# Patient Record
Sex: MALE | Race: WHITE | NOT HISPANIC OR LATINO | Employment: OTHER | URBAN - METROPOLITAN AREA
[De-identification: names, ages, dates, MRNs, and addresses within clinical notes are randomized per-mention and may not be internally consistent; named-entity substitution may affect disease eponyms.]

---

## 2019-10-22 ENCOUNTER — HISTORICAL (OUTPATIENT)
Dept: RADIOLOGY | Facility: HOSPITAL | Age: 74
End: 2019-10-22

## 2020-04-22 ENCOUNTER — HOSPITAL ENCOUNTER (OUTPATIENT)
Dept: MEDSURG UNIT | Facility: HOSPITAL | Age: 75
End: 2020-04-23
Attending: INTERNAL MEDICINE | Admitting: INTERNAL MEDICINE

## 2020-04-22 LAB
ABS NEUT (OLG): 9.8 X10(3)/MCL (ref 2.1–9.2)
ALBUMIN SERPL-MCNC: 3.8 GM/DL (ref 3.4–4.8)
ALBUMIN/GLOB SERPL: 1 RATIO (ref 1.1–2)
ALP SERPL-CCNC: 79 UNIT/L (ref 40–150)
ALT SERPL-CCNC: 18 UNIT/L (ref 0–55)
APPEARANCE, UA: ABNORMAL
AST SERPL-CCNC: 16 UNIT/L (ref 5–34)
BACTERIA SPEC CULT: ABNORMAL /HPF
BASOPHILS # BLD AUTO: 0 X10(3)/MCL (ref 0–0.2)
BASOPHILS NFR BLD AUTO: 0 %
BILIRUB SERPL-MCNC: 3.7 MG/DL
BILIRUB UR QL STRIP: NEGATIVE
BILIRUBIN DIRECT+TOT PNL SERPL-MCNC: 0.4 MG/DL (ref 0–0.5)
BILIRUBIN DIRECT+TOT PNL SERPL-MCNC: 3.3 MG/DL (ref 0–0.8)
BUN SERPL-MCNC: 17 MG/DL (ref 8.4–25.7)
CALCIUM SERPL-MCNC: 9.1 MG/DL (ref 8.8–10)
CHLORIDE SERPL-SCNC: 104 MMOL/L (ref 98–107)
CO2 SERPL-SCNC: 25 MMOL/L (ref 23–31)
COLOR UR: ABNORMAL
CREAT SERPL-MCNC: 1.03 MG/DL (ref 0.73–1.18)
EOSINOPHIL # BLD AUTO: 0 X10(3)/MCL (ref 0–0.9)
EOSINOPHIL NFR BLD AUTO: 0 %
ERYTHROCYTE [DISTWIDTH] IN BLOOD BY AUTOMATED COUNT: 13.2 % (ref 11.5–17)
GLOBULIN SER-MCNC: 3.8 GM/DL (ref 2.4–3.5)
GLUCOSE (UA): NEGATIVE
GLUCOSE SERPL-MCNC: 107 MG/DL (ref 82–115)
HCT VFR BLD AUTO: 45.9 % (ref 42–52)
HGB BLD-MCNC: 14.5 GM/DL (ref 14–18)
HGB UR QL STRIP: ABNORMAL
KETONES UR QL STRIP: NEGATIVE
LEUKOCYTE ESTERASE UR QL STRIP: ABNORMAL
LYMPHOCYTES # BLD AUTO: 1.4 X10(3)/MCL (ref 0.6–4.6)
LYMPHOCYTES NFR BLD AUTO: 11 %
MAGNESIUM SERPL-MCNC: 2.25 MG/DL (ref 1.6–2.6)
MCH RBC QN AUTO: 29.8 PG (ref 27–31)
MCHC RBC AUTO-ENTMCNC: 31.6 GM/DL (ref 33–36)
MCV RBC AUTO: 94.3 FL (ref 80–94)
MONOCYTES # BLD AUTO: 1.3 X10(3)/MCL (ref 0.1–1.3)
MONOCYTES NFR BLD AUTO: 10 %
NEUTROPHILS # BLD AUTO: 9.8 X10(3)/MCL (ref 2.1–9.2)
NEUTROPHILS NFR BLD AUTO: 78 %
NITRITE UR QL STRIP: NEGATIVE
PH UR STRIP: 6.5 [PH] (ref 5–9)
PLATELET # BLD AUTO: 157 X10(3)/MCL (ref 130–400)
PMV BLD AUTO: 9.7 FL (ref 9.4–12.4)
POC TROPONIN: 0.03 NG/ML (ref 0–0.08)
POTASSIUM SERPL-SCNC: 4.1 MMOL/L (ref 3.5–5.1)
PROT SERPL-MCNC: 7.6 GM/DL (ref 5.8–7.6)
PROT UR QL STRIP: ABNORMAL
RBC # BLD AUTO: 4.87 X10(6)/MCL (ref 4.7–6.1)
RBC #/AREA URNS HPF: ABNORMAL /HPF
SARS-COV-2 RNA RESP QL NAA+PROBE: NOT DETECTED
SODIUM SERPL-SCNC: 137 MMOL/L (ref 136–145)
SP GR UR STRIP: 1.02 (ref 1–1.03)
SQUAMOUS EPITHELIAL, UA: ABNORMAL
TROPONIN I SERPL-MCNC: 0.02 NG/ML (ref 0–0.04)
TROPONIN I SERPL-MCNC: 0.02 NG/ML (ref 0–0.04)
UA WBC MAN: >200 /HPF
UROBILINOGEN UR STRIP-ACNC: 2
WBC # SPEC AUTO: 12.6 X10(3)/MCL (ref 4.5–11.5)

## 2020-04-23 LAB — TROPONIN I SERPL-MCNC: 0.02 NG/ML (ref 0–0.04)

## 2022-04-30 NOTE — ED PROVIDER NOTES
Patient:   Madan Elder             MRN: [id number]            FIN: [id number]               Age:   74 years     Sex:  Male     :  1945   Associated Diagnoses:   Chest pain   Author:   Bienvenido MANZANARES, Ania Washburn      Basic Information   Time seen: Date & time 2020 10:57:00.   History source: Patient, EMS.   Arrival mode: Ambulance.   History limitation: hard of hearing.   Additional information: Chief Complaint from Nursing Triage Note : Chief Complaint   2020 10:56 CDT      Chief Complaint           pt to ER via [facility] for CP and SOB.  started yesterday.  possible covid exposure and elderly apartments he resides at per EMS.  hx pacer  .   Provider/Visit info:    No qualifying data available.   .   History of Present Illness   The patient presents with chest pain.  The onset was 1  days ago.  The course/duration of symptoms is fluctuating in intensity.  Location: Substernal chest. Radiating pain: none. The character of symptoms is pressure.  The degree at onset was moderate.  The degree at maximum was moderate.  The degree at present is none.  The relieving factor is none.  Associated symptoms: shortness of breath and cough attributed to allergies, improved with antihistamine.  75 yo M history of HTN, HLD, PM presents for chest pressure, intermittent since last night with SOB. Currently CP free. No fever. Lives in an apartment complex with neighbors who have COVID-19, however patient has not been out of his apartment and has not had visitors. Limited history as he is hard of hearing. History obtained by writing  questions on paper and from daughter who spoke to patient on the phone..        Review of Systems   Constitutional symptoms:  No fever,    Skin symptoms:  No rash,    Eye symptoms:  No recent vision problems,    ENMT symptoms:  No sore throat,    Respiratory symptoms:  Shortness of breath, cough.    Cardiovascular symptoms:  Chest pain, central, moderate pain, intermittent, pressure,  No syncope,    Gastrointestinal symptoms:  No abdominal pain, no nausea, no vomiting, no diarrhea, no rectal bleeding.    Genitourinary symptoms   Neurologic symptoms:  No headache, no altered level of consciousness.       Health Status   Allergies:    Allergic Reactions (Selected)  No Known Allergies,    Allergies (1) Active Reaction  No Known Allergies None Documented  .   Medications:  (Selected)   Prescriptions  Prescribed  DuoNeb 0.5 mg-2.5 mg/3 mL inhalation solution: 3 mL, NEB, q4hr Resp, PRN PRN shortness of breath or wheezing, # 540 mL, 0 Refill(s)  Lasix 20 mg oral tablet: 20 mg = 1 tab(s), Oral, Daily, # 30 tab(s), 0 Refill(s)  folic acid 1 mg oral tablet: 1 mg = 1 tab(s), Oral, Daily, # 30 tab(s), 0 Refill(s)  Documented Medications  Documented  atorvastatin 10 mg oral tablet: 10 mg = 1 tab(s), Oral, Daily  carvedilol 6.25 mg oral tablet: 6.25 mg = 1 tab(s), Oral, BID  lisinopril 5 mg oral tablet: 5 mg = 1 tab(s), Oral, Daily, 0 Refill(s)  thiamine 100 mg oral tablet: 1 tab, Oral, Daily, 0 Refill(s).      Past Medical/ Family/ Social History   Medical history:    Resolved  Essential hypertension (66112833):  Resolved.  Cardiomyopathy ([id number]):  Resolved.  Valvular heart disease ([id number]):  Resolved..   Surgical history:    Shunt Ventriculo Peritoneal (.) on 9/24/2019 at 73 Years.  Comments:  9/24/2019 15:24 Charmaine Manley RN  auto-populated from documented surgical case  Laparoscopic Shunt Ventriculo Peritoneal (.) on 9/24/2019 at 73 Years.  Comments:  9/24/2019 15:24 Charmaine Manley RN  auto-populated from documented surgical case  Appendectomy; ([id number]).  Cholecystectomy ([id number]).  Exploratory laparotomy ([id number])..   Family history:    Cardiac arrhythmia.  Mother  Father  .   Social history:    Social & Psychosocial Habits    Alcohol  10/10/2017  Use: Past    Type: Liquor    Frequency: Daily    Has alcohol use interfered with work or home life? Yes    Do you ever  drink more than intended? Yes    Has anyone been hurt or at risk by your drinking? No    Ready to change: No    Concerns about alcohol use in household: No    Comment: according to patients daughter, who is 50 years of age, patient has been drinking a fifth to a 1/2 galloon of vodka her whole life. - 05/17/2017 00:16 - Isak COHEN, Abdirashid WALLS    09/18/2019  Use: Past    Comment: pt denies. pt daughter states she believes he was drinking last night. - 09/18/2019 10:27 - Maricarmen Yeung RN    09/18/2019  Use: Current    Type: Liquor    Frequency: Daily    Has alcohol use interfered with work or home life? Yes    Has anyone been hurt or at risk by your drinking? No    Concerns about alcohol use in household: Yes    Comment: drinks vodka but hides it from his daughter - 09/18/2019 14:38 - Karen COHEN, Darby GRAHAM    Substance Use  05/13/2015  Use: Never    Tobacco  10/10/2017  Use: Former smoker    Type: Cigarettes    Started at age: 15.0 Years    Comment: quit in 1966 - 10/10/2017 13:57 - Yoselin Ho RN    09/18/2019  Use: Former smoker, quit more    Patient Wants Consult For Cessation Counseling No    09/24/2019  Use: Former smoker, quit more    Type: Cigarettes    Patient Wants Consult For Cessation Counseling No    Concerns about tobacco use in household: No    Abuse/Neglect  09/24/2019  SHX Any signs of abuse or neglect No    Feels unsafe at home: No    Safe place to go: Yes  .      Physical Examination               Vital Signs   Vital Signs   4/22/2020 11:00 CDT      Peripheral Pulse Rate     79 bpm                             Respiratory Rate          18 br/min                             SpO2                      98 %                             Oxygen Therapy            Room air                             Systolic Blood Pressure   146 mmHg  HI                             Diastolic Blood Pressure  74 mmHg                             Mean Arterial Pressure, Cuff              98 mmHg    4/22/2020 10:56 CDT       Temperature Oral          36.6 DegC                             Temperature Oral (calculated)             97.88 DegF                             Respiratory Rate          20 br/min                             Oxygen Therapy            Room air  .      No qualifying data available.   General:  Alert, no acute distress.    Skin:  Warm, dry, intact, no pallor, normal for ethnicity.    Head:  Normocephalic, atraumatic.    Neck:  Supple, trachea midline.    Eye:  Extraocular movements are intact, normal conjunctiva.    Ears, nose, mouth and throat:  Oral mucosa moist.   Cardiovascular:  Regular rate and rhythm, Normal peripheral perfusion, No edema.    Respiratory:  Lungs are clear to auscultation, respirations are non-labored, breath sounds are equal, Symmetrical chest wall expansion.    Gastrointestinal:  Soft, Nontender, Non distended.    Back:  Normal range of motion.   Musculoskeletal:  Normal ROM.   Neurological:  Alert and oriented to person, place, time, and situation, No focal neurological deficit observed, normal sensory observed, normal motor observed.    Psychiatric:  Cooperative.      Medical Decision Making   Differential Diagnosis:  Unstable angina, angina, anxiety, pneumonia, gastroesophageal reflux disease, congestive heart failure, bronchitis.    Rationale:  73 yo M with VHD, CHF EF 35%, HTN, HLD, here for chest pain concerning for ACS. History limited secondary to [place]. Currently CP free. Pacemaker not firing currently, EKG with lateral TWI, no recent available for comparison. Will interrogate PM, Labs and CXR negative for acute findings. Hospital medicine consulted for admission for serial troponins. Patient remains CP free. COVID testing sent considering possible exposure at home with SOB. It is negative. .   Documents reviewed:  Emergency department nurses' notes.   Orders  Launch Orders   Laboratory:  Magnesium Level (Order): Stat collect, 4/22/2020 10:58 CDT, Blood, Lab Collect, Print Label By  Order Location, 4/22/2020 10:58 CDT  Troponin-I (Order): Stat collect, 4/22/2020 10:58 CDT, Blood, Lab Collect, Print Label By Order Location, 4/22/2020 10:58 CDT  CMP (Order): Stat collect, 4/22/2020 10:58 CDT, Blood, Lab Collect, Print Label By Order Location, 4/22/2020 10:58 CDT  CBC w/ Auto Diff (Order): Now collect, 4/22/2020 10:58 CDT, Blood, Lab Collect, Print Label By Order Location, 4/22/2020 10:58 CDT  POC iSTAT ER Troponin request (Order): Blood, Stat collect, 4/22/2020 10:58 CDT by Carolina GALLARDO, Leilani MAIER, Lab Collect, Print Label By Order Location  Radiology:  XR Chest 1 View (Order): Stat, 4/22/2020 10:58 CDT, Chest Pain, None, Patient Bed, Patient Has IV?, Rad Type, Not Scheduled  Cardiology:  EKG Adult 12 Lead (Order): 4/22/2020 10:58 CDT, Stat, Once, 4/22/2020 10:58 CDT, Patient Bed, Patient Has IV, Standard Precautions, -1, -1, 4/22/2020 10:58 CDT.   Electrocardiogram:  Time 4/21/2020 10:56:00, rate 77, normal sinus rhythm, P wave and MS interval 1st degree atrioventricular block, TWI in lateral leads, not currently being paced; no ST elevation.    Results review:  Lab results : Lab View   4/22/2020 12:30 CDT      SARS-CoV-2 PCR            Not Detected    4/22/2020 12:07 CDT      POC Troponin              0.03 ng/mL    4/22/2020 11:44 CDT      Sodium Lvl                137 mmol/L                             Potassium Lvl             4.1 mmol/L                             Chloride                  104 mmol/L                             CO2                       25 mmol/L                             Calcium Lvl               9.1 mg/dL                             Magnesium Lvl             2.25 mg/dL                             Glucose Lvl               107 mg/dL                             BUN                       17.0 mg/dL                             Creatinine                1.03 mg/dL                             eGFR-AA                   91  NA                             eGFR-BAILEE                   75  NA                             Bili Total                3.7 mg/dL  HI                             Bili Direct               0.4 mg/dL                             Bili Indirect             3.30 mg/dL  HI                             AST                       16 unit/L                             ALT                       18 unit/L                             Alk Phos                  79 unit/L                             Total Protein             7.6 gm/dL                             Albumin Lvl               3.8 gm/dL                             Globulin                  3.8 gm/dL  HI                             A/G Ratio                 1.0 ratio  LOW                             Troponin-I                0.019 ng/mL                             WBC                       12.6 x10(3)/mcL  HI                             RBC                       4.87 x10(6)/mcL                             Hgb                       14.5 gm/dL                             Hct                       45.9 %                             Platelet                  157 x10(3)/mcL                             MCV                       94.3 fL  HI                             MCH                       29.8 pg                             MCHC                      31.6 gm/dL  LOW                             RDW                       13.2 %                             MPV                       9.7 fL                             Abs Neut                  9.80 x10(3)/mcL  HI                             Neutro Auto               78 %  NA                             Lymph Auto                11 %  NA                             Mono Auto                 10 %  NA                             Eos Auto                  0 %  NA                             Abs Eos                   0.0 x10(3)/mcL                             Basophil Auto             0 %  NA                             Abs Neutro                9.80 x10(3)/mcL  HI                              Abs Lymph                 1.4 x10(3)/mcL                             Abs Mono                  1.3 x10(3)/mcL                             Abs Baso                  0.0 x10(3)/mcL  .   Chest X-Ray:  No acute disease process, interpretation by Emergency Physician.    Radiology results:  Rad Results (ST)  < 12 hrs   Accession: [id number]  Order: XR Chest 1 View  Report Dt/Tm: 04/22/2020 11:40  Report:   PORTABLE CHEST X-RAY, ONE FRONTAL VIEW     HISTORY: Chest Pain     COMPARISON: 9/18/2019     FINDINGS:  Dual-lead left chest cardiac device with unchanged lead projection.  Shunt catheter traverses the right hemithorax medially.     No focal consolidations, pleural effusions or pneumothoraces.  Cardiomediastinal silhouette within normal limits.  No acute bony pathology.  Soft tissues within normal limits.     IMPRESSION:     No acute thoracic abnormality.  No significant interval change.    .      Reexamination/ Reevaluation   Time: 4/22/2020 12:50:00 .   Vital signs   results included from flowsheet : Vital Signs   4/22/2020 12:00 CDT      Peripheral Pulse Rate     73 bpm                             Respiratory Rate          20 br/min  (Modified)                            SpO2                      97 %                             Oxygen Therapy            Room air                             Systolic Blood Pressure   121 mmHg                             Diastolic Blood Pressure  70 mmHg                             Mean Arterial Pressure, Cuff              87 mmHg     Course: progressing as expected.   Assessment: exam unchanged.      Impression and Plan   Diagnosis   Chest pain ([id number] R07.9)      Calls-Consults   -  4/22/2020 13:10:00 , hospital medicine.    Plan   Condition: Improved, Stable.    Disposition: Admit time  4/22/2020 13:11:00, Place in Observation Telemetry Unit, Jan MANZANARES, Jose Elias VILLANUEVA    Counseled: Patient, Family, Regarding diagnosis, Regarding diagnostic results, Regarding treatment plan, Patient  indicated understanding of instructions.

## 2022-05-03 NOTE — HISTORICAL [facility] CERNER
This is a historical note converted from Samy. Formatting and pictures may have been removed.  Please reference Samy for original formatting and attached multimedia. Admit and Discharge Dates  Admit Date: 04/22/2020  Discharge Date: 04/23/2020  Physicians  Attending Physician - Jan MANZANARES, Jose Elias HANCOCK  Admitting Physician - Jan MANZANARES, Jose Elias HANCOCK  Primary Care Physician - Shonda MANZANARES, Drew PATRICIO  Discharge Diagnosis  1.?Chest pain?R07.9,?Chest pain?R07.9  2.?Coronary artery disease?I25.10  3.?Nonischemic cardiomyopathy?I42.8  4.?Chronic systolic heart failure?I50.22  5.?Essential hypertension?I10  6.?Valvular heart disease?I38  Surgical Procedures  No procedures recorded for this visit.  Immunizations  No immunizations recorded for this visit.  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 He presented to [facility] ED via EMS with c/o CP and SOB x1 day. Describes CP as intermittent, substernal, and nonradiating. EMS reported pt lives in an apartment complex where other residents are positive for COVID-19 and the patient may have been exposed.  ?  ?   Initial ED VS include?/74, HR 79, RR 18, SPO2 98% on room air, temperature 36.6??C. ?Labs notable for total bili 3.7, indirect bili 3.3, WBC 12.6. ?Initial troponin normal. ?EKG 4/22/2020 at 10:56 AM: Rate 77, NSR with first-degree AV block and occasional PVCs. ?CXR negative for acute abnormality. ?He received Ofirmev 1 g IVPB while in the ED. COVID-19 PCR negative. He was subsequently admitted to hospital medicine services for further work-up and management. Pt is very [place]. At the time of exam he denied CP, SOB, or HA. Stated he had pain relief with IV Ofirmev. ?Patients only complaint this morning some constipation. ?He states that he normally uses enemas at home to help relieve this. ?He does have some suprapubic pressure but unsure if  this is constipation?or from bladder infection. ?UA showed too many to count?bacteria. ?He remained afebrile and no white count.? He does report some?intermittent difficulty urinating. ?States that he has a urologist that he follows up with as an outpatient but cannot recall the name. ?Encouraged him to make an appointment with him for the next available. ?We will ahead and discharge him today on?5 days of Bactrim for the UTI. ?To follow-up cultures as an outpatient. ?No further chest pain. ?Troponins remain negative.  Time Spent on discharge  Time to discharge 31 minutes  Objective  Vitals & Measurements  T:?36.9? ?C (Oral)? TMIN:?36.5? ?C (Oral)? TMAX:?36.9? ?C (Oral)? HR:?75(Peripheral)? RR:?12? BP:?136/78? SpO2:?95%? WT:?70.4?kg? BMI:?23.52?  Physical Exam  General:?Cooperative; elderly male?in no acute distress  Eye: PERRLA, EOMI, clear conjunctiva, eyelids normal  HENT:?normocephalic; atraumatic; [place]  Neck: full range of motion, No JVD  Respiratory:?clear to auscultation bilaterally; non-labored respirations; symmetrical chest expansion  Cardiovascular:?regular rate and rhythm; no edema noted  Gastrointestinal:?soft, non-tender, non-distended with normal bowel sounds  Musculoskeletal:?moves all extremities  Integumentary: warm and dry  Neurologic: Alert and oriented; motor/sensory function intact  Patient Discharge Condition  stable  Discharge Disposition  home with home health   Discharge Medication Reconciliation  Prescribed  sulfamethoxazole-trimethoprim (Bactrim  mg-160 mg oral tablet)?1 tab(s), Oral, BID  Continue  albuterol-ipratropium (DuoNeb 0.5 mg-2.5 mg/3 mL inhalation solution)?3 mL, NEB, q4hr Resp, PRN shortness of breath or wheezing  aspirin (aspirin 81 mg oral tablet)?81 mg, Oral, At Bedtime  atorvastatin (atorvastatin 10 mg oral tablet)?10 mg, Oral, Daily  carvedilol (carvedilol 6.25 mg oral tablet)?6.25 mg, Oral, BID  folic acid (folic acid 1 mg oral tablet)?1 mg, Oral, Daily  furosemide  (Lasix 20 mg oral tablet)?20 mg, Oral, Daily  lisinopril (lisinopril 5 mg oral tablet)?5 mg, Oral, Daily  thiamine (thiamine 100 mg oral tablet)?1 tab, Oral, Daily  Education and Orders Provided  Discharge - 04/23/20 8:03:00 CDT, Dis/Trn Home Health, After BM?  Discharge Activity - Activity as Tolerated?  Discharge Diet - Cardiac?  Follow up  Outpatient urology with next available appointment.  Cardiology as scheduled  PCP in 1 to 2 weeks

## 2022-05-03 NOTE — HISTORICAL [facility]ERNER
This is a historical note converted from Samy. Formatting and pictures may have been removed.  Please reference Samy for original formatting and attached multimedia. Chief Complaint  pt to ER via [facility] for CP and SOB. ?started yesterday. ?possible covid exposure and elderly apartments he resides at per EMS. ?hx pacer  History of Present Illness  Mr. Elder is a 74-year-old  male with past medical history of HTN, HLD, VHD, NPH s/p  shunt, dilated nonischemic?CMO -?EF 35% (9/2018) s/p AICD, and non-obstructive CAD. He presented to [facility] ED via EMS with c/o CP and SOB x1 day. Describes CP as intermittent, substernal, and nonradiating. EMS reported pt lives in an apartment complex where other residents are positive for COVID-19 and the patient may have been exposed.  ?  Initial ED VS include?/74, HR 79, RR 18, SPO2 98% on room air, temperature 36.6??C. ?Labs notable for total bili 3.7, indirect bili 3.3, WBC 12.6. ?Initial troponin normal. ?EKG 4/22/2020 at 10:56 AM: Rate 77, NSR with first-degree AV block and occasional PVCs. ?CXR negative for acute abnormality. ?He received Ofirmev 1 g IVPB while in the ED. COVID-19 PCR negative. He was subsequently admitted to hospital medicine services for further work-up and management. Pt is very [place]. At the time of exam he denied CP, SOB, or HA. Stated he had pain relief with IV Ofirmev.  Review of Systems  Except as documented, all other systems reviewed and negative.?  Physical Exam  Vitals & Measurements  T:?36.6? ?C (Oral)? HR:?73(Peripheral)? RR:?20? BP:?121/70? SpO2:?97%?  General:?Cooperative; elderly male?in no acute distress  Eye: PERRLA, EOMI, clear conjunctiva, eyelids normal  HENT:?normocephalic; atraumatic; [place]  Neck: full range of motion, No JVD  Respiratory:?clear to auscultation bilaterally; non-labored respirations; symmetrical chest expansion  Cardiovascular:?regular rate and rhythm; no edema noted  Gastrointestinal:?soft, non-tender,  non-distended with normal bowel sounds  Musculoskeletal:?moves all extremities  Integumentary: warm and dry  Neurologic: Alert and oriented; motor/sensory function intact  Assessment/Plan  Atypical Chest Pain  Nonobstructive CAD  Dilated nonischemic cardiomyopathy s/p AICD (10.10.17) - ECHO (9.17.18) - EF 35%  Essential HTN  VHD  Leukocytosis  Indirect Hyperbilirubinemia  Hx of NPH s/p  shunt, EtOH abuse  ?  Plan:  Trend Troponin levels x3  Telemetry Monitoring  Resume appropriate home medications  Labs in AM  If he remains?symptom free, can likely be discharged home in the AM.  ?  Source of history: Self. Medical record.?  Present at bedside: None.?  History limitation: None.  Provider information: PCP:?NA  ?   Code status: Full  DVT prophylaxis: SCDs  ?  I, Marquita Barron NP have reviewed and discussed this case with Dr. DEVONTE Montoya.  ?  Patient seen/examined personally by me. ?Case discussed with NP.? 74-year-old male admitted for complaints of?chest pain that began last evening.  Work-up in the ER?was negative. ?EKG shows no acute changes.? Patient was seen by [facility] last September?and noted to have nonobstructive CAD.  He has an ICD in place but denies having any?type of shock.? Patient is?asymptomatic at present.? He tells me that it could have been a panic attack causing his CP  ?  General:?elderly male, in no acute distress, decreased hearing  Respiratory:?clear to auscultation bilaterally  Cardiovascular:?regular rate and rhythm? no edema  Gastrointestinal:?soft, non-tender, non-distended with normal bowel sounds  Neurologic: cranial nerves intact, no focal deficits  ?   Personally reviewed labs/radiographs/EKG  ?   -Monitor on telemetry, check serial cardiac enzymes  -Continue aspirin, beta-blocker, statin  -DC in a.m.?symptoms resolve and troponins negative  -Agree with exam/plan as documented by?NP   Problem List/Past Medical History  HTN, HLD, VHD, NPH s/p  shunt, dilated non ischemic?CMO -?EF 35%  (9/2018) s/p AICD, non-obstructive CAD  Procedure/Surgical History  Bypass Cerebral Ventricle to Peritoneal Cavity with Synthetic Substitute, Open Approach (09/24/2019)  Inspection of Peritoneal Cavity, Percutaneous Endoscopic Approach (09/24/2019)  Laparoscopic Shunt Ventriculo Peritoneal (.) (09/24/2019)  Shunt Ventriculo Peritoneal (.) (09/24/2019)  Drainage of Spinal Canal, Percutaneous Approach, Diagnostic (09/20/2019)  Insertion of Defibrillator Generator into Chest Subcutaneous Tissue and Fascia, Open Approach (10/10/2017)  Insertion of Defibrillator Lead into Right Atrium, Percutaneous Approach (10/10/2017)  Insertion of Defibrillator Lead into Right Ventricle, Percutaneous Approach (10/10/2017)  Appendectomy;  Cholecystectomy  Exploratory laparotomy   Medications  Inpatient  acetaminophen, 1000 mg= 2 tab(s), Oral, q6hr, PRN  Zofran, 4 mg= 2 mL, IV Push, q4hr, PRN  Home  aspirin 81 mg oral tablet, 81 mg= 1 tab(s), Oral, Daily  atorvastatin 10 mg oral tablet, 10 mg= 1 tab(s), Oral, Daily  carvedilol 6.25 mg oral tablet, 6.25 mg= 1 tab(s), Oral, BID  DuoNeb 0.5 mg-2.5 mg/3 mL inhalation solution, 3 mL, NEB, q4hr Resp, PRN,? ?Investigating  folic acid 1 mg oral tablet, 1 mg= 1 tab(s), Oral, Daily  Lasix 20 mg oral tablet, 20 mg= 1 tab(s), Oral, Daily  lisinopril 5 mg oral tablet, 5 mg= 1 tab(s), Oral, Daily  thiamine 100 mg oral tablet, 1 tab, Oral, Daily  Allergies  No Known Allergies  Social History  Abuse/Neglect  No, No, Yes, 09/24/2019  Alcohol  Current, Liquor, Daily, Alcohol use interferes with work or home: Yes. Others hurt by drinking: No. Household alcohol concerns: Yes., 09/18/2019  Substance Use  Never, 05/13/2015  Tobacco  Former smoker, quit more than 30 days ago, Cigarettes, No, Household tobacco concerns: No., 09/24/2019  Family History  Cardiac arrhythmia.: Mother and Father.  Lab Results  Labs Last 24 Hours?  ?Chemistry? Hematology/Coagulation?   Sodium Lvl: 137 mmol/L (04/22/20 11:44:00)  WBC:?12.6 x10(3)/mcL?High (04/22/20 11:44:00)   Potassium Lvl: 4.1 mmol/L (04/22/20 11:44:00) RBC: 4.87 x10(6)/mcL (04/22/20 11:44:00)   Chloride: 104 mmol/L (04/22/20 11:44:00) Hgb: 14.5 gm/dL (04/22/20 11:44:00)   CO2: 25 mmol/L (04/22/20 11:44:00) Hct: 45.9 % (04/22/20 11:44:00)   Calcium Lvl: 9.1 mg/dL (04/22/20 11:44:00) Platelet: 157 x10(3)/mcL (04/22/20 11:44:00)   Magnesium Lvl: 2.25 mg/dL (04/22/20 11:44:00) MCV:?94.3 fL?High (04/22/20 11:44:00)   Glucose Lvl: 107 mg/dL (04/22/20 11:44:00) MCH: 29.8 pg (04/22/20 11:44:00)   BUN: 17 mg/dL (04/22/20 11:44:00) MCHC:?31.6 gm/dL?Low (04/22/20 11:44:00)   Creatinine: 1.03 mg/dL (04/22/20 11:44:00) RDW: 13.2 % (04/22/20 11:44:00)   eGFR-AA: 91 (04/22/20 11:44:00) MPV: 9.7 fL (04/22/20 11:44:00)   eGFR-BAILEE: 75 (04/22/20 11:44:00) Abs Neut:?9.8 x10(3)/mcL?High (04/22/20 11:44:00)   Bili Total:?3.7 mg/dL?High (04/22/20 11:44:00) Neutro Auto: 78 % (04/22/20 11:44:00)   Bili Direct: 0.4 mg/dL (04/22/20 11:44:00) Lymph Auto: 11 % (04/22/20 11:44:00)   Bili Indirect:?3.3 mg/dL?High (04/22/20 11:44:00) Mono Auto: 10 % (04/22/20 11:44:00)   AST: 16 unit/L (04/22/20 11:44:00) Eos Auto: 0 % (04/22/20 11:44:00)   ALT: 18 unit/L (04/22/20 11:44:00) Abs Eos: 0 x10(3)/mcL (04/22/20 11:44:00)   Alk Phos: 79 unit/L (04/22/20 11:44:00) Basophil Auto: 0 % (04/22/20 11:44:00)   Total Protein: 7.6 gm/dL (04/22/20 11:44:00) Abs Neutro:?9.8 x10(3)/mcL?High (04/22/20 11:44:00)   Albumin Lvl: 3.8 gm/dL (04/22/20 11:44:00) Abs Lymph: 1.4 x10(3)/mcL (04/22/20 11:44:00)   Globulin:?3.8 gm/dL?High (04/22/20 11:44:00) Abs Mono: 1.3 x10(3)/mcL (04/22/20 11:44:00)   A/G Ratio:?1 ratio?Low (04/22/20 11:44:00) Abs Baso: 0 x10(3)/mcL (04/22/20 11:44:00)   Troponin-I: 0.019 ng/mL (04/22/20 11:44:00) ?   POC Troponin: 0.03 ng/mL (04/22/20 12:07:00) ?   ?  ?  Diagnostic Results  Accession:?[id number]  Order:?XR Chest 1 View  Report Dt/Tm:?04/22/2020 11:40  Report:?  PORTABLE CHEST X-RAY, ONE FRONTAL  VIEW  ?  HISTORY: Chest Pain  ?  COMPARISON: 9/18/2019  ?  FINDINGS:  Dual-lead left chest cardiac device with unchanged lead projection.  Shunt catheter traverses the right hemithorax medially.  ?  No focal consolidations, pleural effusions or pneumothoraces.  Cardiomediastinal silhouette within normal limits.  No acute bony pathology.  Soft tissues within normal limits.  ?  IMPRESSION:  ?  No acute thoracic abnormality.  No significant interval change.  ?

## 2023-04-04 PROCEDURE — 99285 EMERGENCY DEPT VISIT HI MDM: CPT | Mod: 25

## 2023-04-05 ENCOUNTER — HOSPITAL ENCOUNTER (INPATIENT)
Facility: HOSPITAL | Age: 78
LOS: 5 days | Discharge: HOME HEALTH | DRG: 065 | End: 2023-04-10
Attending: EMERGENCY MEDICINE | Admitting: INTERNAL MEDICINE
Payer: MEDICARE

## 2023-04-05 DIAGNOSIS — R20.0 NUMBNESS: Primary | ICD-10-CM

## 2023-04-05 DIAGNOSIS — R53.1 LEFT-SIDED WEAKNESS: ICD-10-CM

## 2023-04-05 DIAGNOSIS — R29.90 STROKE-LIKE SYMPTOMS: ICD-10-CM

## 2023-04-05 DIAGNOSIS — I63.9 STROKE: ICD-10-CM

## 2023-04-05 DIAGNOSIS — R29.898 LEFT ARM WEAKNESS: ICD-10-CM

## 2023-04-05 LAB
ALBUMIN SERPL-MCNC: 3.7 G/DL (ref 3.4–4.8)
ALBUMIN/GLOB SERPL: 1.3 RATIO (ref 1.1–2)
ALP SERPL-CCNC: 61 UNIT/L (ref 40–150)
ALT SERPL-CCNC: 18 UNIT/L (ref 0–55)
AMPHET UR QL SCN: NEGATIVE
APPEARANCE UR: ABNORMAL
AST SERPL-CCNC: 16 UNIT/L (ref 5–34)
AV INDEX (PROSTH): 0.47
AV MEAN GRADIENT: 5 MMHG
AV PEAK GRADIENT: 9 MMHG
AV REGURGITATION PRESSURE HALF TIME: 678 MS
AV VALVE AREA: 1.48 CM2
AV VELOCITY RATIO: 0.51
BACTERIA #/AREA URNS AUTO: ABNORMAL /HPF
BARBITURATE SCN PRESENT UR: NEGATIVE
BASOPHILS # BLD AUTO: 0.03 X10(3)/MCL (ref 0–0.2)
BASOPHILS NFR BLD AUTO: 0.5 %
BENZODIAZ UR QL SCN: NEGATIVE
BILIRUB UR QL STRIP.AUTO: NEGATIVE MG/DL
BILIRUBIN DIRECT+TOT PNL SERPL-MCNC: 3.5 MG/DL
BUN SERPL-MCNC: 16.6 MG/DL (ref 8.4–25.7)
CALCIUM SERPL-MCNC: 8.8 MG/DL (ref 8.8–10)
CANNABINOIDS UR QL SCN: NEGATIVE
CHLORIDE SERPL-SCNC: 110 MMOL/L (ref 98–107)
CHOLEST SERPL-MCNC: 122 MG/DL
CHOLEST/HDLC SERPL: 3 {RATIO} (ref 0–5)
CO2 SERPL-SCNC: 25 MMOL/L (ref 23–31)
COCAINE UR QL SCN: NEGATIVE
COLOR UR AUTO: ABNORMAL
CREAT SERPL-MCNC: 1.08 MG/DL (ref 0.73–1.18)
CRP SERPL HS-MCNC: 1.32 MG/L
CV ECHO LV RWT: 0.32 CM
DOP CALC AO PEAK VEL: 1.46 M/S
DOP CALC AO VTI: 33.8 CM
DOP CALC LVOT AREA: 3.1 CM2
DOP CALC LVOT DIAMETER: 2 CM
DOP CALC LVOT PEAK VEL: 0.74 M/S
DOP CALC LVOT STROKE VOLUME: 49.93 CM3
DOP CALC MV VTI: 21.9 CM
DOP CALCLVOT PEAK VEL VTI: 15.9 CM
ECHO LV POSTERIOR WALL: 1.01 CM (ref 0.6–1.1)
EJECTION FRACTION: 35 %
EOSINOPHIL # BLD AUTO: 0.15 X10(3)/MCL (ref 0–0.9)
EOSINOPHIL NFR BLD AUTO: 2.5 %
ERYTHROCYTE [DISTWIDTH] IN BLOOD BY AUTOMATED COUNT: 12.8 % (ref 11.5–17)
ERYTHROCYTE [SEDIMENTATION RATE] IN BLOOD: 16 MM/HR (ref 0–15)
EST. AVERAGE GLUCOSE BLD GHB EST-MCNC: 128.4 MG/DL
FRACTIONAL SHORTENING: 16 % (ref 28–44)
GFR SERPLBLD CREATININE-BSD FMLA CKD-EPI: >60 MLS/MIN/1.73/M2
GLOBULIN SER-MCNC: 2.8 GM/DL (ref 2.4–3.5)
GLUCOSE SERPL-MCNC: 97 MG/DL (ref 82–115)
GLUCOSE UR QL STRIP.AUTO: NEGATIVE MG/DL
HBA1C MFR BLD: 6.1 %
HCT VFR BLD AUTO: 41.6 % (ref 42–52)
HDLC SERPL-MCNC: 37 MG/DL (ref 35–60)
HGB BLD-MCNC: 13.6 G/DL (ref 14–18)
IMM GRANULOCYTES # BLD AUTO: 0.03 X10(3)/MCL (ref 0–0.04)
IMM GRANULOCYTES NFR BLD AUTO: 0.5 %
INR BLD: 1.14 (ref 0–1.3)
INTERVENTRICULAR SEPTUM: 1.09 CM (ref 0.6–1.1)
KETONES UR QL STRIP.AUTO: ABNORMAL MG/DL
LDLC SERPL CALC-MCNC: 59 MG/DL (ref 50–140)
LEFT ATRIUM SIZE: 3.8 CM
LEFT ATRIUM VOLUME MOD: 69.7 CM3
LEFT INTERNAL DIMENSION IN SYSTOLE: 5.38 CM (ref 2.1–4)
LEFT VENTRICLE DIASTOLIC VOLUME: 206 ML
LEFT VENTRICLE SYSTOLIC VOLUME: 140 ML
LEFT VENTRICULAR INTERNAL DIMENSION IN DIASTOLE: 6.37 CM (ref 3.5–6)
LEFT VENTRICULAR MASS: 291.11 G
LEUKOCYTE ESTERASE UR QL STRIP.AUTO: ABNORMAL UNIT/L
LVOT MG: 1 MMHG
LVOT MV: 0.5 CM/S
LYMPHOCYTES # BLD AUTO: 1.88 X10(3)/MCL (ref 0.6–4.6)
LYMPHOCYTES NFR BLD AUTO: 31.6 %
MCH RBC QN AUTO: 29.6 PG (ref 27–31)
MCHC RBC AUTO-ENTMCNC: 32.7 G/DL (ref 33–36)
MCV RBC AUTO: 90.4 FL (ref 80–94)
MONOCYTES # BLD AUTO: 0.54 X10(3)/MCL (ref 0.1–1.3)
MONOCYTES NFR BLD AUTO: 9.1 %
MV MEAN GRADIENT: 2 MMHG
MV PEAK GRADIENT: 5 MMHG
MV VALVE AREA BY CONTINUITY EQUATION: 2.28 CM2
NEUTROPHILS # BLD AUTO: 3.32 X10(3)/MCL (ref 2.1–9.2)
NEUTROPHILS NFR BLD AUTO: 55.8 %
NITRITE UR QL STRIP.AUTO: POSITIVE
NRBC BLD AUTO-RTO: 0 %
OPIATES UR QL SCN: NEGATIVE
PCP UR QL: NEGATIVE
PH UR STRIP.AUTO: 7 [PH]
PH UR: 7 [PH] (ref 3–11)
PISA AR MAX VEL: 3.46 M/S
PISA RADIUS: 0.6 CM
PISA TR MAX VEL: 2.57 M/S
PISA VN NYQUIST MS: 0.29 M/S
PISA VN NYQUIST: 0.29 M/S
PLATELET # BLD AUTO: 209 X10(3)/MCL (ref 130–400)
PMV BLD AUTO: 10 FL (ref 7.4–10.4)
POTASSIUM SERPL-SCNC: 4.2 MMOL/L (ref 3.5–5.1)
PROT SERPL-MCNC: 6.5 GM/DL (ref 5.8–7.6)
PROT UR QL STRIP.AUTO: ABNORMAL MG/DL
PROTHROMBIN TIME: 14.5 SECONDS (ref 12.5–14.5)
RA PRESSURE: 3 MMHG
RBC # BLD AUTO: 4.6 X10(6)/MCL (ref 4.7–6.1)
RBC #/AREA URNS AUTO: 5 /HPF
RBC UR QL AUTO: ABNORMAL UNIT/L
SINUS: 3.8 CM
SODIUM SERPL-SCNC: 139 MMOL/L (ref 136–145)
SP GR UR STRIP.AUTO: 1.04 (ref 1–1.03)
SPECIFIC GRAVITY, URINE AUTO (.000) (OHS): 1.04 (ref 1–1.03)
SQUAMOUS #/AREA URNS AUTO: <5 /HPF
TDI LATERAL: 0.1 M/S
TDI SEPTAL: 0.06 M/S
TDI: 0.08 M/S
TR MAX PG: 26 MMHG
TRICUSPID ANNULAR PLANE SYSTOLIC EXCURSION: 1.58 CM
TRIGL SERPL-MCNC: 130 MG/DL (ref 34–140)
TSH SERPL-ACNC: 1.53 UIU/ML (ref 0.35–4.94)
TV REST PULMONARY ARTERY PRESSURE: 29 MMHG
UROBILINOGEN UR STRIP-ACNC: 1 MG/DL
VLDLC SERPL CALC-MCNC: 26 MG/DL
WBC # SPEC AUTO: 6 X10(3)/MCL (ref 4.5–11.5)
WBC #/AREA URNS AUTO: 754 /HPF

## 2023-04-05 PROCEDURE — 25500020 PHARM REV CODE 255: Performed by: STUDENT IN AN ORGANIZED HEALTH CARE EDUCATION/TRAINING PROGRAM

## 2023-04-05 PROCEDURE — 80061 LIPID PANEL: CPT | Performed by: NURSE PRACTITIONER

## 2023-04-05 PROCEDURE — 93005 ELECTROCARDIOGRAM TRACING: CPT

## 2023-04-05 PROCEDURE — 63600175 PHARM REV CODE 636 W HCPCS: Performed by: EMERGENCY MEDICINE

## 2023-04-05 PROCEDURE — 25000003 PHARM REV CODE 250: Performed by: NURSE PRACTITIONER

## 2023-04-05 PROCEDURE — 93010 ELECTROCARDIOGRAM REPORT: CPT | Mod: ,,, | Performed by: INTERNAL MEDICINE

## 2023-04-05 PROCEDURE — 83036 HEMOGLOBIN GLYCOSYLATED A1C: CPT | Performed by: NURSE PRACTITIONER

## 2023-04-05 PROCEDURE — 80307 DRUG TEST PRSMV CHEM ANLYZR: CPT | Performed by: PHYSICIAN ASSISTANT

## 2023-04-05 PROCEDURE — 80053 COMPREHEN METABOLIC PANEL: CPT | Performed by: PHYSICIAN ASSISTANT

## 2023-04-05 PROCEDURE — 85651 RBC SED RATE NONAUTOMATED: CPT | Performed by: NURSE PRACTITIONER

## 2023-04-05 PROCEDURE — 93010 EKG 12-LEAD: ICD-10-PCS | Mod: ,,, | Performed by: INTERNAL MEDICINE

## 2023-04-05 PROCEDURE — 87088 URINE BACTERIA CULTURE: CPT | Performed by: NURSE PRACTITIONER

## 2023-04-05 PROCEDURE — 86141 C-REACTIVE PROTEIN HS: CPT | Performed by: NURSE PRACTITIONER

## 2023-04-05 PROCEDURE — 84443 ASSAY THYROID STIM HORMONE: CPT | Performed by: NURSE PRACTITIONER

## 2023-04-05 PROCEDURE — 21400001 HC TELEMETRY ROOM

## 2023-04-05 PROCEDURE — 11000001 HC ACUTE MED/SURG PRIVATE ROOM

## 2023-04-05 PROCEDURE — 97166 OT EVAL MOD COMPLEX 45 MIN: CPT

## 2023-04-05 PROCEDURE — 85610 PROTHROMBIN TIME: CPT | Performed by: NURSE PRACTITIONER

## 2023-04-05 PROCEDURE — 81001 URINALYSIS AUTO W/SCOPE: CPT | Performed by: NURSE PRACTITIONER

## 2023-04-05 PROCEDURE — 25000003 PHARM REV CODE 250: Performed by: EMERGENCY MEDICINE

## 2023-04-05 PROCEDURE — 85025 COMPLETE CBC W/AUTO DIFF WBC: CPT | Performed by: PHYSICIAN ASSISTANT

## 2023-04-05 RX ORDER — PROCHLORPERAZINE EDISYLATE 5 MG/ML
5 INJECTION INTRAMUSCULAR; INTRAVENOUS
Status: COMPLETED | OUTPATIENT
Start: 2023-04-05 | End: 2023-04-05

## 2023-04-05 RX ORDER — ASPIRIN 81 MG/1
1 TABLET ORAL EVERY MORNING
Status: ON HOLD | COMMUNITY
End: 2023-04-10 | Stop reason: HOSPADM

## 2023-04-05 RX ORDER — ATORVASTATIN CALCIUM 10 MG/1
20 TABLET, FILM COATED ORAL DAILY
Status: DISCONTINUED | OUTPATIENT
Start: 2023-04-05 | End: 2023-04-10 | Stop reason: HOSPADM

## 2023-04-05 RX ORDER — FOLIC ACID 1 MG/1
1000 TABLET ORAL DAILY
COMMUNITY
Start: 2023-03-10

## 2023-04-05 RX ORDER — ASPIRIN 325 MG
325 TABLET, DELAYED RELEASE (ENTERIC COATED) ORAL
Status: COMPLETED | OUTPATIENT
Start: 2023-04-05 | End: 2023-04-05

## 2023-04-05 RX ORDER — ASPIRIN 81 MG/1
81 TABLET ORAL DAILY
Status: DISCONTINUED | OUTPATIENT
Start: 2023-04-05 | End: 2023-04-06

## 2023-04-05 RX ORDER — ISOSORBIDE DINITRATE AND HYDRALAZINE HYDROCHLORIDE 37.5; 2 MG/1; MG/1
1 TABLET ORAL
Status: ON HOLD | COMMUNITY
End: 2023-04-10 | Stop reason: HOSPADM

## 2023-04-05 RX ORDER — LISINOPRIL 5 MG/1
5 TABLET ORAL DAILY
COMMUNITY
Start: 2023-03-10

## 2023-04-05 RX ORDER — THIAMINE HCL 100 MG
100 TABLET ORAL DAILY
COMMUNITY
Start: 2023-03-10

## 2023-04-05 RX ORDER — CARVEDILOL 12.5 MG/1
12.5 TABLET ORAL 2 TIMES DAILY
Status: ON HOLD | COMMUNITY
Start: 2023-03-10 | End: 2023-04-10 | Stop reason: HOSPADM

## 2023-04-05 RX ORDER — ATORVASTATIN CALCIUM 10 MG/1
10 TABLET, FILM COATED ORAL
Status: ON HOLD | COMMUNITY
Start: 2023-03-10 | End: 2023-04-10 | Stop reason: HOSPADM

## 2023-04-05 RX ORDER — SODIUM CHLORIDE 0.9 % (FLUSH) 0.9 %
10 SYRINGE (ML) INJECTION
Status: DISCONTINUED | OUTPATIENT
Start: 2023-04-05 | End: 2023-04-10 | Stop reason: HOSPADM

## 2023-04-05 RX ORDER — FUROSEMIDE 20 MG/1
20 TABLET ORAL DAILY
COMMUNITY
Start: 2023-03-10

## 2023-04-05 RX ORDER — HYDRALAZINE HYDROCHLORIDE 20 MG/ML
10 INJECTION INTRAMUSCULAR; INTRAVENOUS EVERY 6 HOURS PRN
Status: DISCONTINUED | OUTPATIENT
Start: 2023-04-05 | End: 2023-04-08

## 2023-04-05 RX ADMIN — PROCHLORPERAZINE EDISYLATE 5 MG: 5 INJECTION INTRAMUSCULAR; INTRAVENOUS at 02:04

## 2023-04-05 RX ADMIN — ASPIRIN 325 MG: 325 TABLET, COATED ORAL at 02:04

## 2023-04-05 RX ADMIN — ASPIRIN 81 MG: 81 TABLET, COATED ORAL at 09:04

## 2023-04-05 RX ADMIN — IOPAMIDOL 100 ML: 755 INJECTION, SOLUTION INTRAVENOUS at 02:04

## 2023-04-05 RX ADMIN — ATORVASTATIN CALCIUM 20 MG: 10 TABLET, FILM COATED ORAL at 03:04

## 2023-04-05 NOTE — PLAN OF CARE
04/05/23 0809   Discharge Assessment   Assessment Type Discharge Planning Assessment   Confirmed/corrected address, phone number and insurance Yes   Confirmed Demographics Correct on Facesheet   Source of Information patient   When was your last doctors appointment?   (no PCP)   Communicated DIAN with patient/caregiver Date not available/Unable to determine   Reason For Admission stroke-like symptoms   People in Home alone   Do you expect to return to your current living situation? Yes   Do you have help at home or someone to help you manage your care at home? Yes   Who are your caregiver(s) and their phone number(s)? daughter Charmaine Vargas [phone] and neighbors help   Prior to hospitilization cognitive status: Alert/Oriented   Current cognitive status: Alert/Oriented   Walking or Climbing Stairs ambulation difficulty, requires equipment   Mobility Management wheelchair or walker   Home Accessibility wheelchair accessible  (lives in 5th floor apt. but has elevator)   Home Layout Able to live on 1st floor   Equipment Currently Used at Home wheelchair;walker, rolling   Readmission within 30 days? No   Patient currently being followed by outpatient case management? No   Do you currently have service(s) that help you manage your care at home? Yes   How Many hours does patient receive services 20   Name and Contact number of agency has sitters (One Day At A Time) 5 days/wk   Do you take prescription medications? Yes  (fills rx at [facility])   Who is going to help you get home at discharge? daughter   How do you get to doctors appointments? family or friend will provide   Are you on dialysis? No   Do you take coumadin? No   Discharge Plan A Home with family   Discharge Plan B Home Health   DME Needed Upon Discharge  none   Discharge Plan discussed with: Patient   Discharge Barriers Identified None

## 2023-04-05 NOTE — PT/OT/SLP EVAL
Occupational Therapy   Evaluation    Name: Madan Elder  MRN: [id number]  Admitting Diagnosis: <principal problem not specified>  Recent Surgery: * No surgery found *      Recommendations:     Discharge Recommendations: home with home health  Discharge Equipment Recommendations:     Barriers to discharge:       Assessment:     Madan Elder is a 77 y.o. male with a medical diagnosis of CVA w/u with onset x2 days ago.  He presents with the following performance deficits affecting function: weakness, impaired endurance, impaired self care skills, impaired functional mobility, gait instability, decreased upper extremity function, decreased coordination, impaired cognition.    Pt lives at [facility], has a caregiver 9-2 M-S, but is alone at night. Caregivers assist with cooking, bathing, and IADLs. Pt uses RW/cane but mostly gets around with w/c. Today, pt was able to ambulate around ED room with RW and appears to be at baseline per daughter and caregiver in terms of mobility. LUE slightly weak with impaired coordination. Recommend home with HH, pending progress. Educated pt on exercises for LUE neuromuscular re-ed.     Rehab Prognosis: Good; patient would benefit from acute skilled OT services to address these deficits and reach maximum level of function.       Plan:     Patient to be seen 3 x/week, 5 x/week to address the above listed problems via self-care/home management, therapeutic activities, therapeutic exercises  Plan of Care Expires:    Plan of Care Reviewed with: patient, daughter, caregiver    Subjective         Occupational Profile:  Living Environment: elevator at apartment, handicap bathroom   Previous level of function: independent for basic ADLs   Roles and Routines: none   Equipment Used at Home: walker, rolling, cane, straight  Assistance upon Discharge: caregivers 9-2 Monday-Saturday     Pain/Comfort:  Pain Rating 1: 0/10    Patients cultural, spiritual, Muslim conflicts given the  current situation:      Objective:     Communicated with: nrsg prior to session.  Patient found HOB elevated with   upon OT entry to room.    General Precautions: Standard,    Orthopedic Precautions:    Braces:    Respiratory Status: Room air  /74     Occupational Performance:    Bed Mobility:    Patient completed Supine to Sit with contact guard assistance  Patient completed Sit to Supine with contact guard assistance    Functional Mobility/Transfers:  Patient completed Sit <> Stand Transfer with contact guard assistance  with  rolling walker and straight cane   Functional Mobility: initially started with RW, then used pts cane with SBA for balance     Activities of Daily Living:  Upper Body Dressing: contact guard assistance    Lower Body Dressing: contact guard assistance    Toileting: contact guard assistance      Cognitive/Visual Perceptual:  Cognitive/Psychosocial Skills:     -       Oriented to: Person, Place, and not oriented to time    Visual/Perceptual:      -Intact      Physical Exam:  Sensation:    -       Intact  Upper Extremity Strength:    -       Right Upper Extremity: WNL  -       Left Upper Extremity: 3/5   Fine Motor Coordination:    -       Impaired  Left hand, finger to nose   and Left hand thumb/finger opposition skills -slightly impaired coordination     AMPAC 6 Click ADL:  AMPAC Total Score:      Treatment & Education:  Educated pt on neuromuscular activities for strength and coordination on LUE.     Patient left HOB elevated with all lines intact, call button in reach, nrsg notified, and family present    GOALS:   Multidisciplinary Problems       Occupational Therapy Goals          Problem: Occupational Therapy    Goal Priority Disciplines Outcome Interventions   Occupational Therapy Goal     OT, PT/OT Ongoing, Progressing    Description: Goals to be met by: 4/19/2023     Patient will increase functional independence with ADLs by performing:    LE Dressing with Modified  [place].  Grooming while standing with Modified [place].  Toileting from toilet with Modified [place] for hygiene and clothing management.   Bathing from  shower chair/bench with Contact Guard Assistance.  Toilet transfer to toilet with Modified [place].                         History:     Past Medical History:   Diagnosis Date    Hypertension          Past Surgical History:   Procedure Laterality Date    INSERTION OF PACEMAKER         Time Tracking:     OT Date of Treatment:    OT Start Time: 1002  OT Stop Time: 1023  OT Total Time (min): 21 min    Billable Minutes:Evaluation Moderate Complexity     4/5/2023

## 2023-04-05 NOTE — SUBJECTIVE & OBJECTIVE
Past Medical History:   Diagnosis Date    Hypertension        Past Surgical History:   Procedure Laterality Date    INSERTION OF PACEMAKER         Review of patient's allergies indicates:  No Known Allergies    Current Neurological Medications:     No current facility-administered medications on file prior to encounter.     Current Outpatient Medications on File Prior to Encounter   Medication Sig    aspirin (ECOTRIN) 81 MG EC tablet Take 1 tablet by mouth every morning.    atorvastatin (LIPITOR) 10 MG tablet Take 10 mg by mouth.    carvediloL (COREG) 12.5 MG tablet Take 12.5 mg by mouth 2 (two) times daily.    folic acid (FOLVITE) 1 MG tablet Take 1,000 mcg by mouth once daily.    furosemide (LASIX) 20 MG tablet Take 20 mg by mouth once daily.    isosorbide-hydrALAZINE 20-37.5 mg (BIDIL) 20-37.5 mg Tab Take 1 tablet by mouth.    lisinopriL (PRINIVIL,ZESTRIL) 5 MG tablet Take 5 mg by mouth once daily.    VITAMIN B-1 100 MG tablet Take 100 mg by mouth once daily.     Family History    None       Tobacco Use    Smoking status: Never    Smokeless tobacco: Never   Substance and Sexual Activity    Alcohol use: Yes    Drug use: Never    Sexual activity: Not on file     Review of Systems   All other systems reviewed and are negative.  Objective:     Vital Signs (Most Recent):  Temp: 98.4 °F (36.9 °C) (04/05/23 0727)  Pulse: 80 (04/05/23 1202)  Resp: (!) 25 (04/05/23 1006)  BP: (!) 146/74 (04/05/23 1006)  SpO2: 96 % (MRI complete, No change in patient status.) (04/05/23 1202)   Vital Signs (24h Range):  Temp:  [98.4 °F (36.9 °C)] 98.4 °F (36.9 °C)  Pulse:  [62-95] 80  Resp:  [18-25] 25  SpO2:  [94 %-97 %] 96 %  BP: (138-162)/(71-90) 146/74     Weight: 77.5 kg (170 lb 13.7 oz)  There is no height or weight on file to calculate BMI.    Physical Exam  Vitals and nursing note reviewed.   Constitutional:       General: He is awake. He is not in acute distress.     Appearance: He is not ill-appearing or toxic-appearing.   HENT:       Head: Normocephalic and atraumatic.   Eyes:      General: No visual field deficit.     Pupils: Pupils are equal, round, and reactive to light.   Pulmonary:      Effort: Pulmonary effort is normal.   Musculoskeletal:         General: Normal range of motion.      Cervical back: Normal range of motion.   Skin:     General: Skin is warm and dry.      Capillary Refill: Capillary refill takes less than 2 seconds.   Neurological:      Mental Status: He is alert and oriented to person, place, and time.      Cranial Nerves: No dysarthria or facial asymmetry.      Sensory: No sensory deficit.      Motor: Weakness present. No tremor, atrophy, abnormal muscle tone, seizure activity or pronator drift.      Coordination: Coordination normal. Finger-Nose-Finger Test normal.   Psychiatric:         Behavior: Behavior is cooperative.       NEUROLOGICAL EXAMINATION:     MENTAL STATUS   Oriented to person, place, and time.     CRANIAL NERVES     CN III, IV, VI   Pupils are equal, round, and reactive to light.    MOTOR EXAM     Strength   Right deltoid: 5/5  Left deltoid: 5/5  Right biceps: 5/5  Left biceps: 5/5  Right triceps: 5/5  Left triceps: 5/5  Right wrist flexion: 5/5  Left wrist flexion: 5/5  Right wrist extension: 5/5  Left wrist extension: 5/5  Right quadriceps: 4/5  Left quadriceps: 4/5  Right hamstrin/5  Left hamstrin/5  Right anterior tibial: 4/5  Left anterior tibial: 4/5  Right posterior tibial: 4/5  Left posterior tibial: 4/5       4/5 left hand grasp  5/5 right hand grasp      GAIT AND COORDINATION      Coordination   Finger to nose coordination: normal    Significant Labs:   Recent Lab Results         23  0411        Albumin/Globulin Ratio 1.3       Albumin 3.7       Alkaline Phosphatase 61       ALT 18       AST 16       Baso # 0.03       Basophil % 0.5       BILIRUBIN TOTAL 3.5       BUN 16.6       Calcium 8.8       Chloride 110       Cholesterol 122       CO2 25       Creatinine 1.08       CRP,  High Sensitivity 1.32       eGFR >60       Eos # 0.15       Eosinophil % 2.5       Estimated Avg Glucose 128.4       Globulin, Total 2.8       Glucose 97       HDL 37       Hematocrit 41.6       Hemoglobin 13.6       Hemoglobin A1C External 6.1       Immature Grans (Abs) 0.03       Immature Granulocytes 0.5       INR 1.14       LDL Cholesterol External 59.00       Lymph # 1.88       LYMPH % 31.6       MCH 29.6       MCHC 32.7       MCV 90.4       Mono # 0.54       Mono % 9.1       MPV 10.0       Neut # 3.32       Neut % 55.8       nRBC 0.0       Platelets 209       Potassium 4.2       PROTEIN TOTAL 6.5       Protime 14.5       RBC 4.60       RDW 12.8       Sed Rate 16       Sodium 139       Thyroid Stimulating Hormone 1.532       Total Cholesterol/HDL Ratio 3       Triglycerides 130       Very Low Density Lipoprotein 26       WBC 6.0               Significant Imaging: I have reviewed all pertinent imaging results/findings within the past 24 hours.

## 2023-04-05 NOTE — ASSESSMENT & PLAN NOTE
LUE weakness- r/o CVA    Continue stroke workup        Antithrombotics for secondary stroke prevention: Antiplatelets: Aspirin: 81 mg daily    Statins for secondary stroke prevention and hyperlipidemia, if present:   Statins: Atorvastatin- 20 mg daily    Aggressive risk factor modification: HTN, HLD, CAD     Rehab efforts: The patient has been evaluated by a stroke team provider and the therapy needs have been fully considered based off the presenting complaints and exam findings. The following therapy evaluations are needed: PT evaluate and treat, OT evaluate and treat, SLP evaluate and treat    Diagnostics ordered/pending: CTA Head to assess vasculature , CTA Neck/Arch to assess vasculature, TTE to assess cardiac function/status     Stroke workup  -CT head: negative  -MRI brain:   1. Small areas of acute ischemia in the right posterior frontal lobe and left anterior temporal.  2. Moderate chronic microvascular ischemic changes.  3. Right posterior ventricular shunt catheter in place with decompressed ventricles.  -LDL: 59  -A1c: 6.1  -TSH: 1.532  -CRP: 1.32  -CUS: negative     VTE prophylaxis: Mechanical prophylaxis: Place SCDs    BP parameters: Infarct: No intervention, SBP <220

## 2023-04-05 NOTE — HPI
77-year-old male with past medical history of HTN, HLD, and MI presented to ED  on 4/5 for LUE weakness x 2 days. Per EMR, he presented to [facility] ED in [place], he was going to be admitted but they left and presented here. CT head was negative at [facility] ED. Caregiver reports she saw him Monday morning, he usually fixes his scrambled eggs in the morning but he did not Monday morning. He reported to her that his LUE felt weak. She walked with him downstairs to get a cup of coffee without difficulties. She also reported he had noticeable decreased hand strength, it looked like his hand was curled up. He came to [facility] ED for further evaluation. CT head was negative for acute intracranial abnormalities. Neurology was consulted for stroke workup.

## 2023-04-05 NOTE — H&P
[facility] Hospital Medicine History & Physical Examination       Patient Name: Madan Elder  MRN: [id number]  Patient Class: IP- Inpatient   Admission Date: 4/5/2023   Admitting Physician: Salvatore Urbina MD   Length of Stay: 0  Attending Physician: Salvatore Urbina MD   Primary Care Provider: Ricky Newsome MD  Face-to-Face encounter date: 04/05/2023  Code Status:Full code   Chief Complaint: Numbness (Pt arrives to ED c/o L arm numbness and weakness that he woke up with 2 days ago. Per daughter she received notice of this 1000 today and symptoms have improved throughout the day. No numbness, weakness, facial droop noted in triage at time. + Headache, nausea, SOB. Denies falls, trauma. Went to [facility] in [place] where pt had labs and CT, neuro consult, admitted to [facility] however daughter took pt out AMA due to lack of satisfaction of their care. Cardiologist Dr. Newsome. )        Patient information was obtained from patient, patient's family, past medical records and ER records.     HISTORY OF PRESENT ILLNESS:   Madan Elder is a 77 y.o. male with a past medical history of essential hypertension, hyperlipidemia, and  presented to [facility] on 4/5/2023 for left upper extremity weakness.  Patient reported left upper extremity weakness and numbness began approximately 2 days ago. Patient reports he noticed symptoms while cooking breakfast. Patient denies speech changes, vision changes, dizziness, headache, syncope, chest pain, shortness of breath, abdominal pain, nausea, vomiting, diarrhea, fever, and chills.  Family reports patient told them of his symptoms yesterday, prompting them to ED.  Prior to arrival patient was seen at [facility] with CT head without abnormality.  Patient was admitted for stroke workup at outside facility.  Family was not satisfied with care and signed patient out AMA.  Initial vital signs in ED were /90, pulse 85, respirations 18, temperature 36.9°  C, and SpO2 97% on room air.  Labs revealed WBC 6 and chloride 110. Neurology was consulted. Patient was admitted to hospital medicine service for further medical management.      PAST MEDICAL HISTORY:   Essential hypertension  Hyperlipidemia  Cardiomyopathy    PAST SURGICAL HISTORY:   Pacemaker insertion   Colostomy   Exploratory laparotomy  Tonsillectomy   Colonoscopy    ALLERGIES:   Patient has no known allergies.    FAMILY HISTORY:   MI: Father    SOCIAL HISTORY:   Previous alcohol and tobacco use  Denies illicit drug use    HOME MEDICATIONS:     Prior to Admission medications    Not on File       REVIEW OF SYSTEMS:   Except as documented, all other systems reviewed and negative     PHYSICAL EXAM:     VITAL SIGNS: 24 HRS MIN & MAX LAST   Temp  Min: 98.4 °F (36.9 °C)  Max: 98.4 °F (36.9 °C) 98.4 °F (36.9 °C)   BP  Min: 147/76  Max: 162/90 (!) 147/76     Pulse  Min: 71  Max: 85  71   Resp  Min: 18  Max: 18 18   SpO2  Min: 97 %  Max: 97 % 97 %       General appearance: Male in no apparent distress.  HEENNT: Atraumatic head.   Lungs: Clear to auscultation bilaterally.   Heart: Regular rate and rhythm.    Abdomen: Soft, non-distended, non-tender. Bowel sounds are normal.   Extremities: No cyanosis, clubbing, or edema. No deformities.   Skin: No Rash. Warm and dry.   Neuro: Awake, alert, and oriented. Motor and sensory exams grossly intact.  No slurred speech.  No facial droop.  Left upper extremity strength 4/5.  5/5 muscle strength in right upper extremity and bilateral lower extremities  Psych/mental status: Appropriate mood and affect. Responds appropriately to questions.     LABS AND IMAGING:   No results for input(s): WBC, RBC, HGB, HCT, MCV, MCH, MCHC, RDW, PLT, MPV, GRAN, LYMPH, MONO, BASO, NRBC in the last 168 hours.    No results for input(s): NA, K, CL, CO2, ANIONGAP, BUN, CREATININE, GLU, CALCIUM, PH, MG, ALBUMIN, PROT, ALKPHOS, ALT, AST, BILITOT in the last 168 hours.    Microbiology Results (last 7 days)        ** No results found for the last 168 hours. **             CT Abdomen Pelvis With Contrast     History.  Abdominal pain.     Reference.  16 May 2017     Technique.  Helical acquisition through the abdomen and pelvis with IV contrast.  Three plane reconstructions were provided for review.  mGycm.  Automatic exposure control, adjustment of mA/kV or iterative  reconstruction technique was used to reduce radiation.     Findings.  Mild chronic changes at the lung bases. The heart is enlarged.     No significant abnormality liver, spleen, pancreas or adrenals. The  gallbladder is surgically absent. There is no hydronephrosis. There  are isoattenuating lesions of the right kidney favored to represent  proteinaceous or hemorrhagic cysts. These are seen on images 26 and 29  series 2.     There is no bowel obstruction. Peritoneal shunt tubing is noted. There  is rectal suture line. There are no significant inflammatory changes  of the bowel. There is colonic diverticulosis. No free air.     There is Lindsey catheter within the bladder. There is irregular bladder  wall thickening with diverticula. There is extensive fat stranding  around the bladder. Prostate is moderately enlarged. No free fluid.  Abdominal aorta normal in caliber. Mild atherosclerotic disease.     Moderate degenerative change of the spine.     Impression.  Irregular bladder wall thickening with adjacent fat stranding  suspicious for cystitis. Other findings above.                 Electronically Signed By: Rigo Umanzor MD  Date/Time Signed: 10/20/2021 10:44        ASSESSMENT & PLAN:   Assessment:  CVA rule out- left arm weakness/paresthesia   Essential Hypertension   Hyperlipidemia  Cardiomyopathy    Plan:  Neurology consulted, appreciate recommendations   MRI brain   Echo   Bilateral carotid artery ultrasound   Lipid panel   Hemoglobin A1c   ESR   CRP   Urine drug screen   Physical, occupational, and speech therapy consulted   PRN  hydralazine  Neuro checks q.4 hours   Fall precautions   Continue appropriate home medications once med rec updated   Labs in a.m.    VTE Prophylaxis: SCDs    __________________________________________________________________________  INPATIENT LIST OF MEDICATIONS     Scheduled Meds:   aspirin  81 mg Oral Daily     Continuous Infusions:  PRN Meds:.hydrALAZINE, sodium chloride 0.9%      Roman LOCO PA-C, have reviewed and discussed the case with Dr. Salvatore Urbina MD   Please see the following addendum for further assessment and plan from there attending MD.    04/05/2023    ________________________________________________________________________________    MD Addendum:  I,  assumed care of this patient today at ---am/pm  For the patient encounter, I performed the substantive portion of the visit, I reviewed the PA documentation, treatment plan, and medical decision making.  I had face to face time with this patient     A. History:    B. Physical exam:    C. Medical decision making:    Discharge Planning and Disposition: No mobility needs. Ambulating well. Good social support system.   Anticipated discharge    All diagnosis and differential diagnosis have been reviewed; assessment and plan has been documented; I have personally reviewed the labs and test results that are presently available; I have reviewed the patients medication list; I have reviewed the consulting providers response and recommendations. I have reviewed or attempted to review medical records based upon their availability.    All of the patient and family questions have been addressed and answered. Patient's is agreeable to the above stated plan. I will continue to monitor closely and make adjustments to medical management as needed.      04/05/2023

## 2023-04-05 NOTE — ED PROVIDER NOTES
Encounter Date: 4/4/2023    SCRIBE #1 NOTE: I, Sanam Siu, am scribing for, and in the presence of,  Kevon Hermosillo MD. I have scribed the following portions of the note - Other sections scribed: HPI,RSO,PE.     History     Chief Complaint   Patient presents with    Numbness     Pt arrives to ED c/o L arm numbness and weakness that he woke up with 2 days ago. Per daughter she received notice of this 1000 today and symptoms have improved throughout the day. No numbness, weakness, facial droop noted in triage at time. + Headache, nausea, SOB. Denies falls, trauma. Went to [facility] in [place] where pt had labs and CT, neuro consult, admitted to [facility] hospital however daughter took pt out AMA due to lack of satisfaction of their care. Cardiologist Dr. Newsome.      77-year-old male with past medical history of HTN, HLD, and MI presents to ED with daughter c/o left arm weakness onset 2x days, daughter states she noticed symptom yesterday 4/4. Pt reports decreased  strength. Pt was seen at [facility] in [place] with plans for admission, however, daughter states unsatisfactory with service therefore signed out and presented here. At [place], CT head negative, C-spine showed effacement of multilevel neural foramina , EKG there sinus rhythm. Pt received Levaquin and aspirin.     Currently, pt reports weakness improved some, c/o HA in room. Pt denies leg weakness, speech change, ankle pain. Daughter reports of SOB on exertion yesterday. Pt follows with Dr. Newsome.     The history is provided by the patient and a relative. No  was used.   Neurologic Problem  The primary symptoms include headaches. Primary symptoms do not include fever, nausea or vomiting. The symptoms began two days ago. The symptoms are improving. The neurological symptoms are focal.   The headache began today. Headache is a new problem. The headache is associated with weakness. The headache is not  associated with eye pain.   Additional symptoms include weakness. Medical issues also include hypertension. Workup history includes CT scan and cardiac workup.   Review of patient's allergies indicates:  No Known Allergies  Past Medical History:   Diagnosis Date    Hypertension      Past Surgical History:   Procedure Laterality Date    INSERTION OF PACEMAKER       No family history on file.  Social History     Tobacco Use    Smoking status: Never    Smokeless tobacco: Never   Substance Use Topics    Alcohol use: Yes    Drug use: Never     Review of Systems   Constitutional:  Negative for chills, diaphoresis, fatigue and fever.   HENT:  Negative for congestion and trouble swallowing.    Eyes:  Negative for pain and discharge.   Respiratory:  Negative for cough and wheezing.    Cardiovascular:  Negative for chest pain and leg swelling.   Gastrointestinal:  Negative for abdominal pain, diarrhea, nausea and vomiting.   Genitourinary:  Negative for dysuria and flank pain.   Musculoskeletal:  Negative for arthralgias.   Skin:  Negative for color change.   Neurological:  Positive for weakness and headaches. Negative for syncope and speech difficulty.   Psychiatric/Behavioral:  Negative for agitation and confusion.      Physical Exam     Initial Vitals [04/05/23 0006]   BP Pulse Resp Temp SpO2   (!) 162/90 85 18 98.4 °F (36.9 °C) 97 %      MAP       --         Physical Exam    Nursing note and vitals reviewed.  Constitutional: He appears well-developed. No distress.   HENT:   Head: Normocephalic and atraumatic.   Mouth/Throat: Oropharynx is clear and moist.   Eyes: Conjunctivae and EOM are normal. Pupils are equal, round, and reactive to light.   Neck: Neck supple.   Cardiovascular:  Normal rate and regular rhythm.           No murmur heard.  Pulmonary/Chest: Breath sounds normal. No respiratory distress. He exhibits no tenderness.   Abdominal: Abdomen is soft. Bowel sounds are normal. He exhibits no distension. There is no  abdominal tenderness.   Musculoskeletal:         General: No edema. Normal range of motion.      Cervical back: Neck supple.      Lumbar back: Normal. No tenderness. Normal range of motion.     Neurological: He is alert and oriented to person, place, and time.   Patient with weak  strength, left greater than right as well as weak bicep strength, left greater than right.    Psychiatric: He has a normal mood and affect. Judgment normal.       ED Course   Procedures  Labs Reviewed - No data to display  EKG Readings: (Independently Interpreted)   Conduction: Normal. Axis: Normal.   EKG done at 0017: Paced rhythm at 70bpm with t-wave inversion in lateral leads. Occasional PVCs.      Imaging Results    None          Medications   aspirin EC tablet 325 mg (has no administration in time range)   prochlorperazine injection Soln 5 mg (has no administration in time range)      Medical Decision Making  The differential diagnosis includes, but is not limited to: CVA, partial seizure, and Radiculopathy    Amount and/or Complexity of Data Reviewed  Independent Historian: guardian     Details: Pt was seen at [facility] in [place] with plans for admission, however, daughter states unsatisfactory with service therefore signed out and presented here. At [place], CT head negative, MRI C-spine showed multilevel neural foramina , EKG there sinus rhythm. Pt received Levaquin and aspirin.  External Data Reviewed: labs, radiology and notes.  Labs: ordered. Decision-making details documented in ED Course.  Radiology: ordered. Decision-making details documented in ED Course.             Scribe Attestation:   Scribe #1: I performed the above scribed service and the documentation accurately describes the services I performed. I attest to the accuracy of the note.    Attending Attestation:           Physician Attestation for Scribe:  Physician Attestation Statement for Scribe #1: I, Kevon Hermosillo MD, reviewed  documentation, as scribed by Sanam Siu in my presence, and it is both accurate and complete.           ED Course as of 04/05/23 0227 Wed Apr 05, 2023   0157 Paged Hospitalist  [DP]   0224 Call and Consult Hospitalist  [DP]      ED Course User Index  [DP] Nikki Siu                   Clinical Impression:   Final diagnoses:  [R29.90] Stroke-like symptoms  [R29.898] Left arm weakness        ED Disposition Condition    Admit Stable                Kevon Hermosillo MD  04/05/23 0228

## 2023-04-05 NOTE — PLAN OF CARE
Problem: Occupational Therapy  Goal: Occupational Therapy Goal  Description: Goals to be met by: 4/19/2023     Patient will increase functional independence with ADLs by performing:    LE Dressing with Modified Ray.  Grooming while standing with Modified Ray.  Toileting from toilet with Modified Ray for hygiene and clothing management.   Bathing from  shower chair/bench with Contact Guard Assistance.  Toilet transfer to toilet with Modified Ray.    Outcome: Ongoing, Progressing

## 2023-04-05 NOTE — CONSULTS
[facility] - Emergency Dept  Neurology  Consult Note    Patient Name: Madan Elder  MRN: [id number]  Admission Date: 4/5/2023  Hospital Length of Stay: 0 days  Code Status: Full Code   Attending Provider: Salvatore Urbina MD   Consulting Provider: Linda Shah NP  Primary Care Physician: Ricky Newsome MD  Principal Problem:Left-sided weakness    Inpatient consult to Neurology  Consult performed by: Linda Shah NP  Consult ordered by: Roman Linares PA-C         Subjective:     Chief Complaint:  Left sided weakness      HPI:   77-year-old male with past medical history of HTN, HLD, and MI presented to ED  on 4/5 for LUE weakness x 2 days. Per EMR, he presented to [facility] ED in [place], he was going to be admitted but they left and presented here. CT head was negative at [facility] ED. Caregiver reports she saw him Monday morning, he usually fixes his scrambled eggs in the morning but he did not Monday morning. He reported to her that his LUE felt weak. She walked with him downstairs to get a cup of coffee without difficulties. She also reported he had noticeable decreased hand strength, it looked like his hand was curled up. He came to [facility] ED for further evaluation. CT head was negative for acute intracranial abnormalities. Neurology was consulted for stroke workup.          Past Medical History:   Diagnosis Date    Hypertension        Past Surgical History:   Procedure Laterality Date    INSERTION OF PACEMAKER         Review of patient's allergies indicates:  No Known Allergies    Current Neurological Medications:     No current facility-administered medications on file prior to encounter.     Current Outpatient Medications on File Prior to Encounter   Medication Sig    aspirin (ECOTRIN) 81 MG EC tablet Take 1 tablet by mouth every morning.    atorvastatin (LIPITOR) 10 MG tablet Take 10 mg by mouth.    carvediloL (COREG) 12.5 MG tablet Take 12.5 mg by mouth 2 (two) times daily.    folic  acid (FOLVITE) 1 MG tablet Take 1,000 mcg by mouth once daily.    furosemide (LASIX) 20 MG tablet Take 20 mg by mouth once daily.    isosorbide-hydrALAZINE 20-37.5 mg (BIDIL) 20-37.5 mg Tab Take 1 tablet by mouth.    lisinopriL (PRINIVIL,ZESTRIL) 5 MG tablet Take 5 mg by mouth once daily.    VITAMIN B-1 100 MG tablet Take 100 mg by mouth once daily.     Family History    None       Tobacco Use    Smoking status: Never    Smokeless tobacco: Never   Substance and Sexual Activity    Alcohol use: Yes    Drug use: Never    Sexual activity: Not on file     Review of Systems   All other systems reviewed and are negative.  Objective:     Vital Signs (Most Recent):  Temp: 98.4 °F (36.9 °C) (04/05/23 0727)  Pulse: 80 (04/05/23 1202)  Resp: (!) 25 (04/05/23 1006)  BP: (!) 146/74 (04/05/23 1006)  SpO2: 96 % (MRI complete, No change in patient status.) (04/05/23 1202)   Vital Signs (24h Range):  Temp:  [98.4 °F (36.9 °C)] 98.4 °F (36.9 °C)  Pulse:  [62-95] 80  Resp:  [18-25] 25  SpO2:  [94 %-97 %] 96 %  BP: (138-162)/(71-90) 146/74     Weight: 77.5 kg (170 lb 13.7 oz)  There is no height or weight on file to calculate BMI.    Physical Exam  Vitals and nursing note reviewed.   Constitutional:       General: He is awake. He is not in acute distress.     Appearance: He is not ill-appearing or toxic-appearing.   HENT:      Head: Normocephalic and atraumatic.   Eyes:      General: No visual field deficit.     Pupils: Pupils are equal, round, and reactive to light.   Pulmonary:      Effort: Pulmonary effort is normal.   Musculoskeletal:         General: Normal range of motion.      Cervical back: Normal range of motion.   Skin:     General: Skin is warm and dry.      Capillary Refill: Capillary refill takes less than 2 seconds.   Neurological:      Mental Status: He is alert and oriented to person, place, and time.      Cranial Nerves: No dysarthria or facial asymmetry.      Sensory: No sensory deficit.      Motor: Weakness  present. No tremor, atrophy, abnormal muscle tone, seizure activity or pronator drift.      Coordination: Coordination normal. Finger-Nose-Finger Test normal.   Psychiatric:         Behavior: Behavior is cooperative.       NEUROLOGICAL EXAMINATION:     MENTAL STATUS   Oriented to person, place, and time.     CRANIAL NERVES     CN III, IV, VI   Pupils are equal, round, and reactive to light.    MOTOR EXAM     Strength   Right deltoid: 5/5  Left deltoid: 5/5  Right biceps: 5/5  Left biceps: 5/5  Right triceps: 5/5  Left triceps: 5/5  Right wrist flexion: 5/5  Left wrist flexion: 5/5  Right wrist extension: 5/5  Left wrist extension: 5/5  Right quadriceps: 4/5  Left quadriceps: 4/5  Right hamstrin/5  Left hamstrin/5  Right anterior tibial: 4/5  Left anterior tibial: 4/5  Right posterior tibial: 4/5  Left posterior tibial: 4/5       4/5 left hand grasp  5/5 right hand grasp      GAIT AND COORDINATION      Coordination   Finger to nose coordination: normal    Significant Labs:   Recent Lab Results         23  0411        Albumin/Globulin Ratio 1.3       Albumin 3.7       Alkaline Phosphatase 61       ALT 18       AST 16       Baso # 0.03       Basophil % 0.5       BILIRUBIN TOTAL 3.5       BUN 16.6       Calcium 8.8       Chloride 110       Cholesterol 122       CO2 25       Creatinine 1.08       CRP, High Sensitivity 1.32       eGFR >60       Eos # 0.15       Eosinophil % 2.5       Estimated Avg Glucose 128.4       Globulin, Total 2.8       Glucose 97       HDL 37       Hematocrit 41.6       Hemoglobin 13.6       Hemoglobin A1C External 6.1       Immature Grans (Abs) 0.03       Immature Granulocytes 0.5       INR 1.14       LDL Cholesterol External 59.00       Lymph # 1.88       LYMPH % 31.6       MCH 29.6       MCHC 32.7       MCV 90.4       Mono # 0.54       Mono % 9.1       MPV 10.0       Neut # 3.32       Neut % 55.8       nRBC 0.0       Platelets 209       Potassium 4.2       PROTEIN TOTAL 6.5        Protime 14.5       RBC 4.60       RDW 12.8       Sed Rate 16       Sodium 139       Thyroid Stimulating Hormone 1.532       Total Cholesterol/HDL Ratio 3       Triglycerides 130       Very Low Density Lipoprotein 26       WBC 6.0               Significant Imaging: I have reviewed all pertinent imaging results/findings within the past 24 hours.    Assessment and Plan:     * Left-sided weakness  LUE weakness- r/o CVA    Continue stroke workup  -Symptom onset greater than 24 hours, recommend SBP less than 140 (goal BP)  -ok to work with PT/OT, ok for therapy to use clinic judgement about BP parameters       Antithrombotics for secondary stroke prevention: Antiplatelets: Aspirin: 81 mg daily    Statins for secondary stroke prevention and hyperlipidemia, if present:   Statins: Atorvastatin- 20 mg daily    Aggressive risk factor modification: HTN, HLD, CAD     Rehab efforts: The patient has been evaluated by a stroke team provider and the therapy needs have been fully considered based off the presenting complaints and exam findings. The following therapy evaluations are needed: PT evaluate and treat, OT evaluate and treat, SLP evaluate and treat    Diagnostics ordered/pending: CTA Head to assess vasculature , CTA Neck/Arch to assess vasculature, TTE to assess cardiac function/status     Stroke workup  -CT head: negative  -MRI brain:   1. Small areas of acute ischemia in the right posterior frontal lobe and left anterior temporal.  2. Moderate chronic microvascular ischemic changes.  3. Right posterior ventricular shunt catheter in place with decompressed ventricles.  -LDL: 59  -A1c: 6.1  -TSH: 1.532  -CRP: 1.32  -CUS: negative     VTE prophylaxis: Mechanical prophylaxis: Place SCDs    BP parameters: Infarct: No intervention, SBP <140            VTE Risk Mitigation (From admission, onward)         Ordered     IP VTE HIGH RISK PATIENT  Once         04/05/23 0327     Place sequential compression device  Until discontinued          04/05/23 3115                Thank you for your consult. Further recommendations to follow from MD Linda Shah, NP  Neurology  [facility] - Emergency Dept

## 2023-04-05 NOTE — ED NOTES
Pt. Awake and alert at this time denies any needs.. MRI contacted pt. Has pacemaker card.. reports will come to speak with pt.. family in room will continue to monitor

## 2023-04-06 PROBLEM — I63.9 STROKE: Status: ACTIVE | Noted: 2023-04-06

## 2023-04-06 LAB
ALBUMIN SERPL-MCNC: 4.1 G/DL (ref 3.4–4.8)
ALBUMIN/GLOB SERPL: 1.2 RATIO (ref 1.1–2)
ALP SERPL-CCNC: 69 UNIT/L (ref 40–150)
ALT SERPL-CCNC: 19 UNIT/L (ref 0–55)
AST SERPL-CCNC: 20 UNIT/L (ref 5–34)
BILIRUBIN DIRECT+TOT PNL SERPL-MCNC: 5.3 MG/DL
BUN SERPL-MCNC: 15.3 MG/DL (ref 8.4–25.7)
CALCIUM SERPL-MCNC: 9.7 MG/DL (ref 8.8–10)
CHLORIDE SERPL-SCNC: 108 MMOL/L (ref 98–107)
CO2 SERPL-SCNC: 25 MMOL/L (ref 23–31)
CREAT SERPL-MCNC: 1.14 MG/DL (ref 0.73–1.18)
GFR SERPLBLD CREATININE-BSD FMLA CKD-EPI: >60 MLS/MIN/1.73/M2
GLOBULIN SER-MCNC: 3.3 GM/DL (ref 2.4–3.5)
GLUCOSE SERPL-MCNC: 91 MG/DL (ref 82–115)
MAGNESIUM SERPL-MCNC: 2.2 MG/DL (ref 1.6–2.6)
PHOSPHATE SERPL-MCNC: 2.7 MG/DL (ref 2.3–4.7)
POTASSIUM SERPL-SCNC: 4.1 MMOL/L (ref 3.5–5.1)
PROT SERPL-MCNC: 7.4 GM/DL (ref 5.8–7.6)
SODIUM SERPL-SCNC: 142 MMOL/L (ref 136–145)

## 2023-04-06 PROCEDURE — 21400001 HC TELEMETRY ROOM

## 2023-04-06 PROCEDURE — 63600175 PHARM REV CODE 636 W HCPCS: Performed by: INTERNAL MEDICINE

## 2023-04-06 PROCEDURE — 25000003 PHARM REV CODE 250: Performed by: STUDENT IN AN ORGANIZED HEALTH CARE EDUCATION/TRAINING PROGRAM

## 2023-04-06 PROCEDURE — 97530 THERAPEUTIC ACTIVITIES: CPT | Mod: CO

## 2023-04-06 PROCEDURE — 80053 COMPREHEN METABOLIC PANEL: CPT | Performed by: NURSE PRACTITIONER

## 2023-04-06 PROCEDURE — 97162 PT EVAL MOD COMPLEX 30 MIN: CPT

## 2023-04-06 PROCEDURE — 25000003 PHARM REV CODE 250: Performed by: NURSE PRACTITIONER

## 2023-04-06 PROCEDURE — 83735 ASSAY OF MAGNESIUM: CPT | Performed by: NURSE PRACTITIONER

## 2023-04-06 PROCEDURE — 25000003 PHARM REV CODE 250: Performed by: INTERNAL MEDICINE

## 2023-04-06 PROCEDURE — 84100 ASSAY OF PHOSPHORUS: CPT | Performed by: NURSE PRACTITIONER

## 2023-04-06 PROCEDURE — 92523 SPEECH SOUND LANG COMPREHEN: CPT

## 2023-04-06 PROCEDURE — 11000001 HC ACUTE MED/SURG PRIVATE ROOM

## 2023-04-06 RX ORDER — LISINOPRIL 5 MG/1
5 TABLET ORAL DAILY
Status: DISCONTINUED | OUTPATIENT
Start: 2023-04-06 | End: 2023-04-07

## 2023-04-06 RX ORDER — FUROSEMIDE 20 MG/1
20 TABLET ORAL DAILY
Status: DISCONTINUED | OUTPATIENT
Start: 2023-04-06 | End: 2023-04-07

## 2023-04-06 RX ORDER — FOLIC ACID 1 MG/1
1000 TABLET ORAL DAILY
Status: DISCONTINUED | OUTPATIENT
Start: 2023-04-06 | End: 2023-04-10 | Stop reason: HOSPADM

## 2023-04-06 RX ORDER — ASPIRIN 325 MG
325 TABLET, DELAYED RELEASE (ENTERIC COATED) ORAL DAILY
Status: DISCONTINUED | OUTPATIENT
Start: 2023-04-07 | End: 2023-04-10 | Stop reason: HOSPADM

## 2023-04-06 RX ORDER — CARVEDILOL 12.5 MG/1
12.5 TABLET ORAL 2 TIMES DAILY
Status: DISCONTINUED | OUTPATIENT
Start: 2023-04-06 | End: 2023-04-07

## 2023-04-06 RX ORDER — ENOXAPARIN SODIUM 100 MG/ML
40 INJECTION SUBCUTANEOUS EVERY 24 HOURS
Status: DISCONTINUED | OUTPATIENT
Start: 2023-04-06 | End: 2023-04-10 | Stop reason: HOSPADM

## 2023-04-06 RX ADMIN — ASPIRIN 81 MG: 81 TABLET, COATED ORAL at 09:04

## 2023-04-06 RX ADMIN — FOLIC ACID 1000 MCG: 1 TABLET ORAL at 09:04

## 2023-04-06 RX ADMIN — ATORVASTATIN CALCIUM 20 MG: 10 TABLET, FILM COATED ORAL at 09:04

## 2023-04-06 RX ADMIN — FUROSEMIDE 20 MG: 20 TABLET ORAL at 09:04

## 2023-04-06 RX ADMIN — CARVEDILOL 12.5 MG: 12.5 TABLET, FILM COATED ORAL at 09:04

## 2023-04-06 RX ADMIN — LISINOPRIL 5 MG: 5 TABLET ORAL at 09:04

## 2023-04-06 RX ADMIN — CEFTRIAXONE 1 G: 1 INJECTION, POWDER, FOR SOLUTION INTRAMUSCULAR; INTRAVENOUS at 09:04

## 2023-04-06 RX ADMIN — ENOXAPARIN SODIUM 40 MG: 40 INJECTION SUBCUTANEOUS at 04:04

## 2023-04-06 NOTE — NURSING
Interrogated  pacemaker, [facility] stated [facility]emaker was interrogated yesterday before MRI. Report was not in pt's chart. Report was faxed from today's interrogation and placed in pt's red chart.

## 2023-04-06 NOTE — NURSING
Nurses Note -- 4 Eyes      4/5/2023   10:59 PM      Skin assessed during: Admit      [x] No Pressure Injuries Present    []Prevention Measures Documented      [] Yes- Altered Skin Integrity Present or Discovered   [] LDA Added if Not in Epic (Describe Wound)   [] New Altered Skin Integrity was Present on Admit and Documented in LDA   [] Wound Image Taken    Wound Care Consulted? No    Attending Nurse:  Mayco Craven RN     Second RN/Staff Member:  Jovanna Spencer

## 2023-04-06 NOTE — PLAN OF CARE
Pt choice of hh reviewed with pt and dgt (Charmaine). Pt had used [facility] in past and stated he would like to use them again. Verbal Pt choice obtained and placed in pt chart. Referral sent via [facility] to [facility]. I spoke to Mansi with [facility] to update pt does not have a PCP. She stated they have a MD who will see pt and sign order for 1st 60 days and will work on getting a PCP locally in mean time. Pt and dgt in agreement with this plan.

## 2023-04-06 NOTE — PT/OT/SLP EVAL
Speech Language Pathology Department  Cognitive-Communication Evaluation    Patient Name:  Madan Elder   MRN:  [id number]    Recommendations:     General recommendations:  SLP intervention not indicated  Communication strategies:  go to room if call light pushed    Discharge recommendations:  Discharge Facility/Level of Care Needs: home with home health       History:     Madan Elder is a/n 77 y.o. male admitted for left sided weakness.    Past Medical History:   Diagnosis Date    Hypertension      Past Surgical History:   Procedure Laterality Date    INSERTION OF PACEMAKER         Previous level of Function  Lives: alone, with care during day  Glasses: yes  Hearing Aids: yes  Home Responsibilities: independent with help from family    Subjective     Patient awake and alert.    Patient goals: to go home     Spiritual/Cultural/Bahai Beliefs/Practices that affect care: no  Pain/Comfort: Pain Rating 1: 0/10  Respiratory Status: room air    Objective:     SPEECH PRODUCTION  Phoneme Production: WFL  Voice Quality: WFL  Voice Production: WFL  Speech Rate: WFL  Loudness: WFL  Respiration: WFL  Resonance: WFL  Prosody: WFL  Speech Intelligibility  Known Context: Greater that 90%  Unknown Context: Greater that 90%    AUDITORY COMPREHENSION  Identification:  Objects: 100%  Following Directions:  1-Step: 100%  2-Step: 100%  Yes/No Questions:  Biographical: 100%  Environmental: 100%  Simple: 100%  Complex: 100%    VERBAL EXPRESSION  Automatic Speech:  Days of the week: 100%  Countin%  Wh- Questions:  Object name: 100%  Object function: 100%    COGNITION  Orientation:  x4  Attention:  Focused: WFL  Sustained: WFL  Memory:  Immediate: WFL  Short Term: WFL, 10/12 on four word memory recall  Problem Solving  Functional simple: 100%  Executive Function:  Attention to detail: WFL  Cognitive endurance: WFL  Information processing: WFL    Assessment:     Pt presents with speech/language and cognitive linguistic abilities  WFL. Patients daughter at bedside, reports pt is at baseline. Skilled SLP services not warranted at this time, please reconsult if appropriate.    Goals:     Multidisciplinary Problems       SLP Goals       Not on file                  Patient Education:     Patient and daughter/s provided with verbal education regarding recommendations.  Understanding was verbalized.    Plan:     SLP Follow-Up:  No   Patient to be seen:      Plan of Care expires:     Plan of Care reviewed with:  patient      Time Tracking:     SLP Treatment Date:   04/06/23  Speech Start Time:  1100  Speech Stop Time:  1120     Speech Total Time (min):  20 min    Billable minutes:  Evaluation of Speech Sound Production with Comprehension and Expression, 20 min      04/06/2023

## 2023-04-06 NOTE — PLAN OF CARE
Problem: Physical Therapy  Goal: Physical Therapy Goal  Description: Goals to be met by: 23     Patient will increase functional independence with mobility by performin. Sit to stand transfer with Modified [place]  2. Gait  x 300 feet with Modified [place] using Rolling Walker.     Outcome: Ongoing, Progressing

## 2023-04-06 NOTE — PROGRESS NOTES
[facility]  Hospital Medicine Progress Note        Chief Complaint: Numbness (Pt arrives to ED c/o L arm numbness and weakness that he woke up with 2 days ago. Per daughter she received notice of this 1000 today and symptoms have improved throughout the day. No numbness, weakness, facial droop noted in triage at time. + Headache, nausea, SOB. Denies falls, trauma. Went to [facility] in [place] where pt had labs and CT, neuro consult, admitted to [facility] hospital however daughter took pt out AMA due to lack of satisfaction of their care. Cardiologist Dr. Newsome. )         Patient information was obtained from patient, patient's family, past medical records and ER records.      HISTORY OF PRESENT ILLNESS:   Madan Elder is a 77 y.o. male with a past medical history of essential hypertension, hyperlipidemia, and  presented to [facility] on 4/5/2023 for left upper extremity weakness.  Patient reported left upper extremity weakness and numbness began approximately 2 days ago. Patient reports he noticed symptoms while cooking breakfast. Patient denies speech changes, vision changes, dizziness, headache, syncope, chest pain, shortness of breath, abdominal pain, nausea, vomiting, diarrhea, fever, and chills.  Family reports patient told them of his symptoms yesterday, prompting them to ED.  Prior to arrival patient was seen at [facility] with CT head without abnormality.  Patient was admitted for stroke workup at outside facility.  Family was not satisfied with care and signed patient out AMA.  Initial vital signs in ED were /90, pulse 85, respirations 18, temperature 36.9° C, and SpO2 97% on room air.  Labs revealed WBC 6 and chloride 110. Neurology was consulted. Patient was admitted to hospital medicine service for further medical management.      Patient currently being managed for acute CVA in the right posterior frontal and left anterior temporal regions.    Today's information   Patient  seen and examined at bedside, daughter at bedside   Daughter reports patient sometimes forgets to take his aspirin  Will defer anticoagulation recommendations to neurology if to add on Plavix or continue only aspirin at this time   Carotid ultrasound was negative   Echo shows EF of 35%, which is known to patient   UA is significant for an infection will begin on IV ceftriaxone and follow up with urine cultures   Continue PT OT       Exam  General appearance: Male in no apparent distress.  HEENNT: Atraumatic head.   Lungs: Clear to auscultation bilaterally.   Heart: Regular rate and rhythm.    Abdomen: Soft, non-distended, non-tender. Bowel sounds are normal.   Extremities: No cyanosis, clubbing, or edema. No deformities.   Skin: No Rash. Warm and dry.   Neuro: Awake, alert, and oriented. Motor and sensory exams grossly intact.  No slurred speech.  No facial droop.  Left upper extremity strength 4/5.  5/5 muscle strength in right upper extremity and bilateral lower extremities  Psych/mental status: Appropriate mood and affect. Responds appropriately to questions.        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  left arm weakness/paresthesia   Essential Hypertension   Hyperlipidemia  Cardiomyopathy EF 35%  Acute UTI  Left oropharynx nodule 12 mm     Plan:  Daughter reports patient sometimes forgets to take his aspirin  Will defer anticoagulation recommendations to neurology if to add on Plavix or continue only aspirin at this time   Carotid ultrasound was negative   Echo shows EF of 35%, which is known to patient   UA is significant for an infection will begin on IV ceftriaxone and follow up with urine cultures   Continue PT OT    PRN hydralazine  Neuro checks q.4 hours   Fall precautions   Continue appropriate home medications once med rec updated        VTE Prophylaxis: SC lovenox     Dispo- to [facility] PT, when medically cleared          VITAL SIGNS:  24 HRS MIN & MAX LAST   Temp  Min: 97.5 °F (36.4 °C)  Max: 98 °F (36.7 °C) 97.5 °F (36.4 °C)   BP  Min: 118/69  Max: 168/79 118/69   Pulse  Min: 52  Max: 97  70   Resp  Min: 14  Max: 25 18   SpO2  Min: 94 %  Max: 98 % (!) 94 %         Recent Labs   Lab 04/05/23  0411   WBC 6.0   RBC 4.60*   HGB 13.6*   HCT 41.6*   MCV 90.4   MCH 29.6   MCHC 32.7*   RDW 12.8      MPV 10.0       Recent Labs   Lab 04/05/23 0411 04/06/23  0936    142   K 4.2 4.1   CO2 25 25   BUN 16.6 15.3   CREATININE 1.08 1.14   CALCIUM 8.8 9.7   MG  --  2.20   ALBUMIN 3.7 4.1   ALKPHOS 61 69   ALT 18 19   AST 16 20   BILITOT 3.5* 5.3*          Microbiology Results (last 7 days)       Procedure Component Value Units Date/Time    Urine culture [[id number]] Collected: 04/05/23 2202    Order Status: Sent Specimen: Urine Updated: 04/05/23 2227             See below for Radiology    Scheduled Med:   aspirin  81 mg Oral Daily    atorvastatin  20 mg Oral Daily    carvediloL  12.5 mg Oral BID    cefTRIAXone (ROCEPHIN) IVPB  1 g Intravenous Q24H    folic acid  1,000 mcg Oral Daily    furosemide  20 mg Oral Daily    lisinopriL  5 mg Oral Daily        Continuous Infusions:       PRN Meds:  hydrALAZINE, sodium chloride 0.9%       VTE prophylaxis:     Patient condition:  Stable/Fair/Guarded/ Serious/ Critical    Anticipated discharge and Disposition:         All diagnosis and differential diagnosis have been reviewed; assessment and plan has been documented; I have personally reviewed the labs and test results that are presently available; I have reviewed the patients medication list; I have reviewed the consulting providers response and recommendations. I have reviewed or attempted to review medical records based upon their availability    All of the patient's questions have been  addressed and answered. Patient's is agreeable to the above stated plan. I will continue to monitor closely and make adjustments to medical management as  needed.  _____________________________________________________________________    Nutrition Status:    Radiology:  Echo Saline Bubble? Yes  · There is no evidence of intracardiac shunting.  · The left ventricle is mildly enlarged with moderately decreased systolic   function.  · The estimated ejection fraction is 35%.  · Left ventricular diastolic dysfunction.  · Mild aortic regurgitation.  · Mild tricuspid regurgitation.  · Normal central venous pressure (3 mmHg).  · The estimated PA systolic pressure is 29 mmHg.  · Normal right ventricular size with mildly reduced right ventricular   systolic function.  · Mild mitral regurgitation.  · Mild right atrial enlargement.     CTA Head and Neck (xpd)  Narrative: EXAMINATION:  CTA HEAD AND NECK (XPD)    CLINICAL HISTORY:  Stroke/TIA, determine embolic source;    TECHNIQUE:  Axial images obtained through the cervical region and [place] before and after the administration of intravenous contrast.    Coronal, sagittal, MIP and 3D reconstructions were obtained from the axial data.    Automatic exposure control was utilized to limit radiation dose.    Radiation Dose:    Total DLP: 2441 mGy*cm    COMPARISON:  MRI brain dated 04/05/2023    FINDINGS:  Head CT with contrast:    No interval changes.    No enhancing abnormalities.    If present, stenosis of the carotid bulbs is measured based on NASCET criteria,    i.e. area of maximal stenosis compared to the cervical ICA distal to the bulb.    Cervical CTA:    The origins of the great vessels are patent.    The common carotid arteries are patent.  There are calcifications at the carotid bulbs with less than 20% stenosis bilaterally.  The internal carotid arteries are otherwise normal in caliber.    The vertebral arteries are patent.    Intracranial CTA:    There are calcifications along the course of the carotid siphons with mild narrowing of the supraclinoid segment on the left.  The middle cerebral arteries and anterior  cerebral arteries are patent.    There is mild narrowing of the vertebral arteries with scattered calcifications.  The basilar artery and posterior cerebral arteries are patent with mild narrowing of the left posterior cerebral artery.    The dural venous sinuses are patent.    Additional findings:    There is a suspected 12 mm enhancing mucosal nodule in the left oropharynx (series 22, image 59).    No cervical lymphadenopathy.  Impression: 1. No large vessel occlusion or flow-limiting stenosis.  2. Atherosclerotic changes of the carotid arteries with less than 20% stenosis at the carotid bulbs and mild narrowing of the left supraclinoid ICA.  3. Suspected 12 mm enhancing mucosal nodule in the left oropharynx.  Suggest correlation with visual inspection.    Electronically signed by: Carisa Moon  Date:    04/05/2023  Time:    14:56  FL Shunt Setting  Narrative: EXAMINATION:  FL SHUNT SETTING    CLINICAL HISTORY:  Stroke, follow up;stroke;    TECHNIQUE:  Fluoroscopic spot images of the skull were obtained in lateral projection prior to and following MRI, to evaluate shunt setting.    COMPARISON:  None available.  Impression: Shunt type:  Codman Certas    Shunt performance settings    Pre-MRI:  2    Post-MRI: 2    The partially visualized shunt tubing and regional osseous structures demonstrate no evidence of acute disruption.    Exposure information:    Fluoro time: 30 seconds    Dose: 13.85 mGy    Electronically signed by: Carisa Moon  Date:    04/05/2023  Time:    12:51  MRI Brain Without Contrast  Narrative: EXAMINATION:  MRI BRAIN WITHOUT CONTRAST    CLINICAL HISTORY:  Transient ischemic attack (TIA);Stroke, follow up;    TECHNIQUE:  Multiplanar, multisequence MR images of the brain were obtained without the administration of intravenous contrast.    COMPARISON:  CT head dated 10/22/2019    FINDINGS:  There is a small area of cortical restricted diffusion in the right posterior frontal lobe, involving  the precentral gyrus.  There is also a suspected small focus of restricted diffusion in the left anterior temporal lobe (series 4, image 18).    Moderate patchy and confluent T2/FLAIR hyperintensities in the subcortical and periventricular white matter, basal ganglia, thalami and liu are nonspecific and may represent chronic microvascular ischemic changes.    There is a right posterior approach ventricular shunt catheter with tip over the left lateral ventricle.  The ventricles have decreased in size and are now decompressed.  There is moderate diffuse parenchymal volume loss.  There is no abnormal extra-axial fluid collection.  The major intracranial flow voids are patent.  The paranasal sinuses are clear.  There are bilateral mastoid effusions.  Impression: 1. Small areas of acute ischemia in the right posterior frontal lobe and left anterior temporal.  2. Moderate chronic microvascular ischemic changes.  3. Right posterior ventricular shunt catheter in place with decompressed ventricles.    Electronically signed by: Carisa Moon  Date:    04/05/2023  Time:    12:12      Oumar Muse MD   04/06/2023

## 2023-04-06 NOTE — PROGRESS NOTES
[facility] - 9th Floor Med Surg  Neurology  Progress Note    Patient Name: Madan Elder  MRN: [id number]  Admission Date: 4/5/2023  Hospital Length of Stay: 1 days  Code Status: Full Code   Attending Provider: Roshan Waller MD  Primary Care Physician: Ricky Newsome MD   Principal Problem:Stroke    HPI:   77-year-old male with past medical history of HTN, HLD, and MI presented to ED  on 4/5 for LUE weakness x 2 days. Per EMR, he presented to [facility] in [place], he was going to be admitted but they left and presented here. CT head was negative at [facility]. Caregiver reports she saw him Monday morning, he usually fixes his scrambled eggs in the morning but he did not Monday morning. He reported to her that his LUE felt weak. She walked with him downstairs to get a cup of coffee without difficulties. She also reported he had noticeable decreased hand strength, it looked like his hand was curled up. He came to [facility] ED for further evaluation. CT head was negative for acute intracranial abnormalities. Neurology was consulted for stroke workup.         Overview/Hospital Course:  No notes on file        Subjective:     Interval History:   He is walking around in his room, reports no new issues, no family at bedside.     Current Neurological Medications:     Current Facility-Administered Medications   Medication Dose Route Frequency Provider Last Rate Last Admin    aspirin EC tablet 81 mg  81 mg Oral Daily PETER Amato   81 mg at 04/06/23 0935    atorvastatin tablet 20 mg  20 mg Oral Daily Linda Shah, NP   20 mg at 04/06/23 0935    carvediloL tablet 12.5 mg  12.5 mg Oral BID Salvatore Urbina MD   12.5 mg at 04/06/23 0935    cefTRIAXone (ROCEPHIN) 1 g in dextrose 5 % in water (D5W) 5 % 50 mL IVPB (MB+)  1 g Intravenous Q24H Oumar Muse MD   Stopped at 04/06/23 1006    folic acid tablet 1,000 mcg  1,000 mcg Oral Daily Salvatore Urbina MD   1,000 mcg at 04/06/23 0935    furosemide  tablet 20 mg  20 mg Oral Daily Salvatore Urbina MD   20 mg at 04/06/23 0935    hydrALAZINE injection 10 mg  10 mg Intravenous Q6H PRN PETER Amato        lisinopriL tablet 5 mg  5 mg Oral Daily Salvatore Urbina MD   5 mg at 04/06/23 0935    sodium chloride 0.9% flush 10 mL  10 mL Intravenous PRN PETER Amato           Review of Systems  Objective:     Vital Signs (Most Recent):  Temp: 97.8 °F (36.6 °C) (04/06/23 0741)  Pulse: (!) 57 (04/06/23 0741)  Resp: 18 (04/06/23 0401)  BP: 138/76 (04/06/23 0935)  SpO2: (!) 94 % (04/06/23 0741)   Vital Signs (24h Range):  Temp:  [97.5 °F (36.4 °C)-98 °F (36.7 °C)] 97.8 °F (36.6 °C)  Pulse:  [52-97] 57  Resp:  [14-25] 18  SpO2:  [94 %-98 %] 94 %  BP: (128-168)/(56-94) 138/76     Weight: 74.1 kg (163 lb 4.8 oz)  Body mass index is 23.43 kg/m².    Physical Exam  GENERAL: NAD, calm, cooperative, appropriate  RESP: CTAB, symmetric chest expansion  HEART: RRR, S1, S2  no LE edema  MENTAL STATUS: Oriented x4, follows commands reliably  SPEECH/LANGUAGE: Clear, fluent, coherent  CN:  perr, EOMI, VFF, gaze conjugate  No tactile or motor facial asymmetry  Motor: Muscle strength 5/5 BUE, BLE,   Mild left hand weakness 4/5 hand grasp, 5/5 right hand grasp   Cerebellar: No tremor or dysmetria  Sensory: Normal to tactile stim.  Gait: not observed  Memory: normal and thought process intact  SKIN: warm, dry, intact        Significant Labs:   Recent Lab Results         04/06/23 0936 04/05/23 2202        Phencyclidine   Negative       Albumin/Globulin Ratio 1.2         Albumin 4.1         Alkaline Phosphatase 69         ALT 19         Amphetamine Screen, Ur   Negative       Appearance, UA   Cloudy       AST 20         Bacteria, UA   None Seen       Barbiturate Screen, Ur   Negative       Benzodiazepine Screen, Urine   Negative       BILIRUBIN TOTAL 5.3         Bilirubin, UA   Negative       BUN 15.3         Calcium 9.7         Cannabinoids, Urine   Negative       Chloride 108          CO2 25         Cocaine (Metab.)   Negative       Color, UA   Dark Yellow       Creatinine 1.14         eGFR >60         Globulin, Total 3.3         Glucose 91         Glucose, UA   Negative       Ketones, UA   Trace       Leukocytes, UA   3+       Magnesium 2.20         NITRITE UA   Positive       Occult Blood UA   1+       Opiate Scrn, Ur   Negative       pH, UA   7.0       pH, Urine   7.0       Phosphorus 2.7         Potassium 4.1         PROTEIN TOTAL 7.4         Protein, UA   1+       RBC, UA   5       Sodium 142         Specific Gravity,UA   1.039       Specific Gravity, Urine Auto   1.039       Squam Epithel, UA   <5       Urobilinogen, UA   1.0       WBC, UA   754               Significant Imaging: I have reviewed all pertinent imaging results/findings within the past 24 hours.    Assessment and Plan:     * Stroke  Stroke     -Continue stroke workup  -Pacemaker needs to be interrogated   -Will defer to primary team about 12 mm enhancing mucosal nodule in the left oropharynx  -From a secondary stroke prevention standpoint only needs to be on aspirin, although the etiology of his strokes are suspicious for cardio embolic source (if that is the case, he will need to be on OAC)  -Goal BP less than 140      Antithrombotics for secondary stroke prevention: Antiplatelets: Aspirin: 81 mg daily    Statins for secondary stroke prevention and hyperlipidemia, if present:   Statins: Atorvastatin- 20 mg daily    Aggressive risk factor modification: HTN, HLD, CAD     Rehab efforts: The patient has been evaluated by a stroke team provider and the therapy needs have been fully considered based off the presenting complaints and exam findings. The following therapy evaluations are needed: PT evaluate and treat, OT evaluate and treat, SLP evaluate and treat    Diagnostics ordered/pending:     Stroke workup  -CT head: negative  -MRI brain:   1. Small areas of acute ischemia in the right posterior frontal lobe and left anterior  temporal.  2. Moderate chronic microvascular ischemic changes.  3. Right posterior ventricular shunt catheter in place with decompressed ventricles.  -LDL: 59  -A1c: 6.1  -TSH: 1.532  -CRP: 1.32  -CUS: negative   -CTA head and neck:   1. No large vessel occlusion or flow-limiting stenosis.  2. Atherosclerotic changes of the carotid arteries with less than 20% stenosis at the carotid bulbs and mild narrowing of the left supraclinoid ICA.  3. Suspected 12 mm enhancing mucosal nodule in the left oropharynx.  Suggest correlation with visual inspection.  -ECHO:    There is no evidence of intracardiac shunting.   The left ventricle is mildly enlarged with moderately decreased systolic function.   The estimated ejection fraction is 35%.   Left ventricular diastolic dysfunction.   Mild aortic regurgitation.   Mild tricuspid regurgitation.   Normal central venous pressure (3 mmHg).   The estimated PA systolic pressure is 29 mmHg.   Normal right ventricular size with mildly reduced right ventricular systolic function.   Mild mitral regurgitation.   Mild right atrial enlargement.        VTE prophylaxis: Mechanical prophylaxis: Place SCDs    BP parameters: Infarct: No intervention, SBP <140          Further recommendations to follow from MD      VTE Risk Mitigation (From admission, onward)         Ordered     enoxaparin injection 40 mg  Daily         04/06/23 1208     IP VTE HIGH RISK PATIENT  Once         04/05/23 0327     Place sequential compression device  Until discontinued         04/05/23 0327                Linda Shah NP  Neurology  [facility] - 9th Floor Med Surg

## 2023-04-06 NOTE — PT/OT/SLP EVAL
Physical Therapy Evaluation/Re-Evaluation    Patient Name:  Madan Elder   MRN:  [id number]    Recommendations:     Discharge Recommendations: home with home health   Discharge Equipment Recommendations: walker, rolling   Barriers to discharge:  impaired mobility    Assessment:     Madan Elder is a 77 y.o. male admitted with a medical diagnosis of Stroke workup: R posterior frontal, L anterior temporal infarcts. He presents with the following impairments/functional limitations: weakness, impaired endurance, impaired functional mobility, gait instability, impaired balance. Patient tolerated PT evaluation well mobilizing with CGA-SBA and RW. Patient with decreased gait speed, slight lateral sway- education provided for safety with mobility. Pt is appropriate for continued acute PT services to improve activity tolerance and reduce fall risk.     Rehab Prognosis: Good; patient would benefit from acute skilled PT services to address these deficits and reach maximum level of function.    Recent Surgery: * No surgery found *      Plan:     During this hospitalization, patient to be seen 5 x/week to address the identified rehab impairments via gait training, therapeutic activities, therapeutic exercises, neuromuscular re-education and progress toward the following goals:    Plan of Care Expires:  05/05/23    Subjective     Chief Complaint: none stated  Patient/Family Comments/goals: to go home  Pain/Comfort:  Pain Rating 1: 0/10    Patients cultural, spiritual, Episcopal conflicts given the current situation: no    Living Environment:  Pt lives alone with caregiver assist from 9-2 M-F, 2nd floor apartment with elevator.  Prior to admission, patients level of function was independent with wheelchair vs household ambulation.  Equipment used at home: wheelchair, cane.  DME owned (not currently used): rolling walker.  Upon discharge, patient will have assistance from caregiver.    Objective:     Communicated with RN prior  to session.  Patient found HOB elevated with peripheral IV, telemetry  upon PT entry to room.    General Precautions: Standard, fall  Orthopedic Precautions:N/A   Braces: N/A  Respiratory Status: Room air  Blood Pressure: 176/97, HR: 76      Exams:  Fine Motor Coordination:    -       Impaired  Left hand, finger to nose   and Left hand thumb/finger opposition skills    Sensation: -       Intact  light/touch BLE  RLE ROM: WNL  RLE Strength: WNL  LLE ROM: WNL  LLE Strength: grossly 4/5  Skin integrity: Visible skin intact      Functional Mobility:  Bed Mobility:  Supine to Sit: minimum assistance  Transfers:  Sit to Stand:  minimum assistance with rolling walker  Gait: patient ambulated 200ft with CGA and RW- progressing to SBA      AM-PAC 6 CLICK MOBILITY  Total Score:20       Treatment & Education:    Patient provided with verbal education regarding PT POC, safety with mobility.  Understanding was verbalized.     Patient left HOB elevated with all lines intact, call button in reach, and family present.    GOALS:   Multidisciplinary Problems       Physical Therapy Goals          Problem: Physical Therapy    Goal Priority Disciplines Outcome Goal Variances Interventions   Physical Therapy Goal     PT, PT/OT Ongoing, Progressing     Description: Goals to be met by: 23     Patient will increase functional independence with mobility by performin. Sit to stand transfer with Modified [place]  2. Gait  x 300 feet with Modified [place] using Rolling Walker.                          History:     Past Medical History:   Diagnosis Date    Hypertension        Past Surgical History:   Procedure Laterality Date    INSERTION OF PACEMAKER         Time Tracking:     PT Received On: 23  PT Start Time: 851     PT Stop Time: 921  PT Total Time (min): 30 min     Billable Minutes: Evaluation Mod complexity      2023

## 2023-04-06 NOTE — ASSESSMENT & PLAN NOTE
Stroke     -Continue stroke workup  -Pacemaker needs to be interrogated   -Will defer to primary team about 12 mm enhancing mucosal nodule in the left oropharynx  -From a secondary stroke prevention standpoint only needs to be on aspirin, although the etiology of his strokes are suspicious for cardio embolic source (if that is the case, he will need to be on OAC)      Antithrombotics for secondary stroke prevention: Antiplatelets: Aspirin: 81 mg daily    Statins for secondary stroke prevention and hyperlipidemia, if present:   Statins: Atorvastatin- 20 mg daily    Aggressive risk factor modification: HTN, HLD, CAD     Rehab efforts: The patient has been evaluated by a stroke team provider and the therapy needs have been fully considered based off the presenting complaints and exam findings. The following therapy evaluations are needed: PT evaluate and treat, OT evaluate and treat, SLP evaluate and treat    Diagnostics ordered/pending:     Stroke workup  -CT head: negative  -MRI brain:   1. Small areas of acute ischemia in the right posterior frontal lobe and left anterior temporal.  2. Moderate chronic microvascular ischemic changes.  3. Right posterior ventricular shunt catheter in place with decompressed ventricles.  -LDL: 59  -A1c: 6.1  -TSH: 1.532  -CRP: 1.32  -CUS: negative   -CTA head and neck:   1. No large vessel occlusion or flow-limiting stenosis.  2. Atherosclerotic changes of the carotid arteries with less than 20% stenosis at the carotid bulbs and mild narrowing of the left supraclinoid ICA.  3. Suspected 12 mm enhancing mucosal nodule in the left oropharynx.  Suggest correlation with visual inspection.  -ECHO:    There is no evidence of intracardiac shunting.   The left ventricle is mildly enlarged with moderately decreased systolic function.   The estimated ejection fraction is 35%.   Left ventricular diastolic dysfunction.   Mild aortic regurgitation.   Mild tricuspid regurgitation.   Normal  central venous pressure (3 mmHg).   The estimated PA systolic pressure is 29 mmHg.   Normal right ventricular size with mildly reduced right ventricular systolic function.   Mild mitral regurgitation.   Mild right atrial enlargement.        VTE prophylaxis: Mechanical prophylaxis: Place SCDs    BP parameters: Infarct: No intervention, SBP <220

## 2023-04-06 NOTE — SUBJECTIVE & OBJECTIVE
Subjective:     Interval History:   He is walking around in his room, reports no new issues, no family at bedside.     Current Neurological Medications:     Current Facility-Administered Medications   Medication Dose Route Frequency Provider Last Rate Last Admin    aspirin EC tablet 81 mg  81 mg Oral Daily PETER Amato   81 mg at 04/06/23 0935    atorvastatin tablet 20 mg  20 mg Oral Daily Linda Shah, NP   20 mg at 04/06/23 0935    carvediloL tablet 12.5 mg  12.5 mg Oral BID Salvatore Urbina MD   12.5 mg at 04/06/23 0935    cefTRIAXone (ROCEPHIN) 1 g in dextrose 5 % in water (D5W) 5 % 50 mL IVPB (MB+)  1 g Intravenous Q24H Oumar Muse MD   Stopped at 04/06/23 1006    folic acid tablet 1,000 mcg  1,000 mcg Oral Daily Salvatore Urbina MD   1,000 mcg at 04/06/23 0935    furosemide tablet 20 mg  20 mg Oral Daily Salvatore Urbina MD   20 mg at 04/06/23 0935    hydrALAZINE injection 10 mg  10 mg Intravenous Q6H PRN PETER Amato        lisinopriL tablet 5 mg  5 mg Oral Daily Salvatore Urbina MD   5 mg at 04/06/23 0935    sodium chloride 0.9% flush 10 mL  10 mL Intravenous PRN PETER Amato           Review of Systems  Objective:     Vital Signs (Most Recent):  Temp: 97.8 °F (36.6 °C) (04/06/23 0741)  Pulse: (!) 57 (04/06/23 0741)  Resp: 18 (04/06/23 0401)  BP: 138/76 (04/06/23 0935)  SpO2: (!) 94 % (04/06/23 0741)   Vital Signs (24h Range):  Temp:  [97.5 °F (36.4 °C)-98 °F (36.7 °C)] 97.8 °F (36.6 °C)  Pulse:  [52-97] 57  Resp:  [14-25] 18  SpO2:  [94 %-98 %] 94 %  BP: (128-168)/(56-94) 138/76     Weight: 74.1 kg (163 lb 4.8 oz)  Body mass index is 23.43 kg/m².    Physical Exam  GENERAL: NAD, calm, cooperative, appropriate  RESP: CTAB, symmetric chest expansion  HEART: RRR, S1, S2  no LE edema  MENTAL STATUS: Oriented x4, follows commands reliably  SPEECH/LANGUAGE: Clear, fluent, coherent  CN:  perr, EOMI, VFF, gaze conjugate  No tactile or motor facial asymmetry  Motor: Muscle strength 5/5 BUE,  BLE,   Mild left hand weakness 4/5 hand grasp, 5/5 right hand grasp   Cerebellar: No tremor or dysmetria  Sensory: Normal to tactile stim.  Gait: not observed  Memory: normal and thought process intact  SKIN: warm, dry, intact        Significant Labs:   Recent Lab Results         04/06/23  0936   04/05/23  2202        Phencyclidine   Negative       Albumin/Globulin Ratio 1.2         Albumin 4.1         Alkaline Phosphatase 69         ALT 19         Amphetamine Screen, Ur   Negative       Appearance, UA   Cloudy       AST 20         Bacteria, UA   None Seen       Barbiturate Screen, Ur   Negative       Benzodiazepine Screen, Urine   Negative       BILIRUBIN TOTAL 5.3         Bilirubin, UA   Negative       BUN 15.3         Calcium 9.7         Cannabinoids, Urine   Negative       Chloride 108         CO2 25         Cocaine (Metab.)   Negative       Color, UA   Dark Yellow       Creatinine 1.14         eGFR >60         Globulin, Total 3.3         Glucose 91         Glucose, UA   Negative       Ketones, UA   Trace       Leukocytes, UA   3+       Magnesium 2.20         NITRITE UA   Positive       Occult Blood UA   1+       Opiate Scrn, Ur   Negative       pH, UA   7.0       pH, Urine   7.0       Phosphorus 2.7         Potassium 4.1         PROTEIN TOTAL 7.4         Protein, UA   1+       RBC, UA   5       Sodium 142         Specific Gravity,UA   1.039       Specific Gravity, Urine Auto   1.039       Squam Epithel, UA   <5       Urobilinogen, UA   1.0       WBC, UA   754               Significant Imaging: I have reviewed all pertinent imaging results/findings within the past 24 hours.

## 2023-04-06 NOTE — PT/OT/SLP PROGRESS
Occupational Therapy  Treatment    Madan Elder   MRN: [id number]   Admitting Diagnosis: Stroke    OT Date of Treatment: 04/06/23   OT Start Time: 1400  OT Stop Time: 1415  OT Total Time (min): 15 min     Billable Minutes:  Therapeutic Activity 15  Total Minutes: 15     OT/TASH: TASH     Number of TASH visits since last OT visit: 1    General Precautions: Standard, fall  Orthopedic Precautions: N/A  Braces: N/A    Spiritual, Cultural Beliefs, Restorationist Practices, Values that Affect Care: no    Subjective:  Communicated with RN prior to session.     Objective:  Patient found with: telemetry, peripheral IV    Functional Mobility:  Bed Mobility:   Supine to sit: Standby Assistance   Sit to supine: Standby Assistance   Rolling: Activity did not occur   Scooting: Standby Assistance    Transfer Training:  Pt BP 99/67 semi supine, 100/65 seated EOB and dropped to 65/30 in standing. Returned pt to supine and notified RN. Family member present in room requested to check again with different machine. BP checked again, semi supine 105/66, dropped to 76/53 in standing. Returned to supine. Pt asymptomatic. Sit>stand x 2 trials CGA no LOB.     Balance:   Static Sit: NORMAL: No deviations seen in posture held statically  Dynamic Sit:  GOOD+: Maintains balance through MAXIMAL excursions of active trunk motion  Static Stand: GOOD: Takes MODERATE challenges from all directions  Dynamic stand: N/A    Additional Treatment:      Patient left supine with all lines intact, call button in reach, bed alarm on, and family present    ASSESSMENT:  Madan Elder is a 77 y.o. male with a medical diagnosis of Stroke. Session limited 2/2 decreased BP with mobility. No c/o dizziness. RN notified.    Rehab potential is good    Activity tolerance: Good    Discharge recommendations: home with home health     Equipment recommendations:       GOALS:   Multidisciplinary Problems       Occupational Therapy Goals          Problem: Occupational Therapy     Goal Priority Disciplines Outcome Interventions   Occupational Therapy Goal     OT, PT/OT Ongoing, Progressing    Description: Goals to be met by: 4/19/2023     Patient will increase functional independence with ADLs by performing:    LE Dressing with Modified [place].  Grooming while standing with Modified [place].  Toileting from toilet with Modified [place] for hygiene and clothing management.   Bathing from  shower chair/bench with Contact Guard Assistance.  Toilet transfer to toilet with Modified [place].                         Plan:  Patient to be seen 3 x/week, 5 x/week to address the above listed problems via self-care/home management, therapeutic activities, therapeutic exercises  Plan of Care expires:    Plan of Care reviewed with: patient, family         04/06/2023

## 2023-04-07 LAB
ALBUMIN SERPL-MCNC: 3.6 G/DL (ref 3.4–4.8)
ALBUMIN/GLOB SERPL: 1.2 RATIO (ref 1.1–2)
ALP SERPL-CCNC: 57 UNIT/L (ref 40–150)
ALT SERPL-CCNC: 16 UNIT/L (ref 0–55)
AST SERPL-CCNC: 19 UNIT/L (ref 5–34)
BACTERIA UR CULT: ABNORMAL
BILIRUBIN DIRECT+TOT PNL SERPL-MCNC: 3.2 MG/DL
BUN SERPL-MCNC: 21.7 MG/DL (ref 8.4–25.7)
CALCIUM SERPL-MCNC: 9.2 MG/DL (ref 8.8–10)
CHLORIDE SERPL-SCNC: 108 MMOL/L (ref 98–107)
CO2 SERPL-SCNC: 21 MMOL/L (ref 23–31)
CREAT SERPL-MCNC: 1.09 MG/DL (ref 0.73–1.18)
GFR SERPLBLD CREATININE-BSD FMLA CKD-EPI: >60 MLS/MIN/1.73/M2
GLOBULIN SER-MCNC: 3.1 GM/DL (ref 2.4–3.5)
GLUCOSE SERPL-MCNC: 88 MG/DL (ref 82–115)
POTASSIUM SERPL-SCNC: 4.2 MMOL/L (ref 3.5–5.1)
PROT SERPL-MCNC: 6.7 GM/DL (ref 5.8–7.6)
SODIUM SERPL-SCNC: 141 MMOL/L (ref 136–145)

## 2023-04-07 PROCEDURE — 97530 THERAPEUTIC ACTIVITIES: CPT | Mod: CQ

## 2023-04-07 PROCEDURE — 80053 COMPREHEN METABOLIC PANEL: CPT | Performed by: NURSE PRACTITIONER

## 2023-04-07 PROCEDURE — 21400001 HC TELEMETRY ROOM

## 2023-04-07 PROCEDURE — 25000003 PHARM REV CODE 250: Performed by: NURSE PRACTITIONER

## 2023-04-07 PROCEDURE — 25000003 PHARM REV CODE 250: Performed by: STUDENT IN AN ORGANIZED HEALTH CARE EDUCATION/TRAINING PROGRAM

## 2023-04-07 PROCEDURE — 97116 GAIT TRAINING THERAPY: CPT | Mod: CQ

## 2023-04-07 PROCEDURE — 97110 THERAPEUTIC EXERCISES: CPT | Mod: CQ

## 2023-04-07 PROCEDURE — 63600175 PHARM REV CODE 636 W HCPCS: Performed by: INTERNAL MEDICINE

## 2023-04-07 PROCEDURE — 25000003 PHARM REV CODE 250: Performed by: PSYCHIATRY & NEUROLOGY

## 2023-04-07 PROCEDURE — 63600175 PHARM REV CODE 636 W HCPCS: Performed by: NURSE PRACTITIONER

## 2023-04-07 PROCEDURE — 25000003 PHARM REV CODE 250: Performed by: INTERNAL MEDICINE

## 2023-04-07 RX ADMIN — CEFTRIAXONE 1 G: 1 INJECTION, POWDER, FOR SOLUTION INTRAMUSCULAR; INTRAVENOUS at 11:04

## 2023-04-07 RX ADMIN — ASPIRIN 325 MG: 325 TABLET, COATED ORAL at 09:04

## 2023-04-07 RX ADMIN — ENOXAPARIN SODIUM 40 MG: 40 INJECTION SUBCUTANEOUS at 04:04

## 2023-04-07 RX ADMIN — FOLIC ACID 1000 MCG: 1 TABLET ORAL at 09:04

## 2023-04-07 RX ADMIN — HYDRALAZINE HYDROCHLORIDE 10 MG: 20 INJECTION INTRAMUSCULAR; INTRAVENOUS at 04:04

## 2023-04-07 RX ADMIN — ATORVASTATIN CALCIUM 20 MG: 10 TABLET, FILM COATED ORAL at 09:04

## 2023-04-07 NOTE — PT/OT/SLP PROGRESS
Physical Therapy         Treatment        Madan Elder   MRN: [id number]     PT Received On: 04/07/23  PT Start Time: 1338     PT Stop Time: 1418    PT Total Time (min): 40 min       Billable Minutes:  Gait [id number], Therapeutic Activity 15, and Therapeutic Exercise 13  Total Minutes: 40    Treatment Type: Treatment  PT/PTA: PTA     Number of PTA visits since last PT visit: 1       General Precautions: Standard, fall  Orthopedic Precautions: Orthopedic Precautions : N/A   Braces:      Spiritual, Cultural Beliefs, Alevism Practices, Values that Affect Care: no    Subjective:  Communicated with NSG prior to session.  Nurse reports pt has not resumed BP medicine, requesting pt BP taken in supine, sitting and standing.          Objective:  Patient found in bed awake, with daughter present.  Patient found with: telemetry, peripheral IV  Pt BP taken in supine 112/ 60 and 101/52 seated.  CNA was in room and stated that she took pt BP ~ 15 minutes ago and it was much different.  Second BP machine was used, recording  supine 152/ 80 seated 155/88  and standing 159/ 91 which was similar to CNA's earlier findings.  Informed pts nurse and nurse stated pt was okay for therapy.     Functional Mobility:  Bed Mobility:   Supine to sit: Modified Independent   Sit to supine: Activity did not occur   Rolling: Activity did not occur   Scooting: Standby Assistance    Balance:   Static Sit: independent   Dynamic Sit:  GOOD: Maintains balance through MODERATE excursions of active trunk movement  Static Stand: SPV w/ walker for support.   Dynamic stand: [facility]    Pt demonstrates good trunk control in standing w/ walker for support, no LOB with standing tasks.     Transfer Training:Mod I  Pt performed sit to stand x 5 reps from EOB and chair working on obtaining balance prior to reaching for support.    Gait Training: CGA w/ RW    Pt ambulated 200 ft x 2 trials working on lupe, increase foot clearance and continuity of steps.       Additional Treatment:  Pt performed seated LE ROM and strengthening exercises, all planes x 15 reps,    Activity Tolerance:  Patient tolerated treatment well    Patient left up in chair with call button in reach and daughter present.    Assessment:  Madan Elder is a 77 y.o. male with a medical diagnosis of Stroke.     Rehab potential is excellent.    Activity tolerance: Excellent    Discharge recommendations: Discharge Facility/Level of Care Needs: home with home health     Equipment recommendations:       GOALS:   Multidisciplinary Problems       Physical Therapy Goals          Problem: Physical Therapy    Goal Priority Disciplines Outcome Goal Variances Interventions   Physical Therapy Goal     PT, PT/OT Ongoing, Progressing     Description: Goals to be met by: 23     Patient will increase functional independence with mobility by performin. Sit to stand transfer with Modified [place]  2. Gait  x 300 feet with Modified [place] using Rolling Walker.                          PLAN:    Patient to be seen 5 x/week  to address the above listed problems via gait training, therapeutic activities, therapeutic exercises  Plan of Care expires: 23  Plan of Care reviewed with: patient, daughter         2023

## 2023-04-07 NOTE — PROGRESS NOTES
[facility]  Hospital Medicine Progress Note        Chief Complaint: Numbness (Pt arrives to ED c/o L arm numbness and weakness that he woke up with 2 days ago. Per daughter she received notice of this 1000 today and symptoms have improved throughout the day. No numbness, weakness, facial droop noted in triage at time. + Headache, nausea, SOB. Denies falls, trauma. Went to [facility] in [place] where pt had labs and CT, neuro consult, admitted to [facility] hospital however daughter took pt out AMA due to lack of satisfaction of their care. Cardiologist Dr. Newsome. )         Patient information was obtained from patient, patient's family, past medical records and ER records.      HISTORY OF PRESENT ILLNESS:   Madan Elder is a 77 y.o. male with a past medical history of essential hypertension, hyperlipidemia, and  presented to [facility] on 4/5/2023 for left upper extremity weakness.  Patient reported left upper extremity weakness and numbness began approximately 2 days ago. Patient reports he noticed symptoms while cooking breakfast. Patient denies speech changes, vision changes, dizziness, headache, syncope, chest pain, shortness of breath, abdominal pain, nausea, vomiting, diarrhea, fever, and chills.  Family reports patient told them of his symptoms yesterday, prompting them to ED.  Prior to arrival patient was seen at [facility] with CT head without abnormality.  Patient was admitted for stroke workup at outside facility.  Family was not satisfied with care and signed patient out AMA.  Initial vital signs in ED were /90, pulse 85, respirations 18, temperature 36.9° C, and SpO2 97% on room air.  Labs revealed WBC 6 and chloride 110. Neurology was consulted. Patient was admitted to hospital medicine service for further medical management.        Patient currently being managed for acute CVA in the right posterior frontal and left anterior temporal regions.     Today's information    Patient seen and examined at bedside, daughter is not at bedside   Patient experienced orthostatic hypotension yesterday.  He was advised to increase p.o. intake  Appreciate neuro input to interrogate pacemaker- this has been done will consult [facility] to help interpret the results   Repeat orthostatic vitals today   Suspend BP meds his blood pressure is borderline low        Exam  General appearance: Male in no apparent distress.  HEENNT: Atraumatic head.   Lungs: Clear to auscultation bilaterally.   Heart: Regular rate and rhythm.    Abdomen: Soft, non-distended, non-tender. Bowel sounds are normal.   Extremities: No cyanosis, clubbing, or edema. No deformities.   Skin: No Rash. Warm and dry.   Neuro: Awake, alert, and oriented. Motor and sensory exams grossly intact.  No slurred speech.  No facial droop.  Left upper extremity strength 4/5.  5/5 muscle strength in right upper extremity and bilateral lower extremities  Psych/mental status: Appropriate mood and affect. Responds appropriately to questions.         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  left arm weakness/paresthesia   Orthostatic hypotension  Essential Hypertension   Hyperlipidemia  Cardiomyopathy EF 35%  Acute UTI  Left oropharynx nodule 12 mm     Plan:   Patient experienced orthostatic hypotension yesterday.  He was advised to increase p.o. intake  Appreciate neuro input to interrogate pacemaker- this has been done will consult [facility] to help interpret the results   Currently on p.o. aspirin q.day  Repeat orthostatic vitals today   Suspend BP meds his blood pressure is borderline low   Carotid ultrasound was negative   Echo shows EF of 35%, which is known to patient   UA is significant for an infection   Continue  IV ceftriaxone, day 2/3,  and follow up with urine cultures   Continue PT OT    PRN hydralazine  Neuro checks q.4 hours   Fall precautions   Continue appropriate home  medications once med rec updated         VTE Prophylaxis: SC lovenox      Dispo- to [facility] PT, when medically cleared          VITAL SIGNS: 24 HRS MIN & MAX LAST   Temp  Min: 97.6 °F (36.4 °C)  Max: 98.1 °F (36.7 °C) 97.9 °F (36.6 °C)   BP  Min: 82/51  Max: 158/82 (!) 158/82     Pulse  Min: 61  Max: 80  72   Resp  Min: 17  Max: 18 18   SpO2  Min: 93 %  Max: 97 % 97 %       Recent Labs   Lab 04/05/23  0411   WBC 6.0   RBC 4.60*   HGB 13.6*   HCT 41.6*   MCV 90.4   MCH 29.6   MCHC 32.7*   RDW 12.8      MPV 10.0       Recent Labs   Lab 04/05/23 0411 04/06/23  0936 04/07/23 0537    142 141   K 4.2 4.1 4.2   CO2 25 25 21*   BUN 16.6 15.3 21.7   CREATININE 1.08 1.14 1.09   CALCIUM 8.8 9.7 9.2   MG  --  2.20  --    ALBUMIN 3.7 4.1 3.6   ALKPHOS 61 69 57   ALT 18 19 16   AST 16 20 19   BILITOT 3.5* 5.3* 3.2*          Microbiology Results (last 7 days)       Procedure Component Value Units Date/Time    Urine culture [[id number]]  (Abnormal) Collected: 04/05/23 2202    Order Status: Completed Specimen: Urine Updated: 04/07/23 0926     Urine Culture Multiple organisms present indicate probable contamination. Recommend recollection.      >/= 100,000 colonies/ml - Three different Gram-positive Cocci      10,000 - 25,000 colonies/ml GRAM POSITIVE GWENDOLYN RESEMBLING DIPHTHEROIDS      Less than 10,000 colonies/ml GAMMA STREPTOCOCCUS     Comment: [place] counts <10,000/ml are of questionable significance and may or may not indicate contamination.  Therefore organisms are identified only.  If further workup is desired please notify Microbiology   We have not further evaluated these organisms because three or more species compatible with normal genital jeannie usually represents specimen contamination from the time of collection, rather than infection. If clinical circumstances warrant further workup of these organisms, please contact the Microbiology dept at [phone]; otherwise please have the patient submit another specimen.                 See below for Radiology    Scheduled Med:   aspirin  325 mg Oral Daily    atorvastatin  20 mg Oral Daily    cefTRIAXone (ROCEPHIN) IVPB  1 g Intravenous Q24H    enoxaparin  40 mg Subcutaneous Daily    folic acid  1,000 mcg Oral Daily        Continuous Infusions:       PRN Meds:  hydrALAZINE, sodium chloride 0.9%         VTE prophylaxis:     Patient condition:  Stable/Fair/Guarded/ Serious/ Critical    Anticipated discharge and Disposition:         All diagnosis and differential diagnosis have been reviewed; assessment and plan has been documented; I have personally reviewed the labs and test results that are presently available; I have reviewed the patients medication list; I have reviewed the consulting providers response and recommendations. I have reviewed or attempted to review medical records based upon their availability    All of the patient's questions have been  addressed and answered. Patient's is agreeable to the above stated plan. I will continue to monitor closely and make adjustments to medical management as needed.  _____________________________________________________________________    Nutrition Status:    Radiology:  Echo Saline Bubble? Yes  · There is no evidence of intracardiac shunting.  · The left ventricle is mildly enlarged with moderately decreased systolic   function.  · The estimated ejection fraction is 35%.  · Left ventricular diastolic dysfunction.  · Mild aortic regurgitation.  · Mild tricuspid regurgitation.  · Normal central venous pressure (3 mmHg).  · The estimated PA systolic pressure is 29 mmHg.  · Normal right ventricular size with mildly reduced right ventricular   systolic function.  · Mild mitral regurgitation.  · Mild right atrial enlargement.     CTA Head and Neck (xpd)  Narrative: EXAMINATION:  CTA HEAD AND NECK (XPD)    CLINICAL HISTORY:  Stroke/TIA, determine embolic source;    TECHNIQUE:  Axial images obtained through the cervical region and [place]  before and after the administration of intravenous contrast.    Coronal, sagittal, MIP and 3D reconstructions were obtained from the axial data.    Automatic exposure control was utilized to limit radiation dose.    Radiation Dose:    Total DLP: 2441 mGy*cm    COMPARISON:  MRI brain dated 04/05/2023    FINDINGS:  Head CT with contrast:    No interval changes.    No enhancing abnormalities.    If present, stenosis of the carotid bulbs is measured based on NASCET criteria,    i.e. area of maximal stenosis compared to the cervical ICA distal to the bulb.    Cervical CTA:    The origins of the great vessels are patent.    The common carotid arteries are patent.  There are calcifications at the carotid bulbs with less than 20% stenosis bilaterally.  The internal carotid arteries are otherwise normal in caliber.    The vertebral arteries are patent.    Intracranial CTA:    There are calcifications along the course of the carotid siphons with mild narrowing of the supraclinoid segment on the left.  The middle cerebral arteries and anterior cerebral arteries are patent.    There is mild narrowing of the vertebral arteries with scattered calcifications.  The basilar artery and posterior cerebral arteries are patent with mild narrowing of the left posterior cerebral artery.    The dural venous sinuses are patent.    Additional findings:    There is a suspected 12 mm enhancing mucosal nodule in the left oropharynx (series 22, image 59).    No cervical lymphadenopathy.  Impression: 1. No large vessel occlusion or flow-limiting stenosis.  2. Atherosclerotic changes of the carotid arteries with less than 20% stenosis at the carotid bulbs and mild narrowing of the left supraclinoid ICA.  3. Suspected 12 mm enhancing mucosal nodule in the left oropharynx.  Suggest correlation with visual inspection.    Electronically signed by: Carisa Moon  Date:    04/05/2023  Time:    14:56  FL Shunt Setting  Narrative: EXAMINATION:  FL  SHUNT SETTING    CLINICAL HISTORY:  Stroke, follow up;stroke;    TECHNIQUE:  Fluoroscopic spot images of the skull were obtained in lateral projection prior to and following MRI, to evaluate shunt setting.    COMPARISON:  None available.  Impression: Shunt type:  Codman Certas    Shunt performance settings    Pre-MRI:  2    Post-MRI: 2    The partially visualized shunt tubing and regional osseous structures demonstrate no evidence of acute disruption.    Exposure information:    Fluoro time: 30 seconds    Dose: 13.85 mGy    Electronically signed by: Carisa Moon  Date:    04/05/2023  Time:    12:51  MRI Brain Without Contrast  Narrative: EXAMINATION:  MRI BRAIN WITHOUT CONTRAST    CLINICAL HISTORY:  Transient ischemic attack (TIA);Stroke, follow up;    TECHNIQUE:  Multiplanar, multisequence MR images of the brain were obtained without the administration of intravenous contrast.    COMPARISON:  CT head dated 10/22/2019    FINDINGS:  There is a small area of cortical restricted diffusion in the right posterior frontal lobe, involving the precentral gyrus.  There is also a suspected small focus of restricted diffusion in the left anterior temporal lobe (series 4, image 18).    Moderate patchy and confluent T2/FLAIR hyperintensities in the subcortical and periventricular white matter, basal ganglia, thalami and liu are nonspecific and may represent chronic microvascular ischemic changes.    There is a right posterior approach ventricular shunt catheter with tip over the left lateral ventricle.  The ventricles have decreased in size and are now decompressed.  There is moderate diffuse parenchymal volume loss.  There is no abnormal extra-axial fluid collection.  The major intracranial flow voids are patent.  The paranasal sinuses are clear.  There are bilateral mastoid effusions.  Impression: 1. Small areas of acute ischemia in the right posterior frontal lobe and left anterior temporal.  2. Moderate chronic  microvascular ischemic changes.  3. Right posterior ventricular shunt catheter in place with decompressed ventricles.    Electronically signed by: Carisa Moon  Date:    04/05/2023  Time:    12:12      Oumar Muse MD   04/07/2023

## 2023-04-08 PROCEDURE — 25000003 PHARM REV CODE 250: Performed by: INTERNAL MEDICINE

## 2023-04-08 PROCEDURE — 25000003 PHARM REV CODE 250: Performed by: PSYCHIATRY & NEUROLOGY

## 2023-04-08 PROCEDURE — 97530 THERAPEUTIC ACTIVITIES: CPT | Mod: CQ

## 2023-04-08 PROCEDURE — 25000003 PHARM REV CODE 250: Performed by: STUDENT IN AN ORGANIZED HEALTH CARE EDUCATION/TRAINING PROGRAM

## 2023-04-08 PROCEDURE — 25000003 PHARM REV CODE 250: Performed by: NURSE PRACTITIONER

## 2023-04-08 PROCEDURE — 97116 GAIT TRAINING THERAPY: CPT | Mod: CQ

## 2023-04-08 PROCEDURE — 21400001 HC TELEMETRY ROOM

## 2023-04-08 PROCEDURE — 63600175 PHARM REV CODE 636 W HCPCS: Performed by: INTERNAL MEDICINE

## 2023-04-08 RX ORDER — ACETAMINOPHEN 325 MG/1
650 TABLET ORAL EVERY 6 HOURS PRN
Status: DISCONTINUED | OUTPATIENT
Start: 2023-04-08 | End: 2023-04-10 | Stop reason: HOSPADM

## 2023-04-08 RX ORDER — LISINOPRIL 5 MG/1
5 TABLET ORAL DAILY
Status: DISCONTINUED | OUTPATIENT
Start: 2023-04-08 | End: 2023-04-10 | Stop reason: HOSPADM

## 2023-04-08 RX ORDER — FUROSEMIDE 20 MG/1
20 TABLET ORAL DAILY
Status: DISCONTINUED | OUTPATIENT
Start: 2023-04-08 | End: 2023-04-10 | Stop reason: HOSPADM

## 2023-04-08 RX ADMIN — LISINOPRIL 5 MG: 5 TABLET ORAL at 09:04

## 2023-04-08 RX ADMIN — ATORVASTATIN CALCIUM 20 MG: 10 TABLET, FILM COATED ORAL at 09:04

## 2023-04-08 RX ADMIN — FOLIC ACID 1000 MCG: 1 TABLET ORAL at 09:04

## 2023-04-08 RX ADMIN — ACETAMINOPHEN 325MG 650 MG: 325 TABLET ORAL at 02:04

## 2023-04-08 RX ADMIN — ENOXAPARIN SODIUM 40 MG: 40 INJECTION SUBCUTANEOUS at 05:04

## 2023-04-08 RX ADMIN — ASPIRIN 325 MG: 325 TABLET, COATED ORAL at 09:04

## 2023-04-08 RX ADMIN — FUROSEMIDE 20 MG: 20 TABLET ORAL at 09:04

## 2023-04-08 RX ADMIN — CEFTRIAXONE 1 G: 1 INJECTION, POWDER, FOR SOLUTION INTRAMUSCULAR; INTRAVENOUS at 11:04

## 2023-04-08 NOTE — PT/OT/SLP PROGRESS
Physical Therapy         Treatment        Madan Elder   MRN: [id number]     PT Received On: 04/08/23  PT Start Time: 1359     PT Stop Time: 1432    PT Total Time (min): 33 min       Billable Minutes:  Gait [id number] and Therapeutic Activity 19  Total Minutes: 33    Treatment Type: Treatment  PT/PTA: PTA     Number of PTA visits since last PT visit: 2       General Precautions: Standard, fall  Orthopedic Precautions: Orthopedic Precautions : N/A   Braces:      Spiritual, Cultural Beliefs, Evangelical Practices, Values that Affect Care: no    Subjective:  Communicated with NSG prior to session, stating pt has resumed BP meds and to monitor PB during activity.          Objective:  Patient found sitting up EOB, with daughter present.   Patient found with: peripheral IV, telemetry    Blood pressure taken supine, 152/76 seated 152/80 and standing first time at 161/103, 5 min sit rest and repeated 163/92.  Nurse suggested doing light activity and monitor BP.  Following ~ 5 minutes of activity, BP measured at 139/68.  Nurse given all measurements.     Functional Mobility:  Bed Mobility:   Supine to sit: Supervision or Set-up Assistance   Sit to supine: Supervision or Set-up Assistance   Rolling: Activity did not occur   Scooting: Supervision or Set-up Assistance    Balance:   Static Stand: SBA   Dynamic stand: CGA    Pt performed functional tasks in standing in bathroom, reaching various levels/directions with steps to side and back.    Transfer Training:SPV    Pt performed sit to stand from toilet bed and chair, obtaining balance prior to reaching for walker.    Gait Training:CGA    Pt ambulated in room and bathroom, to improve safety and motor planning for return home.  Pt ambulated 200 ft in hallway, w/ RW, demonstrating good safety and quality of gait.     Activity Tolerance:  Patient tolerated treatment well    Patient left up in chair with all lines intact, call button in reach, and daughter  present.    Assessment:  Madan Elder is a 77 y.o. male with a medical diagnosis of Stroke.     Rehab potential is excellent.    Activity tolerance: Excellent    Discharge recommendations: Discharge Facility/Level of Care Needs: home with home health     Equipment recommendations:       GOALS:   Multidisciplinary Problems       Physical Therapy Goals          Problem: Physical Therapy    Goal Priority Disciplines Outcome Goal Variances Interventions   Physical Therapy Goal     PT, PT/OT Ongoing, Progressing     Description: Goals to be met by: 23     Patient will increase functional independence with mobility by performin. Sit to stand transfer with Modified [place]  2. Gait  x 300 feet with Modified [place] using Rolling Walker.                          PLAN:    Patient to be seen 5 x/week  to address the above listed problems via gait training, therapeutic activities, therapeutic exercises  Plan of Care expires: 23  Plan of Care reviewed with: patient, daughter         2023

## 2023-04-08 NOTE — PLAN OF CARE
Problem: Adult Inpatient Plan of Care  Goal: Plan of Care Review  Outcome: Ongoing, Progressing  Goal: Patient-Specific Goal (Individualized)  Outcome: Ongoing, Progressing  Goal: Absence of Hospital-Acquired Illness or Injury  Outcome: Ongoing, Progressing  Goal: Optimal Comfort and Wellbeing  Outcome: Ongoing, Progressing  Goal: Readiness for Transition of Care  Outcome: Ongoing, Progressing     Problem: Adjustment to Illness (Stroke, Ischemic/Transient Ischemic Attack)  Goal: Optimal Coping  Outcome: Ongoing, Progressing     Problem: Bowel Elimination Impaired (Stroke, Ischemic/Transient Ischemic Attack)  Goal: Effective Bowel Elimination  Outcome: Ongoing, Progressing     Problem: Cognitive Impairment (Stroke, Ischemic/Transient Ischemic Attack)  Goal: Optimal Cognitive Function  Outcome: Ongoing, Progressing     Problem: Cerebral Tissue Perfusion (Stroke, Ischemic/Transient Ischemic Attack)  Goal: Optimal Cerebral Tissue Perfusion  Outcome: Ongoing, Progressing     Problem: Communication Impairment (Stroke, Ischemic/Transient Ischemic Attack)  Goal: Improved Communication Skills  Outcome: Ongoing, Progressing     Problem: Respiratory Compromise (Stroke, Ischemic/Transient Ischemic Attack)  Goal: Effective Oxygenation and Ventilation  Outcome: Ongoing, Progressing     Problem: Functional Ability Impaired (Stroke, Ischemic/Transient Ischemic Attack)  Goal: Optimal Functional Ability  Outcome: Ongoing, Progressing     Problem: Sensorimotor Impairment (Stroke, Ischemic/Transient Ischemic Attack)  Goal: Improved Sensorimotor Function  Outcome: Ongoing, Progressing     Problem: Urinary Elimination Impaired (Stroke, Ischemic/Transient Ischemic Attack)  Goal: Effective Urinary Elimination  Outcome: Ongoing, Progressing

## 2023-04-08 NOTE — PROGRESS NOTES
[facility]  Hospital Medicine Progress Note        Chief Complaint: Numbness (Pt arrives to ED c/o L arm numbness and weakness that he woke up with 2 days ago. Per daughter she received notice of this 1000 today and symptoms have improved throughout the day. No numbness, weakness, facial droop noted in triage at time. + Headache, nausea, SOB. Denies falls, trauma. Went to [facility] in [place] where pt had labs and CT, neuro consult, admitted to [facility] hospital however daughter took pt out AMA due to lack of satisfaction of their care. Cardiologist Dr. Newsome. )         Patient information was obtained from patient, patient's family, past medical records and ER records.      HISTORY OF PRESENT ILLNESS:   Madan Elder is a 77 y.o. male with a past medical history of essential hypertension, hyperlipidemia, and  presented to [facility] on 4/5/2023 for left upper extremity weakness.  Patient reported left upper extremity weakness and numbness began approximately 2 days ago. Patient reports he noticed symptoms while cooking breakfast. Patient denies speech changes, vision changes, dizziness, headache, syncope, chest pain, shortness of breath, abdominal pain, nausea, vomiting, diarrhea, fever, and chills.  Family reports patient told them of his symptoms yesterday, prompting them to ED.  Prior to arrival patient was seen at [facility] with CT head without abnormality.  Patient was admitted for stroke workup at outside facility.  Family was not satisfied with care and signed patient out AMA.  Initial vital signs in ED were /90, pulse 85, respirations 18, temperature 36.9° C, and SpO2 97% on room air.  Labs revealed WBC 6 and chloride 110. Neurology was consulted. Patient was admitted to hospital medicine service for further medical management.        Patient currently being managed for acute CVA in the right posterior frontal and left anterior temporal regions.     Today's information    Patient seen and examined at bedside, daughter is not at bedside   Vitals currently reviewed and stable   Will resume home dose lisinopril 5 mg and Lasix 20 mg   Continue IV ceftriaxone day 3 of 3   Patient currently on aspirin and atorvastatin per neuro recs   Patient complained that he wet the bed he did not know when he urinated on himself.  He feels ashamed      Exam  General appearance: Male in no apparent distress.  HEENNT: Atraumatic head.   Lungs: Clear to auscultation bilaterally.   Heart: Regular rate and rhythm.    Abdomen: Soft, non-distended, non-tender. Bowel sounds are normal.   Extremities: No cyanosis, clubbing, or edema. No deformities.   Skin: No Rash. Warm and dry.   Neuro: Awake, alert, and oriented. Motor and sensory exams grossly intact.  No slurred speech.  No facial droop.  Left upper extremity strength 4/5.  5/5 muscle strength in right upper extremity and bilateral lower extremities  Psych/mental status: Appropriate mood and affect. Responds appropriately to questions.         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  left arm weakness/paresthesia   Orthostatic hypotension  Essential Hypertension , uncontrolled   Hyperlipidemia  Cardiomyopathy EF 35%  Acute UTI  Left oropharynx nodule 12 mm     Plan:   Will resume home dose lisinopril 5 mg and Lasix 20 mg   Continue IV ceftriaxone day 3 of 3   Patient currently on aspirin and atorvastatin per neuro recs   Appreciate neuro input to interrogate pacemaker- this has been done    Carotid ultrasound was negative   Echo shows EF of 35%, which is known to patient   Continue PT OT    Neuro checks q.4 hours   Fall precautions   Continue appropriate home medications once med rec updated         VTE Prophylaxis: SC lovenox      Dispo- to [facility] PT, when medically cleared in next 24 hrs       VITAL SIGNS: 24 HRS MIN & MAX LAST   Temp  Min: 97.5 °F (36.4 °C)  Max: 98.7 °F (37.1 °C)  97.6 °F (36.4 °C)   BP  Min: 101/64  Max: 156/97 (!) 150/67     Pulse  Min: 62  Max: 90  66   Resp  Min: 16  Max: 16 16   SpO2  Min: 94 %  Max: 97 % 97 %       Recent Labs   Lab 04/05/23 0411   WBC 6.0   RBC 4.60*   HGB 13.6*   HCT 41.6*   MCV 90.4   MCH 29.6   MCHC 32.7*   RDW 12.8      MPV 10.0       Recent Labs   Lab 04/05/23 0411 04/06/23  0936 04/07/23  0537    142 141   K 4.2 4.1 4.2   CO2 25 25 21*   BUN 16.6 15.3 21.7   CREATININE 1.08 1.14 1.09   CALCIUM 8.8 9.7 9.2   MG  --  2.20  --    ALBUMIN 3.7 4.1 3.6   ALKPHOS 61 69 57   ALT 18 19 16   AST 16 20 19   BILITOT 3.5* 5.3* 3.2*          Microbiology Results (last 7 days)       Procedure Component Value Units Date/Time    Urine culture [[id number]]  (Abnormal) Collected: 04/05/23 2202    Order Status: Completed Specimen: Urine Updated: 04/07/23 0926     Urine Culture Multiple organisms present indicate probable contamination. Recommend recollection.      >/= 100,000 colonies/ml - Three different Gram-positive Cocci      10,000 - 25,000 colonies/ml GRAM POSITIVE GWENDOLYN RESEMBLING DIPHTHEROIDS      Less than 10,000 colonies/ml GAMMA STREPTOCOCCUS     Comment: [place] counts <10,000/ml are of questionable significance and may or may not indicate contamination.  Therefore organisms are identified only.  If further workup is desired please notify Microbiology   We have not further evaluated these organisms because three or more species compatible with normal genital jeannie usually represents specimen contamination from the time of collection, rather than infection. If clinical circumstances warrant further workup of these organisms, please contact the Microbiology dept at [phone]; otherwise please have the patient submit another specimen.                See below for Radiology    Scheduled Med:   aspirin  325 mg Oral Daily    atorvastatin  20 mg Oral Daily    enoxaparin  40 mg Subcutaneous Daily    folic acid  1,000 mcg Oral Daily    furosemide  20 mg  Oral Daily    lisinopriL  5 mg Oral Daily        Continuous Infusions:       PRN Meds:  acetaminophen, hydrALAZINE, sodium chloride 0.9%     VTE prophylaxis:     Patient condition:  Stable/Fair/Guarded/ Serious/ Critical    Anticipated discharge and Disposition:         All diagnosis and differential diagnosis have been reviewed; assessment and plan has been documented; I have personally reviewed the labs and test results that are presently available; I have reviewed the patients medication list; I have reviewed the consulting providers response and recommendations. I have reviewed or attempted to review medical records based upon their availability    All of the patient's questions have been  addressed and answered. Patient's is agreeable to the above stated plan. I will continue to monitor closely and make adjustments to medical management as needed.  _____________________________________________________________________    Nutrition Status:    Radiology:  Echo Saline Bubble? Yes  · There is no evidence of intracardiac shunting.  · The left ventricle is mildly enlarged with moderately decreased systolic   function.  · The estimated ejection fraction is 35%.  · Left ventricular diastolic dysfunction.  · Mild aortic regurgitation.  · Mild tricuspid regurgitation.  · Normal central venous pressure (3 mmHg).  · The estimated PA systolic pressure is 29 mmHg.  · Normal right ventricular size with mildly reduced right ventricular   systolic function.  · Mild mitral regurgitation.  · Mild right atrial enlargement.     CTA Head and Neck (xpd)  Narrative: EXAMINATION:  CTA HEAD AND NECK (XPD)    CLINICAL HISTORY:  Stroke/TIA, determine embolic source;    TECHNIQUE:  Axial images obtained through the cervical region and [place] before and after the administration of intravenous contrast.    Coronal, sagittal, MIP and 3D reconstructions were obtained from the axial data.    Automatic exposure control was utilized to  limit radiation dose.    Radiation Dose:    Total DLP: 2441 mGy*cm    COMPARISON:  MRI brain dated 04/05/2023    FINDINGS:  Head CT with contrast:    No interval changes.    No enhancing abnormalities.    If present, stenosis of the carotid bulbs is measured based on NASCET criteria,    i.e. area of maximal stenosis compared to the cervical ICA distal to the bulb.    Cervical CTA:    The origins of the great vessels are patent.    The common carotid arteries are patent.  There are calcifications at the carotid bulbs with less than 20% stenosis bilaterally.  The internal carotid arteries are otherwise normal in caliber.    The vertebral arteries are patent.    Intracranial CTA:    There are calcifications along the course of the carotid siphons with mild narrowing of the supraclinoid segment on the left.  The middle cerebral arteries and anterior cerebral arteries are patent.    There is mild narrowing of the vertebral arteries with scattered calcifications.  The basilar artery and posterior cerebral arteries are patent with mild narrowing of the left posterior cerebral artery.    The dural venous sinuses are patent.    Additional findings:    There is a suspected 12 mm enhancing mucosal nodule in the left oropharynx (series 22, image 59).    No cervical lymphadenopathy.  Impression: 1. No large vessel occlusion or flow-limiting stenosis.  2. Atherosclerotic changes of the carotid arteries with less than 20% stenosis at the carotid bulbs and mild narrowing of the left supraclinoid ICA.  3. Suspected 12 mm enhancing mucosal nodule in the left oropharynx.  Suggest correlation with visual inspection.    Electronically signed by: Carisa Moon  Date:    04/05/2023  Time:    14:56  FL Shunt Setting  Narrative: EXAMINATION:  FL SHUNT SETTING    CLINICAL HISTORY:  Stroke, follow up;stroke;    TECHNIQUE:  Fluoroscopic spot images of the skull were obtained in lateral projection prior to and following MRI, to evaluate  shunt setting.    COMPARISON:  None available.  Impression: Shunt type:  [place] Certas    Shunt performance settings    Pre-MRI:  2    Post-MRI: 2    The partially visualized shunt tubing and regional osseous structures demonstrate no evidence of acute disruption.    Exposure information:    Fluoro time: 30 seconds    Dose: 13.85 mGy    Electronically signed by: Carisa Moon  Date:    04/05/2023  Time:    12:51  MRI Brain Without Contrast  Narrative: EXAMINATION:  MRI BRAIN WITHOUT CONTRAST    CLINICAL HISTORY:  Transient ischemic attack (TIA);Stroke, follow up;    TECHNIQUE:  Multiplanar, multisequence MR images of the brain were obtained without the administration of intravenous contrast.    COMPARISON:  CT head dated 10/22/2019    FINDINGS:  There is a small area of cortical restricted diffusion in the right posterior frontal lobe, involving the precentral gyrus.  There is also a suspected small focus of restricted diffusion in the left anterior temporal lobe (series 4, image 18).    Moderate patchy and confluent T2/FLAIR hyperintensities in the subcortical and periventricular white matter, basal ganglia, thalami and liu are nonspecific and may represent chronic microvascular ischemic changes.    There is a right posterior approach ventricular shunt catheter with tip over the left lateral ventricle.  The ventricles have decreased in size and are now decompressed.  There is moderate diffuse parenchymal volume loss.  There is no abnormal extra-axial fluid collection.  The major intracranial flow voids are patent.  The paranasal sinuses are clear.  There are bilateral mastoid effusions.  Impression: 1. Small areas of acute ischemia in the right posterior frontal lobe and left anterior temporal.  2. Moderate chronic microvascular ischemic changes.  3. Right posterior ventricular shunt catheter in place with decompressed ventricles.    Electronically signed by: Carisa  Red  Date:    04/05/2023  Time:    12:12      Ouamr Muse MD   04/08/2023

## 2023-04-09 PROCEDURE — 63600175 PHARM REV CODE 636 W HCPCS: Performed by: INTERNAL MEDICINE

## 2023-04-09 PROCEDURE — 25000003 PHARM REV CODE 250: Performed by: PSYCHIATRY & NEUROLOGY

## 2023-04-09 PROCEDURE — 25000003 PHARM REV CODE 250: Performed by: INTERNAL MEDICINE

## 2023-04-09 PROCEDURE — 21400001 HC TELEMETRY ROOM

## 2023-04-09 PROCEDURE — 25000003 PHARM REV CODE 250: Performed by: NURSE PRACTITIONER

## 2023-04-09 PROCEDURE — 25000003 PHARM REV CODE 250: Performed by: STUDENT IN AN ORGANIZED HEALTH CARE EDUCATION/TRAINING PROGRAM

## 2023-04-09 RX ORDER — CARVEDILOL 3.12 MG/1
3.12 TABLET ORAL 2 TIMES DAILY
Status: DISCONTINUED | OUTPATIENT
Start: 2023-04-09 | End: 2023-04-10 | Stop reason: HOSPADM

## 2023-04-09 RX ADMIN — FUROSEMIDE 20 MG: 20 TABLET ORAL at 08:04

## 2023-04-09 RX ADMIN — ASPIRIN 325 MG: 325 TABLET, COATED ORAL at 08:04

## 2023-04-09 RX ADMIN — CARVEDILOL 3.12 MG: 3.12 TABLET, FILM COATED ORAL at 08:04

## 2023-04-09 RX ADMIN — LISINOPRIL 5 MG: 5 TABLET ORAL at 08:04

## 2023-04-09 RX ADMIN — ENOXAPARIN SODIUM 40 MG: 40 INJECTION SUBCUTANEOUS at 04:04

## 2023-04-09 RX ADMIN — ATORVASTATIN CALCIUM 20 MG: 10 TABLET, FILM COATED ORAL at 08:04

## 2023-04-09 RX ADMIN — FOLIC ACID 1000 MCG: 1 TABLET ORAL at 08:04

## 2023-04-09 NOTE — PLAN OF CARE
Problem: Adult Inpatient Plan of Care  Goal: Plan of Care Review  Outcome: Ongoing, Progressing  Goal: Patient-Specific Goal (Individualized)  Outcome: Ongoing, Progressing  Goal: Absence of Hospital-Acquired Illness or Injury  Outcome: Ongoing, Progressing  Goal: Optimal Comfort and Wellbeing  Outcome: Ongoing, Progressing  Goal: Readiness for Transition of Care  Outcome: Ongoing, Progressing     Problem: Adjustment to Illness (Stroke, Ischemic/Transient Ischemic Attack)  Goal: Optimal Coping  Outcome: Ongoing, Progressing     Problem: Bowel Elimination Impaired (Stroke, Ischemic/Transient Ischemic Attack)  Goal: Effective Bowel Elimination  Outcome: Ongoing, Progressing     Problem: Cerebral Tissue Perfusion (Stroke, Ischemic/Transient Ischemic Attack)  Goal: Optimal Cerebral Tissue Perfusion  Outcome: Ongoing, Progressing     Problem: Cognitive Impairment (Stroke, Ischemic/Transient Ischemic Attack)  Goal: Optimal Cognitive Function  Outcome: Ongoing, Progressing     Problem: Communication Impairment (Stroke, Ischemic/Transient Ischemic Attack)  Goal: Improved Communication Skills  Outcome: Ongoing, Progressing     Problem: Respiratory Compromise (Stroke, Ischemic/Transient Ischemic Attack)  Goal: Effective Oxygenation and Ventilation  Outcome: Ongoing, Progressing     Problem: Sensorimotor Impairment (Stroke, Ischemic/Transient Ischemic Attack)  Goal: Improved Sensorimotor Function  Outcome: Ongoing, Progressing     Problem: Swallowing Impairment (Stroke, Ischemic/Transient Ischemic Attack)  Goal: Optimal Eating and Swallowing without Aspiration  Outcome: Ongoing, Progressing     Problem: Urinary Elimination Impaired (Stroke, Ischemic/Transient Ischemic Attack)  Goal: Effective Urinary Elimination  Outcome: Ongoing, Progressing

## 2023-04-09 NOTE — PROGRESS NOTES
[facility]  Hospital Medicine Progress Note        Chief Complaint: Numbness (Pt arrives to ED c/o L arm numbness and weakness that he woke up with 2 days ago. Per daughter she received notice of this 1000 today and symptoms have improved throughout the day. No numbness, weakness, facial droop noted in triage at time. + Headache, nausea, SOB. Denies falls, trauma. Went to [facility] in [place] where pt had labs and CT, neuro consult, admitted to [facility] hospital however daughter took pt out AMA due to lack of satisfaction of their care. Cardiologist Dr. Newsome. )         Patient information was obtained from patient, patient's family, past medical records and ER records.      HISTORY OF PRESENT ILLNESS:   Madan Elder is a 77 y.o. male with a past medical history of essential hypertension, hyperlipidemia, and  presented to [facility] on 4/5/2023 for left upper extremity weakness.  Patient reported left upper extremity weakness and numbness began approximately 2 days ago. Patient reports he noticed symptoms while cooking breakfast. Patient denies speech changes, vision changes, dizziness, headache, syncope, chest pain, shortness of breath, abdominal pain, nausea, vomiting, diarrhea, fever, and chills.  Family reports patient told them of his symptoms yesterday, prompting them to ED.  Prior to arrival patient was seen at [facility] with CT head without abnormality.  Patient was admitted for stroke workup at outside facility.  Family was not satisfied with care and signed patient out AMA.  Initial vital signs in ED were /90, pulse 85, respirations 18, temperature 36.9° C, and SpO2 97% on room air.  Labs revealed WBC 6 and chloride 110. Neurology was consulted. Patient was admitted to hospital medicine service for further medical management.        Patient currently being managed for acute CVA in the right posterior frontal and left anterior temporal regions.     Today's information    Patient seen and examined at bedside, daughter is not at bedside   Overnight no events reported   Blood pressure trending upwards will resume Coreg at a low dose 3.125 mg b.i.d.  Recheck orthostatic today  Will likely discharge patient in next 24 hours        Exam  General appearance: Male in no apparent distress.  HEENNT: Atraumatic head.   Lungs: Clear to auscultation bilaterally.   Heart: Regular rate and rhythm.    Abdomen: Soft, non-distended, non-tender. Bowel sounds are normal.   Extremities: No cyanosis, clubbing, or edema. No deformities.   Skin: No Rash. Warm and dry.   Neuro: Awake, alert, and oriented. Motor and sensory exams grossly intact.  No slurred speech.  No facial droop.  Left upper extremity strength 4/5.  5/5 muscle strength in right upper extremity and bilateral lower extremities  Psych/mental status: Appropriate mood and affect. Responds appropriately to questions.         ASSESSMENT & PLAN:   acute CVA in the right posterior frontal and left anterior temporal regions.  left arm weakness/paresthesia   Orthostatic hypotension  Essential Hypertension , uncontrolled   Hyperlipidemia  Cardiomyopathy EF 35%  Acute UTI  Left oropharynx nodule 12 mm     Plan:    resume Coreg at a low dose 3.125 mg b.i.d.  Recheck orthostatic today  Will resume home dose lisinopril 5 mg and Lasix 20 mg   S/p  IV ceftriaxone 3 days   Patient currently on aspirin and atorvastatin per neuro recs   Appreciate neuro input to interrogate pacemaker- this has been done    Carotid ultrasound was negative   Echo shows EF of 35%, which is known to patient   Continue PT OT    Neuro checks q.4 hours   Fall precautions   Continue appropriate home medications once med rec updated         VTE Prophylaxis: SC lovenox      Dispo- to [facility] PT,Will likely discharge patient in next 24 hours            VITAL SIGNS: 24 HRS MIN & MAX LAST   Temp  Min: 97.3 °F (36.3 °C)  Max: 97.6 °F (36.4 °C) 97.3 °F (36.3 °C)   BP  Min: 132/68  Max: 184/93  139/80   Pulse  Min: 61  Max: 79  65   Resp  Min: 16  Max: 16 16   SpO2  Min: 95 %  Max: 97 % 96 %       Recent Labs   Lab 04/05/23  0411   WBC 6.0   RBC 4.60*   HGB 13.6*   HCT 41.6*   MCV 90.4   MCH 29.6   MCHC 32.7*   RDW 12.8      MPV 10.0       Recent Labs   Lab 04/05/23  0411 04/06/23  0936 04/07/23  0537    142 141   K 4.2 4.1 4.2   CO2 25 25 21*   BUN 16.6 15.3 21.7   CREATININE 1.08 1.14 1.09   CALCIUM 8.8 9.7 9.2   MG  --  2.20  --    ALBUMIN 3.7 4.1 3.6   ALKPHOS 61 69 57   ALT 18 19 16   AST 16 20 19   BILITOT 3.5* 5.3* 3.2*          Microbiology Results (last 7 days)       Procedure Component Value Units Date/Time    Urine culture [[id number]]  (Abnormal) Collected: 04/05/23 2202    Order Status: Completed Specimen: Urine Updated: 04/07/23 0926     Urine Culture Multiple organisms present indicate probable contamination. Recommend recollection.      >/= 100,000 colonies/ml - Three different Gram-positive Cocci      10,000 - 25,000 colonies/ml GRAM POSITIVE GWENDOLYN RESEMBLING DIPHTHEROIDS      Less than 10,000 colonies/ml GAMMA STREPTOCOCCUS     Comment: [place] counts <10,000/ml are of questionable significance and may or may not indicate contamination.  Therefore organisms are identified only.  If further workup is desired please notify Microbiology   We have not further evaluated these organisms because three or more species compatible with normal genital jeannie usually represents specimen contamination from the time of collection, rather than infection. If clinical circumstances warrant further workup of these organisms, please contact the Microbiology dept at [phone]; otherwise please have the patient submit another specimen.                See below for Radiology    Scheduled Med:   aspirin  325 mg Oral Daily    atorvastatin  20 mg Oral Daily    carvediloL  3.125 mg Oral BID    enoxaparin  40 mg Subcutaneous Daily    folic acid  1,000 mcg Oral Daily    furosemide  20 mg Oral Daily     lisinopriL  5 mg Oral Daily        Continuous Infusions:       PRN Meds:  acetaminophen, sodium chloride 0.9%       VTE prophylaxis:     Patient condition:  Stable/Fair/Guarded/ Serious/ Critical    Anticipated discharge and Disposition:         All diagnosis and differential diagnosis have been reviewed; assessment and plan has been documented; I have personally reviewed the labs and test results that are presently available; I have reviewed the patients medication list; I have reviewed the consulting providers response and recommendations. I have reviewed or attempted to review medical records based upon their availability    All of the patient's questions have been  addressed and answered. Patient's is agreeable to the above stated plan. I will continue to monitor closely and make adjustments to medical management as needed.  _____________________________________________________________________    Nutrition Status:    Radiology:  Echo Saline Bubble? Yes  · There is no evidence of intracardiac shunting.  · The left ventricle is mildly enlarged with moderately decreased systolic   function.  · The estimated ejection fraction is 35%.  · Left ventricular diastolic dysfunction.  · Mild aortic regurgitation.  · Mild tricuspid regurgitation.  · Normal central venous pressure (3 mmHg).  · The estimated PA systolic pressure is 29 mmHg.  · Normal right ventricular size with mildly reduced right ventricular   systolic function.  · Mild mitral regurgitation.  · Mild right atrial enlargement.     CTA Head and Neck (xpd)  Narrative: EXAMINATION:  CTA HEAD AND NECK (XPD)    CLINICAL HISTORY:  Stroke/TIA, determine embolic source;    TECHNIQUE:  Axial images obtained through the cervical region and [place] before and after the administration of intravenous contrast.    Coronal, sagittal, MIP and 3D reconstructions were obtained from the axial data.    Automatic exposure control was utilized to limit radiation  dose.    Radiation Dose:    Total DLP: 2441 mGy*cm    COMPARISON:  MRI brain dated 04/05/2023    FINDINGS:  Head CT with contrast:    No interval changes.    No enhancing abnormalities.    If present, stenosis of the carotid bulbs is measured based on NASCET criteria,    i.e. area of maximal stenosis compared to the cervical ICA distal to the bulb.    Cervical CTA:    The origins of the great vessels are patent.    The common carotid arteries are patent.  There are calcifications at the carotid bulbs with less than 20% stenosis bilaterally.  The internal carotid arteries are otherwise normal in caliber.    The vertebral arteries are patent.    Intracranial CTA:    There are calcifications along the course of the carotid siphons with mild narrowing of the supraclinoid segment on the left.  The middle cerebral arteries and anterior cerebral arteries are patent.    There is mild narrowing of the vertebral arteries with scattered calcifications.  The basilar artery and posterior cerebral arteries are patent with mild narrowing of the left posterior cerebral artery.    The dural venous sinuses are patent.    Additional findings:    There is a suspected 12 mm enhancing mucosal nodule in the left oropharynx (series 22, image 59).    No cervical lymphadenopathy.  Impression: 1. No large vessel occlusion or flow-limiting stenosis.  2. Atherosclerotic changes of the carotid arteries with less than 20% stenosis at the carotid bulbs and mild narrowing of the left supraclinoid ICA.  3. Suspected 12 mm enhancing mucosal nodule in the left oropharynx.  Suggest correlation with visual inspection.    Electronically signed by: Carisa Moon  Date:    04/05/2023  Time:    14:56  FL Shunt Setting  Narrative: EXAMINATION:  FL SHUNT SETTING    CLINICAL HISTORY:  Stroke, follow up;stroke;    TECHNIQUE:  Fluoroscopic spot images of the skull were obtained in lateral projection prior to and following MRI, to evaluate shunt  setting.    COMPARISON:  None available.  Impression: Shunt type:  Codman Certas    Shunt performance settings    Pre-MRI:  2    Post-MRI: 2    The partially visualized shunt tubing and regional osseous structures demonstrate no evidence of acute disruption.    Exposure information:    Fluoro time: 30 seconds    Dose: 13.85 mGy    Electronically signed by: Carisa Moon  Date:    04/05/2023  Time:    12:51  MRI Brain Without Contrast  Narrative: EXAMINATION:  MRI BRAIN WITHOUT CONTRAST    CLINICAL HISTORY:  Transient ischemic attack (TIA);Stroke, follow up;    TECHNIQUE:  Multiplanar, multisequence MR images of the brain were obtained without the administration of intravenous contrast.    COMPARISON:  CT head dated 10/22/2019    FINDINGS:  There is a small area of cortical restricted diffusion in the right posterior frontal lobe, involving the precentral gyrus.  There is also a suspected small focus of restricted diffusion in the left anterior temporal lobe (series 4, image 18).    Moderate patchy and confluent T2/FLAIR hyperintensities in the subcortical and periventricular white matter, basal ganglia, thalami and liu are nonspecific and may represent chronic microvascular ischemic changes.    There is a right posterior approach ventricular shunt catheter with tip over the left lateral ventricle.  The ventricles have decreased in size and are now decompressed.  There is moderate diffuse parenchymal volume loss.  There is no abnormal extra-axial fluid collection.  The major intracranial flow voids are patent.  The paranasal sinuses are clear.  There are bilateral mastoid effusions.  Impression: 1. Small areas of acute ischemia in the right posterior frontal lobe and left anterior temporal.  2. Moderate chronic microvascular ischemic changes.  3. Right posterior ventricular shunt catheter in place with decompressed ventricles.    Electronically signed by: Carisa  Red  Date:    04/05/2023  Time:    12:12      Oumar Muse MD   04/09/2023

## 2023-04-10 VITALS
SYSTOLIC BLOOD PRESSURE: 138 MMHG | TEMPERATURE: 98 F | RESPIRATION RATE: 18 BRPM | WEIGHT: 164.56 LBS | HEART RATE: 60 BPM | BODY MASS INDEX: 23.56 KG/M2 | OXYGEN SATURATION: 95 % | HEIGHT: 70 IN | DIASTOLIC BLOOD PRESSURE: 84 MMHG

## 2023-04-10 PROCEDURE — 25000003 PHARM REV CODE 250: Performed by: PSYCHIATRY & NEUROLOGY

## 2023-04-10 PROCEDURE — 97116 GAIT TRAINING THERAPY: CPT | Mod: CQ

## 2023-04-10 PROCEDURE — 25000003 PHARM REV CODE 250: Performed by: NURSE PRACTITIONER

## 2023-04-10 PROCEDURE — 99223 PR INITIAL HOSPITAL CARE,LEVL III: ICD-10-PCS | Mod: ,,, | Performed by: PSYCHIATRY & NEUROLOGY

## 2023-04-10 PROCEDURE — 25000003 PHARM REV CODE 250: Performed by: STUDENT IN AN ORGANIZED HEALTH CARE EDUCATION/TRAINING PROGRAM

## 2023-04-10 PROCEDURE — 99223 1ST HOSP IP/OBS HIGH 75: CPT | Mod: ,,, | Performed by: PSYCHIATRY & NEUROLOGY

## 2023-04-10 PROCEDURE — 97535 SELF CARE MNGMENT TRAINING: CPT

## 2023-04-10 PROCEDURE — 25000003 PHARM REV CODE 250: Performed by: INTERNAL MEDICINE

## 2023-04-10 RX ORDER — CARVEDILOL 3.12 MG/1
3.12 TABLET ORAL 2 TIMES DAILY
Qty: 60 TABLET | Refills: 11 | Status: SHIPPED | OUTPATIENT
Start: 2023-04-10 | End: 2024-04-09

## 2023-04-10 RX ORDER — POLYETHYLENE GLYCOL 3350 17 G/17G
17 POWDER, FOR SOLUTION ORAL DAILY
Status: DISCONTINUED | OUTPATIENT
Start: 2023-04-10 | End: 2023-04-10 | Stop reason: HOSPADM

## 2023-04-10 RX ORDER — ATORVASTATIN CALCIUM 20 MG/1
20 TABLET, FILM COATED ORAL DAILY
Qty: 90 TABLET | Refills: 3 | Status: SHIPPED | OUTPATIENT
Start: 2023-04-10 | End: 2024-04-09

## 2023-04-10 RX ORDER — AMOXICILLIN 250 MG
1 CAPSULE ORAL DAILY
Status: DISCONTINUED | OUTPATIENT
Start: 2023-04-10 | End: 2023-04-10 | Stop reason: HOSPADM

## 2023-04-10 RX ORDER — ASPIRIN 325 MG
325 TABLET, DELAYED RELEASE (ENTERIC COATED) ORAL DAILY
Qty: 30 TABLET | Refills: 11 | Status: SHIPPED | OUTPATIENT
Start: 2023-04-10 | End: 2024-04-09

## 2023-04-10 RX ADMIN — FOLIC ACID 1000 MCG: 1 TABLET ORAL at 10:04

## 2023-04-10 RX ADMIN — SENNOSIDES AND DOCUSATE SODIUM 1 TABLET: 50; 8.6 TABLET ORAL at 12:04

## 2023-04-10 RX ADMIN — LISINOPRIL 5 MG: 5 TABLET ORAL at 10:04

## 2023-04-10 RX ADMIN — ASPIRIN 325 MG: 325 TABLET, COATED ORAL at 10:04

## 2023-04-10 RX ADMIN — ATORVASTATIN CALCIUM 20 MG: 10 TABLET, FILM COATED ORAL at 10:04

## 2023-04-10 RX ADMIN — CARVEDILOL 3.12 MG: 3.12 TABLET, FILM COATED ORAL at 10:04

## 2023-04-10 RX ADMIN — FUROSEMIDE 20 MG: 20 TABLET ORAL at 10:04

## 2023-04-10 RX ADMIN — POLYETHYLENE GLYCOL 3350 17 G: 17 POWDER, FOR SOLUTION ORAL at 12:04

## 2023-04-10 NOTE — PLAN OF CARE
04/10/23 0833   Final Note   Assessment Type Final Discharge Note   Anticipated Discharge Disposition Home-Health   Hospital Resources/Appts/Education Provided Post-Acute resouces added to AVS   Post-Acute Status   Post-Acute Authorization Home Health   Home Health Status Set-up Complete/Auth obtained  (Maxwell MILLER)     Spoke to Dominic, patient received a walker in 2019 and is not able to get one covered by insurance. Discussed with daughter and patient, patient does have a walker at home.

## 2023-04-10 NOTE — PT/OT/SLP PROGRESS
Physical Therapy Treatment    Patient Name:  Madan Elder   MRN:  [id number]    Recommendations:     Discharge Recommendations: home with home health  Discharge Equipment Recommendations: walker, rolling  Barriers to discharge:  medical condition    Assessment:     Madan Elder is a 77 y.o. male admitted with a medical diagnosis of Stroke.  He presents with the following impairments/functional limitations: weakness, impaired endurance, impaired functional mobility, impaired balance, impaired self care skills.    Rehab Prognosis: Good; patient would benefit from acute skilled PT services to address these deficits and reach maximum level of function.    Recent Surgery: * No surgery found *      Plan:     During this hospitalization, patient to be seen 5 x/week to address the identified rehab impairments via therapeutic activities, gait training and progress toward the following goals:    Plan of Care Expires:  05/05/23    Subjective     Chief Complaint: none  Patient/Family Comments/goals:   Pain/Comfort:         Objective:     Communicated with nurse prior to session.  Patient found HOB elevated with   upon PT entry to room.     General Precautions: Standard, fall  Orthopedic Precautions: N/A  Braces: N/A  Respiratory Status: Room air  Blood Pressure:    Seated EOB: 134/78, HR: 69   Standing in RW: 128/76, HR: 82  Standing in RW post activity: 150/71, HR: 96     Functional Mobility:  Bed Mobility:     Supine to Sit: modified independence  Sit to Supine: modified independence  Transfers:     Sit to Stand:  modified independence with rolling walker  Gait: pt amb ~420ft w/ RW Yesy.   Balance: standing balance Yesy w/ RW.       AM-PAC 6 CLICK MOBILITY          Patient left HOB elevated with all lines intact, call button in reach, and family present.    GOALS:   Multidisciplinary Problems       Physical Therapy Goals          Problem: Physical Therapy    Goal Priority Disciplines Outcome Goal Variances Interventions    Physical Therapy Goal     PT, PT/OT Ongoing, Progressing     Description: Goals to be met by: 23     Patient will increase functional independence with mobility by performin. Sit to stand transfer with Modified [place]  2. Gait  x 300 feet with Modified [place] using Rolling Walker.                          Time Tracking:     PT Received On: 04/10/23  PT Start Time: 1144     PT Stop Time: 1157  PT Total Time (min): 13 min     Billable Minutes: Gait Training 13    Treatment Type: Treatment  PT/PTA: PTA     Number of PTA visits since last PT visit: 3     04/10/2023

## 2023-04-10 NOTE — CONSULTS
Inpatient consult to Cardiology  Consult performed by: PETER Mar  Consult ordered by: Roshan Waller MD    [facility] - 9th Floor Med Surg  Cardiology  Consult Note    Patient Name: Madan Elder  MRN: [id number]  Admission Date: 4/5/2023  Hospital Length of Stay: 5 days  Code Status: Full Code   Attending Provider: Roshan Waller MD   Consulting Provider: PETER Mar  Primary Care Physician: Ricky Newsome MD  Principal Problem:Stroke    Patient information was obtained from patient, past medical records, and ER records.     Subjective:     Chief Complaint:  stroke    HPI:   Ms. Elder is a 76y/o male, followed by Dr. Newsome, who presented to ED with c/o 2 day hx of left upper extremity weakness/numbness. He was seen at [facility] with normal CT head. He was admitted for stroke workup but daughter took patient out AMA due to lack of satisfaction with their care. He then presented to [facility] where he was found to have an acute CVA in right posterior frontal and left anterior temporal regions. His BP has been uncontrolled therefore hospitalist has been adjusting medication with good control of BP but dtr would like CIS consulted to review medication list since medications have been changed.       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 ,mother PPM  Social History: never smoker, hx alcohol abuse    Previous Cardiac Diagnostics:   TTE 04.05.23:  There is no evidence of intracardiac shunting.  The left ventricle is mildly enlarged with moderately decreased systolic function.  The estimated ejection fraction is 35%.  Left ventricular diastolic dysfunction.  Mild aortic regurgitation.  Mild tricuspid regurgitation.  Normal central venous pressure (3 mmHg).  The estimated PA systolic pressure is 29 mmHg.  Normal right ventricular size with mildly reduced right ventricular  systolic function.  Mild mitral regurgitation.  Mild right atrial enlargement.    CUS 04.05.23:  The right internal carotid artery demonstrated less than 50% stenosis.  The left internal carotid artery demonstrated less than 50% stenosis.  Bilateral vertebral arteries were patent with antegrade flow.    TTE 10.04.2022:  LV is mildly enlarged. Global LVSR is moderately decreased. LVEF 30%. LV diastolic function is impaired (Grade I) with normal LA pressure. Moderate MR. Mild to moderate TR. Mild AR. Mild AS noted with adequate cuspal excursion. PASP 28mmHg. Intracardiac device wire is visualized in right heart.      CUS 10.04.2022:  1-39% stenosis mid LICA  1-39% stenosis pRICA  Antegrade flow to bilateral vertebral arteries     [facility] 06.20.2017:  LM: large vessel without evidence of obstructive CAD.   LAD: 60% distal stenosis  Lcx: 60%  distal stenosis  RCA: large and dominant with 60% narrowing of PDA  Mean RA pressure 3mmHG., RV pressure 55/3mmHg, PAP 55/20mmHg. These values represent the presence of moderate pulmonary HTN. PCWP 20-24mmHg which was moderately to severely elevated. No significant gradient across aortic valve. LVEDP 20mmHg. CO 2.3L/min with CI 1.4L/min    Review of patient's allergies indicates:  No Known Allergies      Review of Systems   Constitutional: Negative.    Respiratory: Negative.     Cardiovascular: Negative.    Gastrointestinal: Negative.    Musculoskeletal:  Positive for gait problem.   Neurological:  Positive for weakness.     Objective:     Vital Signs (Most Recent):  Temp: 97.8 °F (36.6 °C) (04/10/23 0755)  Pulse: 60 (04/10/23 0755)  Resp: 18 (04/10/23 0535)  BP: 138/84 (04/10/23 0755)  SpO2: 95 % (04/10/23 0755) Vital Signs (24h Range):  Temp:  [97.4 °F (36.3 °C)-98.2 °F (36.8 °C)] 97.8 °F (36.6 °C)  Pulse:  [60-73] 60  Resp:  [18] 18  SpO2:  [95 %-97 %] 95 %  BP: (108-163)/(68-95) 138/84     Weight: 74.6 kg (164 lb 9 oz)  Body mass index is 23.61 kg/m².    SpO2: 95 %       No intake  or output data in the 24 hours ending 04/10/23 0931    Lines/Drains/Airways       Peripheral Intravenous Line  Duration                  Peripheral IV - Single Lumen 04/05/23 0331 20 G Anterior;Distal;Right Forearm 5 days                    Significant Labs:  No results found for this or any previous visit (from the past 72 hour(s)).    Significant Imaging:  Imaging Results              CTA Head and Neck (xpd) (Final result)  Result time 04/05/23 14:56:04      Final result by Carisa Moon MD (04/05/23 14:56:04)                   Impression:      1. No large vessel occlusion or flow-limiting stenosis.  2. Atherosclerotic changes of the carotid arteries with less than 20% stenosis at the carotid bulbs and mild narrowing of the left supraclinoid ICA.  3. Suspected 12 mm enhancing mucosal nodule in the left oropharynx.  Suggest correlation with visual inspection.      Electronically signed by: Carisa Moon  Date:    04/05/2023  Time:    14:56               Narrative:    EXAMINATION:  CTA HEAD AND NECK (XPD)    CLINICAL HISTORY:  Stroke/TIA, determine embolic source;    TECHNIQUE:  Axial images obtained through the cervical region and [place] before and after the administration of intravenous contrast.    Coronal, sagittal, MIP and 3D reconstructions were obtained from the axial data.    Automatic exposure control was utilized to limit radiation dose.    Radiation Dose:    Total DLP: 2441 mGy*cm    COMPARISON:  MRI brain dated 04/05/2023    FINDINGS:  Head CT with contrast:    No interval changes.    No enhancing abnormalities.    If present, stenosis of the carotid bulbs is measured based on NASCET criteria,    i.e. area of maximal stenosis compared to the cervical ICA distal to the bulb.    Cervical CTA:    The origins of the great vessels are patent.    The common carotid arteries are patent.  There are calcifications at the carotid bulbs with less than 20% stenosis bilaterally.  The internal  carotid arteries are otherwise normal in caliber.    The vertebral arteries are patent.    Intracranial CTA:    There are calcifications along the course of the carotid siphons with mild narrowing of the supraclinoid segment on the left.  The middle cerebral arteries and anterior cerebral arteries are patent.    There is mild narrowing of the vertebral arteries with scattered calcifications.  The basilar artery and posterior cerebral arteries are patent with mild narrowing of the left posterior cerebral artery.    The dural venous sinuses are patent.    Additional findings:    There is a suspected 12 mm enhancing mucosal nodule in the left oropharynx (series 22, image 59).    No cervical lymphadenopathy.                                       FL Shunt Setting (Final result)  Result time 04/05/23 12:51:29      Final result by Carisa Moon MD (04/05/23 12:51:29)                   Impression:      Shunt type:  Codman Certas    Shunt performance settings    Pre-MRI:  2    Post-MRI: 2    The partially visualized shunt tubing and regional osseous structures demonstrate no evidence of acute disruption.    Exposure information:    Fluoro time: 30 seconds    Dose: 13.85 mGy      Electronically signed by: Carisa Moon  Date:    04/05/2023  Time:    12:51               Narrative:    EXAMINATION:  FL SHUNT SETTING    CLINICAL HISTORY:  Stroke, follow up;stroke;    TECHNIQUE:  Fluoroscopic spot images of the skull were obtained in lateral projection prior to and following MRI, to evaluate shunt setting.    COMPARISON:  None available.                                       MRI Brain Without Contrast (Final result)  Result time 04/05/23 12:12:16      Final result by Carisa Moon MD (04/05/23 12:12:16)                   Impression:      1. Small areas of acute ischemia in the right posterior frontal lobe and left anterior temporal.  2. Moderate chronic microvascular ischemic changes.  3. Right posterior ventricular  shunt catheter in place with decompressed ventricles.      Electronically signed by: Carisa Moon  Date:    04/05/2023  Time:    12:12               Narrative:    EXAMINATION:  MRI BRAIN WITHOUT CONTRAST    CLINICAL HISTORY:  Transient ischemic attack (TIA);Stroke, follow up;    TECHNIQUE:  Multiplanar, multisequence MR images of the brain were obtained without the administration of intravenous contrast.    COMPARISON:  CT head dated 10/22/2019    FINDINGS:  There is a small area of cortical restricted diffusion in the right posterior frontal lobe, involving the precentral gyrus.  There is also a suspected small focus of restricted diffusion in the left anterior temporal lobe (series 4, image 18).    Moderate patchy and confluent T2/FLAIR hyperintensities in the subcortical and periventricular white matter, basal ganglia, thalami and liu are nonspecific and may represent chronic microvascular ischemic changes.    There is a right posterior approach ventricular shunt catheter with tip over the left lateral ventricle.  The ventricles have decreased in size and are now decompressed.  There is moderate diffuse parenchymal volume loss.  There is no abnormal extra-axial fluid collection.  The major intracranial flow voids are patent.  The paranasal sinuses are clear.  There are bilateral mastoid effusions.                                    apaced    Physical Exam  HENT:      Mouth/Throat:      Mouth: Mucous membranes are moist.   Cardiovascular:      Rate and Rhythm: Normal rate and regular rhythm.      Pulses: Normal pulses.      Heart sounds: Normal heart sounds.   Pulmonary:      Effort: Pulmonary effort is normal.      Breath sounds: Normal breath sounds.   Abdominal:      Palpations: Abdomen is soft.   Musculoskeletal:         General: Normal range of motion.   Skin:     General: Skin is warm.      Capillary Refill: Capillary refill takes less than 2 seconds.   Neurological:      Mental Status: He is alert.       Motor: Weakness present.      Gait: Gait abnormal.   Psychiatric:         Mood and Affect: Mood normal.       Home Medications:   No current facility-administered medications on file prior to encounter.     Current Outpatient Medications on File Prior to Encounter   Medication Sig Dispense Refill    folic acid (FOLVITE) 1 MG tablet Take 1,000 mcg by mouth once daily.      furosemide (LASIX) 20 MG tablet Take 20 mg by mouth once daily.      lisinopriL (PRINIVIL,ZESTRIL) 5 MG tablet Take 5 mg by mouth once daily.      VITAMIN B-1 100 MG tablet Take 100 mg by mouth once daily.      [DISCONTINUED] aspirin (ECOTRIN) 81 MG EC tablet Take 1 tablet by mouth every morning.      [DISCONTINUED] atorvastatin (LIPITOR) 10 MG tablet Take 10 mg by mouth.      [DISCONTINUED] carvediloL (COREG) 12.5 MG tablet Take 12.5 mg by mouth 2 (two) times daily.      [DISCONTINUED] isosorbide-hydrALAZINE 20-37.5 mg (BIDIL) 20-37.5 mg Tab Take 1 tablet by mouth.         Current Inpatient Medications:    Current Facility-Administered Medications:     acetaminophen tablet 650 mg, 650 mg, Oral, Q6H PRN, Oumar Muse MD, 650 mg at 04/08/23 1423    aspirin EC tablet 325 mg, 325 mg, Oral, Daily, Kavon Lea MD, 325 mg at 04/09/23 0833    atorvastatin tablet 20 mg, 20 mg, Oral, Daily, Linda Shah NP, 20 mg at 04/09/23 0833    carvediloL tablet 3.125 mg, 3.125 mg, Oral, BID, Oumar Muse MD, 3.125 mg at 04/09/23 0833    enoxaparin injection 40 mg, 40 mg, Subcutaneous, Daily, Oumar Muse MD, 40 mg at 04/09/23 1648    folic acid tablet 1,000 mcg, 1,000 mcg, Oral, Daily, Salvatore Urbina MD, 1,000 mcg at 04/09/23 0833    furosemide tablet 20 mg, 20 mg, Oral, Daily, Oumar Muse MD, 20 mg at 04/09/23 0833    lisinopriL tablet 5 mg, 5 mg, Oral, Daily, Oumar Muse MD, 5 mg at 04/09/23 0833    sodium chloride 0.9% flush 10 mL, 10 mL, Intravenous, PRN, PETER Amato         VTE Risk Mitigation (From admission, onward)            Ordered     enoxaparin injection 40 mg  Daily         04/06/23 1208     IP VTE HIGH RISK PATIENT  Once         04/05/23 0327     Place sequential compression device  Until discontinued         04/05/23 0327                    Assessment:   See below MDM formulated by me (Bartolo Doe MD):     Acute CVA right posterior frontal and left anterior temporal regions  --residual left arm weakness/paresthesias  HTN  HLD  Alcoholic CMO  --[employer] AICD  Native CAD  --[facility] 6.2017: LM: no obstructive CAD. LAD: 60% distal stenosis. Lcx: 60%  distal stenosis. RCA: large/dominant; 60% narrowing of PDA  UTI+  Hx Alcohol abuse    Plan:   Continue aspirin and atorvastatin  AICD has been interrogated- no Afib noted  Continue lisinopril 5mg daily, coreg 3.125mg bid, and lasix 20mg daily. Further uptitration of GDMT as outpatient.   Patient instructed to keep BP log for review at follow-up    Thank you for your consult. OK to discharge home if ok with primary    PETER Mar and Bartolo Doe MD  Cardiology  [facility] - 9th Floor Med Surg  04/10/2023 9:31 AM

## 2023-04-10 NOTE — ASSESSMENT & PLAN NOTE
Stroke     Pacemaker interrogation was negative.  Continue ASA 325mg daily   -From a secondary stroke prevention standpoint only needs to be on aspirin, although the etiology of his strokes are suspicious for cardio embolic source (if that is the case, he will need to be on OAC)  Continue Atorvastatin 20mg daily  Will defer to primary team about 12 mm enhancing mucosal nodule in the left oropharynx    No further stroke specific recommendations at this time.  Signing off for now ... Please call if there are any further questions or concerns.         Antithrombotics for secondary stroke prevention: Antiplatelets: Aspirin: 325 mg daily    Statins for secondary stroke prevention and hyperlipidemia, if present: Statins: Atorvastatin- 20 mg daily    Aggressive risk factor modification: HTN, HLD, CAD     Rehab efforts: The patient has been evaluated by a stroke team provider and the therapy needs have been fully considered based off the presenting complaints and exam findings. The following therapy evaluations are needed: PT evaluate and treat, OT evaluate and treat, SLP evaluate and treat    Diagnostics ordered/pending: none  Stroke workup complete.  -MRI brain:  1. Small areas of acute ischemia in the right posterior frontal lobe and left anterior temporal.  2. Moderate chronic microvascular ischemic changes.  3. Right posterior ventricular shunt catheter in place with decompressed ventricles.  -CTA h/n:  1. No large vessel occlusion or flow-limiting stenosis.  2. Atherosclerotic changes of the carotid arteries with less than 20% stenosis at the carotid bulbs and mild narrowing of the left supraclinoid ICA.  3. Suspected 12 mm enhancing mucosal nodule in the left oropharynx.  Suggest correlation with visual inspection.  -ECHO: EF 35%, bubble study negative, normal LA  -CUS: bilateral ICA less than 50% stenosis  -LDL: 59  -A1c: 6.1  -TSH: 1.532  -PPM interrogation: no episodes of AFib    VTE prophylaxis: Lovenox 40mg  daily,  Mechanical prophylaxis: Place SCDs    BP parameters: Infarct: No intervention, normalize blood pressure

## 2023-04-10 NOTE — SUBJECTIVE & OBJECTIVE
Subjective:     Interval History: Sitting up in bed with daughter at bedside.  No new issues overnight.  Continues to have weakness with fine motor of left hand.      Current Facility-Administered Medications   Medication Dose Route Frequency Provider Last Rate Last Admin    acetaminophen tablet 650 mg  650 mg Oral Q6H PRN Oumar Muse MD   650 mg at 04/08/23 1423    aspirin EC tablet 325 mg  325 mg Oral Daily Kavon Lea MD   325 mg at 04/10/23 1033    atorvastatin tablet 20 mg  20 mg Oral Daily Linda Shah NP   20 mg at 04/10/23 1035    carvediloL tablet 3.125 mg  3.125 mg Oral BID Oumar Muse MD   3.125 mg at 04/10/23 1035    enoxaparin injection 40 mg  40 mg Subcutaneous Daily Oumar Muse MD   40 mg at 04/09/23 1648    folic acid tablet 1,000 mcg  1,000 mcg Oral Daily Salvatore Urbina MD   1,000 mcg at 04/10/23 1035    furosemide tablet 20 mg  20 mg Oral Daily Oumar Muse MD   20 mg at 04/10/23 1034    lisinopriL tablet 5 mg  5 mg Oral Daily Oumar Muse MD   5 mg at 04/10/23 1035    sodium chloride 0.9% flush 10 mL  10 mL Intravenous PRN PETER Amato           Review of Systems   Neurological:  Positive for weakness (left hand).   All other systems reviewed and are negative.    Objective:     Vital Signs (Most Recent):  Temp: 97.8 °F (36.6 °C) (04/10/23 0755)  Pulse: 60 (04/10/23 1035)  Resp: 18 (04/10/23 0535)  BP: 138/84 (04/10/23 1035)  SpO2: 95 % (04/10/23 0755) Vital Signs (24h Range):  Temp:  [97.4 °F (36.3 °C)-98.2 °F (36.8 °C)] 97.8 °F (36.6 °C)  Pulse:  [60-73] 60  Resp:  [18] 18  SpO2:  [95 %-97 %] 95 %  BP: (108-163)/(68-84) 138/84     Weight: 74.6 kg (164 lb 9 oz)  Body mass index is 23.61 kg/m².    Physical Exam  Constitutional:       General: He is not in acute distress.     Appearance: He is not ill-appearing.   Eyes:      General: No visual field deficit.     Pupils: Pupils are equal, round, and reactive to light.   Cardiovascular:      Rate and Rhythm:  Normal rate and regular rhythm.   Pulmonary:      Effort: Pulmonary effort is normal.   Skin:     General: Skin is warm and dry.   Neurological:      Mental Status: He is alert and oriented to person, place, and time.      Cranial Nerves: No dysarthria or facial asymmetry.      Sensory: Sensation is intact.      Motor: Weakness (mild weakness left hand) present. No pronator drift.   Psychiatric:         Mood and Affect: Mood normal.         Behavior: Behavior normal.       Significant Labs: BMP: No results for input(s): GLU, NA, K, CL, CO2, BUN, CREATININE, CALCIUM, MG in the last 48 hours.  CBC: No results for input(s): WBC, HGB, HCT, PLT in the last 48 hours.    Significant Imaging: I have reviewed all pertinent imaging results/findings within the past 24 hours.

## 2023-04-10 NOTE — PROGRESS NOTES
[facility] - 9th Floor Med Surg  Neurology  Progress Note    Patient Name: Madan Elder  MRN: [id number]  Admission Date: 4/5/2023  Hospital Length of Stay: 5 days  Code Status: Full Code   Attending Provider: Roshan Waller MD  Primary Care Physician: Ricky Newsome MD   Principal Problem:Stroke    HPI:   77-year-old male with past medical history of HTN, HLD, and MI presented to ED  on 4/5 for LUE weakness x 2 days. Per EMR, he presented to [facility] ED in [place], he was going to be admitted but they left and presented here. CT head was negative at [facility] ED. Caregiver reports she saw him Monday morning, he usually fixes his scrambled eggs in the morning but he did not Monday morning. He reported to her that his LUE felt weak. She walked with him downstairs to get a cup of coffee without difficulties. She also reported he had noticeable decreased hand strength, it looked like his hand was curled up. He came to [facility] ED for further evaluation. CT head was negative for acute intracranial abnormalities. Neurology was consulted for stroke workup.         Overview/Hospital Course:  No notes on file        Subjective:     Interval History: Sitting up in bed with daughter at bedside.  No new issues overnight.  Continues to have weakness with fine motor of left hand.      Current Facility-Administered Medications   Medication Dose Route Frequency Provider Last Rate Last Admin    acetaminophen tablet 650 mg  650 mg Oral Q6H PRN Oumar Muse MD   650 mg at 04/08/23 1423    aspirin EC tablet 325 mg  325 mg Oral Daily Kavon Lea MD   325 mg at 04/10/23 1033    atorvastatin tablet 20 mg  20 mg Oral Daily Linda Shah NP   20 mg at 04/10/23 1035    carvediloL tablet 3.125 mg  3.125 mg Oral BID Oumar Muse MD   3.125 mg at 04/10/23 1035    enoxaparin injection 40 mg  40 mg Subcutaneous Daily Oumar Muse MD   40 mg at 04/09/23 1648    folic acid tablet 1,000 mcg  1,000 mcg Oral Daily  Salvatore Urbina MD   1,000 mcg at 04/10/23 1035    furosemide tablet 20 mg  20 mg Oral Daily Oumar Muse MD   20 mg at 04/10/23 1034    lisinopriL tablet 5 mg  5 mg Oral Daily Oumar Muse MD   5 mg at 04/10/23 1035    sodium chloride 0.9% flush 10 mL  10 mL Intravenous PRN PETER Amato           Review of Systems   Neurological:  Positive for weakness (left hand).   All other systems reviewed and are negative.    Objective:     Vital Signs (Most Recent):  Temp: 97.8 °F (36.6 °C) (04/10/23 0755)  Pulse: 60 (04/10/23 1035)  Resp: 18 (04/10/23 0535)  BP: 138/84 (04/10/23 1035)  SpO2: 95 % (04/10/23 0755) Vital Signs (24h Range):  Temp:  [97.4 °F (36.3 °C)-98.2 °F (36.8 °C)] 97.8 °F (36.6 °C)  Pulse:  [60-73] 60  Resp:  [18] 18  SpO2:  [95 %-97 %] 95 %  BP: (108-163)/(68-84) 138/84     Weight: 74.6 kg (164 lb 9 oz)  Body mass index is 23.61 kg/m².    Physical Exam  Constitutional:       General: He is not in acute distress.     Appearance: He is not ill-appearing.   Eyes:      General: No visual field deficit.     Pupils: Pupils are equal, round, and reactive to light.   Cardiovascular:      Rate and Rhythm: Normal rate and regular rhythm.   Pulmonary:      Effort: Pulmonary effort is normal.   Skin:     General: Skin is warm and dry.   Neurological:      Mental Status: He is alert and oriented to person, place, and time.      Cranial Nerves: No dysarthria or facial asymmetry.      Sensory: Sensation is intact.      Motor: Weakness (mild weakness left hand) present. No pronator drift.   Psychiatric:         Mood and Affect: Mood normal.         Behavior: Behavior normal.       Significant Labs: BMP: No results for input(s): GLU, NA, K, CL, CO2, BUN, CREATININE, CALCIUM, MG in the last 48 hours.  CBC: No results for input(s): WBC, HGB, HCT, PLT in the last 48 hours.    Significant Imaging: I have reviewed all pertinent imaging results/findings within the past 24 hours.      Assessment and Plan:     *  Stroke  Stroke - right posterior frontal, left anterior temporal    Pacemaker interrogation was negative.  Continue ASA 325mg daily   -From a secondary stroke prevention standpoint only needs to be on aspirin, although the etiology of his strokes are suspicious for cardio embolic source (if that is the case, he will need to be on OAC)  Continue Atorvastatin 20mg daily  Will defer to primary team about 12 mm enhancing mucosal nodule in the left oropharynx    No further stroke specific recommendations at this time.  Signing off for now ... Please call if there are any further questions or concerns.         Antithrombotics for secondary stroke prevention: Antiplatelets: Aspirin: 325 mg daily    Statins for secondary stroke prevention and hyperlipidemia, if present: Statins: Atorvastatin- 20 mg daily    Aggressive risk factor modification: HTN, HLD, CAD     Rehab efforts: The patient has been evaluated by a stroke team provider and the therapy needs have been fully considered based off the presenting complaints and exam findings. The following therapy evaluations are needed: PT evaluate and treat, OT evaluate and treat, SLP evaluate and treat    Diagnostics ordered/pending: none  Stroke workup complete.  -MRI brain:  1. Small areas of acute ischemia in the right posterior frontal lobe and left anterior temporal.  2. Moderate chronic microvascular ischemic changes.  3. Right posterior ventricular shunt catheter in place with decompressed ventricles.  -CTA h/n:  1. No large vessel occlusion or flow-limiting stenosis.  2. Atherosclerotic changes of the carotid arteries with less than 20% stenosis at the carotid bulbs and mild narrowing of the left supraclinoid ICA.  3. Suspected 12 mm enhancing mucosal nodule in the left oropharynx.  Suggest correlation with visual inspection.  -ECHO: EF 35%, bubble study negative, normal LA  -CUS: bilateral ICA less than 50% stenosis  -LDL: 59  -A1c: 6.1  -TSH: 1.532  -PPM interrogation:  no episodes of AFib    VTE prophylaxis: Lovenox 40mg daily,  Mechanical prophylaxis: Place SCDs    BP parameters: Infarct: No intervention, normalize blood pressure            VTE Risk Mitigation (From admission, onward)         Ordered     enoxaparin injection 40 mg  Daily         04/06/23 1208     IP VTE HIGH RISK PATIENT  Once         04/05/23 0327     Place sequential compression device  Until discontinued         04/05/23 0327                Roxane Su, PETER  Neurology  [facility] - 9th Floor Med Surg

## 2023-04-10 NOTE — PROGRESS NOTES
[facility]  Hospital Medicine Progress Note        Chief Complaint: Numbness (Pt arrives to ED c/o L arm numbness and weakness that he woke up with 2 days ago. Per daughter she received notice of this 1000 today and symptoms have improved throughout the day. No numbness, weakness, facial droop noted in triage at time. + Headache, nausea, SOB. Denies falls, trauma. Went to [facility] in [place] where pt had labs and CT, neuro consult, admitted to [facility] hospital however daughter took pt out AMA due to lack of satisfaction of their care. Cardiologist Dr. Newsome. )         Patient information was obtained from patient, patient's family, past medical records and ER records.      HISTORY OF PRESENT ILLNESS:   Madan Elder is a 77 y.o. male with a past medical history of essential hypertension, hyperlipidemia, and  presented to [facility] on 4/5/2023 for left upper extremity weakness.  Patient reported left upper extremity weakness and numbness began approximately 2 days ago. Patient reports he noticed symptoms while cooking breakfast. Patient denies speech changes, vision changes, dizziness, headache, syncope, chest pain, shortness of breath, abdominal pain, nausea, vomiting, diarrhea, fever, and chills.  Family reports patient told them of his symptoms yesterday, prompting them to ED.  Prior to arrival patient was seen at [facility] with CT head without abnormality.  Patient was admitted for stroke workup at outside facility.  Family was not satisfied with care and signed patient out AMA.  Initial vital signs in ED were /90, pulse 85, respirations 18, temperature 36.9° C, and SpO2 97% on room air.  Labs revealed WBC 6 and chloride 110. Neurology was consulted. Patient was admitted to hospital medicine service for further medical management.        Patient currently being managed for acute CVA in the right posterior frontal and left anterior temporal regions.  Neurology recommends to be on  aspirin and atorvastatin.  Hospital course complicated by orthostatic hypotension.       Today's information   Patient seen and examined at bedside, daughter  at bedside   Overnight no events reported   Daughter requesting for a cardiac evaluation given fluctuations in his blood pressures.  His cardiologist has been consulted   Patient complaining of constipation will intensify laxatives        Exam  General appearance: Male in no apparent distress.  HEENNT: Atraumatic head.   Lungs: Clear to auscultation bilaterally.   Heart: Regular rate and rhythm.    Abdomen: Soft, non-distended, non-tender. Bowel sounds are normal.   Extremities: No cyanosis, clubbing, or edema. No deformities.   Skin: No Rash. Warm and dry.   Neuro: Awake, alert, and oriented. Motor and sensory exams grossly intact.  No slurred speech.  No facial droop.  Left upper extremity strength 4/5.  5/5 muscle strength in right upper extremity and bilateral lower extremities  Psych/mental status: Appropriate mood and affect. Responds appropriately to questions.         ASSESSMENT & PLAN:   acute CVA in the right posterior frontal and left anterior temporal regions.  left arm weakness/paresthesia   Orthostatic hypotension  Essential Hypertension , uncontrolled   Hyperlipidemia  Cardiomyopathy EF 35%  Acute UTI  Left oropharynx nodule 12 mm- f/up out pt with his pcp    constipation      Plan:   Daughter requesting for a cardiac evaluation given fluctuations in his blood pressures.  His cardiologist has been consulted   Patient complaining of constipation will intensify laxatives   Continue Coreg, lisinopril and Lasix  Patient was status post treatment for UTI 3 days of antibiotics   Patient currently on aspirin and atorvastatin per neuro recs   Appreciate neuro input to interrogate pacemaker- this has been done    Carotid ultrasound was negative   Echo shows EF of 35%, which is known to patient   Continue PT OT    Neuro checks q.4 hours   Fall precautions    Left oropharynx nodule 12 mm- f/up out pt with his pcp   Continue appropriate home medications once med rec updated         VTE Prophylaxis: SC lovenox      Dispo- to [facility] PT, discharge pending his cardiology evaluation            VITAL SIGNS: 24 HRS MIN & MAX LAST   Temp  Min: 97.4 °F (36.3 °C)  Max: 98.2 °F (36.8 °C) 97.8 °F (36.6 °C)   BP  Min: 108/68  Max: 163/77 138/84   Pulse  Min: 60  Max: 73  60   Resp  Min: 18  Max: 18 18   SpO2  Min: 95 %  Max: 97 % 95 %       Recent Labs   Lab 04/05/23 0411   WBC 6.0   RBC 4.60*   HGB 13.6*   HCT 41.6*   MCV 90.4   MCH 29.6   MCHC 32.7*   RDW 12.8      MPV 10.0       Recent Labs   Lab 04/05/23 0411 04/06/23  0936 04/07/23  0537    142 141   K 4.2 4.1 4.2   CO2 25 25 21*   BUN 16.6 15.3 21.7   CREATININE 1.08 1.14 1.09   CALCIUM 8.8 9.7 9.2   MG  --  2.20  --    ALBUMIN 3.7 4.1 3.6   ALKPHOS 61 69 57   ALT 18 19 16   AST 16 20 19   BILITOT 3.5* 5.3* 3.2*          Microbiology Results (last 7 days)       Procedure Component Value Units Date/Time    Urine culture [[id number]]  (Abnormal) Collected: 04/05/23 2202    Order Status: Completed Specimen: Urine Updated: 04/07/23 0926     Urine Culture Multiple organisms present indicate probable contamination. Recommend recollection.      >/= 100,000 colonies/ml - Three different Gram-positive Cocci      10,000 - 25,000 colonies/ml GRAM POSITIVE GWENDOLYN RESEMBLING DIPHTHEROIDS      Less than 10,000 colonies/ml GAMMA STREPTOCOCCUS     Comment: Musella counts <10,000/ml are of questionable significance and may or may not indicate contamination.  Therefore organisms are identified only.  If further workup is desired please notify Microbiology   We have not further evaluated these organisms because three or more species compatible with normal genital jeannie usually represents specimen contamination from the time of collection, rather than infection. If clinical circumstances warrant further workup of these organisms, please  contact the Microbiology dept at [phone]; otherwise please have the patient submit another specimen.                See below for Radiology    Scheduled Med:   aspirin  325 mg Oral Daily    atorvastatin  20 mg Oral Daily    carvediloL  3.125 mg Oral BID    enoxaparin  40 mg Subcutaneous Daily    folic acid  1,000 mcg Oral Daily    furosemide  20 mg Oral Daily    lisinopriL  5 mg Oral Daily        Continuous Infusions:       PRN Meds:  acetaminophen, sodium chloride 0.9%         VTE prophylaxis:     Patient condition:  Stable/Fair/Guarded/ Serious/ Critical    Anticipated discharge and Disposition:         All diagnosis and differential diagnosis have been reviewed; assessment and plan has been documented; I have personally reviewed the labs and test results that are presently available; I have reviewed the patients medication list; I have reviewed the consulting providers response and recommendations. I have reviewed or attempted to review medical records based upon their availability    All of the patient's questions have been  addressed and answered. Patient's is agreeable to the above stated plan. I will continue to monitor closely and make adjustments to medical management as needed.  _____________________________________________________________________    Nutrition Status:    Radiology:  Echo Saline Bubble? Yes  · There is no evidence of intracardiac shunting.  · The left ventricle is mildly enlarged with moderately decreased systolic   function.  · The estimated ejection fraction is 35%.  · Left ventricular diastolic dysfunction.  · Mild aortic regurgitation.  · Mild tricuspid regurgitation.  · Normal central venous pressure (3 mmHg).  · The estimated PA systolic pressure is 29 mmHg.  · Normal right ventricular size with mildly reduced right ventricular   systolic function.  · Mild mitral regurgitation.  · Mild right atrial enlargement.     CTA Head and Neck (xpd)  Narrative: EXAMINATION:  CTA HEAD AND NECK  (XPD)    CLINICAL HISTORY:  Stroke/TIA, determine embolic source;    TECHNIQUE:  Axial images obtained through the cervical region and [place] before and after the administration of intravenous contrast.    Coronal, sagittal, MIP and 3D reconstructions were obtained from the axial data.    Automatic exposure control was utilized to limit radiation dose.    Radiation Dose:    Total DLP: 2441 mGy*cm    COMPARISON:  MRI brain dated 04/05/2023    FINDINGS:  Head CT with contrast:    No interval changes.    No enhancing abnormalities.    If present, stenosis of the carotid bulbs is measured based on NASCET criteria,    i.e. area of maximal stenosis compared to the cervical ICA distal to the bulb.    Cervical CTA:    The origins of the great vessels are patent.    The common carotid arteries are patent.  There are calcifications at the carotid bulbs with less than 20% stenosis bilaterally.  The internal carotid arteries are otherwise normal in caliber.    The vertebral arteries are patent.    Intracranial CTA:    There are calcifications along the course of the carotid siphons with mild narrowing of the supraclinoid segment on the left.  The middle cerebral arteries and anterior cerebral arteries are patent.    There is mild narrowing of the vertebral arteries with scattered calcifications.  The basilar artery and posterior cerebral arteries are patent with mild narrowing of the left posterior cerebral artery.    The dural venous sinuses are patent.    Additional findings:    There is a suspected 12 mm enhancing mucosal nodule in the left oropharynx (series 22, image 59).    No cervical lymphadenopathy.  Impression: 1. No large vessel occlusion or flow-limiting stenosis.  2. Atherosclerotic changes of the carotid arteries with less than 20% stenosis at the carotid bulbs and mild narrowing of the left supraclinoid ICA.  3. Suspected 12 mm enhancing mucosal nodule in the left oropharynx.  Suggest correlation with  visual inspection.    Electronically signed by: Carisa Moon  Date:    04/05/2023  Time:    14:56  FL Shunt Setting  Narrative: EXAMINATION:  FL SHUNT SETTING    CLINICAL HISTORY:  Stroke, follow up;stroke;    TECHNIQUE:  Fluoroscopic spot images of the skull were obtained in lateral projection prior to and following MRI, to evaluate shunt setting.    COMPARISON:  None available.  Impression: Shunt type:  Codman Certas    Shunt performance settings    Pre-MRI:  2    Post-MRI: 2    The partially visualized shunt tubing and regional osseous structures demonstrate no evidence of acute disruption.    Exposure information:    Fluoro time: 30 seconds    Dose: 13.85 mGy    Electronically signed by: Carisa Moon  Date:    04/05/2023  Time:    12:51  MRI Brain Without Contrast  Narrative: EXAMINATION:  MRI BRAIN WITHOUT CONTRAST    CLINICAL HISTORY:  Transient ischemic attack (TIA);Stroke, follow up;    TECHNIQUE:  Multiplanar, multisequence MR images of the brain were obtained without the administration of intravenous contrast.    COMPARISON:  CT head dated 10/22/2019    FINDINGS:  There is a small area of cortical restricted diffusion in the right posterior frontal lobe, involving the precentral gyrus.  There is also a suspected small focus of restricted diffusion in the left anterior temporal lobe (series 4, image 18).    Moderate patchy and confluent T2/FLAIR hyperintensities in the subcortical and periventricular white matter, basal ganglia, thalami and liu are nonspecific and may represent chronic microvascular ischemic changes.    There is a right posterior approach ventricular shunt catheter with tip over the left lateral ventricle.  The ventricles have decreased in size and are now decompressed.  There is moderate diffuse parenchymal volume loss.  There is no abnormal extra-axial fluid collection.  The major intracranial flow voids are patent.  The paranasal sinuses are clear.  There are bilateral mastoid  effusions.  Impression: 1. Small areas of acute ischemia in the right posterior frontal lobe and left anterior temporal.  2. Moderate chronic microvascular ischemic changes.  3. Right posterior ventricular shunt catheter in place with decompressed ventricles.    Electronically signed by: Carisa Moon  Date:    04/05/2023  Time:    12:12      Oumar Muse MD   04/10/2023

## 2023-04-10 NOTE — PT/OT/SLP PROGRESS
Occupational Therapy   Treatment    Name: Madan Elder  MRN: [id number]  Admitting Diagnosis:  Stroke       Recommendations:     Discharge Recommendations: home with home health (and sitters)  Discharge Equipment Recommendations:     Barriers to discharge:       Assessment:     Madan Elder is a 77 y.o. male with a medical diagnosis of Stroke.  He presents with the following performance deficits affecting function are weakness, impaired endurance, impaired self care skills, impaired functional mobility, gait instability, impaired balance.     Rehab Prognosis:  Good; patient would benefit from acute skilled OT services to address these deficits and reach maximum level of function.       Plan:     Patient to be seen 3 x/week, 5 x/week to address the above listed problems via self-care/home management, therapeutic exercises, therapeutic activities  Plan of Care Expires:    Plan of Care Reviewed with: patient    Subjective       Pain/Comfort:  Pain Rating 1: 0/10    Objective:     Communicated with: nrsg prior to session.  Patient found sitting edge of bed with   upon OT entry to room.    General Precautions: Standard, fall    Orthopedic Precautions:N/A  Braces: N/A  Respiratory Status: Room air     Occupational Performance:         Functional Mobility/Transfers:  Patient completed Toilet Transfer Step Transfer technique with contact guard assistance with  standard walker  Functional Mobility: pt ambulated to bathroom with RW and education of safe technique with RW     Activities of Daily Living:  Grooming: stand by assistance pt stood at sink with RW and VC to keep RW close; no assist for opening packages; SBA for balance   Lower Body Dressing: stand by assistance pt able to don/doff BLE socks at EOB; increased LUE strength and coordination   Toileting: contact guard assistance VC for appropriate/safe hand placement and repeated practice using RW safely           Patient left HOB elevated with all lines intact, call  button in reach, and daughter  present    GOALS:   Multidisciplinary Problems       Occupational Therapy Goals          Problem: Occupational Therapy    Goal Priority Disciplines Outcome Interventions   Occupational Therapy Goal     OT, PT/OT Ongoing, Progressing    Description: Goals to be met by: 4/19/2023     Patient will increase functional independence with ADLs by performing:    LE Dressing with Modified [place].  Grooming while standing with Modified [place].  Toileting from toilet with Modified [place] for hygiene and clothing management.   Bathing from  shower chair/bench with Contact Guard Assistance.  Toilet transfer to toilet with Modified [place].                         Time Tracking:     OT Date of Treatment:    OT Start Time: 1056  OT Stop Time: 1121  OT Total Time (min): 25 min    Billable Minutes:Self Care/Home Management 25min     OT/TASH: OT     Number of TASH visits since last OT visit: 2    4/10/2023

## 2023-04-11 NOTE — DISCHARGE SUMMARY
[facility] Hospital Medicine Discharge Summary    Admit Date: 4/5/2023  Discharge Date and Time: 4/11/20231:21 PM  Admitting Physician:  Team  Discharging Physician: Oumar Muse MD.  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  left arm weakness/paresthesia   Orthostatic hypotension  Essential Hypertension , uncontrolled   Hyperlipidemia  Cardiomyopathy EF 35%  Acute UTI  Left oropharynx nodule 12 mm- f/up out pt with his pcp    constipation       Hospital Course:   Chief Complaint: Numbness (Pt arrives to ED c/o L arm numbness and weakness that he woke up with 2 days ago. Per daughter she received notice of this 1000 today and symptoms have improved throughout the day. No numbness, weakness, facial droop noted in triage at time. + Headache, nausea, SOB. Denies falls, trauma. Went to [facility] in [place] where pt had labs and CT, neuro consult, admitted to [facility] hospital however daughter took pt out AMA due to lack of satisfaction of their care. Cardiologist Dr. Newsome. )         Patient information was obtained from patient, patient's family, past medical records and ER records.      HISTORY OF PRESENT ILLNESS:   Madan Elder is a 77 y.o. male with a past medical history of essential hypertension, hyperlipidemia, and  presented to [facility] on 4/5/2023 for left upper extremity weakness.  Patient reported left upper extremity weakness and numbness began approximately 2 days ago. Patient reports he noticed symptoms while cooking breakfast. Patient denies speech changes, vision changes, dizziness, headache, syncope, chest pain, shortness of breath, abdominal pain, nausea, vomiting, diarrhea, fever, and chills.  Family reports patient told them of his symptoms yesterday, prompting them to ED.  Prior to arrival patient was  seen at [facility] with CT head without abnormality.  Patient was admitted for stroke workup at outside facility.  Family was not satisfied with care and signed patient out AMA.  Initial vital signs in ED were /90, pulse 85, respirations 18, temperature 36.9° C, and SpO2 97% on room air.  Labs revealed WBC 6 and chloride 110. Neurology was consulted. Patient was admitted to hospital medicine service for further medical management.        Patient currently being managed for acute CVA in the right posterior frontal and left anterior temporal regions.  Neurology recommends to be on aspirin and atorvastatin.  Hospital course complicated by orthostatic hypotension.        Today's information   Patient seen and examined at bedside, daughter  at bedside   Overnight no events reported   Daughter requesting for a cardiac evaluation given fluctuations in his blood pressures.  His cardiologist has been consulted - cardiology evaluated patient and recommended continue on current medications and will follow-up outpatient for further dose titration.          Exam  General appearance: Male in no apparent distress.  HEENNT: Atraumatic head.   Lungs: Clear to auscultation bilaterally.   Heart: Regular rate and rhythm.    Abdomen: Soft, non-distended, non-tender. Bowel sounds are normal.   Extremities: No cyanosis, clubbing, or edema. No deformities.   Skin: No Rash. Warm and dry.   Neuro: Awake, alert, and oriented. Motor and sensory exams grossly intact.  No slurred speech.  No facial droop.  Left upper extremity strength 4/5.  5/5 muscle strength in right upper extremity and bilateral lower extremities  Psych/mental status: Appropriate mood and affect. Responds appropriately to questions.             Pt was seen and examined on the day of discharge      Vitals:  VITAL SIGNS: 24 HRS MIN & MAX LAST   No data recorded 97.8 °F (36.6 °C)   No data recorded 138/84   No data recorded  60   No data recorded 18   No data recorded 95 %        Procedures Performed: No admission procedures for hospital encounter.     Significant Diagnostic Studies: See Full reports for all details    Recent Labs   Lab 04/05/23  0411   WBC 6.0   RBC 4.60*   HGB 13.6*   HCT 41.6*   MCV 90.4   MCH 29.6   MCHC 32.7*   RDW 12.8      MPV 10.0       Recent Labs   Lab 04/05/23  0411 04/06/23  0936 04/07/23  0537    142 141   K 4.2 4.1 4.2   CO2 25 25 21*   BUN 16.6 15.3 21.7   CREATININE 1.08 1.14 1.09   CALCIUM 8.8 9.7 9.2   MG  --  2.20  --    ALBUMIN 3.7 4.1 3.6   ALKPHOS 61 69 57   ALT 18 19 16   AST 16 20 19   BILITOT 3.5* 5.3* 3.2*        Microbiology Results (last 7 days)       Procedure Component Value Units Date/Time    Urine culture [[id number]]  (Abnormal) Collected: 04/05/23 2202    Order Status: Completed Specimen: Urine Updated: 04/07/23 0926     Urine Culture Multiple organisms present indicate probable contamination. Recommend recollection.      >/= 100,000 colonies/ml - Three different Gram-positive Cocci      10,000 - 25,000 colonies/ml GRAM POSITIVE GWENDOLYN RESEMBLING DIPHTHEROIDS      Less than 10,000 colonies/ml GAMMA STREPTOCOCCUS     Comment: [place] counts <10,000/ml are of questionable significance and may or may not indicate contamination.  Therefore organisms are identified only.  If further workup is desired please notify Microbiology   We have not further evaluated these organisms because three or more species compatible with normal genital jeannie usually represents specimen contamination from the time of collection, rather than infection. If clinical circumstances warrant further workup of these organisms, please contact the Microbiology dept at [phone]; otherwise please have the patient submit another specimen.                Echo Saline Bubble? Yes  · There is no evidence of intracardiac shunting.  · The left ventricle is mildly enlarged with moderately decreased systolic   function.  · The estimated ejection fraction is 35%.  · Left  ventricular diastolic dysfunction.  · Mild aortic regurgitation.  · Mild tricuspid regurgitation.  · Normal central venous pressure (3 mmHg).  · The estimated PA systolic pressure is 29 mmHg.  · Normal right ventricular size with mildly reduced right ventricular   systolic function.  · Mild mitral regurgitation.  · Mild right atrial enlargement.     CTA Head and Neck (xpd)  Narrative: EXAMINATION:  CTA HEAD AND NECK (XPD)    CLINICAL HISTORY:  Stroke/TIA, determine embolic source;    TECHNIQUE:  Axial images obtained through the cervical region and [place] before and after the administration of intravenous contrast.    Coronal, sagittal, MIP and 3D reconstructions were obtained from the axial data.    Automatic exposure control was utilized to limit radiation dose.    Radiation Dose:    Total DLP: 2441 mGy*cm    COMPARISON:  MRI brain dated 04/05/2023    FINDINGS:  Head CT with contrast:    No interval changes.    No enhancing abnormalities.    If present, stenosis of the carotid bulbs is measured based on NASCET criteria,    i.e. area of maximal stenosis compared to the cervical ICA distal to the bulb.    Cervical CTA:    The origins of the great vessels are patent.    The common carotid arteries are patent.  There are calcifications at the carotid bulbs with less than 20% stenosis bilaterally.  The internal carotid arteries are otherwise normal in caliber.    The vertebral arteries are patent.    Intracranial CTA:    There are calcifications along the course of the carotid siphons with mild narrowing of the supraclinoid segment on the left.  The middle cerebral arteries and anterior cerebral arteries are patent.    There is mild narrowing of the vertebral arteries with scattered calcifications.  The basilar artery and posterior cerebral arteries are patent with mild narrowing of the left posterior cerebral artery.    The dural venous sinuses are patent.    Additional findings:    There is a suspected 12  mm enhancing mucosal nodule in the left oropharynx (series 22, image 59).    No cervical lymphadenopathy.  Impression: 1. No large vessel occlusion or flow-limiting stenosis.  2. Atherosclerotic changes of the carotid arteries with less than 20% stenosis at the carotid bulbs and mild narrowing of the left supraclinoid ICA.  3. Suspected 12 mm enhancing mucosal nodule in the left oropharynx.  Suggest correlation with visual inspection.    Electronically signed by: Carisa Moon  Date:    04/05/2023  Time:    14:56  FL Shunt Setting  Narrative: EXAMINATION:  FL SHUNT SETTING    CLINICAL HISTORY:  Stroke, follow up;stroke;    TECHNIQUE:  Fluoroscopic spot images of the skull were obtained in lateral projection prior to and following MRI, to evaluate shunt setting.    COMPARISON:  None available.  Impression: Shunt type:  Codman Certas    Shunt performance settings    Pre-MRI:  2    Post-MRI: 2    The partially visualized shunt tubing and regional osseous structures demonstrate no evidence of acute disruption.    Exposure information:    Fluoro time: 30 seconds    Dose: 13.85 mGy    Electronically signed by: Carisa Moon  Date:    04/05/2023  Time:    12:51  MRI Brain Without Contrast  Narrative: EXAMINATION:  MRI BRAIN WITHOUT CONTRAST    CLINICAL HISTORY:  Transient ischemic attack (TIA);Stroke, follow up;    TECHNIQUE:  Multiplanar, multisequence MR images of the brain were obtained without the administration of intravenous contrast.    COMPARISON:  CT head dated 10/22/2019    FINDINGS:  There is a small area of cortical restricted diffusion in the right posterior frontal lobe, involving the precentral gyrus.  There is also a suspected small focus of restricted diffusion in the left anterior temporal lobe (series 4, image 18).    Moderate patchy and confluent T2/FLAIR hyperintensities in the subcortical and periventricular white matter, basal ganglia, thalami and liu are nonspecific and may represent chronic  microvascular ischemic changes.    There is a right posterior approach ventricular shunt catheter with tip over the left lateral ventricle.  The ventricles have decreased in size and are now decompressed.  There is moderate diffuse parenchymal volume loss.  There is no abnormal extra-axial fluid collection.  The major intracranial flow voids are patent.  The paranasal sinuses are clear.  There are bilateral mastoid effusions.  Impression: 1. Small areas of acute ischemia in the right posterior frontal lobe and left anterior temporal.  2. Moderate chronic microvascular ischemic changes.  3. Right posterior ventricular shunt catheter in place with decompressed ventricles.    Electronically signed by: Carisa Moon  Date:    04/05/2023  Time:    12:12         Medication List        CHANGE how you take these medications      aspirin 325 MG EC tablet  Commonly known as: ECOTRIN  Take 1 tablet (325 mg total) by mouth once daily.  What changed:   medication strength  how much to take  when to take this     atorvastatin 20 MG tablet  Commonly known as: LIPITOR  Take 1 tablet (20 mg total) by mouth once daily.  What changed:   medication strength  how much to take  when to take this     carvediloL 3.125 MG tablet  Commonly known as: COREG  Take 1 tablet (3.125 mg total) by mouth 2 (two) times daily.  What changed:   medication strength  how much to take            CONTINUE taking these medications      folic acid 1 MG tablet  Commonly known as: FOLVITE     furosemide 20 MG tablet  Commonly known as: LASIX     lisinopriL 5 MG tablet  Commonly known as: PRINIVILZESTRIL     VITAMIN B-1 100 MG tablet  Generic drug: thiamine            STOP taking these medications      isosorbide-hydrALAZINE 20-37.5 mg 20-37.5 mg Tab  Commonly known as: BIDIL               Where to Get Your Medications        These medications were sent to [facility] [place].  [place] [address] [address]      Phone:  [phone]   aspirin 325 MG EC tablet  atorvastatin 20 MG tablet  carvediloL 3.125 MG tablet          Explained in detail to the patient about the discharge plan, medications, and follow-up visits. Pt understands and agrees with the treatment plan  [place]charge [place] [facility]   Discharged Condition: stable  Diet-    Medications Per DC med rec  Activities as tolerated   Follow-up Information       [facility] Follow up.    Specialty: Home Health Services  Why: This is your home health provider. Someone will contact you to set up a home visit.  Contact information:  [address] [facility]  [address]  [place] [address]  [phone]               Ricky Newsome MD. Schedule an appointment as soon as possible for a visit in 2 week(s).    Specialty: Cardiology  Why: appointnment is set for may 2nd 9:15 a.m.  Contact information:  [address] [facility].  [place] [address]  [phone]               Eddi Castañeda MD Follow up on 4/18/2023.    Specialty: Neurology  Why: appointnment is scheduled for April 18th at 10:45  Contact information:  [address] [facility]  [address]  [place] [address]  [phone]                           For further questions contact hospitalist office    Discharge time 33 minutes    For worsening symptoms, chest pain, shortness of breath, increased abdominal pain, high grade fever, stroke or stroke like symptoms, immediately go to the nearest Emergency Room or call 911 as soon as possible.      Oumar Ayala M.D on 4/11/2023. at 1:21 PM.

## 2023-04-12 ENCOUNTER — PATIENT OUTREACH (OUTPATIENT)
Dept: ADMINISTRATIVE | Facility: CLINIC | Age: 78
End: 2023-04-12
Payer: MEDICARE

## 2023-04-12 LAB
LEFT CCA DIST DIAS: 6 CM/S
LEFT CCA DIST SYS: 73 CM/S
LEFT CCA PROX DIAS: 4 CM/S
LEFT CCA PROX SYS: 82 CM/S
LEFT ECA DIAS: 0 CM/S
LEFT ECA SYS: 89 CM/S
LEFT ICA DIST DIAS: 6 CM/S
LEFT ICA DIST SYS: 101 CM/S
LEFT ICA MID DIAS: 10 CM/S
LEFT ICA MID SYS: 95 CM/S
LEFT ICA PROX DIAS: 4 CM/S
LEFT ICA PROX SYS: 51 CM/S
LEFT VERTEBRAL DIAS: 2 CM/S
LEFT VERTEBRAL SYS: 48 CM/S
OHS CV CAROTID RIGHT ICA EDV HIGHEST: 14
OHS CV CAROTID ULTRASOUND LEFT ICA/CCA RATIO: 1.38
OHS CV CAROTID ULTRASOUND RIGHT ICA/CCA RATIO: 0.87
OHS CV PV CAROTID LEFT HIGHEST CCA: 82
OHS CV PV CAROTID LEFT HIGHEST ICA: 101
OHS CV PV CAROTID RIGHT HIGHEST CCA: 92
OHS CV PV CAROTID RIGHT HIGHEST ICA: 65
OHS CV US CAROTID LEFT HIGHEST EDV: 10
RIGHT CCA DIST DIAS: 5 CM/S
RIGHT CCA DIST SYS: 75 CM/S
RIGHT CCA PROX DIAS: 0 CM/S
RIGHT CCA PROX SYS: 92 CM/S
RIGHT ECA SYS: 73 CM/S
RIGHT ICA DIST DIAS: 12 CM/S
RIGHT ICA DIST SYS: 54 CM/S
RIGHT ICA MID DIAS: 6 CM/S
RIGHT ICA MID SYS: 44 CM/S
RIGHT ICA PROX DIAS: 14 CM/S
RIGHT ICA PROX SYS: 65 CM/S
RIGHT VERTEBRAL DIAS: 8 CM/S
RIGHT VERTEBRAL SYS: 36 CM/S

## 2023-04-12 NOTE — PROGRESS NOTES
C3 nurse spoke with Madan Elder's daughter Charmaine for a TCC post hospital discharge follow up call to complete med rec. Call completed at this time.

## 2023-04-12 NOTE — PROGRESS NOTES
C3 nurse spoke with Madan Elder's daughter for a TCC post hospital discharge follow up call. Pts daughter denies any new symptoms with the pt. Pts med list is at his home and pts daughter requested callback later today when she will be at home with the list to finish med rec. The patient has a scheduled follow up with Eddi Castañeda MD (Neurology) on 4/18/2023 at 1045 and then with Ricky Newsome MD (Cardiology) at 1300. Message routed to PCP.

## 2023-04-27 ENCOUNTER — HOSPITAL ENCOUNTER (EMERGENCY)
Facility: HOSPITAL | Age: 78
Discharge: HOME | End: 2023-04-27
Attending: EMERGENCY MEDICINE
Payer: MEDICARE

## 2023-04-27 VITALS
TEMPERATURE: 98 F | BODY MASS INDEX: 20.9 KG/M2 | DIASTOLIC BLOOD PRESSURE: 89 MMHG | HEART RATE: 68 BPM | HEIGHT: 70 IN | SYSTOLIC BLOOD PRESSURE: 145 MMHG | WEIGHT: 146 LBS | OXYGEN SATURATION: 98 % | RESPIRATION RATE: 16 BRPM

## 2023-04-27 DIAGNOSIS — R31.9 HEMATURIA, UNSPECIFIED TYPE: Primary | ICD-10-CM

## 2023-04-27 LAB
ALBUMIN SERPL-MCNC: 4.3 G/DL (ref 3.4–4.8)
ALBUMIN/GLOB SERPL: 1.2 RATIO (ref 1.1–2)
ALP SERPL-CCNC: 77 UNIT/L (ref 40–150)
ALT SERPL-CCNC: 29 UNIT/L (ref 0–55)
APPEARANCE UR: ABNORMAL
APTT PPP: 34.5 SECONDS (ref 23.2–33.7)
AST SERPL-CCNC: 23 UNIT/L (ref 5–34)
BACTERIA #/AREA URNS AUTO: ABNORMAL /HPF
BASOPHILS # BLD AUTO: 0.03 X10(3)/MCL (ref 0–0.2)
BASOPHILS NFR BLD AUTO: 0.4 %
BILIRUB UR QL STRIP.AUTO: ABNORMAL MG/DL
BILIRUBIN DIRECT+TOT PNL SERPL-MCNC: 2.3 MG/DL
BUN SERPL-MCNC: 19.2 MG/DL (ref 8.4–25.7)
CALCIUM SERPL-MCNC: 9.8 MG/DL (ref 8.8–10)
CHLORIDE SERPL-SCNC: 102 MMOL/L (ref 98–107)
CO2 SERPL-SCNC: 26 MMOL/L (ref 23–31)
COLOR UR AUTO: ABNORMAL
CREAT SERPL-MCNC: 1.23 MG/DL (ref 0.73–1.18)
EOSINOPHIL # BLD AUTO: 0.27 X10(3)/MCL (ref 0–0.9)
EOSINOPHIL NFR BLD AUTO: 3.5 %
ERYTHROCYTE [DISTWIDTH] IN BLOOD BY AUTOMATED COUNT: 13.1 % (ref 11.5–17)
GFR SERPLBLD CREATININE-BSD FMLA CKD-EPI: 60 MLS/MIN/1.73/M2
GLOBULIN SER-MCNC: 3.5 GM/DL (ref 2.4–3.5)
GLUCOSE SERPL-MCNC: 104 MG/DL (ref 82–115)
GLUCOSE UR QL STRIP.AUTO: NEGATIVE MG/DL
HCT VFR BLD AUTO: 48.6 % (ref 42–52)
HGB BLD-MCNC: 16 G/DL (ref 14–18)
IMM GRANULOCYTES # BLD AUTO: 0.03 X10(3)/MCL (ref 0–0.04)
IMM GRANULOCYTES NFR BLD AUTO: 0.4 %
INR BLD: 1.26 (ref 0–1.3)
KETONES UR QL STRIP.AUTO: NEGATIVE MG/DL
LEUKOCYTE ESTERASE UR QL STRIP.AUTO: ABNORMAL UNIT/L
LYMPHOCYTES # BLD AUTO: 1.8 X10(3)/MCL (ref 0.6–4.6)
LYMPHOCYTES NFR BLD AUTO: 23.7 %
MCH RBC QN AUTO: 30.4 PG (ref 27–31)
MCHC RBC AUTO-ENTMCNC: 32.9 G/DL (ref 33–36)
MCV RBC AUTO: 92.4 FL (ref 80–94)
MONOCYTES # BLD AUTO: 0.57 X10(3)/MCL (ref 0.1–1.3)
MONOCYTES NFR BLD AUTO: 7.5 %
NEUTROPHILS # BLD AUTO: 4.91 X10(3)/MCL (ref 2.1–9.2)
NEUTROPHILS NFR BLD AUTO: 64.5 %
NITRITE UR QL STRIP.AUTO: POSITIVE
NRBC BLD AUTO-RTO: 0 %
PH UR STRIP.AUTO: 5.5 [PH]
PLATELET # BLD AUTO: 224 X10(3)/MCL (ref 130–400)
PMV BLD AUTO: 10 FL (ref 7.4–10.4)
POTASSIUM SERPL-SCNC: 4.3 MMOL/L (ref 3.5–5.1)
PROT SERPL-MCNC: 7.8 GM/DL (ref 5.8–7.6)
PROT UR QL STRIP.AUTO: ABNORMAL MG/DL
PROTHROMBIN TIME: 15.7 SECONDS (ref 12.5–14.5)
RBC # BLD AUTO: 5.26 X10(6)/MCL (ref 4.7–6.1)
RBC #/AREA URNS AUTO: >3000 /HPF
RBC UR QL AUTO: ABNORMAL UNIT/L
SODIUM SERPL-SCNC: 138 MMOL/L (ref 136–145)
SP GR UR STRIP.AUTO: 1.01 (ref 1–1.03)
SQUAMOUS #/AREA URNS AUTO: <5 /HPF
UROBILINOGEN UR STRIP-ACNC: 0.2 MG/DL
WBC # SPEC AUTO: 7.6 X10(3)/MCL (ref 4.5–11.5)
WBC #/AREA URNS AUTO: 15 /HPF

## 2023-04-27 PROCEDURE — 85610 PROTHROMBIN TIME: CPT | Performed by: PHYSICIAN ASSISTANT

## 2023-04-27 PROCEDURE — 85730 THROMBOPLASTIN TIME PARTIAL: CPT | Performed by: PHYSICIAN ASSISTANT

## 2023-04-27 PROCEDURE — 80053 COMPREHEN METABOLIC PANEL: CPT | Performed by: PHYSICIAN ASSISTANT

## 2023-04-27 PROCEDURE — 81001 URINALYSIS AUTO W/SCOPE: CPT | Performed by: PHYSICIAN ASSISTANT

## 2023-04-27 PROCEDURE — 99284 EMERGENCY DEPT VISIT MOD MDM: CPT | Mod: 25

## 2023-04-27 PROCEDURE — 85025 COMPLETE CBC W/AUTO DIFF WBC: CPT | Performed by: PHYSICIAN ASSISTANT

## 2023-04-27 PROCEDURE — 87088 URINE BACTERIA CULTURE: CPT | Performed by: PHYSICIAN ASSISTANT

## 2023-04-27 RX ORDER — CIPROFLOXACIN 500 MG/1
500 TABLET ORAL 2 TIMES DAILY
Qty: 14 TABLET | Refills: 0 | Status: SHIPPED | OUTPATIENT
Start: 2023-04-27 | End: 2023-05-04

## 2023-04-27 NOTE — FIRST PROVIDER EVALUATION
"Medical screening examination initiated.  I have conducted a focused provider triage encounter, findings are as follows:    Chief Complaint   Patient presents with    Hematuria     Patient reports hematuria today on Eliquis, on Eliquis for CVA     Brief history of present illness:  77 y.o. male presents to the ED with hematuria onset today. Patient was recently diagnosed with CVA and started on Eliquis however it was stopped when he started having hematuria. Recently started back up again and having hematuria. Denies pain or difficulty urinating.     Vitals:    04/27/23 1239   BP: (!) 150/77   Pulse: 67   Resp: 18   Temp: 98 °F (36.7 °C)   SpO2: 96%   Weight: 66.2 kg (146 lb)   Height: 5' 10" (1.778 m)     Pertinent physical exam:  Awake, alert, ambulatory, non-labored respirations    Brief workup plan:  labs, UA    Preliminary workup initiated; this workup will be continued and followed by the physician or advanced practice provider that is assigned to the patient when roomed.  "

## 2023-04-27 NOTE — ED PROVIDER NOTES
Encounter Date: 4/27/2023       History     Chief Complaint   Patient presents with    Hematuria     Patient reports hematuria today on Eliquis, on Eliquis for CVA     See MDM    The history is provided by the patient. No  was used.   Review of patient's allergies indicates:  No Known Allergies  Past Medical History:   Diagnosis Date    Hypertension     Stroke      Past Surgical History:   Procedure Laterality Date    INSERTION OF PACEMAKER       History reviewed. No pertinent family history.  Social History     Tobacco Use    Smoking status: Never    Smokeless tobacco: Never   Substance Use Topics    Alcohol use: Yes    Drug use: Never     Review of Systems   Constitutional:  Negative for fever.   Respiratory:  Negative for cough and shortness of breath.    Cardiovascular:  Negative for chest pain.   Gastrointestinal:  Negative for abdominal pain.   Genitourinary:  Positive for hematuria. Negative for difficulty urinating and dysuria.   Musculoskeletal:  Negative for gait problem.   Skin:  Negative for color change.   Neurological:  Negative for dizziness, speech difficulty and headaches.   Psychiatric/Behavioral:  Negative for hallucinations and suicidal ideas.    All other systems reviewed and are negative.    Physical Exam     Initial Vitals [04/27/23 1239]   BP Pulse Resp Temp SpO2   (!) 150/77 67 18 98 °F (36.7 °C) 96 %      MAP       --         Physical Exam    Nursing note and vitals reviewed.  Constitutional: He appears well-developed and well-nourished.   HENT:   Head: Normocephalic.   Eyes: EOM are normal.   Neck: Neck supple.   Normal range of motion.  Cardiovascular:  Normal rate, regular rhythm, normal heart sounds and intact distal pulses.           Pulmonary/Chest: Breath sounds normal.   Abdominal: Abdomen is soft. Bowel sounds are normal.   Musculoskeletal:         General: Normal range of motion.      Cervical back: Normal range of motion and neck supple.     Neurological: He  is alert and oriented to person, place, and time. He has normal strength.   Skin: Skin is warm and dry. Capillary refill takes less than 2 seconds.   Psychiatric: He has a normal mood and affect. His behavior is normal. Judgment and thought content normal.       ED Course   Procedures  Labs Reviewed   COMPREHENSIVE METABOLIC PANEL - Abnormal; Notable for the following components:       Result Value    Creatinine 1.23 (*)     Protein Total 7.8 (*)     Bilirubin Total 2.3 (*)     All other components within normal limits   APTT - Abnormal; Notable for the following components:    PTT 34.5 (*)     All other components within normal limits   PROTIME-INR - Abnormal; Notable for the following components:    PT 15.7 (*)     All other components within normal limits   URINALYSIS, REFLEX TO URINE CULTURE - Abnormal; Notable for the following components:    Color, UA Red (*)     Appearance, UA Turbid (*)     Protein, UA 2+ (*)     Blood, UA 2+ (*)     Bilirubin, UA 1+ (*)     Nitrites, UA Positive (*)     Leukocyte Esterase, UA 2+ (*)     All other components within normal limits   CBC WITH DIFFERENTIAL - Abnormal; Notable for the following components:    MCHC 32.9 (*)     All other components within normal limits   URINALYSIS, MICROSCOPIC - Abnormal; Notable for the following components:    RBC, UA >3,000 (*)     WBC, UA 15 (*)     All other components within normal limits   CULTURE, URINE   CBC W/ AUTO DIFFERENTIAL    Narrative:     The following orders were created for panel order CBC auto differential.  Procedure                               Abnormality         Status                     ---------                               -----------         ------                     CBC with Differential[[id number]]        Abnormal            Final result                 Please view results for these tests on the individual orders.          Imaging Results              CT Abdomen Pelvis  Without Contrast (Final result)  Result time  04/27/23 17:42:02      Final result by Praful Maynard MD (04/27/23 17:42:02)                   Impression:      Cyst in the right kidney    Multiple urinary bladder diverticuli seen which was seen on the previous examination as well      Electronically signed by: Praful Maynard  Date:    04/27/2023  Time:    17:42               Narrative:    EXAMINATION:  CT ABDOMEN PELVIS WITHOUT CONTRAST    CLINICAL HISTORY:  hematuria;    TECHNIQUE:  Low dose axial images, sagittal and coronal reformations were obtained from the lung bases to the pubic symphysis.  No contrast was administered.  Automatic exposure control is utilized to reduce patient radiation exposure.    COMPARISON:  None    FINDINGS:  The lung bases are clear.    The liver appears normal.  No obvious liver mass or lesion is seen.    The patient is status post cholecystectomy.    There is a peritoneal catheter seen in place with the tip in the left upper quadrant..    The pancreas appears normal.  No inflammatory changes are seen in the pancreatic region.    The spleen appears normal and adrenal glands appear normal.  No adrenal nodule is seen.    No nephrolithiasis is seen.  No hydronephrosis is seen.  No hydroureter is seen.  No ureteral stone is seen.  There is an exophytic lesion seen in the right kidney in the midpole which was previously shown to be a cyst.    No colitis is seen.  No diverticulitis is seen.  No appendicitis is seen.    No free air seen.  No free fluid is seen.    There are multiple bladder diverticuli seen.  The largest 1 is seen on the right side.  Similar changes were seen on the prior examination.    The prostate is enlarged in size and partially calcified..    Bones show no acute abnormality.                                       Medications - No data to display  Medical Decision Making:   Initial Assessment:   Historian:  Patient.  Patient is a 77-year-old male  that presents with hematuria that has been present today.  Associated symptoms nothing. Surrounding information is on Eliquis. Exacerbated by nothing. Relieved by nothing. Patient treatment prior to arrival none. Risk factors include anticoagulants. Other history pertaining to this complaint patient had hematuria 2 weeks ago.  They stopped his Eliquis briefly.  The hematuria stopped.  They restarted the Eliquis.  Now he is having hematuria again..   Assessment:  See physical exam.    Differential Diagnosis:   Hematuria, anticoagulation, bladder lesion, kidney stone  ED Management:  History was obtained.  Physical was performed.  Discussed case with emergency room physician Dr. Villatoro.  We feel that the renal cyst in the urinary diverticuli are causing the bleeding.  This is exacerbated by the Eliquis.  Patient is able to urinate.  We will send him to see Urology.  We will put him on Cipro in the time being.  He is to return to the emergency room unable to urinate.  No medical or surgical consult indicated in ER.  Social determinants the fact healthcare were recent CVA.  He does have home health and family to help. Patient will hold eliquis until speaks to Cardiology tomorrow.                         Clinical Impression:   Final diagnoses:  [R31.9] Hematuria, unspecified type (Primary)        ED Disposition Condition    Discharge Stable          ED Prescriptions       Medication Sig Dispense Start Date End Date Auth. Provider    ciprofloxacin HCl (CIPRO) 500 MG tablet Take 1 tablet (500 mg total) by mouth 2 (two) times daily. for 7 days 14 tablet 4/27/2023 5/4/2023 PETER Barlow          Follow-up Information       Follow up With Specialties Details Why Contact Info    Blake Alegria MD Urology Call in 3 days ed follow up [address] [facility] [address]  [facility] [address]  [phone]               PETER Barlow  04/27/23 1801       PETER Barlow  04/27/23 8742

## 2023-04-29 LAB — BACTERIA UR CULT: NORMAL

## 2023-07-10 PROBLEM — I63.9 STROKE: Status: RESOLVED | Noted: 2023-04-06 | Resolved: 2023-07-10

## 2023-10-17 ENCOUNTER — HOSPITAL ENCOUNTER (EMERGENCY)
Facility: HOSPITAL | Age: 78
Discharge: HOME | End: 2023-10-18
Attending: EMERGENCY MEDICINE
Payer: MEDICARE

## 2023-10-17 VITALS
TEMPERATURE: 98 F | DIASTOLIC BLOOD PRESSURE: 68 MMHG | BODY MASS INDEX: 20.52 KG/M2 | WEIGHT: 143 LBS | RESPIRATION RATE: 12 BRPM | SYSTOLIC BLOOD PRESSURE: 128 MMHG | HEART RATE: 65 BPM | OXYGEN SATURATION: 97 %

## 2023-10-17 DIAGNOSIS — S09.90XA CLOSED HEAD INJURY, INITIAL ENCOUNTER: ICD-10-CM

## 2023-10-17 DIAGNOSIS — N30.01 ACUTE CYSTITIS WITH HEMATURIA: ICD-10-CM

## 2023-10-17 DIAGNOSIS — M25.552 LEFT HIP PAIN: ICD-10-CM
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
## 2023-10-17 LAB
ALBUMIN SERPL-MCNC: 3.9 G/DL (ref 3.4–4.8)
ALBUMIN/GLOB SERPL: 1.1 RATIO (ref 1.1–2)
ALP SERPL-CCNC: 73 UNIT/L (ref 40–150)
ALT SERPL-CCNC: 14 UNIT/L (ref 0–55)
APPEARANCE UR: ABNORMAL
AST SERPL-CCNC: 15 UNIT/L (ref 5–34)
BACTERIA #/AREA URNS AUTO: ABNORMAL /HPF
BASOPHILS # BLD AUTO: 0.04 X10(3)/MCL
BASOPHILS NFR BLD AUTO: 0.5 %
BILIRUB SERPL-MCNC: 2.4 MG/DL
BILIRUB UR QL STRIP.AUTO: NEGATIVE
BUN SERPL-MCNC: 17.8 MG/DL (ref 8.4–25.7)
CALCIUM SERPL-MCNC: 9.3 MG/DL (ref 8.8–10)
CHLORIDE SERPL-SCNC: 109 MMOL/L (ref 98–107)
CO2 SERPL-SCNC: 23 MMOL/L (ref 23–31)
COLOR UR AUTO: ABNORMAL
CREAT SERPL-MCNC: 1.2 MG/DL (ref 0.73–1.18)
EOSINOPHIL # BLD AUTO: 0.51 X10(3)/MCL (ref 0–0.9)
EOSINOPHIL NFR BLD AUTO: 6 %
ERYTHROCYTE [DISTWIDTH] IN BLOOD BY AUTOMATED COUNT: 13.5 % (ref 11.5–17)
GFR SERPLBLD CREATININE-BSD FMLA CKD-EPI: >60 MLS/MIN/1.73/M2
GLOBULIN SER-MCNC: 3.4 GM/DL (ref 2.4–3.5)
GLUCOSE SERPL-MCNC: 103 MG/DL (ref 82–115)
GLUCOSE UR QL STRIP.AUTO: NEGATIVE
HCT VFR BLD AUTO: 44.6 % (ref 42–52)
HGB BLD-MCNC: 15 G/DL (ref 14–18)
IMM GRANULOCYTES # BLD AUTO: 0.04 X10(3)/MCL (ref 0–0.04)
IMM GRANULOCYTES NFR BLD AUTO: 0.5 %
KETONES UR QL STRIP.AUTO: NEGATIVE
LEUKOCYTE ESTERASE UR QL STRIP.AUTO: ABNORMAL
LYMPHOCYTES # BLD AUTO: 1.98 X10(3)/MCL (ref 0.6–4.6)
LYMPHOCYTES NFR BLD AUTO: 23.3 %
MCH RBC QN AUTO: 30.6 PG (ref 27–31)
MCHC RBC AUTO-ENTMCNC: 33.6 G/DL (ref 33–36)
MCV RBC AUTO: 91 FL (ref 80–94)
MONOCYTES # BLD AUTO: 0.65 X10(3)/MCL (ref 0.1–1.3)
MONOCYTES NFR BLD AUTO: 7.6 %
NEUTROPHILS # BLD AUTO: 5.29 X10(3)/MCL (ref 2.1–9.2)
NEUTROPHILS NFR BLD AUTO: 62.1 %
NITRITE UR QL STRIP.AUTO: NEGATIVE
NRBC BLD AUTO-RTO: 0 %
PH UR STRIP.AUTO: 5.5 [PH]
PLATELET # BLD AUTO: 210 X10(3)/MCL (ref 130–400)
PMV BLD AUTO: 9.8 FL (ref 7.4–10.4)
POTASSIUM SERPL-SCNC: 4.2 MMOL/L (ref 3.5–5.1)
PROT SERPL-MCNC: 7.3 GM/DL (ref 5.8–7.6)
PROT UR QL STRIP.AUTO: ABNORMAL
RBC # BLD AUTO: 4.9 X10(6)/MCL (ref 4.7–6.1)
RBC #/AREA URNS AUTO: 515 /HPF
RBC UR QL AUTO: ABNORMAL
SODIUM SERPL-SCNC: 140 MMOL/L (ref 136–145)
SP GR UR STRIP.AUTO: 1.02 (ref 1–1.03)
SQUAMOUS #/AREA URNS AUTO: <5 /HPF
UROBILINOGEN UR STRIP-ACNC: 1
WBC # SPEC AUTO: 8.51 X10(3)/MCL (ref 4.5–11.5)
WBC #/AREA URNS AUTO: 587 /HPF

## 2023-10-17 PROCEDURE — 25000003 PHARM REV CODE 250: Performed by: PHYSICIAN ASSISTANT

## 2023-10-17 PROCEDURE — 81001 URINALYSIS AUTO W/SCOPE: CPT

## 2023-10-17 PROCEDURE — 99285 EMERGENCY DEPT VISIT HI MDM: CPT | Mod: 25

## 2023-10-17 PROCEDURE — 80053 COMPREHEN METABOLIC PANEL: CPT

## 2023-10-17 PROCEDURE — 85025 COMPLETE CBC W/AUTO DIFF WBC: CPT

## 2023-10-17 PROCEDURE — 87088 URINE BACTERIA CULTURE: CPT

## 2023-10-17 RX ORDER — HYDROCODONE BITARTRATE AND ACETAMINOPHEN 5; 325 MG/1; MG/1
1 TABLET ORAL
Status: COMPLETED | OUTPATIENT
Start: 2023-10-17 | End: 2023-10-17

## 2023-10-17 RX ADMIN — HYDROCODONE BITARTRATE AND ACETAMINOPHEN 1 TABLET: 5; 325 TABLET ORAL at 11:10

## 2023-10-17 RX ADMIN — IOPAMIDOL 100 ML: 755 INJECTION, SOLUTION INTRAVENOUS at 11:10

## 2023-10-17 NOTE — FIRST PROVIDER EVALUATION
Medical screening examination initiated.  I have conducted a focused provider triage encounter, findings are as follows:    Brief history of present illness:  77 year old male presents to ER with hematoma to head after unwitnessed fall. Family states that he has caregivers and they did not witness him fall. Patient states his left leg gave out causing him to fall. Caregiver states he vomited once.     Vitals:    10/17/23 1844   BP: 128/68   Pulse: 65   Resp: 19   Temp: 97.5 °F (36.4 °C)   TempSrc: Oral   SpO2: 97%   Weight: 64.9 kg (143 lb)       Pertinent physical exam:  Awake and alert, nad    Brief workup plan:  labs, imaging     Preliminary workup initiated; this workup will be continued and followed by the physician or advanced practice provider that is assigned to the patient when roomed.

## 2023-10-18 PROCEDURE — 25500020 PHARM REV CODE 255: Performed by: PHYSICIAN ASSISTANT

## 2023-10-18 RX ORDER — SULFAMETHOXAZOLE AND TRIMETHOPRIM 800; 160 MG/1; MG/1
1 TABLET ORAL 2 TIMES DAILY
Qty: 14 TABLET | Refills: 0 | Status: SHIPPED | OUTPATIENT
Start: 2023-10-18 | End: 2023-10-25

## 2023-10-18 RX ORDER — HYDROCODONE BITARTRATE AND ACETAMINOPHEN 5; 325 MG/1; MG/1
1 TABLET ORAL EVERY 8 HOURS PRN
Qty: 15 TABLET | Refills: 0 | Status: SHIPPED | OUTPATIENT
Start: 2023-10-18 | End: 2023-10-23

## 2023-10-18 RX ORDER — LACTULOSE 10 G/15ML
20 SOLUTION ORAL; RECTAL 2 TIMES DAILY
Qty: 180 ML | Refills: 0 | Status: SHIPPED | OUTPATIENT
Start: 2023-10-18 | End: 2023-10-21

## 2023-10-18 NOTE — ED NOTES
Voicing c/o left hip pain. States that he fell, unsure of when he fell though. Daughter states that she has noticed periods of confusion. Pt wears incontinent brief and has had UTI's in the past.

## 2023-10-18 NOTE — ED PROVIDER NOTES
Encounter Date: 10/17/2023       History     Chief Complaint   Patient presents with    Fall     Family noticed a knot of the back of head. Pt has 24 hr caregivers. Caregivers deny fall. Pt states he fell yesterday.  + pain to left hip Pt states he fell out bed. Pt states his left leg gave out. -loc. Denies headache. Caregiver said he threw up this am, pt denies throwing up. - blood thinners     77 y.o. male with a history of hypertension and previous CVA currently on Eliquis presents to the ED with family member for evaluation.  Per daughter, the patient has 24 hour caregivers, and was called this afternoon around 3:00 p.m. and was told about a knot to the left side of the patient's head.  When asked, the patient states he fell however caregiver is unsure because she did not witness the fall.  Patient complained of left hip pain as well.  Caregiver states that he had 1 episode of vomiting today however patient denies this.  Denies any other symptoms.  Denies dizziness, headache, blurred vision, nausea, chest pain, shortness breath, back pain, neck pain.    The history is provided by the patient. No  was used.     Review of patient's allergies indicates:  No Known Allergies  Past Medical History:   Diagnosis Date    Hypertension     Stroke      Past Surgical History:   Procedure Laterality Date    INSERTION OF PACEMAKER       No family history on file.  Social History     Tobacco Use    Smoking status: Never    Smokeless tobacco: Never   Substance Use Topics    Alcohol use: Yes    Drug use: Never     Review of Systems   Constitutional:  Negative for fever.   HENT:  Negative for sore throat.    Respiratory:  Negative for shortness of breath.    Cardiovascular:  Negative for chest pain.   Gastrointestinal:  Negative for abdominal pain, constipation, diarrhea and nausea.   Genitourinary:  Negative for dysuria.   Musculoskeletal:  Positive for arthralgias. Negative for back pain and neck pain.    Skin:  Negative for rash.   Neurological:  Negative for dizziness, weakness and headaches.   Hematological:  Does not bruise/bleed easily.   All other systems reviewed and are negative.      Physical Exam     Initial Vitals [10/17/23 1844]   BP Pulse Resp Temp SpO2   128/68 65 19 97.5 °F (36.4 °C) 97 %      MAP       --         Physical Exam    Nursing note and vitals reviewed.  Constitutional: He appears well-developed and well-nourished.   HENT:   Head: Normocephalic. Head is with contusion. Head is without abrasion, without right periorbital erythema and without left periorbital erythema.       Eyes: EOM are normal. Pupils are equal, round, and reactive to light.   Neck: Trachea normal and phonation normal. Neck supple.   Normal range of motion.   Full passive range of motion without pain.     Cardiovascular:  Normal rate, regular rhythm and normal heart sounds.           Pulmonary/Chest: Breath sounds normal. He exhibits no tenderness.   No ecchymosis noted to chest wall   Abdominal: Abdomen is soft. He exhibits no distension. There is no abdominal tenderness.   No ecchymosis noted to abdominal wall There is no rebound and no guarding.   Musculoskeletal:      Cervical back: Normal, full passive range of motion without pain, normal range of motion and neck supple. No spinous process tenderness or muscular tenderness.      Thoracic back: Normal.      Lumbar back: Normal.      Right hip: Normal.      Left hip: Tenderness present. No deformity, bony tenderness or crepitus. Decreased range of motion (due to pain). Normal strength.     Neurological: He is alert and oriented to person, place, and time. He has normal strength. GCS score is 15. GCS eye subscore is 4. GCS verbal subscore is 5. GCS motor subscore is 6.   Skin: Skin is warm and dry.   Psychiatric: He has a normal mood and affect.         ED Course   Procedures  Labs Reviewed   COMPREHENSIVE METABOLIC PANEL - Abnormal; Notable for the following components:        Result Value    Chloride 109 (*)     Creatinine 1.20 (*)     Bilirubin Total 2.4 (*)     All other components within normal limits   URINALYSIS, REFLEX TO URINE CULTURE - Abnormal; Notable for the following components:    Appearance, UA Turbid (*)     Protein, UA 1+ (*)     Blood, UA 3+ (*)     Leukocyte Esterase, UA 3+ (*)     All other components within normal limits   URINALYSIS, MICROSCOPIC - Abnormal; Notable for the following components:    RBC,  (*)     WBC,  (*)     All other components within normal limits   CULTURE, URINE   CBC W/ AUTO DIFFERENTIAL    Narrative:     The following orders were created for panel order CBC Auto Differential.  Procedure                               Abnormality         Status                     ---------                               -----------         ------                     CBC with Differential[[id number]]                            Final result                 Please view results for these tests on the individual orders.   CBC WITH DIFFERENTIAL          Imaging Results              CT Abdomen Pelvis With Contrast (Preliminary result)  Result time 10/17/23 23:43:46      Preliminary result by Ian Renee Jr., MD (10/17/23 23:43:46)                   Narrative:    START OF REPORT:  Technique: CT of the abdomen and pelvis was performed with axial images as well as sagittal and coronal reconstruction images with intravenous contrast.    Comparison: Comparison is with study dated2023-04-27 17:28:54.    Clinical History: Fall, left side pain.    Dosage Information: Automated Exposure Control was utilized.    Findings:  Thorax:  Lungs: Streaky opacities are noted in the right middle lobe, lingula and bilateral lower lobes, which may reflect subsegmental atelectasis and / or parenchymal scarring.  Pleura: No effusions or thickening are seen.  Heart: Pacer leads are again seen in the visualized heart.  Abdomen:  Abdominal Wall: No abdominal wall  pathology is seen.  Liver: The liver appears unremarkable.  Biliary System: No intrahepatic or extrahepatic biliary duct dilatation is seen.  Gallbladder: Surgical clips are seen in the gallbladder fossa consistent with prior cholecystectomy.  Pancreas: The pancreas appears unremarkable.  Spleen: The spleen appears unremarkable.  Adrenals: The adrenal glands appear unremarkable.  Kidneys: Again noted are two complex exophytic right renal cortical cysts in the upper and mid pole (HU 40-50) (series 2, image 56 and 61), the larger measures 1 cm. There are additional subcentimeter hypodensities in the right renal cortex, which are too small to further characterize.  Aorta: There is mild calcification of the abdominal aorta and its branches.  IVC: Unremarkable.  Bowel:  Esophagus: The visualized esophagus appears unremarkable.  Stomach: The gastric pyloric wall seems thick raising concern for pyloric ulcer disease.  Duodenum: Unremarkable appearing duodenum.  Small Bowel: The small bowel appears unremarkable.  Colon: Multiple diverticula are seen in the colon. No associated inflammatory stranding or pericolonic fluid is seen to suggest diverticulitis. The rectosigmoid colon is moderately distended with stool consistent with fecal impaction.  Appendix: The appendix is not identified but no inflammatory changes are seen in the right lower quadrant to suggest appendicitis.  Peritoneum: No intraperitoneal free air or ascites is seen.    Pelvis:  Bladder: The urinary bladder wall is trabeculated with multiple diverticula, the largest measures approximately 5.8 x 5.1 cm. Similar findings are also noted on the prior examination. There is interval development of diffuse urinary bladder wall thickening with subtle perivesical fat stranding, consistent with cystitis.  Male:  Prostate gland: The prostate gland is moderately enlarged with its median lobe projecting into the bladder base.    Bony structures:  Dorsal Spine: There is  mild spondylosis of the visualized dorsal spine. There is grade I anterolisthesis of L5 over S1.  Bony Pelvis: The visualized bony structures of the pelvis appear unremarkable.      Impression:  1. The prostate gland is moderately enlarged with its median lobe projecting into the bladder base.  2. Again noted are two complex exophytic right renal cortical cysts in the upper and mid pole (HU 40-50) (series 2, image 56 and 61), the larger measures 1 cm. There are additional subcentimeter hypodensities in the right renal cortex, which are too small to further characterize.  3. The urinary bladder wall is trabeculated with multiple diverticula, the largest measures approximately 5.8 x 5.1 cm. Similar findings are also noted on the prior examination. There is interval development of diffuse urinary bladder wall thickening with subtle perivesical fat stranding, consistent with cystitis. Correlate with clinical and laboratory findings.  4. The rectosigmoid colon is moderately distended with stool consistent with fecal impaction.  5. The gastric pyloric wall seems thick raising concern for pyloric ulcer disease.  6. Details and other findings as discussed above.                                         X-Ray Hip 2 or 3 views Left (with Pelvis when performed) (Final result)  Result time 10/17/23 19:54:12      Final result by Rigo Umanzor MD (10/17/23 19:54:12)                   Impression:      No acute findings.      Electronically signed by: Rigo Umanzor  Date:    10/17/2023  Time:    19:54               Narrative:    EXAMINATION:  XR HIP WITH PELVIS WHEN PERFORMED, 2 OR 3 VIEWS LEFT    CLINICAL HISTORY:  fall;    COMPARISON:  19 May 2017    FINDINGS:  Frontal view of the pelvis with two views of the left hip.  There is no fracture or dislocation.  Large amount of stool in the rectum.                                       CT Head Without Contrast (Final result)  Result time 10/17/23 19:17:21      Final result by Erna  Rigo TRIANA MD (10/17/23 19:17:21)                   Impression:      No acute intracranial findings or significant interval change compared April 2023.      Electronically signed by: Rigo Umanzor  Date:    10/17/2023  Time:    19:17               Narrative:    EXAMINATION:  CT HEAD WITHOUT CONTRAST    CLINICAL HISTORY:  Head trauma, minor (Age >= 65y);    TECHNIQUE:  CT imaging of the head performed from the skull base to the vertex without intravenous contrast. DLP 1352 mGycm. Automatic exposure control, adjustment of mA/kV or iterative reconstruction technique was used to reduce radiation.    COMPARISON:  5 April 2023    FINDINGS:  There is no acute cortical infarct, hemorrhage or mass lesion.  There is similar position of the ventricular shunt.  There is no new parenchymal attenuation abnormality.  Ventricular size is stable.  Prominent global atrophy.  There are vascular calcifications.    Mild paranasal sinus inflammation.  There is bilateral mastoid fluid with postprocedural changes.  Mild left frontal scalp soft tissue swelling.                                       CT Cervical Spine Without Contrast (Final result)  Result time 10/17/23 19:19:41      Final result by Rigo Umanzor MD (10/17/23 19:19:41)                   Impression:      No acute bony injury of the cervical spine.      Electronically signed by: Rigo Umanzor  Date:    10/17/2023  Time:    19:19               Narrative:    EXAMINATION:  CT CERVICAL SPINE WITHOUT CONTRAST    CLINICAL HISTORY:  Neck trauma (Age >= 65y);    TECHNIQUE:  Helical acquisition through the cervical spine without IV contrast. Three plane reconstructions were made available for review. DLP 1352 mGycm. Automatic exposure control, adjustment of mA/kV or iterative reconstruction technique was used to reduce radiation.    COMPARISON:  None available.    FINDINGS:  No fractures identified.  Straightening of the cervical spine with no focal alignment abnormality.  The odontoid  is intact.  There is limited evaluation of the soft tissues. No prevertebral soft tissue swelling. Ligamentous injury cannot be excluded with CT.  There are prominent bridging anterior osteophytes extending from C3 to the imaged upper thoracic spine.  Heterogeneous thyroid.  There are vascular calcifications.                                       CT Lumbar Spine Without Contrast (Final result)  Result time 10/17/23 19:22:25      Final result by Rigo Umanzor MD (10/17/23 19:22:25)                   Impression:      No acute bony injury of the lumbar spine.      Electronically signed by: Rigo Umanzor  Date:    10/17/2023  Time:    19:22               Narrative:    EXAMINATION:  CT LUMBAR SPINE WITHOUT CONTRAST    CLINICAL HISTORY:  Low back pain, trauma;    TECHNIQUE:  Helical acquisition through the lumbar spine without IV contrast.  Three plane reconstructions were provided for review.  mGycm. Automatic exposure control, adjustment of mA/kV or iterative reconstruction technique was used to reduce radiation.    COMPARISON:  27 April 2023    FINDINGS:  Stable alignment.  No significant loss of vertebral body height.  Multilevel moderate to severe degenerative changes similar to the prior CT.  There is limited evaluation of the soft tissues.  Sacroiliac joints are intact.  Bladder diverticula noted.  Moderate stool in the rectum.                                       Medications   HYDROcodone-acetaminophen 5-325 mg per tablet 1 tablet (1 tablet Oral Given 10/17/23 5298)   iopamidoL (ISOVUE-370) injection 100 mL (100 mLs Intravenous Given 10/17/23 2344)     Medical Decision Making  Differential diagnosis:  Skull fracture, intracranial hemorrhage, contusion, concussion, fracture, dislocation, hollow versus solid organ injury    77 y.o. male with a history of hypertension and previous CVA currently on Eliquis presents to the ED with family member for evaluation.  Per daughter, the patient has 24 hour  caregivers, and was called this afternoon around 3:00 p.m. and was told about a knot to the left side of the patient's head.  When asked, the patient states he fell however caregiver is unsure because she did not witness the fall.  Patient complained of left hip pain as well.  Caregiver states that he had 1 episode of vomiting today however patient denies this.  Denies any other symptoms.  Denies dizziness, headache, blurred vision, nausea, chest pain, shortness breath, back pain, neck pain.      Amount and/or Complexity of Data Reviewed  Labs: ordered. Decision-making details documented in ED Course.  Radiology: ordered.    Risk  Prescription drug management.               ED Course as of 10/18/23 0132   Tue Oct 17, 2023   2257 Occult Blood UA(!): 3+ [MA]   2259 Leukocytes, UA(!): 3+ [MA]   2259 RBC, UA(!): 515 [MA]   2259 WBC, UA(!): 587 [MA]   2300 Concern for possible kidney injury due to blood found in urine secondary to fall.  Will add CT of the abdomen and pelvis [MA]   Wed Oct 18, 2023   0025 Patient states pain is completely resolved after pain medication.  Discussed all imaging, labs, and urine findings with patient and daughter at bedside.  Will treat for urinary tract infection and also send home with pain medication.  Will discharge home with lactulose as well due to signs of constipation on exam however patient does note he has been going to the bathroom regularly.  Will discharge home with strict return precautions for any worsening symptoms. [MA]      ED Course User Index  [MA] Roman Bernabe, ALEXANDREA                    Clinical Impression:   Final diagnoses:  [W19.XXXA] Fall, initial encounter (Primary)  [S09.90XA] Closed head injury, initial encounter  [N30.01] Acute cystitis with hematuria  [M25.552] Left hip pain        ED Disposition Condition    Discharge Stable          ED Prescriptions       Medication Sig Dispense Start Date End Date Auth. Provider    HYDROcodone-acetaminophen (NORCO) 5-325  mg per tablet Take 1 tablet by mouth every 8 (eight) hours as needed for Pain. 15 tablet 10/18/2023 10/23/2023 Roman Bernabe PA-C    lactulose (CHRONULAC) 10 gram/15 mL solution Take 30 mLs (20 g total) by mouth 2 (two) times daily. for 3 days 180 mL 10/18/2023 10/21/2023 Roman Bernabe PA-C    sulfamethoxazole-trimethoprim 800-160mg (BACTRIM DS) 800-160 mg Tab Take 1 tablet by mouth 2 (two) times daily. for 7 days 14 tablet 10/18/2023 10/25/2023 Roman Bernabe PA-C          Follow-up Information       Follow up With Specialties Details Why Contact Info    Ricky Newsome MD Cardiology   [address] [place] [address].  [facility] [id number] [phone]               Roman Bernabe PA-C  10/18/23 0132

## 2023-10-20 LAB — BACTERIA UR CULT: NORMAL

## 2024-09-14 ENCOUNTER — HOSPITAL ENCOUNTER (INPATIENT)
Facility: HOSPITAL | Age: 79
LOS: 2 days | Discharge: HOME HEALTH | DRG: 689 | End: 2024-09-17
Attending: INTERNAL MEDICINE | Admitting: INTERNAL MEDICINE
Payer: MEDICARE

## 2024-09-14 DIAGNOSIS — N39.0 URINARY TRACT INFECTION WITHOUT HEMATURIA, SITE UNSPECIFIED: Primary | ICD-10-CM

## 2024-09-14 DIAGNOSIS — R79.89 TROPONIN LEVEL ELEVATED: ICD-10-CM

## 2024-09-14 DIAGNOSIS — R53.1 WEAKNESS: ICD-10-CM
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
## 2024-09-14 LAB
ALBUMIN SERPL-MCNC: 3.3 G/DL (ref 3.4–4.8)
ALBUMIN/GLOB SERPL: 1 RATIO (ref 1.1–2)
ALP SERPL-CCNC: 81 UNIT/L (ref 40–150)
ALT SERPL-CCNC: 25 UNIT/L (ref 0–55)
ANION GAP SERPL CALC-SCNC: 9 MEQ/L
AST SERPL-CCNC: 28 UNIT/L (ref 5–34)
BACTERIA #/AREA URNS AUTO: ABNORMAL /HPF
BASOPHILS # BLD AUTO: 0.05 X10(3)/MCL
BASOPHILS NFR BLD AUTO: 0.7 %
BILIRUB SERPL-MCNC: 3.1 MG/DL
BILIRUB UR QL STRIP.AUTO: NEGATIVE
BUN SERPL-MCNC: 55.4 MG/DL (ref 8.4–25.7)
CALCIUM SERPL-MCNC: 9.3 MG/DL (ref 8.8–10)
CHLORIDE SERPL-SCNC: 105 MMOL/L (ref 98–107)
CK SERPL-CCNC: 368 U/L (ref 30–200)
CLARITY UR: ABNORMAL
CO2 SERPL-SCNC: 24 MMOL/L (ref 23–31)
COLOR UR AUTO: YELLOW
CREAT SERPL-MCNC: 1.6 MG/DL (ref 0.73–1.18)
CREAT/UREA NIT SERPL: 35
EOSINOPHIL # BLD AUTO: 0.08 X10(3)/MCL (ref 0–0.9)
EOSINOPHIL NFR BLD AUTO: 1 %
ERYTHROCYTE [DISTWIDTH] IN BLOOD BY AUTOMATED COUNT: 14 % (ref 11.5–17)
GFR SERPLBLD CREATININE-BSD FMLA CKD-EPI: 44 ML/MIN/1.73/M2
GLOBULIN SER-MCNC: 3.4 GM/DL (ref 2.4–3.5)
GLUCOSE SERPL-MCNC: 105 MG/DL (ref 82–115)
GLUCOSE UR QL STRIP: NORMAL
HCT VFR BLD AUTO: 42.4 % (ref 42–52)
HGB BLD-MCNC: 13.8 G/DL (ref 14–18)
HGB UR QL STRIP: ABNORMAL
IMM GRANULOCYTES # BLD AUTO: 0.05 X10(3)/MCL (ref 0–0.04)
IMM GRANULOCYTES NFR BLD AUTO: 0.7 %
KETONES UR QL STRIP: ABNORMAL
LEUKOCYTE ESTERASE UR QL STRIP: 500
LYMPHOCYTES # BLD AUTO: 0.55 X10(3)/MCL (ref 0.6–4.6)
LYMPHOCYTES NFR BLD AUTO: 7.2 %
MCH RBC QN AUTO: 29.6 PG (ref 27–31)
MCHC RBC AUTO-ENTMCNC: 32.5 G/DL (ref 33–36)
MCV RBC AUTO: 91 FL (ref 80–94)
MONOCYTES # BLD AUTO: 0.47 X10(3)/MCL (ref 0.1–1.3)
MONOCYTES NFR BLD AUTO: 6.1 %
NEUTROPHILS # BLD AUTO: 6.49 X10(3)/MCL (ref 2.1–9.2)
NEUTROPHILS NFR BLD AUTO: 84.3 %
NITRITE UR QL STRIP: ABNORMAL
NRBC BLD AUTO-RTO: 0 %
PH UR STRIP: 5.5 [PH]
PLATELET # BLD AUTO: 107 X10(3)/MCL (ref 130–400)
PLATELETS.RETICULATED NFR BLD AUTO: 5.6 % (ref 0.9–11.2)
PMV BLD AUTO: 10.9 FL (ref 7.4–10.4)
POTASSIUM SERPL-SCNC: 4.4 MMOL/L (ref 3.5–5.1)
PROT SERPL-MCNC: 6.7 GM/DL (ref 5.8–7.6)
PROT UR QL STRIP: ABNORMAL
RBC # BLD AUTO: 4.66 X10(6)/MCL (ref 4.7–6.1)
RBC #/AREA URNS AUTO: ABNORMAL /HPF
SODIUM SERPL-SCNC: 138 MMOL/L (ref 136–145)
SP GR UR STRIP.AUTO: 1.01 (ref 1–1.03)
SQUAMOUS #/AREA URNS LPF: ABNORMAL /HPF
TROPONIN I SERPL-MCNC: 0.05 NG/ML (ref 0–0.04)
UROBILINOGEN UR STRIP-ACNC: NORMAL
WBC # BLD AUTO: 7.69 X10(3)/MCL (ref 4.5–11.5)
WBC #/AREA URNS AUTO: >100 /HPF

## 2024-09-14 PROCEDURE — 81001 URINALYSIS AUTO W/SCOPE: CPT | Performed by: NURSE PRACTITIONER

## 2024-09-14 PROCEDURE — 63600175 PHARM REV CODE 636 W HCPCS: Performed by: NURSE PRACTITIONER

## 2024-09-14 PROCEDURE — 96365 THER/PROPH/DIAG IV INF INIT: CPT

## 2024-09-14 PROCEDURE — 80053 COMPREHEN METABOLIC PANEL: CPT | Performed by: NURSE PRACTITIONER

## 2024-09-14 PROCEDURE — 93010 ELECTROCARDIOGRAM REPORT: CPT | Mod: ,,, | Performed by: INTERNAL MEDICINE

## 2024-09-14 PROCEDURE — 25000003 PHARM REV CODE 250: Performed by: NURSE PRACTITIONER

## 2024-09-14 PROCEDURE — 96361 HYDRATE IV INFUSION ADD-ON: CPT

## 2024-09-14 PROCEDURE — 93005 ELECTROCARDIOGRAM TRACING: CPT

## 2024-09-14 PROCEDURE — 85025 COMPLETE CBC W/AUTO DIFF WBC: CPT | Performed by: NURSE PRACTITIONER

## 2024-09-14 PROCEDURE — 25000003 PHARM REV CODE 250: Performed by: PHYSICIAN ASSISTANT

## 2024-09-14 PROCEDURE — 99285 EMERGENCY DEPT VISIT HI MDM: CPT | Mod: 25

## 2024-09-14 PROCEDURE — 87186 SC STD MICRODIL/AGAR DIL: CPT | Performed by: NURSE PRACTITIONER

## 2024-09-14 PROCEDURE — 87086 URINE CULTURE/COLONY COUNT: CPT | Performed by: NURSE PRACTITIONER

## 2024-09-14 PROCEDURE — 82550 ASSAY OF CK (CPK): CPT | Performed by: NURSE PRACTITIONER

## 2024-09-14 PROCEDURE — 84484 ASSAY OF TROPONIN QUANT: CPT | Performed by: NURSE PRACTITIONER

## 2024-09-14 RX ORDER — SODIUM CHLORIDE 9 MG/ML
1000 INJECTION, SOLUTION INTRAVENOUS
Status: COMPLETED | OUTPATIENT
Start: 2024-09-14 | End: 2024-09-14

## 2024-09-14 RX ADMIN — CEFTRIAXONE SODIUM 1 G: 1 INJECTION, POWDER, FOR SOLUTION INTRAMUSCULAR; INTRAVENOUS at 09:09

## 2024-09-14 RX ADMIN — SODIUM CHLORIDE 1000 ML: 9 INJECTION, SOLUTION INTRAVENOUS at 06:09

## 2024-09-14 NOTE — FIRST PROVIDER EVALUATION
"Medical screening examination initiated.  I have conducted a focused provider triage encounter, findings are as follows:    Brief history of present illness:  79y/o M presents to the ED with mid back pain. EMS found on the floor unsure how long he was there.  +  blood thinners     Vitals:    09/14/24 1615   BP: 139/86   Pulse: 77   Resp: 18   Temp: 99.3 °F (37.4 °C)   TempSrc: Oral   SpO2: 100%   Weight: 68 kg (150 lb)   Height: 5' 8" (1.727 m)       Pertinent physical exam:  Awake  hard of hearing     Brief workup plan:  Labs/Imaging     Preliminary workup initiated; this workup will be continued and followed by the physician or advanced practice provider that is assigned to the patient when roomed.  "

## 2024-09-15 PROBLEM — N39.0 URINARY TRACT INFECTION WITHOUT HEMATURIA: Status: ACTIVE | Noted: 2024-09-15

## 2024-09-15 LAB
ALBUMIN SERPL-MCNC: 2.5 G/DL (ref 3.4–4.8)
ALBUMIN/GLOB SERPL: 0.9 RATIO (ref 1.1–2)
ALP SERPL-CCNC: 58 UNIT/L (ref 40–150)
ALT SERPL-CCNC: 22 UNIT/L (ref 0–55)
ANION GAP SERPL CALC-SCNC: 9 MEQ/L
AST SERPL-CCNC: 26 UNIT/L (ref 5–34)
BASOPHILS # BLD AUTO: 0.04 X10(3)/MCL
BASOPHILS NFR BLD AUTO: 0.6 %
BILIRUB SERPL-MCNC: 2 MG/DL
BUN SERPL-MCNC: 40.6 MG/DL (ref 8.4–25.7)
CALCIUM SERPL-MCNC: 8.1 MG/DL (ref 8.8–10)
CHLORIDE SERPL-SCNC: 112 MMOL/L (ref 98–107)
CO2 SERPL-SCNC: 20 MMOL/L (ref 23–31)
CREAT SERPL-MCNC: 1.08 MG/DL (ref 0.73–1.18)
CREAT/UREA NIT SERPL: 38
EOSINOPHIL # BLD AUTO: 0 X10(3)/MCL (ref 0–0.9)
EOSINOPHIL NFR BLD AUTO: 0 %
ERYTHROCYTE [DISTWIDTH] IN BLOOD BY AUTOMATED COUNT: 14.2 % (ref 11.5–17)
GFR SERPLBLD CREATININE-BSD FMLA CKD-EPI: >60 ML/MIN/1.73/M2
GLOBULIN SER-MCNC: 2.7 GM/DL (ref 2.4–3.5)
GLUCOSE SERPL-MCNC: 94 MG/DL (ref 82–115)
HCT VFR BLD AUTO: 35.5 % (ref 42–52)
HGB BLD-MCNC: 11.7 G/DL (ref 14–18)
IMM GRANULOCYTES # BLD AUTO: 0.04 X10(3)/MCL (ref 0–0.04)
IMM GRANULOCYTES NFR BLD AUTO: 0.6 %
LACTATE SERPL-SCNC: 1.6 MMOL/L (ref 0.5–2.2)
LYMPHOCYTES # BLD AUTO: 0.81 X10(3)/MCL (ref 0.6–4.6)
LYMPHOCYTES NFR BLD AUTO: 11.5 %
MCH RBC QN AUTO: 30.1 PG (ref 27–31)
MCHC RBC AUTO-ENTMCNC: 33 G/DL (ref 33–36)
MCV RBC AUTO: 91.3 FL (ref 80–94)
MONOCYTES # BLD AUTO: 0.71 X10(3)/MCL (ref 0.1–1.3)
MONOCYTES NFR BLD AUTO: 10 %
NEUTROPHILS # BLD AUTO: 5.47 X10(3)/MCL (ref 2.1–9.2)
NEUTROPHILS NFR BLD AUTO: 77.3 %
NRBC BLD AUTO-RTO: 0 %
PLATELET # BLD AUTO: 85 X10(3)/MCL (ref 130–400)
PMV BLD AUTO: 11 FL (ref 7.4–10.4)
POTASSIUM SERPL-SCNC: 3.4 MMOL/L (ref 3.5–5.1)
PROT SERPL-MCNC: 5.2 GM/DL (ref 5.8–7.6)
RBC # BLD AUTO: 3.89 X10(6)/MCL (ref 4.7–6.1)
SODIUM SERPL-SCNC: 141 MMOL/L (ref 136–145)
TROPONIN I SERPL-MCNC: 0.04 NG/ML (ref 0–0.04)
TROPONIN I SERPL-MCNC: 0.06 NG/ML (ref 0–0.04)
WBC # BLD AUTO: 7.07 X10(3)/MCL (ref 4.5–11.5)

## 2024-09-15 PROCEDURE — 25000003 PHARM REV CODE 250: Performed by: INTERNAL MEDICINE

## 2024-09-15 PROCEDURE — 21400001 HC TELEMETRY ROOM

## 2024-09-15 PROCEDURE — 36415 COLL VENOUS BLD VENIPUNCTURE: CPT | Performed by: INTERNAL MEDICINE

## 2024-09-15 PROCEDURE — 83605 ASSAY OF LACTIC ACID: CPT | Performed by: INTERNAL MEDICINE

## 2024-09-15 PROCEDURE — 63600175 PHARM REV CODE 636 W HCPCS: Performed by: INTERNAL MEDICINE

## 2024-09-15 PROCEDURE — 84484 ASSAY OF TROPONIN QUANT: CPT | Performed by: NURSE PRACTITIONER

## 2024-09-15 PROCEDURE — 85025 COMPLETE CBC W/AUTO DIFF WBC: CPT | Performed by: INTERNAL MEDICINE

## 2024-09-15 PROCEDURE — 25000003 PHARM REV CODE 250: Performed by: NURSE PRACTITIONER

## 2024-09-15 PROCEDURE — 80053 COMPREHEN METABOLIC PANEL: CPT | Performed by: INTERNAL MEDICINE

## 2024-09-15 RX ORDER — SODIUM CHLORIDE 9 MG/ML
INJECTION, SOLUTION INTRAVENOUS CONTINUOUS
Status: DISCONTINUED | OUTPATIENT
Start: 2024-09-15 | End: 2024-09-17

## 2024-09-15 RX ORDER — ENOXAPARIN SODIUM 100 MG/ML
30 INJECTION SUBCUTANEOUS EVERY 24 HOURS
Status: DISCONTINUED | OUTPATIENT
Start: 2024-09-15 | End: 2024-09-16

## 2024-09-15 RX ORDER — ONDANSETRON HYDROCHLORIDE 2 MG/ML
4 INJECTION, SOLUTION INTRAVENOUS EVERY 8 HOURS PRN
Status: DISCONTINUED | OUTPATIENT
Start: 2024-09-15 | End: 2024-09-17 | Stop reason: HOSPADM

## 2024-09-15 RX ORDER — ACETAMINOPHEN 325 MG/1
650 TABLET ORAL EVERY 8 HOURS PRN
Status: DISCONTINUED | OUTPATIENT
Start: 2024-09-15 | End: 2024-09-17 | Stop reason: HOSPADM

## 2024-09-15 RX ORDER — FOLIC ACID 1 MG/1
1000 TABLET ORAL DAILY
Status: DISCONTINUED | OUTPATIENT
Start: 2024-09-15 | End: 2024-09-17 | Stop reason: HOSPADM

## 2024-09-15 RX ORDER — SODIUM CHLORIDE 0.9 % (FLUSH) 0.9 %
10 SYRINGE (ML) INJECTION
Status: DISCONTINUED | OUTPATIENT
Start: 2024-09-15 | End: 2024-09-17 | Stop reason: HOSPADM

## 2024-09-15 RX ORDER — ATORVASTATIN CALCIUM 10 MG/1
20 TABLET, FILM COATED ORAL DAILY
Status: DISCONTINUED | OUTPATIENT
Start: 2024-09-15 | End: 2024-09-17 | Stop reason: HOSPADM

## 2024-09-15 RX ORDER — ASPIRIN 325 MG
325 TABLET ORAL
Status: COMPLETED | OUTPATIENT
Start: 2024-09-15 | End: 2024-09-15

## 2024-09-15 RX ORDER — CARVEDILOL 3.12 MG/1
3.12 TABLET ORAL 2 TIMES DAILY
Status: DISCONTINUED | OUTPATIENT
Start: 2024-09-15 | End: 2024-09-17 | Stop reason: HOSPADM

## 2024-09-15 RX ORDER — ASPIRIN 325 MG
325 TABLET, DELAYED RELEASE (ENTERIC COATED) ORAL DAILY
Status: DISCONTINUED | OUTPATIENT
Start: 2024-09-15 | End: 2024-09-15

## 2024-09-15 RX ORDER — TALC
6 POWDER (GRAM) TOPICAL NIGHTLY PRN
Status: DISCONTINUED | OUTPATIENT
Start: 2024-09-15 | End: 2024-09-17 | Stop reason: HOSPADM

## 2024-09-15 RX ADMIN — FOLIC ACID 1000 MCG: 1 TABLET ORAL at 09:09

## 2024-09-15 RX ADMIN — ASPIRIN 325 MG ORAL TABLET 325 MG: 325 PILL ORAL at 01:09

## 2024-09-15 RX ADMIN — ATORVASTATIN CALCIUM 20 MG: 10 TABLET, FILM COATED ORAL at 09:09

## 2024-09-15 RX ADMIN — ACETAMINOPHEN 650 MG: 325 TABLET, FILM COATED ORAL at 09:09

## 2024-09-15 RX ADMIN — SODIUM CHLORIDE: 9 INJECTION, SOLUTION INTRAVENOUS at 10:09

## 2024-09-15 RX ADMIN — ENOXAPARIN SODIUM 30 MG: 30 INJECTION SUBCUTANEOUS at 05:09

## 2024-09-15 RX ADMIN — SODIUM CHLORIDE: 9 INJECTION, SOLUTION INTRAVENOUS at 03:09

## 2024-09-15 RX ADMIN — CEFTRIAXONE SODIUM 1 G: 1 INJECTION, POWDER, FOR SOLUTION INTRAMUSCULAR; INTRAVENOUS at 09:09

## 2024-09-15 RX ADMIN — CARVEDILOL 3.12 MG: 3.12 TABLET, FILM COATED ORAL at 09:09

## 2024-09-15 NOTE — CONSULTS
Inpatient consult to Cardiology  Consult performed by: Shruti Medley FNP  Consult ordered by: Bearb, Ashley L, FNP [facility]    Cardiology  Consult Note    Patient Name: Madan Elder  MRN: [id number]  Admission Date: 9/14/2024  Hospital Length of Stay: 0 days  Code Status: Full Code   Attending Provider: Roshan Waller MD   Consulting Provider: PETER Mar  Primary Care Physician: Ricky Newsome MD  Principal Problem:Urinary tract infection without hematuria    Patient information was obtained from patient, past medical records, and ER records.     Subjective:     Chief Complaint/Reason for Consult: elevated troponin    HPI:   Mr. Elder is a 79y/o male, followed by Dr. Newsome, who presented to ED for evaluation post fall while attempting to transfer from bed to wheelchair. He was unable to get up and laid on the floor until caretaker arrived. Denies syncope/hitting head.  Workup revealed H&H 13.8/42.4, BUN creatinine 55/1.60,  , troponin 0.050-0.056-0.044. EKG sinus rhythm with first-degree AV block and incomplete left bundle branch block. UTI per UA. UrCX + GNR. He has been started on IVF/ABX and admitted to hospital medicine. 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 ,mother PPM  Social History: never smoker, hx alcohol abuse     Previous Cardiac Diagnostics:   TTE 04.05.23:  There is no evidence of intracardiac shunting.  The left ventricle is mildly enlarged with moderately decreased systolic function.  The estimated ejection fraction is 35%.  Left ventricular diastolic dysfunction.  Mild aortic regurgitation.  Mild tricuspid regurgitation.  Normal central venous pressure (3 mmHg).  The estimated PA systolic pressure is  29 mmHg.  Normal right ventricular size with mildly reduced right ventricular systolic function.  Mild mitral regurgitation.  Mild right atrial enlargement.     CUS 04.05.23:  The right internal carotid artery demonstrated less than 50% stenosis.  The left internal carotid artery demonstrated less than 50% stenosis.  Bilateral vertebral arteries were patent with antegrade flow.     TTE 10.04.2022:  LV is mildly enlarged. Global LVSR is moderately decreased. LVEF 30%. LV diastolic function is impaired (Grade I) with normal LA pressure. Moderate MR. Mild to moderate TR. Mild AR. Mild AS noted with adequate cuspal excursion. PASP 28mmHg. Intracardiac device wire is visualized in right heart.       CUS 10.04.2022:  1-39% stenosis mid LICA  1-39% stenosis pRICA  Antegrade flow to bilateral vertebral arteries     [facility] 06.20.2017:  LM: large vessel without evidence of obstructive CAD.   LAD: 60% distal stenosis  Lcx: 60%  distal stenosis  RCA: large and dominant with 60% narrowing of PDA  Mean RA pressure 3mmHG., RV pressure 55/3mmHg, PAP 55/20mmHg. These values represent the presence of moderate pulmonary HTN. PCWP 20-24mmHg which was moderately to severely elevated. No significant gradient across aortic valve. LVEDP 20mmHg. CO 2.3L/min with CI 1.4L/min         Past Medical History:   Diagnosis Date    Hypertension     Stroke      Past Surgical History:   Procedure Laterality Date    INSERTION OF PACEMAKER       Review of patient's allergies indicates:  No Known Allergies  No current facility-administered medications on file prior to encounter.     Current Outpatient Medications on File Prior to Encounter   Medication Sig    aspirin (ECOTRIN) 325 MG EC tablet Take 1 tablet (325 mg total) by mouth once daily.    atorvastatin (LIPITOR) 20 MG tablet Take 1 tablet (20 mg total) by mouth once daily.    carvediloL (COREG) 3.125 MG tablet Take 1 tablet (3.125 mg total) by mouth 2 (two) times daily.    folic acid (FOLVITE) 1 MG  tablet Take 1,000 mcg by mouth once daily.    furosemide (LASIX) 20 MG tablet Take 20 mg by mouth once daily.    lisinopriL (PRINIVIL,ZESTRIL) 5 MG tablet Take 5 mg by mouth once daily.    VITAMIN B-1 100 MG tablet Take 100 mg by mouth once daily.     Family History    None       Tobacco Use    Smoking status: Never    Smokeless tobacco: Never   Substance and Sexual Activity    Alcohol use: Yes    Drug use: Never    Sexual activity: Not on file       Review of Systems   Constitutional: Negative.    Respiratory: Negative.     Cardiovascular: Negative.    Gastrointestinal: Negative.    Musculoskeletal:         Left sided weakness   Skin: Negative.    Neurological:  Positive for weakness.   Psychiatric/Behavioral: Negative.         Objective:     Vital Signs (Most Recent):  Temp: 98.4 °F (36.9 °C) (09/15/24 0139)  Pulse: 64 (09/15/24 0159)  Resp: (!) 28 (09/15/24 0139)  BP: (!) 143/72 (09/15/24 0139)  SpO2: (!) 92 % (09/15/24 0159) Vital Signs (24h Range):  Temp:  [98.4 °F (36.9 °C)-99.3 °F (37.4 °C)] 98.4 °F (36.9 °C)  Pulse:  [62-81] 64  Resp:  [14-28] 28  SpO2:  [87 %-100 %] 92 %  BP: (107-143)/(50-86) 143/72   Weight: 63.4 kg (139 lb 12.4 oz)  Body mass index is 21.25 kg/m².  SpO2: (!) 92 %       Intake/Output Summary (Last 24 hours) at 9/15/2024 0737  Last data filed at 9/14/2024 2208  Gross per 24 hour   Intake 2100 ml   Output --   Net 2100 ml     Lines/Drains/Airways       Peripheral Intravenous Line  Duration                  Peripheral IV - Single Lumen 09/14/24 1829 20 G Anterior;Proximal;Right Forearm <1 day                  Significant Labs:   Chemistries:   Recent Labs   Lab 09/14/24  1715 09/14/24  2140 09/15/24  0022 09/15/24  0510     --   --  141   K 4.4  --   --  3.4*     --   --  112*   CO2 24  --   --  20*   BUN 55.4*  --   --  40.6*   CREATININE 1.60*  --   --  1.08   CALCIUM 9.3  --   --  8.1*   BILITOT 3.1*  --   --  2.0*   ALKPHOS 81  --   --  58   ALT 25  --   --  22   AST 28  --    "--  26   GLUCOSE 105  --   --  94   TROPONINI  --  0.050* 0.056* 0.044        CBC/Anemia Labs: Coags:    Recent Labs   Lab 09/14/24  1715 09/15/24  0510   WBC 7.69 7.07   HGB 13.8* 11.7*   HCT 42.4 35.5*   * 85*   MCV 91.0 91.3   RDW 14.0 14.2    No results for input(s): "PT", "INR", "APTT" in the last 168 hours.     Significant Imaging:  Imaging Results              CT Head Without Contrast (Final result)  Result time 09/14/24 18:10:42      Final result by Frederick Rodriguez MD (09/14/24 18:10:42)                   Impression:      No acute intracranial findings identified.      Electronically signed by: Frederick Rodriguez  Date:    09/14/2024  Time:    18:10               Narrative:    EXAMINATION:  CT HEAD WITHOUT CONTRAST    CLINICAL HISTORY:  Head trauma, minor (Age >= 65y);    TECHNIQUE:  Sequential axial images were performed of the brain without contrast.    Dose product length of 983 mGycm. Automated exposure control was utilized to minimize radiation dose.    COMPARISON:  September 14, 2024..    FINDINGS:  Right occipital approach ventriculostomy catheter minimally crosses the midline and is in similar location compared to the prior exam.  The ventricles are nondilated.  There is prominent chronic microvascular ischemia and atrophy.  No acute intracranial hemorrhage, hydrocephalus, midline shift or mass effect.  No acute large vessel territory infarct identified.    There is no acute depressed skull fracture.  Bilateral prominent loss of pneumatization of the mastoid air cells with opacification and fluid and operative changes with about similar appearance.                                       CT Thoracic Spine Without Contrast (Final result)  Result time 09/14/24 18:16:57      Final result by Frederick Rodriguez MD (09/14/24 18:16:57)                   Impression:      No acute fracture or malalignment identified.      Electronically signed by: Frederick Rodriguez  Date:    09/14/2024  Time:    18:16               " Narrative:    EXAMINATION:  CT THORACIC SPINE WITHOUT CONTRAST    CLINICAL HISTORY:  Back trauma, no prior imaging (Age >= 16y);    TECHNIQUE:  Multidetector axial images were performed of the thoracic spine without administration of contrast images were reconstructed.    Dose length product was 129 mGycm. Automated radiation control was utilized to minimize radiation dose.    COMPARISON:  None available    FINDINGS:  There is mild thoracic kyphoscoliosis.  Anterior bridging osteophytes.  Thoracic vertebrae stature preserved and alignment is unremarkable.  No acute fracture or malalignment identified.  Hemangiomas involve T3 and T5 vertebral bodies.  Demineralization of bones.  No apparent paraspinal soft inflammations.                                       CT Cervical Spine Without Contrast (Final result)  Result time 09/14/24 18:07:34      Final result by Frederick Rodriguez MD (09/14/24 18:07:34)                   Impression:      No acute fracture or malalignment identified.      Electronically signed by: Frederick Rodriguez  Date:    09/14/2024  Time:    18:07               Narrative:    EXAMINATION:  CT CERVICAL SPINE WITHOUT CONTRAST    CLINICAL HISTORY:  Trauma.    TECHNIQUE:  Multidetector axial images were performed of the cervical spine without and.  Images were reconstructed.    Automated exposure control was utilized to minimize radiation dose.  DLP 4 6.    COMPARISON:  None available.    FINDINGS:  Cervical vertebrae stature is maintained and alignment is unremarkable.  No acute fracture or malalignment identified.  The anterior longer ligament prominent ossifications from C3 through T1.  There are degenerative changes which cause moderate narrowing of the left neural foramen at C3-C4, mild narrowing of the left neural foramen at C4-C5 moderate narrowing of left neural foramen at C5-C6 and mild narrowing of left neural foramen at C6-C7..  There is no prevertebral soft tissue prominence.    This study does not  exclude the possibility of intrathecal soft tissue, ligamentous or vascular injury.                                    EKG:         Physical Exam  Cardiovascular:      Rate and Rhythm: Normal rate and regular rhythm.      Pulses: Normal pulses.      Heart sounds: Normal heart sounds.   Pulmonary:      Effort: Pulmonary effort is normal.      Breath sounds: Normal breath sounds.   Abdominal:      Palpations: Abdomen is soft.   Musculoskeletal:      Comments: Left sided weakness   Skin:     General: Skin is warm.   Neurological:      Mental Status: He is alert.   Psychiatric:         Mood and Affect: Mood normal.       Home Medications:   No current facility-administered medications on file prior to encounter.     Current Outpatient Medications on File Prior to Encounter   Medication Sig Dispense Refill    aspirin (ECOTRIN) 325 MG EC tablet Take 1 tablet (325 mg total) by mouth once daily. 30 tablet 11    atorvastatin (LIPITOR) 20 MG tablet Take 1 tablet (20 mg total) by mouth once daily. 90 tablet 3    carvediloL (COREG) 3.125 MG tablet Take 1 tablet (3.125 mg total) by mouth 2 (two) times daily. 60 tablet 11    folic acid (FOLVITE) 1 MG tablet Take 1,000 mcg by mouth once daily.      furosemide (LASIX) 20 MG tablet Take 20 mg by mouth once daily.      lisinopriL (PRINIVIL,ZESTRIL) 5 MG tablet Take 5 mg by mouth once daily.      VITAMIN B-1 100 MG tablet Take 100 mg by mouth once daily.       Current Schedule Inpatient Medications:   aspirin  325 mg Oral Daily    atorvastatin  20 mg Oral Daily    carvediloL  3.125 mg Oral BID    cefTRIAXone (Rocephin) IV (PEDS and ADULTS)  1 g Intravenous Q24H    enoxparin  30 mg Subcutaneous Daily    folic acid  1,000 mcg Oral Daily     Continuous Infusions:   0.9% NaCl   Intravenous Continuous 125 mL/hr at 09/15/24 0315 New Bag at 09/15/24 0315     IDewayne MD,performed the substantive portion of this visit. I had a face-to-face time with the patient on 9/15/24. I reviewed and  agree with the nurse practitioner's history, physical exam.     Medical decision making:        Assessment:   NSTEMI, likely Type II due to fall/dehydration   -- troponin peak 0.056  WIN/dehydration  --resolving with IVFs  UTI  --UrCX + GNR  HTN  HLD  Alcoholic CMO  --[employer] AICD  Native CAD  --[facility] 6.2017: LM: no obstructive CAD. LAD: 60% distal stenosis. Lcx: 60%  distal stenosis. RCA: large/dominant; 60% narrowing of PDA  Persistent Afib  --CHADS VASC score 6  --On Eliquis outpatient  Thrombocytopenia   --plt 85 down from 107  Hx Alcohol abuse  Hx CVA right posterior frontal and left anterior temporal regions with residual left arm weakness/paresthesias    Plan:  ABX per primary  Continue statin, and BB  DC ASA due to thrombocytopenia  Monitor thrombocytopenia  Resume Eliquis 5mg bid  Please call with any further questions    Thank you for your consult.     Shruti Medley, PETER  Cardiology  [facility]  09/15/2024

## 2024-09-15 NOTE — NURSING
Nurses Note -- 4 Eyes      9/15/2024   2:29 PM      Skin assessed during: Q Shift Change      [x] No Altered Skin Integrity Present    [x]Prevention Measures Documented      [] Yes- Altered Skin Integrity Present or Discovered   [] LDA Added if Not in Epic (Describe Wound)   [] New Altered Skin Integrity was Present on Admit and Documented in LDA   [] Wound Image Taken    Wound Care Consulted? No    Attending Nurse:  Mary Martinez RN    Second RN/Staff Member:   Joleen Rapp RN

## 2024-09-15 NOTE — AI DETERIORATION ALERT
Artificial Intelligence Notification  [facility]  [address] [place] [address]  Phone: [phone]    This documentation was triggered by an Artificial Intelligence Notification:    Admit Date: 2024   LOS: 0  Code Status: Full Code  : 1945  Age: 78 y.o.  Weight:   Wt Readings from Last 1 Encounters:   24 68 kg (150 lb)        Sex: male  Bed: [facility]  MRN: [id number]  Attending Physician: Roshan Waller MD     Date of Alert: 09/15/2024  Time AI Alert Received: 0150            Vitals:    09/15/24 0139   BP: (!) 143/72   Pulse: (!) 149   Resp: (!) 28   Temp: 98.4 °F (36.9 °C)     SpO2: (!) 87 %      Artificial Intelligence alert discussed with Provider:     Name: NOEL Vaughn   Date/Time of Provider Notification: 0158      Patient Condition: Pt admitted this evening s/p fall and remained on ground for 12+ hours. Admitted for UTI, weakness, and dehydration. Radiology exams all negative for injury. Upon arrival to 8W a HR of 149 and O2 of 87 was charted. Vitals rechecked and WNL. Instructed to call with any decline. No ICU intervention at this time.

## 2024-09-15 NOTE — PLAN OF CARE
[facility] spoke to Charmaine Vargas (Daughter)  [phone] (Mobile)  who is requesting pt/ot in the patients home she doesn't want her dad in nursing home instead she would prefer the service be preformed at home    She is also requesting more hours for long-term care at home being her dad fell after the caregiver left the home    FOC is [facility] home health he had in the past  [facility] sent new referral to [facility]

## 2024-09-15 NOTE — ED PROVIDER NOTES
Encounter Date: 9/14/2024       History     Chief Complaint   Patient presents with    Back Pain     Fall a couple of days ago. Has been on the floor since. C/o back pain. Denies head trauma. +BT (eliquis).      Patient states that last night after his caretaker left he attempted to get out of bed and go to his wheelchair when he felt weak and fell to the floor.  Patient states that he remained on the floor until his caretaker arrived earlier today.  At that time EMS was called to bring patient to the ED.  Denies hitting his head or any LOC.  Patient states upper back pain.  Denies any numbness or tingling to extremities.  Patient denies any loss of bladder or bowel or any saddle anesthesia.  Patient states generalized weakness.  Denies any chest pain, shortness of breath, fever, nausea, vomiting, or dizziness.  Patient is very hard of hearing and does not have his hearing aids because he states they do not work.  History of hypertension, stroke, hyperlipidemia.    The history is provided by the patient.   Fall  Illness onset: Last night. The fall occurred while walking. Distance fallen: Ground level. There was no blood loss. The pain is at a severity of 4/10. Associated symptoms include back pain. Pertinent negatives include no fever.     Review of patient's allergies indicates:  No Known Allergies  Past Medical History:   Diagnosis Date    Hypertension     Stroke      Past Surgical History:   Procedure Laterality Date    INSERTION OF PACEMAKER       No family history on file.  Social History     Tobacco Use    Smoking status: Never    Smokeless tobacco: Never   Substance Use Topics    Alcohol use: Yes    Drug use: Never     Review of Systems   Constitutional: Negative.  Negative for fever.   HENT: Negative.     Eyes: Negative.    Respiratory: Negative.     Cardiovascular: Negative.  Negative for chest pain.   Gastrointestinal: Negative.    Endocrine: Negative.    Genitourinary: Negative.    Musculoskeletal:   Positive for back pain.   Skin: Negative.    Allergic/Immunologic: Negative.    Neurological:  Positive for weakness. Negative for dizziness.   Hematological: Negative.    Psychiatric/Behavioral: Negative.     All other systems reviewed and are negative.      Physical Exam     Initial Vitals [09/14/24 1615]   BP Pulse Resp Temp SpO2   139/86 77 18 99.3 °F (37.4 °C) 100 %      MAP       --         Physical Exam    Nursing note and vitals reviewed.  Constitutional: He appears well-developed and well-nourished. No distress.   HENT:   Head: Normocephalic and atraumatic.   Mouth/Throat: Oropharynx is clear and moist.   Eyes: Conjunctivae and EOM are normal. Pupils are equal, round, and reactive to light.   Neck: Neck supple.   Normal range of motion.  Cardiovascular:  Normal rate, regular rhythm, normal heart sounds and intact distal pulses.           Pulmonary/Chest: Breath sounds normal. No respiratory distress.   Abdominal: Abdomen is soft. Bowel sounds are normal. He exhibits no distension. There is no abdominal tenderness.   Musculoskeletal:         General: No edema. Normal range of motion.      Cervical back: Normal, normal range of motion and neck supple. No tenderness or bony tenderness. No pain with movement. Normal range of motion.      Thoracic back: Tenderness (Mild paravertebral tenderness to palpation.) present. No bony tenderness. Normal range of motion.      Lumbar back: Normal. No tenderness or bony tenderness. Normal range of motion.     Neurological: He is alert and oriented to person, place, and time. He has normal strength. GCS score is 15. GCS eye subscore is 4. GCS verbal subscore is 5. GCS motor subscore is 6.   Skin: Skin is warm and dry. No rash noted.   Psychiatric: He has a normal mood and affect. Thought content normal.         ED Course   Procedures  Labs Reviewed   COMPREHENSIVE METABOLIC PANEL - Abnormal       Result Value    Sodium 138      Potassium 4.4      Chloride 105      CO2 24       Glucose 105      Blood Urea Nitrogen 55.4 (*)     Creatinine 1.60 (*)     Calcium 9.3      Protein Total 6.7      Albumin 3.3 (*)     Globulin 3.4      Albumin/Globulin Ratio 1.0 (*)     Bilirubin Total 3.1 (*)     ALP 81      ALT 25      AST 28      eGFR 44      Anion Gap 9.0      BUN/Creatinine Ratio 35     CK - Abnormal    Creatine Kinase 368 (*)    URINALYSIS, REFLEX TO URINE CULTURE - Abnormal    Color, UA Yellow      Appearance, UA Turbid (*)     Specific Gravity, UA 1.015      pH, UA 5.5      Protein, UA 1+ (*)     Glucose, UA Normal      Ketones, UA 1+ (*)     Blood, UA 1+ (*)     Bilirubin, UA Negative      Urobilinogen, UA Normal      Nitrites, UA 1+ (*)     Leukocyte Esterase,  (*)     RBC, UA 0-5      WBC, UA >100 (*)     Bacteria, UA Occasional (*)     Squamous Epithelial Cells, UA None Seen     CBC WITH DIFFERENTIAL - Abnormal    WBC 7.69      RBC 4.66 (*)     Hgb 13.8 (*)     Hct 42.4      MCV 91.0      MCH 29.6      MCHC 32.5 (*)     RDW 14.0      Platelet 107 (*)     MPV 10.9 (*)     Neut % 84.3      Lymph % 7.2      Mono % 6.1      Eos % 1.0      Basophil % 0.7      Lymph # 0.55 (*)     Neut # 6.49      Mono # 0.47      Eos # 0.08      Baso # 0.05      IG# 0.05 (*)     IG% 0.7      NRBC% 0.0      IPF 5.6     TROPONIN I - Abnormal    Troponin-I 0.050 (*)    TROPONIN I - Abnormal    Troponin-I 0.056 (*)    LACTIC ACID, PLASMA - Normal    Lactic Acid Level 1.6     CULTURE, URINE   CBC W/ AUTO DIFFERENTIAL    Narrative:     The following orders were created for panel order CBC Auto Differential.  Procedure                               Abnormality         Status                     ---------                               -----------         ------                     CBC with Differential[[id number]]       Abnormal            Final result                 Please view results for these tests on the individual orders.   TROPONIN I     EKG Readings: (Independently Interpreted)   Initial Reading:  No STEMI. Rhythm: Sinus Arrhythmia. Heart Rate: 74. Conduction: 1st Degree AV Block. ST Segments: Normal ST Segments. T Waves: Normal. Other Findings: Prolonged QT Interval. Clinical Impression: Sinus Arrhythmia with PVCs       Imaging Results              CT Head Without Contrast (Final result)  Result time 09/14/24 18:10:42      Final result by Frederick Rodriguez MD (09/14/24 18:10:42)                   Impression:      No acute intracranial findings identified.      Electronically signed by: Frederick Rodriguez  Date:    09/14/2024  Time:    18:10               Narrative:    EXAMINATION:  CT HEAD WITHOUT CONTRAST    CLINICAL HISTORY:  Head trauma, minor (Age >= 65y);    TECHNIQUE:  Sequential axial images were performed of the brain without contrast.    Dose product length of 983 mGycm. Automated exposure control was utilized to minimize radiation dose.    COMPARISON:  September 14, 2024..    FINDINGS:  Right occipital approach ventriculostomy catheter minimally crosses the midline and is in similar location compared to the prior exam.  The ventricles are nondilated.  There is prominent chronic microvascular ischemia and atrophy.  No acute intracranial hemorrhage, hydrocephalus, midline shift or mass effect.  No acute large vessel territory infarct identified.    There is no acute depressed skull fracture.  Bilateral prominent loss of pneumatization of the mastoid air cells with opacification and fluid and operative changes with about similar appearance.                                       CT Thoracic Spine Without Contrast (Final result)  Result time 09/14/24 18:16:57      Final result by Frederick Rodriguez MD (09/14/24 18:16:57)                   Impression:      No acute fracture or malalignment identified.      Electronically signed by: Frederick Rodriguez  Date:    09/14/2024  Time:    18:16               Narrative:    EXAMINATION:  CT THORACIC SPINE WITHOUT CONTRAST    CLINICAL HISTORY:  Back trauma, no prior imaging (Age  >= 16y);    TECHNIQUE:  Multidetector axial images were performed of the thoracic spine without administration of contrast images were reconstructed.    Dose length product was 129 mGycm. Automated radiation control was utilized to minimize radiation dose.    COMPARISON:  None available    FINDINGS:  There is mild thoracic kyphoscoliosis.  Anterior bridging osteophytes.  Thoracic vertebrae stature preserved and alignment is unremarkable.  No acute fracture or malalignment identified.  Hemangiomas involve T3 and T5 vertebral bodies.  Demineralization of bones.  No apparent paraspinal soft inflammations.                                       CT Cervical Spine Without Contrast (Final result)  Result time 09/14/24 18:07:34      Final result by Frederick Rodriguez MD (09/14/24 18:07:34)                   Impression:      No acute fracture or malalignment identified.      Electronically signed by: Frederick Rodriguez  Date:    09/14/2024  Time:    18:07               Narrative:    EXAMINATION:  CT CERVICAL SPINE WITHOUT CONTRAST    CLINICAL HISTORY:  Trauma.    TECHNIQUE:  Multidetector axial images were performed of the cervical spine without and.  Images were reconstructed.    Automated exposure control was utilized to minimize radiation dose.  DLP 4 6.    COMPARISON:  None available.    FINDINGS:  Cervical vertebrae stature is maintained and alignment is unremarkable.  No acute fracture or malalignment identified.  The anterior longer ligament prominent ossifications from C3 through T1.  There are degenerative changes which cause moderate narrowing of the left neural foramen at C3-C4, mild narrowing of the left neural foramen at C4-C5 moderate narrowing of left neural foramen at C5-C6 and mild narrowing of left neural foramen at C6-C7..  There is no prevertebral soft tissue prominence.    This study does not exclude the possibility of intrathecal soft tissue, ligamentous or vascular injury.                                        Medications   aspirin tablet 325 mg (has no administration in time range)   0.9%  NaCl infusion (0 mLs Intravenous Stopped 9/14/24 2129)   0.9%  NaCl infusion (0 mLs Intravenous Stopped 9/14/24 2129)   cefTRIAXone (Rocephin) 1 g in D5W 100 mL IVPB (MB+) (0 g Intravenous Stopped 9/14/24 2208)     Medical Decision Making  Patient states that last night after his caretaker left he attempted to get out of bed and go to his wheelchair when he felt weak and fell to the floor.  Patient states that he remained on the floor until his caretaker arrived earlier today.  At that time EMS was called to bring patient to the ED.  Denies hitting his head or any LOC.  Patient states upper back pain.  Denies any numbness or tingling to extremities.  Patient denies any loss of bladder or bowel or any saddle anesthesia.  Patient states generalized weakness.  Denies any chest pain, shortness of breath, fever, nausea, vomiting, or dizziness.  Patient is very hard of hearing and does not have his hearing aids because he states they do not work.  History of hypertension, stroke, hyperlipidemia.    The history is provided by the patient.   Fall  Illness onset: Last night. The fall occurred while walking. Distance fallen: Ground level. There was no blood loss. The pain is at a severity of 4/10. Associated symptoms include back pain. Pertinent negatives include no fever.       Amount and/or Complexity of Data Reviewed  Labs: ordered. Decision-making details documented in ED Course.  Radiology: ordered. Decision-making details documented in ED Course.  ECG/medicine tests: ordered. Decision-making details documented in ED Course.  Discussion of management or test interpretation with external provider(s): Differential diagnosis (including but not limited to):   Judging by the patient's chief complaint and pertinent history, the patient has the following possible differential diagnoses, including but not limited to the following.  Some of these  are deemed to be lower likelihood and some more likely based on my physical exam and history combined with possible lab work and/or imaging studies.   Please see the pertinent studies, and refer to the HPI.  Some of these diagnoses will take further evaluation to fully rule out, perhaps as an outpatient and the patient was encouraged to follow up when discharged for more comprehensive evaluation.  Fall, dehydration, rhabdomyolysis, electrolyte abnormality, weakness, UTI      Risk  Decision regarding hospitalization.               ED Course as of 09/15/24 0121   Sat Sep 14, 2024   2250 Discussed patient with Dr. Tang and [facility], and mild dehydration. Patient states agreement with plan.  Patient's troponin level is mildly elevated.  Patient without any chest pain.  Mild elevation in troponin may be related to fall with prolonged period of time down on the floor.  Will repeat troponin levels.  And put an inpatient consult in to Cardiology. [AB]   2300 Hospitalist paged.  [AB]   Sun Sep 15, 2024   0000 Spoke to Dr. Waller. Ok to admit to service. [AB]      ED Course User Index  [AB] Nimisha Whyte FNP                           Clinical Impression:  Final diagnoses:  [R53.1] Weakness  [N39.0] Urinary tract infection without hematuria, site unspecified (Primary)  [W19.XXXA] Fall, initial encounter  [R79.89] Troponin level elevated          ED Disposition Condition    Admit Stable                Nimisha Whyte FNP  09/15/24 0121

## 2024-09-15 NOTE — ED NOTES
Charmaine Vargas, [phone], daughter. Per daughter, pt has hearing aids at home, but they are not working.

## 2024-09-15 NOTE — NURSING
Nurses Note -- 4 Eyes      9/15/2024   3:07 AM      Skin assessed during: Q Shift Change      [x] No Altered Skin Integrity Present    []Prevention Measures Documented      [] Yes- Altered Skin Integrity Present or Discovered   [] LDA Added if Not in Epic (Describe Wound)   [] New Altered Skin Integrity was Present on Admit and Documented in LDA   [] Wound Image Taken    Wound Care Consulted? No    Attending Nurse:  Joleen Rapp RN    Second RN/Staff Member:  Negro Borja RN

## 2024-09-15 NOTE — PLAN OF CARE
Problem: Skin Injury Risk Increased  Goal: Skin Health and Integrity  Outcome: Progressing  Intervention: Optimize Skin Protection  Flowsheets (Taken 9/15/2024 0247)  Pressure Reduction Techniques: frequent weight shift encouraged  Skin Protection: incontinence pads utilized  Activity Management:   Ambulated -L4   Rolling - L1  Head of Bed (HOB) Positioning: HOB at 30-45 degrees     Problem: Adult Inpatient Plan of Care  Goal: Plan of Care Review  Outcome: Progressing  Flowsheets (Taken 9/15/2024 0247)  Plan of Care Reviewed With: patient  Goal: Patient-Specific Goal (Individualized)  Outcome: Progressing  Flowsheets (Taken 9/15/2024 0247)  Individualized Care Needs: Monitor labs, manage pain.  Anxieties, Fears or Concerns: Denies.  Goal: Absence of Hospital-Acquired Illness or Injury  Outcome: Progressing  Intervention: Identify and Manage Fall Risk  Flowsheets (Taken 9/15/2024 0247)  Safety Promotion/Fall Prevention:   assistive device/personal item within reach   Fall Risk signage in place   Fall Risk reviewed with patient/family   medications reviewed   nonskid shoes/socks when out of bed   bed alarm set  Intervention: Prevent Skin Injury  Flowsheets (Taken 9/15/2024 0247)  Body Position:   position changed independently   supine  Skin Protection: incontinence pads utilized  Device Skin Pressure Protection: absorbent pad utilized/changed  Intervention: Prevent and Manage VTE (Venous Thromboembolism) Risk  Flowsheets (Taken 9/15/2024 0247)  VTE Prevention/Management:   bleeding risk assessed   ambulation promoted   ROM (passive) performed   dorsiflexion/plantar flexion performed  Intervention: Prevent Infection  Flowsheets (Taken 9/15/2024 0247)  Infection Prevention:   hand hygiene promoted   rest/sleep promoted   single patient room provided  Goal: Optimal Comfort and Wellbeing  Outcome: Progressing  Intervention: Monitor Pain and Promote Comfort  Flowsheets (Taken 9/15/2024 0247)  Pain Management  Interventions:   care clustered   pillow support provided  Intervention: Provide Person-Centered Care  Flowsheets (Taken 9/15/2024 7567)  Trust Relationship/Rapport:   care explained   questions answered   thoughts/feelings acknowledged   questions encouraged   choices provided   reassurance provided   emotional support provided   empathic listening provided  Goal: Readiness for Transition of Care  Outcome: Progressing

## 2024-09-15 NOTE — H&P
[facility] Hospital Medicine History & Physical Examination       Patient Name: Madan Elder  MRN: [id number]  Patient Class: IP- Inpatient   Admission Date: 9/14/2024  4:21 PM  Length of Stay: 0  Admitting Service: Hospital Medicine   Attending Physician: Roshan Waller MD   Primary Care Provider: Ricky Newsome MD  History source: EMR, patient and/or patient's family    CHIEF COMPLAINT   Fall    HISTORY OF PRESENT ILLNESS:   Patient is a 78-year-old male with a past medical history of HTN, HLD, CVA, systolic CHF, [place] brought into the ER after falling out of bed last night and remaining on the floor throughout the course of the night until his caretaker arrived.  Subsequently he was had back pain.    On arrival to the ER patient was afebrile and hemodynamically stable maintaining normal sats on room air.  Laboratory work showed acute kidney injury with a creatinine of 1.6 and a mildly elevated troponin as well as a mildly elevated CPK.  Urinalysis showed evidence of urinary tract infection.  CT head, C-spine and thoracic spine all showed no acute traumatic injuries or acute process.    PAST MEDICAL HISTORY:     Past Medical History:   Diagnosis Date    Hypertension     Stroke        PAST SURGICAL HISTORY:     Past Surgical History:   Procedure Laterality Date    INSERTION OF PACEMAKER         ALLERGIES:   Patient has no known allergies.    FAMILY HISTORY:   Reviewed and non-contributory     SOCIAL HISTORY:   Screening for Social Drivers for health: Patient screened for food insecurity, housing instability, transportation needs, utility   difficulties, and interpersonal safety (select all that apply as identified as concern)  []Housing or Food  []Transportation Needs  []Utility Difficulties  []Interpersonal safety  [x]None  Social History     Tobacco Use    Smoking status: Never    Smokeless tobacco: Never   Substance Use Topics    Alcohol use: Yes        HOME MEDICATIONS:  "    Prior to Admission medications    Medication Sig Start Date End Date Taking? Authorizing Provider   aspirin (ECOTRIN) 325 MG EC tablet Take 1 tablet (325 mg total) by mouth once daily. 4/10/23 4/9/24  Oumar Muse MD   atorvastatin (LIPITOR) 20 MG tablet Take 1 tablet (20 mg total) by mouth once daily. 4/10/23 4/9/24  Oumar Muse MD   carvediloL (COREG) 3.125 MG tablet Take 1 tablet (3.125 mg total) by mouth 2 (two) times daily. 4/10/23 4/9/24  Oumar Muse MD   folic acid (FOLVITE) 1 MG tablet Take 1,000 mcg by mouth once daily. 3/10/23   Provider, Historical   furosemide (LASIX) 20 MG tablet Take 20 mg by mouth once daily. 3/10/23   Provider, Historical   lisinopriL (PRINIVIL,ZESTRIL) 5 MG tablet Take 5 mg by mouth once daily. 3/10/23   Provider, Historical   VITAMIN B-1 100 MG tablet Take 100 mg by mouth once daily. 3/10/23   Provider, Historical       REVIEW OF SYSTEMS:   Except as documented, all other systems reviewed and negative     PHYSICAL EXAM:   T 98.4 °F (36.9 °C)   BP (!) 143/72   P 64   RR (!) 28   O2 (!) 87 %  GENERAL: awake, alert, oriented and in no acute distress [place]  HEENT: normocephalic atraumatic   NECK: supple   LUNGS: Clear bilaterally, no wheezing or rales, no accessory muscle use   CVS: Regular rate and rhythm, normal peripheral perfusion  ABD: Soft, non-tender, non-distended, bowel sounds present  EXTREMITIES: no clubbing or cyanosis  SKIN: Warm, dry.   NEURO: alert and oriented, grossly without focal deficits   PSYCHIATRIC: Cooperative    LABS AND IMAGING:     Recent Labs     09/14/24  1715   WBC 7.69   RBC 4.66*   HGB 13.8*   HCT 42.4   MCV 91.0   MCH 29.6   MCHC 32.5*   RDW 14.0   *     No results for input(s): "LACTIC" in the last 72 hours.  No results for input(s): "INR", "APTT", "D-DIMER" in the last 72 hours.  No results for input(s): "HGBA1C", "CHOL", "TRIG", "LDL", "VLDL", "HDL" in the last 72 hours.   Recent Labs     09/14/24  1715      K 4.4   CO2 " 24   BUN 55.4*   CREATININE 1.60*   GLUCOSE 105   CALCIUM 9.3   ALBUMIN 3.3*   GLOBULIN 3.4   ALKPHOS 81   ALT 25   AST 28   BILITOT 3.1*     Recent Labs     09/14/24  1715 09/14/24  2140 09/15/24  0022   *  --   --    TROPONINI  --  0.050* 0.056*          CT Thoracic Spine Without Contrast  Narrative: EXAMINATION:  CT THORACIC SPINE WITHOUT CONTRAST    CLINICAL HISTORY:  Back trauma, no prior imaging (Age >= 16y);    TECHNIQUE:  Multidetector axial images were performed of the thoracic spine without administration of contrast images were reconstructed.    Dose length product was 129 mGycm. Automated radiation control was utilized to minimize radiation dose.    COMPARISON:  None available    FINDINGS:  There is mild thoracic kyphoscoliosis.  Anterior bridging osteophytes.  Thoracic vertebrae stature preserved and alignment is unremarkable.  No acute fracture or malalignment identified.  Hemangiomas involve T3 and T5 vertebral bodies.  Demineralization of bones.  No apparent paraspinal soft inflammations.  Impression: No acute fracture or malalignment identified.    Electronically signed by: Frederick Rodriguez  Date:    09/14/2024  Time:    18:16  CT Head Without Contrast  Narrative: EXAMINATION:  CT HEAD WITHOUT CONTRAST    CLINICAL HISTORY:  Head trauma, minor (Age >= 65y);    TECHNIQUE:  Sequential axial images were performed of the brain without contrast.    Dose product length of 983 mGycm. Automated exposure control was utilized to minimize radiation dose.    COMPARISON:  September 14, 2024..    FINDINGS:  Right occipital approach ventriculostomy catheter minimally crosses the midline and is in similar location compared to the prior exam.  The ventricles are nondilated.  There is prominent chronic microvascular ischemia and atrophy.  No acute intracranial hemorrhage, hydrocephalus, midline shift or mass effect.  No acute large vessel territory infarct identified.    There is no acute depressed skull fracture.   Bilateral prominent loss of pneumatization of the mastoid air cells with opacification and fluid and operative changes with about similar appearance.  Impression: No acute intracranial findings identified.    Electronically signed by: Frederick Rodriguez  Date:    09/14/2024  Time:    18:10  CT Cervical Spine Without Contrast  Narrative: EXAMINATION:  CT CERVICAL SPINE WITHOUT CONTRAST    CLINICAL HISTORY:  Trauma.    TECHNIQUE:  Multidetector axial images were performed of the cervical spine without and.  Images were reconstructed.    Automated exposure control was utilized to minimize radiation dose.  DLP 4 6.    COMPARISON:  None available.    FINDINGS:  Cervical vertebrae stature is maintained and alignment is unremarkable.  No acute fracture or malalignment identified.  The anterior longer ligament prominent ossifications from C3 through T1.  There are degenerative changes which cause moderate narrowing of the left neural foramen at C3-C4, mild narrowing of the left neural foramen at C4-C5 moderate narrowing of left neural foramen at C5-C6 and mild narrowing of left neural foramen at C6-C7..  There is no prevertebral soft tissue prominence.    This study does not exclude the possibility of intrathecal soft tissue, ligamentous or vascular injury.  Impression: No acute fracture or malalignment identified.    Electronically signed by: Frederick Rodriguez  Date:    09/14/2024  Time:    18:07      ASSESSMENT & PLAN:   Fall, on ground overnight  UTI, present on arrival  Mild traumatic rhabdomyolysis  NSTEMI likely type 2 demand ischemia  Dehydration with WIN/prerenal azotemia    Hx: HTN, CVA, HLD    -ceftriaxone and urine culture   -IV fluids overnight   -tele monitoring and trend cardiac enzymes   -repeat labs in a.m.   -resume home meds as appropriate pending med rec    DVT prophylaxis:  Lovenox  Code status:  Full    If patient was admitted under observational status it is with my approval/permission.     At least 55 min was  spent on this history and physical.  Time seen:  3:30 a.m. 9/15  Roshan Waller MD

## 2024-09-16 LAB
ALBUMIN SERPL-MCNC: 2.2 G/DL (ref 3.4–4.8)
ALBUMIN/GLOB SERPL: 0.8 RATIO (ref 1.1–2)
ALP SERPL-CCNC: 60 UNIT/L (ref 40–150)
ALT SERPL-CCNC: 32 UNIT/L (ref 0–55)
ANION GAP SERPL CALC-SCNC: 6 MEQ/L
AST SERPL-CCNC: 37 UNIT/L (ref 5–34)
BACTERIA UR CULT: ABNORMAL
BASOPHILS # BLD AUTO: 0.02 X10(3)/MCL
BASOPHILS NFR BLD AUTO: 0.3 %
BILIRUB SERPL-MCNC: 1.9 MG/DL
BUN SERPL-MCNC: 18.5 MG/DL (ref 8.4–25.7)
CALCIUM SERPL-MCNC: 7.6 MG/DL (ref 8.8–10)
CHLORIDE SERPL-SCNC: 114 MMOL/L (ref 98–107)
CO2 SERPL-SCNC: 19 MMOL/L (ref 23–31)
CREAT SERPL-MCNC: 0.82 MG/DL (ref 0.73–1.18)
CREAT/UREA NIT SERPL: 23
EOSINOPHIL # BLD AUTO: 0.05 X10(3)/MCL (ref 0–0.9)
EOSINOPHIL NFR BLD AUTO: 0.8 %
ERYTHROCYTE [DISTWIDTH] IN BLOOD BY AUTOMATED COUNT: 14.3 % (ref 11.5–17)
GFR SERPLBLD CREATININE-BSD FMLA CKD-EPI: >60 ML/MIN/1.73/M2
GLOBULIN SER-MCNC: 2.6 GM/DL (ref 2.4–3.5)
GLUCOSE SERPL-MCNC: 98 MG/DL (ref 82–115)
HCT VFR BLD AUTO: 34.3 % (ref 42–52)
HGB BLD-MCNC: 10.7 G/DL (ref 14–18)
IMM GRANULOCYTES # BLD AUTO: 0.09 X10(3)/MCL (ref 0–0.04)
IMM GRANULOCYTES NFR BLD AUTO: 1.4 %
LYMPHOCYTES # BLD AUTO: 0.98 X10(3)/MCL (ref 0.6–4.6)
LYMPHOCYTES NFR BLD AUTO: 15.5 %
MAGNESIUM SERPL-MCNC: 1.8 MG/DL (ref 1.6–2.6)
MCH RBC QN AUTO: 29.3 PG (ref 27–31)
MCHC RBC AUTO-ENTMCNC: 31.2 G/DL (ref 33–36)
MCV RBC AUTO: 94 FL (ref 80–94)
MONOCYTES # BLD AUTO: 0.89 X10(3)/MCL (ref 0.1–1.3)
MONOCYTES NFR BLD AUTO: 14.1 %
NEUTROPHILS # BLD AUTO: 4.28 X10(3)/MCL (ref 2.1–9.2)
NEUTROPHILS NFR BLD AUTO: 67.9 %
NRBC BLD AUTO-RTO: 0 %
OHS QRS DURATION: 116 MS
OHS QTC CALCULATION: 515 MS
PLATELET # BLD AUTO: 79 X10(3)/MCL (ref 130–400)
PMV BLD AUTO: 11.5 FL (ref 7.4–10.4)
POTASSIUM SERPL-SCNC: 3.4 MMOL/L (ref 3.5–5.1)
PROT SERPL-MCNC: 4.8 GM/DL (ref 5.8–7.6)
RBC # BLD AUTO: 3.65 X10(6)/MCL (ref 4.7–6.1)
SODIUM SERPL-SCNC: 139 MMOL/L (ref 136–145)
WBC # BLD AUTO: 6.31 X10(3)/MCL (ref 4.5–11.5)

## 2024-09-16 PROCEDURE — 63600175 PHARM REV CODE 636 W HCPCS: Performed by: INTERNAL MEDICINE

## 2024-09-16 PROCEDURE — 25000003 PHARM REV CODE 250: Performed by: NURSE PRACTITIONER

## 2024-09-16 PROCEDURE — 83735 ASSAY OF MAGNESIUM: CPT | Performed by: INTERNAL MEDICINE

## 2024-09-16 PROCEDURE — 85025 COMPLETE CBC W/AUTO DIFF WBC: CPT | Performed by: INTERNAL MEDICINE

## 2024-09-16 PROCEDURE — 25000003 PHARM REV CODE 250: Performed by: INTERNAL MEDICINE

## 2024-09-16 PROCEDURE — 21400001 HC TELEMETRY ROOM

## 2024-09-16 PROCEDURE — 36415 COLL VENOUS BLD VENIPUNCTURE: CPT | Performed by: INTERNAL MEDICINE

## 2024-09-16 PROCEDURE — 80053 COMPREHEN METABOLIC PANEL: CPT | Performed by: INTERNAL MEDICINE

## 2024-09-16 PROCEDURE — 27000221 HC OXYGEN, UP TO 24 HOURS

## 2024-09-16 PROCEDURE — 97162 PT EVAL MOD COMPLEX 30 MIN: CPT

## 2024-09-16 RX ORDER — POTASSIUM CHLORIDE 20 MEQ/1
40 TABLET, EXTENDED RELEASE ORAL EVERY 4 HOURS
Status: COMPLETED | OUTPATIENT
Start: 2024-09-16 | End: 2024-09-16

## 2024-09-16 RX ADMIN — APIXABAN 5 MG: 5 TABLET, FILM COATED ORAL at 09:09

## 2024-09-16 RX ADMIN — POTASSIUM CHLORIDE 40 MEQ: 1500 TABLET, EXTENDED RELEASE ORAL at 04:09

## 2024-09-16 RX ADMIN — CEFTRIAXONE SODIUM 1 G: 1 INJECTION, POWDER, FOR SOLUTION INTRAMUSCULAR; INTRAVENOUS at 09:09

## 2024-09-16 RX ADMIN — CARVEDILOL 3.12 MG: 3.12 TABLET, FILM COATED ORAL at 09:09

## 2024-09-16 RX ADMIN — ATORVASTATIN CALCIUM 20 MG: 10 TABLET, FILM COATED ORAL at 09:09

## 2024-09-16 RX ADMIN — FOLIC ACID 1000 MCG: 1 TABLET ORAL at 09:09

## 2024-09-16 RX ADMIN — POTASSIUM CHLORIDE 40 MEQ: 1500 TABLET, EXTENDED RELEASE ORAL at 05:09

## 2024-09-16 NOTE — PLAN OF CARE
09/16/24 1548   Discharge Assessment   Assessment Type Discharge Planning Assessment   Confirmed/corrected address, phone number and insurance Yes   Confirmed Demographics Correct on Facesheet   Source of Information patient;family   Communicated DIAN with patient/caregiver Date not available/Unable to determine   Reason For Admission UTI, fall   People in Home alone   Do you expect to return to your current living situation? Yes   Do you have help at home or someone to help you manage your care at home? Yes   Who are your caregiver(s) and their phone number(s)? Daughter Charmaine Vargas [phone]   Current cognitive status: Alert/Oriented   Walking or Climbing Stairs Difficulty yes   Walking or Climbing Stairs ambulation difficulty, requires equipment   Equipment Currently Used at Home walker, rolling;wheelchair   Patient currently being followed by outpatient case management? No   Do you currently have service(s) that help you manage your care at home? Yes   Name and Contact number of agency has sitters for 30 hours per week wt First Name Basis sitter service   Is the pt/caregiver preference to resume services with current agency Yes   Do you take prescription medications? Yes   Do you have prescription coverage? Yes   Coverage medicaid   Do you have any problems affording any of your prescribed medications? No   Is the patient taking medications as prescribed? yes   Are you on dialysis? No   Do you take coumadin? No   Discharge Plan A Home Health   Discharge Plan B Home Health   DME Needed Upon Discharge  none   Discharge Plan discussed with: Patient;Adult children   Transition of Care Barriers None

## 2024-09-16 NOTE — PT/OT/SLP EVAL
Physical Therapy Evaluation    Patient Name:  Madan Elder   MRN:  [id number]    Recommendations:     Discharge therapy intensity: Moderate Intensity Therapy   Discharge Equipment Recommendations:  (tbd)   Barriers to discharge: Impaired mobility    Assessment:     Madan Elder is a 78 y.o. male admitted with a medical diagnosis of Ground level fall   Mild rhabdomyolysis due to above   E coli UTI-POA   NSTEMI likely type 2-improving   Dehydration with a WIN .  He presents with the following impairments/functional limitations: weakness, impaired endurance, gait instability, impaired functional mobility, impaired self care skills, impaired balance, decreased safety awareness . Py very [place].    Rehab Prognosis: Good; patient would benefit from acute skilled PT services to address these deficits and reach maximum level of function.    Recent Surgery: * No surgery found *      Plan:     During this hospitalization, patient would benefit from acute PT services 5 x/week to address the identified rehab impairments via gait training, therapeutic activities, therapeutic exercises and progress toward the following goals:    Plan of Care Expires:  10/04/24    Subjective     Chief Complaint:   Patient/Family Comments/goals:   Pain/Comfort:       Patients cultural, spiritual, Buddhist conflicts given the current situation:      Living Environment:  Pt lives in an apt with an elevator  Prior to admission, patients level of function was pt states he mostly got around in his wc.  Equipment used at home: walker, rolling, wheelchair.  DME owned (not currently used): .  Upon discharge, patient will have assistance from TBD.    Objective:     Communicated with nurse prior to session.  Patient found supine with PureWick, peripheral IV, telemetry, pulse ox (continuous)  upon PT entry to room.    General Precautions: Standard, fall  Orthopedic Precautions:N/A   Braces: N/A  Respiratory Status: Nasal cannula, flow   L/min  Blood  Pressure:       Exams:  RLE ROM: WFL  RLE Strength: 3/5  LLE ROM: WFL  LLE Strength: 3/5  Skin integrity: Visible skin intact      Functional Mobility:  Bed Mobility:     Supine to Sit: moderate assistance  Sit to Supine: moderate assistance  Transfers:     Sit to Stand:  moderate assistance with rolling walker  Gait: pt amb 2 steps with rw with moda      AM-PAC 6 CLICK MOBILITY  Total Score:        Treatment & Education:      Patient provided with verbal education education regarding PT role/goals/POC, fall prevention, and safety awareness.  Understanding was verbalized.     Patient left supine with all lines intact and call button in reach.    GOALS:   Multidisciplinary Problems       Physical Therapy Goals          Problem: Physical Therapy    Goal Priority Disciplines Outcome Goal Variances Interventions   Physical Therapy Goal     PT, PT/OT Progressing     Description: Pt will be seen for the following goals  1. Pt will be sba with bed mobility  2. Pt will be cga with sit to stand  3. Pt will be mynor with rw 25ft                        History:     Past Medical History:   Diagnosis Date    Hypertension     Stroke        Past Surgical History:   Procedure Laterality Date    INSERTION OF PACEMAKER         Time Tracking:     PT Received On:    PT Start Time: 1643     PT Stop Time: 1700  PT Total Time (min): 17 min     Billable Minutes: Evaluation 17 09/16/2024

## 2024-09-16 NOTE — PLAN OF CARE
Problem: Physical Therapy  Goal: Physical Therapy Goal  Description: Pt will be seen for the following goals  1. Pt will be sba with bed mobility  2. Pt will be cga with sit to stand  3. Pt will be mynor with rw 25ft   Outcome: Progressing

## 2024-09-16 NOTE — PLAN OF CARE
Nurses Note -- 4 Eyes      9/15/2024   7:49 PM      Skin assessed during: Q Shift Change      [x] No Altered Skin Integrity Present    []Prevention Measures Documented      [] Yes- Altered Skin Integrity Present or Discovered   [] LDA Added if Not in Epic (Describe Wound)   [] New Altered Skin Integrity was Present on Admit and Documented in LDA   [] Wound Image Taken    Wound Care Consulted? No    Attending Nurse:  Joleen Rapp RN    Second RN/Staff Member: Rohit Barney RN

## 2024-09-16 NOTE — PROVIDER PROGRESS NOTES - EMERGENCY DEPT.
Encounter Date: 9/14/2024    ED Physician Progress Notes        I examined this patient in conjunction with the nurse practitioner.  I agree with her assessment and plan.  I played a substantive role in the workup and treatment of this patient.  Patient is a 78-year-old male presenting with complain of generalized weakness.  Patient states he fell to the floor and could not get up.  While in the ER, patient was diagnosed with a UTI.  CT of the head and C-spine were done which shows no acute changes.  Patient will be admitted to the hospital for IV hydration and antibiotics.  On exam, patient is in no apparent distress.  He is very hard of hearing.  He turns his head with no significant neck pain.  Heart and lung exam is within normal.  His abdomen is soft, nontender.  No deformity to the extremities.

## 2024-09-16 NOTE — NURSING
Nurses Note -- 4 Eyes        9/16/2024   7:20 AM        Skin assessed during: Q Shift Change        [x] No Altered Skin Integrity Present                 [x]Prevention Measures Documented        [] Yes- Altered Skin Integrity Present or Discovered              [] LDA Added if Not in Epic (Describe Wound)              [] New Altered Skin Integrity was Present on Admit and Documented in LDA              [] Wound Image Taken     Wound Care Consulted? No     Attending Nurse:  Mary Mratinez RN     Second RN/Staff Member:   Joleen Rapp RN

## 2024-09-16 NOTE — PROGRESS NOTES
Consults  [facility] - Oncology Acute    Cardiology  Progress Note    Patient Name: Madan Elder  MRN: [id number]  Admission Date: 9/14/2024  Hospital Length of Stay: 1 days  Code Status: Full Code   Attending Provider: Sarbjit Sparrow MD   Consulting Provider: PETER Chen  Primary Care Physician: Ricky Newsome MD  Principal Problem:Urinary tract infection without hematuria    Patient information was obtained from patient, past medical records, and ER records.     Subjective:     Chief Complaint/Reason for Consult: elevated troponin    HPI:   Mr. Elder is a 77y/o male, followed by Dr. Newsome, who presented to ED for evaluation post fall while attempting to transfer from bed to wheelchair. He was unable to get up and laid on the floor until caretaker arrived. Denies syncope/hitting head.  Workup revealed H&H 13.8/42.4, BUN creatinine 55/1.60,  , troponin 0.050-0.056-0.044. EKG sinus rhythm with first-degree AV block and incomplete left bundle branch block. UTI per UA. UrCX + GNR. He has been started on IVF/ABX and admitted to hospital medicine. 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 ,mother PPM  Social History: never smoker, hx alcohol abuse     Previous Cardiac Diagnostics:   TTE 04.05.23:  There is no evidence of intracardiac shunting.  The left ventricle is mildly enlarged with moderately decreased systolic function.  The estimated ejection fraction is 35%.  Left ventricular diastolic dysfunction.  Mild aortic regurgitation.  Mild tricuspid regurgitation.  Normal central venous pressure (3 mmHg).  The estimated PA systolic pressure is 29 mmHg.  Normal right ventricular size with mildly reduced right ventricular systolic function.  Mild mitral  regurgitation.  Mild right atrial enlargement.     CUS 04.05.23:  The right internal carotid artery demonstrated less than 50% stenosis.  The left internal carotid artery demonstrated less than 50% stenosis.  Bilateral vertebral arteries were patent with antegrade flow.     TTE 10.04.2022:  LV is mildly enlarged. Global LVSR is moderately decreased. LVEF 30%. LV diastolic function is impaired (Grade I) with normal LA pressure. Moderate MR. Mild to moderate TR. Mild AR. Mild AS noted with adequate cuspal excursion. PASP 28mmHg. Intracardiac device wire is visualized in right heart.       CUS 10.04.2022:  1-39% stenosis mid LICA  1-39% stenosis pRICA  Antegrade flow to bilateral vertebral arteries     [facility] 06.20.2017:  LM: large vessel without evidence of obstructive CAD.   LAD: 60% distal stenosis  Lcx: 60%  distal stenosis  RCA: large and dominant with 60% narrowing of PDA  Mean RA pressure 3mmHG., RV pressure 55/3mmHg, PAP 55/20mmHg. These values represent the presence of moderate pulmonary HTN. PCWP 20-24mmHg which was moderately to severely elevated. No significant gradient across aortic valve. LVEDP 20mmHg. CO 2.3L/min with CI 1.4L/min         Past Medical History:   Diagnosis Date    Hypertension     Stroke      Past Surgical History:   Procedure Laterality Date    INSERTION OF PACEMAKER       Review of patient's allergies indicates:  No Known Allergies  No current facility-administered medications on file prior to encounter.     Current Outpatient Medications on File Prior to Encounter   Medication Sig    aspirin (ECOTRIN) 325 MG EC tablet Take 1 tablet (325 mg total) by mouth once daily.    atorvastatin (LIPITOR) 20 MG tablet Take 1 tablet (20 mg total) by mouth once daily.    carvediloL (COREG) 3.125 MG tablet Take 1 tablet (3.125 mg total) by mouth 2 (two) times daily.    folic acid (FOLVITE) 1 MG tablet Take 1,000 mcg by mouth once daily.    furosemide (LASIX) 20 MG tablet Take 20 mg by mouth once daily.     lisinopriL (PRINIVIL,ZESTRIL) 5 MG tablet Take 5 mg by mouth once daily.    VITAMIN B-1 100 MG tablet Take 100 mg by mouth once daily.     Family History    None       Tobacco Use    Smoking status: Never    Smokeless tobacco: Never   Substance and Sexual Activity    Alcohol use: Yes    Drug use: Never    Sexual activity: Not on file       Review of Systems   Constitutional: Negative.    Respiratory: Negative.  Negative for shortness of breath.    Cardiovascular: Negative.  Negative for chest pain and leg swelling.   Gastrointestinal: Negative.    Musculoskeletal:         Left sided weakness   Skin: Negative.    Neurological:  Positive for weakness.   Psychiatric/Behavioral: Negative.         Objective:     Vital Signs (Most Recent):  Temp: 98.6 °F (37 °C) (09/16/24 0816)  Pulse: 71 (09/16/24 0935)  Resp: 18 (09/15/24 1912)  BP: 117/67 (09/16/24 0935)  SpO2: 97 % (09/16/24 0816) Vital Signs (24h Range):  Temp:  [97.6 °F (36.4 °C)-98.6 °F (37 °C)] 98.6 °F (37 °C)  Pulse:  [54-71] 71  Resp:  [18] 18  SpO2:  [96 %-99 %] 97 %  BP: ()/(50-67) 117/67   Weight: 63.4 kg (139 lb 12.4 oz)  Body mass index is 21.25 kg/m².  SpO2: 97 %       Intake/Output Summary (Last 24 hours) at 9/16/2024 1108  Last data filed at 9/16/2024 0507  Gross per 24 hour   Intake 3264.29 ml   Output 1300 ml   Net 1964.29 ml     Lines/Drains/Airways       Peripheral Intravenous Line  Duration                  Peripheral IV - Single Lumen 09/14/24 1829 20 G Anterior;Proximal;Right Forearm 1 day                  Significant Labs:   Chemistries:   Recent Labs   Lab 09/14/24  1715 09/14/24  2140 09/15/24  0022 09/15/24  0510 09/16/24  0323     --   --  141 139   K 4.4  --   --  3.4* 3.4*     --   --  112* 114*   CO2 24  --   --  20* 19*   BUN 55.4*  --   --  40.6* 18.5   CREATININE 1.60*  --   --  1.08 0.82   CALCIUM 9.3  --   --  8.1* 7.6*   BILITOT 3.1*  --   --  2.0* 1.9*   ALKPHOS 81  --   --  58 60   ALT 25  --   --  22 32   AST 28   "--   --  26 37*   GLUCOSE 105  --   --  94 98   MG  --   --   --   --  1.80   TROPONINI  --  0.050* 0.056* 0.044  --         CBC/Anemia Labs: Coags:    Recent Labs   Lab 09/14/24  1715 09/15/24  0510 09/16/24  0323   WBC 7.69 7.07 6.31   HGB 13.8* 11.7* 10.7*   HCT 42.4 35.5* 34.3*   * 85* 79*   MCV 91.0 91.3 94.0   RDW 14.0 14.2 14.3    No results for input(s): "PT", "INR", "APTT" in the last 168 hours.     Significant Imaging:  Imaging Results              CT Head Without Contrast (Final result)  Result time 09/14/24 18:10:42      Final result by Frederick Rodriguez MD (09/14/24 18:10:42)                   Impression:      No acute intracranial findings identified.      Electronically signed by: Frederick Rodriguez  Date:    09/14/2024  Time:    18:10               Narrative:    EXAMINATION:  CT HEAD WITHOUT CONTRAST    CLINICAL HISTORY:  Head trauma, minor (Age >= 65y);    TECHNIQUE:  Sequential axial images were performed of the brain without contrast.    Dose product length of 983 mGycm. Automated exposure control was utilized to minimize radiation dose.    COMPARISON:  September 14, 2024..    FINDINGS:  Right occipital approach ventriculostomy catheter minimally crosses the midline and is in similar location compared to the prior exam.  The ventricles are nondilated.  There is prominent chronic microvascular ischemia and atrophy.  No acute intracranial hemorrhage, hydrocephalus, midline shift or mass effect.  No acute large vessel territory infarct identified.    There is no acute depressed skull fracture.  Bilateral prominent loss of pneumatization of the mastoid air cells with opacification and fluid and operative changes with about similar appearance.                                       CT Thoracic Spine Without Contrast (Final result)  Result time 09/14/24 18:16:57      Final result by Frederick Rodriguez MD (09/14/24 18:16:57)                   Impression:      No acute fracture or malalignment " identified.      Electronically signed by: Frederick Rodriguez  Date:    09/14/2024  Time:    18:16               Narrative:    EXAMINATION:  CT THORACIC SPINE WITHOUT CONTRAST    CLINICAL HISTORY:  Back trauma, no prior imaging (Age >= 16y);    TECHNIQUE:  Multidetector axial images were performed of the thoracic spine without administration of contrast images were reconstructed.    Dose length product was 129 mGycm. Automated radiation control was utilized to minimize radiation dose.    COMPARISON:  None available    FINDINGS:  There is mild thoracic kyphoscoliosis.  Anterior bridging osteophytes.  Thoracic vertebrae stature preserved and alignment is unremarkable.  No acute fracture or malalignment identified.  Hemangiomas involve T3 and T5 vertebral bodies.  Demineralization of bones.  No apparent paraspinal soft inflammations.                                       CT Cervical Spine Without Contrast (Final result)  Result time 09/14/24 18:07:34      Final result by Frederick Rodriguez MD (09/14/24 18:07:34)                   Impression:      No acute fracture or malalignment identified.      Electronically signed by: Frederick Rodriguez  Date:    09/14/2024  Time:    18:07               Narrative:    EXAMINATION:  CT CERVICAL SPINE WITHOUT CONTRAST    CLINICAL HISTORY:  Trauma.    TECHNIQUE:  Multidetector axial images were performed of the cervical spine without and.  Images were reconstructed.    Automated exposure control was utilized to minimize radiation dose.  DLP 4 6.    COMPARISON:  None available.    FINDINGS:  Cervical vertebrae stature is maintained and alignment is unremarkable.  No acute fracture or malalignment identified.  The anterior longer ligament prominent ossifications from C3 through T1.  There are degenerative changes which cause moderate narrowing of the left neural foramen at C3-C4, mild narrowing of the left neural foramen at C4-C5 moderate narrowing of left neural foramen at C5-C6 and mild narrowing of  left neural foramen at C6-C7..  There is no prevertebral soft tissue prominence.    This study does not exclude the possibility of intrathecal soft tissue, ligamentous or vascular injury.                                    EKG:       Tele 9.16.24 @1530        Physical Exam  HENT:      Right Ear: Decreased hearing noted.      Left Ear: Decreased hearing noted.      Ears:      Comments: Very [place]  Cardiovascular:      Rate and Rhythm: Normal rate and regular rhythm.      Pulses: Normal pulses.      Heart sounds: Normal heart sounds.   Pulmonary:      Effort: Pulmonary effort is normal.      Breath sounds: Normal breath sounds.   Abdominal:      Palpations: Abdomen is soft.   Musculoskeletal:      Comments: Left sided weakness   Skin:     General: Skin is warm.   Neurological:      Mental Status: He is alert.   Psychiatric:         Mood and Affect: Mood normal.       Home Medications:   No current facility-administered medications on file prior to encounter.     Current Outpatient Medications on File Prior to Encounter   Medication Sig Dispense Refill    aspirin (ECOTRIN) 325 MG EC tablet Take 1 tablet (325 mg total) by mouth once daily. 30 tablet 11    atorvastatin (LIPITOR) 20 MG tablet Take 1 tablet (20 mg total) by mouth once daily. 90 tablet 3    carvediloL (COREG) 3.125 MG tablet Take 1 tablet (3.125 mg total) by mouth 2 (two) times daily. 60 tablet 11    folic acid (FOLVITE) 1 MG tablet Take 1,000 mcg by mouth once daily.      furosemide (LASIX) 20 MG tablet Take 20 mg by mouth once daily.      lisinopriL (PRINIVIL,ZESTRIL) 5 MG tablet Take 5 mg by mouth once daily.      VITAMIN B-1 100 MG tablet Take 100 mg by mouth once daily.       Current Schedule Inpatient Medications:   atorvastatin  20 mg Oral Daily    carvediloL  3.125 mg Oral BID    cefTRIAXone (Rocephin) IV (PEDS and ADULTS)  1 g Intravenous Q24H    enoxparin  30 mg Subcutaneous Daily    folic acid  1,000 mcg Oral Daily     Continuous Infusions:   0.9%  NaCl   Intravenous Continuous 100 mL/hr at 09/16/24 0507 Rate Verify at 09/16/24 0507          Medical decision making:        Assessment:   NSTEMI, likely Type II due to fall/rhabdomyolysis & dehydration   -- troponin peak 0.056  WIN/dehydration (Resolved)  --resolving with IVFs  UTI  --UrCX + GNR  NSVT (9 beat run x 1 episode)  HTN  HLD  Alcoholic CMO  --[employer]tronic AICD  Native CAD  --[facility] 6.2017: LM: no obstructive CAD. LAD: 60% distal stenosis. Lcx: 60%  distal stenosis. RCA: large/dominant; 60% narrowing of PDA  Persistent Afib  --CHADS VASC score 6  --On Eliquis outpatient  Thrombocytopenia   --plt 85 down from 107  Hx Alcohol abuse  Hx CVA right posterior frontal and left anterior temporal regions with residual left arm weakness/paresthesias    Plan:  ABX per primary  Continue statin, and BB  DC ASA due to thrombocytopenia, Can assess restarting as outpatient  Monitor thrombocytopenia  Continue Eliquis 5mg bid  Keep K > 4.0 and Mg > 2.0  Give 40 mEq KCL PO x 2 doses today  No Invasive work-up at this time  Will have patient f/u with Dr. Newsome within 1-2 weeks of d/c  Patient was supposed to schedule Teresa PET after last clinic visit in July (Unsure why this did not happen)  Very difficult to communicate with Pt.  He can not hear and did not understand questions that I wrote down  Will schedule Outpatient Teresa PET and close f/u with Dr. Newsome/HA Castañeda NP   Cardiology will sign off. Will be available if needed further      Rhiannon Ryees, PETER  Cardiology  [facility] - Oncology Acute  09/16/2024

## 2024-09-16 NOTE — PROGRESS NOTES
[facility] Hospital Medicine Progress Note        Chief Complaint: Inpatient Follow-up for fall    HPI:   Patient is a 78-year-old male with a past medical history of HTN, HLD, CVA, systolic CHF, [place] brought into the ER after falling out of bed last night and remaining on the floor throughout the course of the night until his caretaker arrived.  Subsequently he was had back pain.     On arrival to the ER patient was afebrile and hemodynamically stable maintaining normal sats on room air.  Laboratory work showed acute kidney injury with a creatinine of 1.6 and a mildly elevated troponin as well as a mildly elevated CPK.  Urinalysis showed evidence of urinary tract infection.  CT head, C-spine and thoracic spine all showed no acute traumatic injuries or acute process.    Interval Hx:   Alert.  Blood pressure was borderline low yesterday.  This morning he is alert, comfortably resting.  Blood pressure looking better.  HGB stable.  Thrombocytopenia noted.  Mild hypokalemia seen.  Renal function stable.  Troponin improved since admission.    Urine culture showing E coli.  Objective/physical exam:  Vitals:    09/15/24 2316 09/15/24 2322 09/16/24 0000 09/16/24 0436   BP: (!) 85/52 (!) 88/52 97/60 108/65   BP Location:       Patient Position:       Pulse: (!) 56 60  67   Resp:       Temp: 98.1 °F (36.7 °C)   98.6 °F (37 °C)   TempSrc: Oral   Oral   SpO2: 96%   98%   Weight:       Height:         General: In no acute distress, afebrile  Respiratory: Clear to auscultation bilaterally  Cardiovascular: S1, S2, no appreciable murmur  Abdomen: Soft, nontender, BS +  MSK: Warm, no lower extremity edema, no clubbing or cyanosis  Neurologic: Alert and oriented x4, moving all extremities with good strength     Lab Results   Component Value Date     09/16/2024    K 3.4 (L) 09/16/2024     (H) 09/16/2024    CO2 19 (L) 09/16/2024    BUN 18.5 09/16/2024    CREATININE 0.82 09/16/2024    CALCIUM  7.6 (L) 09/16/2024    EGFRNONAA >60 11/05/2021      Lab Results   Component Value Date    ALT 32 09/16/2024    AST 37 (H) 09/16/2024    ALKPHOS 60 09/16/2024    BILITOT 1.9 (H) 09/16/2024      Lab Results   Component Value Date    WBC 6.31 09/16/2024    HGB 10.7 (L) 09/16/2024    HCT 34.3 (L) 09/16/2024    MCV 94.0 09/16/2024    PLT 79 (L) 09/16/2024           Medications:   atorvastatin  20 mg Oral Daily    carvediloL  3.125 mg Oral BID    cefTRIAXone (Rocephin) IV (PEDS and ADULTS)  1 g Intravenous Q24H    enoxparin  30 mg Subcutaneous Daily    folic acid  1,000 mcg Oral Daily        Current Facility-Administered Medications:     acetaminophen, 650 mg, Oral, Q8H PRN    acetaminophen, 650 mg, Oral, Q8H PRN    melatonin, 6 mg, Oral, Nightly PRN    ondansetron, 4 mg, Intravenous, Q8H PRN    sodium chloride 0.9%, 10 mL, Intravenous, PRN     Assessment/Plan:    Ground level fall   Mild rhabdomyolysis due to above   E coli UTI-POA   NSTEMI likely type 2-improving   Dehydration with a WIN at admission-improving   Mild thrombocytopenia   Hearing deficits    HX: HTN, CVA, HLD     Plan:   -continue IV hydration.  Encourage p.o. intake.  Home antihypertensives on hold.  Will resume when appropriate   -function improving.  Continue IV hydration today.  Mobilize with PT today  -continue ceftriaxone to complete 3 days total  -other home medications were reviewed and renewed     Lovenox      Sarbjit Sparrow MD

## 2024-09-16 NOTE — PLAN OF CARE
Spoke with the patient's daughter Charmaine.  She stated that the patient currently has sitters, but he needs more hours.  Called [facility] Care sitter service [phone] and spoke with Starr.  She stated that the patient's currently receives 30 hours per week on the long-term care waiver program and the max is 32 hours. I called the [facility] [phone] and spoke to Gina. She stated that the patient's daughter needs to call them for her monthly monitor call so that he can get the additional 2 hours per week.  She also stated that the patient is currently on the waiting list for the [employer]aiver Program, but it is a long waiting list. Informed the patient's daughter of the above.

## 2024-09-17 VITALS
WEIGHT: 139.75 LBS | OXYGEN SATURATION: 94 % | SYSTOLIC BLOOD PRESSURE: 112 MMHG | HEIGHT: 68 IN | RESPIRATION RATE: 18 BRPM | BODY MASS INDEX: 21.18 KG/M2 | TEMPERATURE: 98 F | DIASTOLIC BLOOD PRESSURE: 64 MMHG | HEART RATE: 82 BPM

## 2024-09-17 PROCEDURE — 94760 N-INVAS EAR/PLS OXIMETRY 1: CPT

## 2024-09-17 PROCEDURE — 25000003 PHARM REV CODE 250: Performed by: INTERNAL MEDICINE

## 2024-09-17 PROCEDURE — 97116 GAIT TRAINING THERAPY: CPT

## 2024-09-17 RX ORDER — POTASSIUM CHLORIDE 20 MEQ/1
40 TABLET, EXTENDED RELEASE ORAL ONCE
Status: COMPLETED | OUTPATIENT
Start: 2024-09-17 | End: 2024-09-17

## 2024-09-17 RX ORDER — ACETAMINOPHEN 325 MG/1
650 TABLET ORAL ONCE
Status: COMPLETED | OUTPATIENT
Start: 2024-09-17 | End: 2024-09-17

## 2024-09-17 RX ORDER — ALPRAZOLAM 0.25 MG/1
0.25 TABLET ORAL ONCE
Status: COMPLETED | OUTPATIENT
Start: 2024-09-17 | End: 2024-09-17

## 2024-09-17 RX ORDER — SODIUM BICARBONATE 650 MG/1
650 TABLET ORAL DAILY
Status: DISCONTINUED | OUTPATIENT
Start: 2024-09-17 | End: 2024-09-17 | Stop reason: HOSPADM

## 2024-09-17 RX ORDER — SODIUM BICARBONATE 650 MG/1
650 TABLET ORAL DAILY
Qty: 5 TABLET | Refills: 0 | Status: SHIPPED | OUTPATIENT
Start: 2024-09-17 | End: 2024-09-22

## 2024-09-17 RX ADMIN — FOLIC ACID 1000 MCG: 1 TABLET ORAL at 08:09

## 2024-09-17 RX ADMIN — ATORVASTATIN CALCIUM 20 MG: 10 TABLET, FILM COATED ORAL at 08:09

## 2024-09-17 RX ADMIN — CARVEDILOL 3.12 MG: 3.12 TABLET, FILM COATED ORAL at 08:09

## 2024-09-17 RX ADMIN — POTASSIUM CHLORIDE 40 MEQ: 1500 TABLET, EXTENDED RELEASE ORAL at 09:09

## 2024-09-17 RX ADMIN — APIXABAN 5 MG: 5 TABLET, FILM COATED ORAL at 08:09

## 2024-09-17 RX ADMIN — ALPRAZOLAM 0.25 MG: 0.25 TABLET ORAL at 12:09

## 2024-09-17 RX ADMIN — SODIUM BICARBONATE 650 MG TABLET 650 MG: at 12:09

## 2024-09-17 RX ADMIN — SODIUM CHLORIDE: 9 INJECTION, SOLUTION INTRAVENOUS at 05:09

## 2024-09-17 RX ADMIN — ACETAMINOPHEN 650 MG: 325 TABLET, FILM COATED ORAL at 12:09

## 2024-09-17 NOTE — DISCHARGE SUMMARY
[facility] Medicine Discharge Summary    Admit Date: 9/14/2024  Discharge Date and Time: 9/17/20249:37 AM  Admitting Physician:  Team  Discharging Physician: Gregorio Varner MD.  Primary Care Physician: Ricky Newsome MD  Consults: Cardiology    Discharge Diagnoses:  Ground level fall   Mild rhabdomyolysis due to above   E coli UTI-POA   NSTEMI likely type 2  Dehydration with a WIN at admission-improving   Mild thrombocytopenia   Hearing deficits     HX: HTN, CVA, HLD     Hospital Course:   Patient is a 78-year-old male with a past medical history of HTN, HLD, CVA, systolic CHF, [place] brought into the ER after falling out of bed last night and remaining on the floor throughout the course of the night until his caretaker arrived.  Subsequently he was had back pain.     On arrival to the ER patient was afebrile and hemodynamically stable maintaining normal sats on room air.  Laboratory work showed acute kidney injury with a creatinine of 1.6 and a mildly elevated troponin as well as a mildly elevated CPK.  Urinalysis showed evidence of urinary tract infection.  CT head, C-spine and thoracic spine all showed no acute traumatic injuries or acute process.    Patient was started on iv rocephin and completed 3 days. He was stable and asymptomatic. Afebrile. Rpt labs showed mild hypokalemia and was replaced. He was also placed on bicarb tabs for 5 days. Renal functions was at baseline. His platelets was 79K. He had no signs of bleeding. Low platelets probably from infection. Home ASA on hold for now.     He has andrew moderate deafness. He was set up with HH and was discharged in a stable condition.     Spoke to patients daughter. Patient has sitter at home and family is ok with HH with OT/PT. Advised to repair the hearing aid asap. CM consulted for setting up HH with OT/PT.     Pt was seen and examined on the day of discharge  Vitals:  VITAL SIGNS: 24 HRS MIN & MAX LAST   Temp   Min: 97.3 °F (36.3 °C)  Max: 99.4 °F (37.4 °C) 97.6 °F (36.4 °C)   BP  Min: 106/70  Max: 126/63 115/78   Pulse  Min: 62  Max: 99  83   Resp  Min: 18  Max: 20 20   SpO2  Min: 95 %  Max: 98 % 98 %       Physical Exam:  Heart RRR  Lungs clear   Abdomen soft and non tender   Neuro: No FND      Procedures Performed: No admission procedures for hospital encounter.     Significant Diagnostic Studies: See Full reports for all details    Recent Labs   Lab 09/14/24  1715 09/15/24  0510 09/16/24 0323   WBC 7.69 7.07 6.31   RBC 4.66* 3.89* 3.65*   HGB 13.8* 11.7* 10.7*   HCT 42.4 35.5* 34.3*   MCV 91.0 91.3 94.0   MCH 29.6 30.1 29.3   MCHC 32.5* 33.0 31.2*   RDW 14.0 14.2 14.3   * 85* 79*   MPV 10.9* 11.0* 11.5*       Recent Labs   Lab 09/14/24  1715 09/15/24  0510 09/16/24  0323    141 139   K 4.4 3.4* 3.4*    112* 114*   CO2 24 20* 19*   BUN 55.4* 40.6* 18.5   CREATININE 1.60* 1.08 0.82   CALCIUM 9.3 8.1* 7.6*   MG  --   --  1.80   ALBUMIN 3.3* 2.5* 2.2*   ALKPHOS 81 58 60   ALT 25 22 32   AST 28 26 37*   BILITOT 3.1* 2.0* 1.9*        Microbiology Results (last 7 days)       Procedure Component Value Units Date/Time    Urine culture [[id number]]  (Abnormal)  (Susceptibility) Collected: 09/14/24 1957    Order Status: Completed Specimen: Urine Updated: 09/16/24 0835     Urine Culture >/= 100,000 colonies/ml Escherichia coli             CT Thoracic Spine Without Contrast  Narrative: EXAMINATION:  CT THORACIC SPINE WITHOUT CONTRAST    CLINICAL HISTORY:  Back trauma, no prior imaging (Age >= 16y);    TECHNIQUE:  Multidetector axial images were performed of the thoracic spine without administration of contrast images were reconstructed.    Dose length product was 129 mGycm. Automated radiation control was utilized to minimize radiation dose.    COMPARISON:  None available    FINDINGS:  There is mild thoracic kyphoscoliosis.  Anterior bridging osteophytes.  Thoracic vertebrae stature preserved and alignment is  unremarkable.  No acute fracture or malalignment identified.  Hemangiomas involve T3 and T5 vertebral bodies.  Demineralization of bones.  No apparent paraspinal soft inflammations.  Impression: No acute fracture or malalignment identified.    Electronically signed by: Frederick Rodriguez  Date:    09/14/2024  Time:    18:16  CT Head Without Contrast  Narrative: EXAMINATION:  CT HEAD WITHOUT CONTRAST    CLINICAL HISTORY:  Head trauma, minor (Age >= 65y);    TECHNIQUE:  Sequential axial images were performed of the brain without contrast.    Dose product length of 983 mGycm. Automated exposure control was utilized to minimize radiation dose.    COMPARISON:  September 14, 2024..    FINDINGS:  Right occipital approach ventriculostomy catheter minimally crosses the midline and is in similar location compared to the prior exam.  The ventricles are nondilated.  There is prominent chronic microvascular ischemia and atrophy.  No acute intracranial hemorrhage, hydrocephalus, midline shift or mass effect.  No acute large vessel territory infarct identified.    There is no acute depressed skull fracture.  Bilateral prominent loss of pneumatization of the mastoid air cells with opacification and fluid and operative changes with about similar appearance.  Impression: No acute intracranial findings identified.    Electronically signed by: Frederick Rodriguez  Date:    09/14/2024  Time:    18:10  CT Cervical Spine Without Contrast  Narrative: EXAMINATION:  CT CERVICAL SPINE WITHOUT CONTRAST    CLINICAL HISTORY:  Trauma.    TECHNIQUE:  Multidetector axial images were performed of the cervical spine without and.  Images were reconstructed.    Automated exposure control was utilized to minimize radiation dose.  DLP 4 6.    COMPARISON:  None available.    FINDINGS:  Cervical vertebrae stature is maintained and alignment is unremarkable.  No acute fracture or malalignment identified.  The anterior longer ligament prominent ossifications from C3  through T1.  There are degenerative changes which cause moderate narrowing of the left neural foramen at C3-C4, mild narrowing of the left neural foramen at C4-C5 moderate narrowing of left neural foramen at C5-C6 and mild narrowing of left neural foramen at C6-C7..  There is no prevertebral soft tissue prominence.    This study does not exclude the possibility of intrathecal soft tissue, ligamentous or vascular injury.  Impression: No acute fracture or malalignment identified.    Electronically signed by: Frederick Rodriguez  Date:    09/14/2024  Time:    18:07         Medication List        START taking these medications      sodium bicarbonate 650 MG tablet  Take 1 tablet (650 mg total) by mouth once daily. for 5 days            CONTINUE taking these medications      atorvastatin 20 MG tablet  Commonly known as: LIPITOR  Take 1 tablet (20 mg total) by mouth once daily.     carvediloL 3.125 MG tablet  Commonly known as: COREG  Take 1 tablet (3.125 mg total) by mouth 2 (two) times daily.     ELIQUIS 5 mg Tab  Generic drug: apixaban     folic acid 1 MG tablet  Commonly known as: FOLVITE     VITAMIN B-1 100 MG tablet  Generic drug: thiamine            STOP taking these medications      aspirin 325 MG EC tablet  Commonly known as: ECOTRIN     furosemide 20 MG tablet  Commonly known as: LASIX     lisinopriL 5 MG tablet  Commonly known as: PRINIVIL,ZESTRIL               Where to Get Your Medications        These medications were sent to [facility] [place]. [address] [address]      Phone: [phone]   sodium bicarbonate 650 MG tablet          Explained in detail to the patient about the discharge plan, medications, and follow-up visits. Pt understands and agrees with the treatment plan  Discharge Disposition: Home with   Discharged Condition: stable  Diet-   Dietary Orders (From admission, onward)       Start     Ordered    09/15/24 0258  Diet Adult Regular   (Diet/Nutrition - [facility])  Diet effective now         09/15/24 0259                   Medications Per DC med rec  Activities as tolerated   Follow-up Information       Ricky Newsome MD Follow up.    Specialty: Cardiology  Why: Please schedule Outpatient Teresa PET  Then schedule Hospital f/u after Teresa Pet has been done  Contact information:  Leisa [address]  [facility] [address]  [phone]               [facility] Follow up.    Specialties: Home Health Services, Home Therapy Services, Home Living Aide Services  Why: This will be your home health provider.  They will contact you after discharge.  You may call them for any questions regarding home health.  Contact information:  [address]  [facility], [address]  [facility] [id number] [phone]                         For further questions contact hospitalist office    Discharge time 33 minutes    For worsening symptoms, chest pain, shortness of breath, increased abdominal pain, high grade fever, stroke or stroke like symptoms, immediately go to the nearest Emergency Room or call 911 as soon as possible.      Gregorio Ibarra M.D, on 9/17/2024. at 9:37 AM.

## 2024-09-17 NOTE — PLAN OF CARE
Problem: Skin Injury Risk Increased  Goal: Skin Health and Integrity  9/17/2024 1613 by Destiny Bunch RN  Outcome: Progressing  9/17/2024 0752 by Destiny Bunch RN  Outcome: Progressing     Problem: Adult Inpatient Plan of Care  Goal: Plan of Care Review  9/17/2024 1613 by Destiny Bunch RN  Outcome: Progressing  9/17/2024 0752 by Destiny Bunch RN  Outcome: Progressing  Goal: Patient-Specific Goal (Individualized)  9/17/2024 1613 by Destiny Bunch RN  Outcome: Progressing  9/17/2024 0752 by Destiny Bunch RN  Outcome: Progressing  Goal: Absence of Hospital-Acquired Illness or Injury  9/17/2024 1613 by Destiny Bunch RN  Outcome: Progressing  9/17/2024 0752 by Destiny Bunch RN  Outcome: Progressing  Goal: Optimal Comfort and Wellbeing  9/17/2024 1613 by Destiny Bunch RN  Outcome: Progressing  9/17/2024 0752 by Destiny Bunch RN  Outcome: Progressing  Goal: Readiness for Transition of Care  9/17/2024 1613 by Destiny Bunch RN  Outcome: Progressing  9/17/2024 0752 by Destiny Bunch RN  Outcome: Progressing

## 2024-09-17 NOTE — NURSING
Pt to be transported to home per wheelchair van. Sitter is waiting for pt at home. Discharge instructions given by phone to pt's daughter , Charmaine. All questions answered .

## 2024-09-17 NOTE — PLAN OF CARE
Problem: Adult Inpatient Plan of Care  Goal: Plan of Care Review  Outcome: Progressing  Flowsheets (Taken 9/17/2024 0612)  Plan of Care Reviewed With: patient      [Negative] : Allergic/Immunologic [FreeTextEntry2] : negative except as indicated in interval history

## 2024-09-17 NOTE — PT/OT/SLP PROGRESS
Physical Therapy Treatment    Patient Name:  Madan Elder   MRN:  [id number]    Recommendations:     Discharge therapy intensity: Moderate Intensity Therapy   Discharge Equipment Recommendations: to be determined by next level of care  Barriers to discharge: Impaired mobility    Assessment:     Madan Elder is a 78 y.o. male admitted with a medical diagnosis of GLF,mild rhabdo, 2/2 fall, E coli UTI, NSTEMI, dehydration with WIN. He presents with the following impairments/functional limitations: weakness, impaired endurance, gait instability, impaired functional mobility, impaired self care skills, impaired balance, decreased safety awareness. The pt continues to demonstrate unsteadiness with ambulation, requiring min A for safety. He remains a high fall risk. Pt would greatly benefit from MOD intensity PT upon dc.     Rehab Prognosis: Good; patient would benefit from acute skilled PT services to address these deficits and reach maximum level of function.    Recent Surgery: * No surgery found *      Plan:     During this hospitalization, patient would benefit from acute PT services 5 x/week to address the identified rehab impairments via gait training, therapeutic activities, therapeutic exercises and progress toward the following goals:    Plan of Care Expires:  10/17/24    Subjective     Chief Complaint: none  Patient/Family Comments/goals: return to PLOF  Pain/Comfort:         Objective:     Communicated with NSG prior to session.  Patient found supine with peripheral IV upon PT entry to room.     General Precautions: Standard, fall  Orthopedic Precautions: N/A  Braces: N/A  Respiratory Status: Room air  Blood Pressure: NA  Skin Integrity: Visible skin intact      Functional Mobility:  Bed Mobility:     Scooting: contact guard assistance  Supine to Sit: minimum assistance  Sit to Supine: minimum assistance  Transfers:     Sit to Stand:  minimum assistance with rolling walker  Gait: pt ambulates x 35 feet, min A  with RW, pt suddenly sits, impulsive    Education:  Patient provided with verbal education education regarding PT role/goals/POC, fall prevention, safety awareness, and discharge/DME recommendations.  Understanding was verbalized.     Patient left supine with all lines intact, call button in reach, bed alarm on, and NSG notified    GOALS:   Multidisciplinary Problems       Physical Therapy Goals          Problem: Physical Therapy    Goal Priority Disciplines Outcome Goal Variances Interventions   Physical Therapy Goal     PT, PT/OT Progressing     Description: Pt will be seen for the following goals  1. Pt will be sba with bed mobility  2. Pt will be cga with sit to stand  3. Pt will be mynor with rw 25ft                        Time Tracking:     PT Received On: 09/17/24  PT Start Time: 1044     PT Stop Time: 1101  PT Total Time (min): 17 min     Billable Minutes: Gait Training 17    Treatment Type: Treatment  PT/PTA: PT     Number of PTA visits since last PT visit: 1 09/17/2024

## 2024-09-17 NOTE — NURSING
Nurses Note -- 4 Eyes      9/17/2024   7:53 AM      Skin assessed during: Q Shift Change      [x] No  New Altered Skin Integrity Present    []Prevention Measures Documented      [] Yes- Altered Skin Integrity Present or Discovered   [] LDA Added if Not in Epic (Describe Wound)   [] New Altered Skin Integrity was Present on Admit and Documented in LDA   [] Wound Image Taken    Wound Care Consulted? No    Attending Nurse:  Destiny Judd RN/Staff Member:  Patricia

## 2024-09-17 NOTE — PLAN OF CARE
Sent new home health order with lab work to [facility] via [employer].  Also informed Conor with [employer] of new order.

## 2024-09-17 NOTE — PLAN OF CARE
09/17/24 0918   Final Note   Assessment Type Final Discharge Note   Anticipated Discharge Disposition Home-Health   Hospital Resources/Appts/Education Provided Post-Acute resouces added to AVS   Post-Acute Status   Post-Acute Authorization Home Health   Home Health Status Set-up Complete/Auth obtained     Discharge home today with home health. AVS sent to [facility] via [employer].

## 2024-09-24 ENCOUNTER — PATIENT OUTREACH (OUTPATIENT)
Dept: ADMINISTRATIVE | Facility: CLINIC | Age: 79
End: 2024-09-24
Payer: MEDICARE

## 2024-09-24 NOTE — PROGRESS NOTES
C3 nurse attempted to contact Madan Elder  for a TCC post hospital discharge follow up call. No answer. No voicemail available. The patient has a scheduled HOSFU appointment with Ricky Newsome MD (Cardiology) on 10/3/2024 @1pm

## 2024-09-25 NOTE — PROGRESS NOTES
2nd attempt made to reach patient for TCC call. Left voicemail please call [phone] leave first name, last, and date of birth for Aide. I will return your call.

## 2024-09-25 NOTE — PROGRESS NOTES
3rd attempt- C3 nurse attempted to contact Madan Elder  for a TCC post hospital discharge follow up call. No answer. No voicemail available. The patient has a scheduled HOSFU appointment with Ricky Newsome MD (Cardiology) on 10/3/2024 @1pm

## 2024-10-06 ENCOUNTER — HOSPITAL ENCOUNTER (INPATIENT)
Facility: HOSPITAL | Age: 79
LOS: 11 days | Discharge: LEFT BEFORE DISCHARGE | DRG: 689 | End: 2024-10-17
Attending: EMERGENCY MEDICINE | Admitting: STUDENT IN AN ORGANIZED HEALTH CARE EDUCATION/TRAINING PROGRAM
Payer: MEDICARE

## 2024-10-06 DIAGNOSIS — N18.9 ACUTE RENAL FAILURE SUPERIMPOSED ON CHRONIC KIDNEY DISEASE, UNSPECIFIED ACUTE RENAL FAILURE TYPE, UNSPECIFIED CKD STAGE: Primary | ICD-10-CM

## 2024-10-06 DIAGNOSIS — I50.20 HFREF (HEART FAILURE WITH REDUCED EJECTION FRACTION): ICD-10-CM

## 2024-10-06 DIAGNOSIS — N17.9 ACUTE RENAL FAILURE SUPERIMPOSED ON CHRONIC KIDNEY DISEASE, UNSPECIFIED ACUTE RENAL FAILURE TYPE, UNSPECIFIED CKD STAGE: Primary | ICD-10-CM

## 2024-10-06 DIAGNOSIS — R06.02 SOB (SHORTNESS OF BREATH): ICD-10-CM

## 2024-10-06 DIAGNOSIS — N13.30 HYDRONEPHROSIS, UNSPECIFIED HYDRONEPHROSIS TYPE: ICD-10-CM

## 2024-10-06 DIAGNOSIS — I95.9 HYPOTENSION: ICD-10-CM

## 2024-10-06 DIAGNOSIS — I95.9 HYPOTENSIVE EPISODE: ICD-10-CM

## 2024-10-06 DIAGNOSIS — R07.9 CHEST PAIN: ICD-10-CM

## 2024-10-06 DIAGNOSIS — N30.01 ACUTE CYSTITIS WITH HEMATURIA: ICD-10-CM

## 2024-10-06 DIAGNOSIS — N17.9 AKI (ACUTE KIDNEY INJURY): ICD-10-CM

## 2024-10-06 LAB
ALBUMIN SERPL-MCNC: 2.6 G/DL (ref 3.4–4.8)
ALBUMIN/GLOB SERPL: 0.4 RATIO (ref 1.1–2)
ALP SERPL-CCNC: 107 UNIT/L (ref 40–150)
ALT SERPL-CCNC: 99 UNIT/L (ref 0–55)
ANION GAP SERPL CALC-SCNC: 15 MEQ/L
AST SERPL-CCNC: 99 UNIT/L (ref 5–34)
BACTERIA #/AREA URNS AUTO: ABNORMAL /HPF
BASOPHILS # BLD AUTO: 0.05 X10(3)/MCL
BASOPHILS NFR BLD AUTO: 0.5 %
BILIRUB SERPL-MCNC: 2.1 MG/DL
BILIRUB UR QL STRIP.AUTO: NEGATIVE
BUN SERPL-MCNC: 49.8 MG/DL (ref 8.4–25.7)
CALCIUM SERPL-MCNC: 10.7 MG/DL (ref 8.8–10)
CHLORIDE SERPL-SCNC: 98 MMOL/L (ref 98–107)
CK SERPL-CCNC: 27 U/L (ref 30–200)
CLARITY UR: ABNORMAL
CO2 SERPL-SCNC: 24 MMOL/L (ref 23–31)
COLOR UR AUTO: ABNORMAL
CREAT SERPL-MCNC: 1.91 MG/DL (ref 0.73–1.18)
CREAT/UREA NIT SERPL: 26
EOSINOPHIL # BLD AUTO: 0.06 X10(3)/MCL (ref 0–0.9)
EOSINOPHIL NFR BLD AUTO: 0.6 %
ERYTHROCYTE [DISTWIDTH] IN BLOOD BY AUTOMATED COUNT: 14.3 % (ref 11.5–17)
GFR SERPLBLD CREATININE-BSD FMLA CKD-EPI: 35 ML/MIN/1.73/M2
GLOBULIN SER-MCNC: 5.8 GM/DL (ref 2.4–3.5)
GLUCOSE SERPL-MCNC: 115 MG/DL (ref 82–115)
GLUCOSE UR QL STRIP: NORMAL
HCT VFR BLD AUTO: 45.5 % (ref 42–52)
HGB BLD-MCNC: 14.6 G/DL (ref 14–18)
HGB UR QL STRIP: ABNORMAL
HYALINE CASTS #/AREA URNS LPF: ABNORMAL /LPF
IMM GRANULOCYTES # BLD AUTO: 0.11 X10(3)/MCL (ref 0–0.04)
IMM GRANULOCYTES NFR BLD AUTO: 1 %
KETONES UR QL STRIP: NEGATIVE
LACTATE SERPL-SCNC: 1.8 MMOL/L (ref 0.5–2.2)
LEUKOCYTE ESTERASE UR QL STRIP: 500
LYMPHOCYTES # BLD AUTO: 1.38 X10(3)/MCL (ref 0.6–4.6)
LYMPHOCYTES NFR BLD AUTO: 12.7 %
MAGNESIUM SERPL-MCNC: 2.6 MG/DL (ref 1.6–2.6)
MCH RBC QN AUTO: 29.3 PG (ref 27–31)
MCHC RBC AUTO-ENTMCNC: 32.1 G/DL (ref 33–36)
MCV RBC AUTO: 91.4 FL (ref 80–94)
MONOCYTES # BLD AUTO: 0.93 X10(3)/MCL (ref 0.1–1.3)
MONOCYTES NFR BLD AUTO: 8.5 %
MUCOUS THREADS URNS QL MICRO: ABNORMAL /LPF
NEUTROPHILS # BLD AUTO: 8.35 X10(3)/MCL (ref 2.1–9.2)
NEUTROPHILS NFR BLD AUTO: 76.7 %
NITRITE UR QL STRIP: NEGATIVE
NRBC BLD AUTO-RTO: 0 %
PH UR STRIP: 6 [PH]
PHOSPHATE SERPL-MCNC: 3.8 MG/DL (ref 2.3–4.7)
PLATELET # BLD AUTO: 226 X10(3)/MCL (ref 130–400)
PMV BLD AUTO: 10.2 FL (ref 7.4–10.4)
POCT GLUCOSE: 109 MG/DL (ref 70–110)
POTASSIUM SERPL-SCNC: 4.4 MMOL/L (ref 3.5–5.1)
PROT SERPL-MCNC: 8.4 GM/DL (ref 5.8–7.6)
PROT UR QL STRIP: ABNORMAL
RBC # BLD AUTO: 4.98 X10(6)/MCL (ref 4.7–6.1)
RBC #/AREA URNS AUTO: >=100 /HPF
SODIUM SERPL-SCNC: 137 MMOL/L (ref 136–145)
SP GR UR STRIP.AUTO: 1.01 (ref 1–1.03)
SQUAMOUS #/AREA URNS LPF: ABNORMAL /HPF
UROBILINOGEN UR STRIP-ACNC: NORMAL
WBC # BLD AUTO: 10.88 X10(3)/MCL (ref 4.5–11.5)
WBC #/AREA URNS AUTO: >=100 /HPF
WBC CLUMPS UR QL AUTO: ABNORMAL

## 2024-10-06 PROCEDURE — 25000003 PHARM REV CODE 250: Performed by: INTERNAL MEDICINE

## 2024-10-06 PROCEDURE — 25000003 PHARM REV CODE 250: Performed by: EMERGENCY MEDICINE

## 2024-10-06 PROCEDURE — 87077 CULTURE AEROBIC IDENTIFY: CPT | Performed by: INTERNAL MEDICINE

## 2024-10-06 PROCEDURE — 80053 COMPREHEN METABOLIC PANEL: CPT | Performed by: EMERGENCY MEDICINE

## 2024-10-06 PROCEDURE — 84100 ASSAY OF PHOSPHORUS: CPT

## 2024-10-06 PROCEDURE — 63600175 PHARM REV CODE 636 W HCPCS

## 2024-10-06 PROCEDURE — 99291 CRITICAL CARE FIRST HOUR: CPT | Mod: GC,,, | Performed by: INTERNAL MEDICINE

## 2024-10-06 PROCEDURE — 81001 URINALYSIS AUTO W/SCOPE: CPT | Performed by: EMERGENCY MEDICINE

## 2024-10-06 PROCEDURE — 96365 THER/PROPH/DIAG IV INF INIT: CPT

## 2024-10-06 PROCEDURE — 87086 URINE CULTURE/COLONY COUNT: CPT | Performed by: EMERGENCY MEDICINE

## 2024-10-06 PROCEDURE — 63600175 PHARM REV CODE 636 W HCPCS: Performed by: INTERNAL MEDICINE

## 2024-10-06 PROCEDURE — 87154 CUL TYP ID BLD PTHGN 6+ TRGT: CPT | Performed by: INTERNAL MEDICINE

## 2024-10-06 PROCEDURE — 83605 ASSAY OF LACTIC ACID: CPT | Performed by: INTERNAL MEDICINE

## 2024-10-06 PROCEDURE — 36415 COLL VENOUS BLD VENIPUNCTURE: CPT

## 2024-10-06 PROCEDURE — 82550 ASSAY OF CK (CPK): CPT | Performed by: EMERGENCY MEDICINE

## 2024-10-06 PROCEDURE — 20000000 HC ICU ROOM

## 2024-10-06 PROCEDURE — 82962 GLUCOSE BLOOD TEST: CPT

## 2024-10-06 PROCEDURE — 96361 HYDRATE IV INFUSION ADD-ON: CPT

## 2024-10-06 PROCEDURE — 83735 ASSAY OF MAGNESIUM: CPT

## 2024-10-06 PROCEDURE — 87040 BLOOD CULTURE FOR BACTERIA: CPT

## 2024-10-06 PROCEDURE — 25000003 PHARM REV CODE 250

## 2024-10-06 PROCEDURE — 85025 COMPLETE CBC W/AUTO DIFF WBC: CPT | Performed by: EMERGENCY MEDICINE

## 2024-10-06 PROCEDURE — 99285 EMERGENCY DEPT VISIT HI MDM: CPT | Mod: 25

## 2024-10-06 RX ORDER — MUPIROCIN 20 MG/G
OINTMENT TOPICAL 2 TIMES DAILY
Status: COMPLETED | OUTPATIENT
Start: 2024-10-06 | End: 2024-10-11

## 2024-10-06 RX ORDER — IBUPROFEN 200 MG
16 TABLET ORAL
Status: DISCONTINUED | OUTPATIENT
Start: 2024-10-06 | End: 2024-10-17 | Stop reason: HOSPADM

## 2024-10-06 RX ORDER — SODIUM CHLORIDE, SODIUM LACTATE, POTASSIUM CHLORIDE, CALCIUM CHLORIDE 600; 310; 30; 20 MG/100ML; MG/100ML; MG/100ML; MG/100ML
INJECTION, SOLUTION INTRAVENOUS CONTINUOUS
Status: DISCONTINUED | OUTPATIENT
Start: 2024-10-06 | End: 2024-10-09

## 2024-10-06 RX ORDER — ATORVASTATIN CALCIUM 20 MG/1
20 TABLET, FILM COATED ORAL DAILY
Status: DISCONTINUED | OUTPATIENT
Start: 2024-10-06 | End: 2024-10-17 | Stop reason: HOSPADM

## 2024-10-06 RX ORDER — GLUCAGON 1 MG
1 KIT INJECTION
Status: DISCONTINUED | OUTPATIENT
Start: 2024-10-06 | End: 2024-10-17 | Stop reason: HOSPADM

## 2024-10-06 RX ORDER — SODIUM CHLORIDE 0.9 % (FLUSH) 0.9 %
10 SYRINGE (ML) INJECTION EVERY 12 HOURS PRN
Status: DISCONTINUED | OUTPATIENT
Start: 2024-10-06 | End: 2024-10-17 | Stop reason: HOSPADM

## 2024-10-06 RX ORDER — ENOXAPARIN SODIUM 100 MG/ML
30 INJECTION SUBCUTANEOUS EVERY 24 HOURS
Status: DISCONTINUED | OUTPATIENT
Start: 2024-10-06 | End: 2024-10-06

## 2024-10-06 RX ORDER — NOREPINEPHRINE BITARTRATE/D5W 4MG/250ML
0-3 PLASTIC BAG, INJECTION (ML) INTRAVENOUS CONTINUOUS
Status: DISCONTINUED | OUTPATIENT
Start: 2024-10-06 | End: 2024-10-09

## 2024-10-06 RX ORDER — SODIUM CHLORIDE 9 MG/ML
1000 INJECTION, SOLUTION INTRAVENOUS
Status: COMPLETED | OUTPATIENT
Start: 2024-10-06 | End: 2024-10-06

## 2024-10-06 RX ORDER — NALOXONE HCL 0.4 MG/ML
0.02 VIAL (ML) INJECTION
Status: DISCONTINUED | OUTPATIENT
Start: 2024-10-06 | End: 2024-10-17 | Stop reason: HOSPADM

## 2024-10-06 RX ORDER — IBUPROFEN 200 MG
24 TABLET ORAL
Status: DISCONTINUED | OUTPATIENT
Start: 2024-10-06 | End: 2024-10-17 | Stop reason: HOSPADM

## 2024-10-06 RX ADMIN — CEFTRIAXONE SODIUM 1 G: 1 INJECTION, POWDER, FOR SOLUTION INTRAMUSCULAR; INTRAVENOUS at 02:10

## 2024-10-06 RX ADMIN — SODIUM CHLORIDE, POTASSIUM CHLORIDE, SODIUM LACTATE AND CALCIUM CHLORIDE: 600; 310; 30; 20 INJECTION, SOLUTION INTRAVENOUS at 12:10

## 2024-10-06 RX ADMIN — MUPIROCIN: 20 OINTMENT TOPICAL at 10:10

## 2024-10-06 RX ADMIN — SODIUM CHLORIDE, POTASSIUM CHLORIDE, SODIUM LACTATE AND CALCIUM CHLORIDE 500 ML: 600; 310; 30; 20 INJECTION, SOLUTION INTRAVENOUS at 04:10

## 2024-10-06 RX ADMIN — SODIUM CHLORIDE, POTASSIUM CHLORIDE, SODIUM LACTATE AND CALCIUM CHLORIDE: 600; 310; 30; 20 INJECTION, SOLUTION INTRAVENOUS at 09:10

## 2024-10-06 RX ADMIN — APIXABAN 5 MG: 2.5 TABLET, FILM COATED ORAL at 09:10

## 2024-10-06 RX ADMIN — CEFTRIAXONE SODIUM 2 G: 2 INJECTION, POWDER, FOR SOLUTION INTRAMUSCULAR; INTRAVENOUS at 11:10

## 2024-10-06 RX ADMIN — ATORVASTATIN CALCIUM 20 MG: 20 TABLET, FILM COATED ORAL at 01:10

## 2024-10-06 RX ADMIN — SODIUM CHLORIDE 1000 ML: 9 INJECTION, SOLUTION INTRAVENOUS at 06:10

## 2024-10-06 RX ADMIN — SODIUM CHLORIDE, POTASSIUM CHLORIDE, SODIUM LACTATE AND CALCIUM CHLORIDE 500 ML: 600; 310; 30; 20 INJECTION, SOLUTION INTRAVENOUS at 05:10

## 2024-10-06 RX ADMIN — SODIUM CHLORIDE, POTASSIUM CHLORIDE, SODIUM LACTATE AND CALCIUM CHLORIDE 1000 ML: 600; 310; 30; 20 INJECTION, SOLUTION INTRAVENOUS at 08:10

## 2024-10-06 RX ADMIN — CEFTRIAXONE SODIUM 1 G: 1 INJECTION, POWDER, FOR SOLUTION INTRAMUSCULAR; INTRAVENOUS at 11:10

## 2024-10-06 NOTE — H&P
[facility] History and Physical     Patient Name: Madan Elder  MRN: [id number]  Admission Date: 10/6/2024  Hospital Length of Stay: 0 days  Code Status: Full Code  Attending Provider: Pedro Mckoen MD  Primary Care Provider: Noemí Peña     Chief Complaint:   Back Pain (Back pain after fall last week)      Subjective:      Brief HPI:  Madan Elder is a 78 y.o. male who has a past medical history of Hypertension, AFib, CVA/left-sided weakness, HFrEF (EF 30%), cardiomyopathy with AICD, and chronic bladder outlet obstruction.  He presented to [facility] on 10/6/2024  with a primary complaint of UTI/WIN.  Patient was brought to the ED after concerned neighbors heard him yelling in his apartment.  When seen in the ED patient reported right-sided back pain with no radiation.  Patient denied chest pain, shortness of breath, fever/chills, weakness, dysuria, nausea, vomiting, or diarrhea.  Patient said he does not know why he is here.  Patient was recently discharged on 09/14 due to E coli UTI treated with Bactrim p.o., patient completed this course of antibiotics as reported by daughter.  Patient has a recurrent history of UTIs due to chronic bladder outlet obstruction. Patient is unsure of his own medical history and is likely a poor historian, daughter reports that he seems altered compared to his baseline.  Daughter reports that his presentation is similar to previous UTIs.    ED Course:  Patient presented with stable vital signs.  Labs showed no leukocytosis, H/H within normal limits, elevated corrected calcium of 11.8, elevated renal indices with BUN/creatinine 49.8/1.91 (baseline normal), elevated transaminitis AST/ALT 99/99, CPK low at 27, lactic acid is normal 1.8 .  Chest x-ray showed no acute chest disease identified.  CT abdomen and pelvis showed chronic bladder outlet obstruction with mild right-sided hydronephrosis/hydroureter with no urinary stone appreciated. UA showed UTI  present. Patient received 1 L bolus of LR, 1 g Rocephin, and had blood cultures drawn. Internal medicine was consulted due to management of WIN in the setting of UTI.            Patient family history is not on file.       Patient  has a past surgical history that includes Insertion of pacemaker.    Patient has No Known Allergies.    Patient  reports that he has never smoked. He has never used smokeless tobacco. He reports current alcohol use. He reports that he does not use drugs.     Current Outpatient Medications   Medication Instructions    apixaban (ELIQUIS) 5 mg, Oral, 2 times daily    atorvastatin (LIPITOR) 20 mg, Oral, Daily    carvediloL (COREG) 3.125 mg, Oral, 2 times daily    folic acid (FOLVITE) 1,000 mcg, Oral, Daily    sodium bicarbonate 650 mg, Oral, Daily    VITAMIN B-1 100 mg, Oral, Daily          Review of Systems:  The remainder of the 14 point ROS is noncontributory or negative unless mentioned/reviewed above.     Objective:     Vital Signs:  Vital Signs (Most Recent):  Temp: 97.9 °F (36.6 °C) (10/06/24 0618)  Pulse: 66 (10/06/24 1138)  Resp: 20 (10/06/24 0908)  BP: 112/63 (10/06/24 1138)  SpO2: 96 % (10/06/24 1138)  Body mass index is 22.05 kg/m².  Weight: 65.8 kg (145 lb) Vital Signs (24h Range):  Temp:  [97.9 °F (36.6 °C)] 97.9 °F (36.6 °C)  Pulse:  [60-67] 66  Resp:  [18-20] 20  SpO2:  [96 %-99 %] 96 %  BP: ()/(55-68) 112/63       Input/output:     Intake/Output Summary (Last 24 hours) at 10/6/2024 1309  Last data filed at 10/6/2024 1136  Gross per 24 hour   Intake 1760.51 ml   Output --   Net 1760.51 ml       Physical Exam  Constitutional:       Appearance: He is ill-appearing.   HENT:      Head: Normocephalic and atraumatic.   Eyes:      Extraocular Movements: Extraocular movements intact.      Conjunctiva/sclera: Conjunctivae normal.   Cardiovascular:      Rate and Rhythm: Normal rate and regular rhythm.      Pulses: Normal pulses.      Heart sounds: Normal heart sounds. No murmur  "heard.     No friction rub. No gallop.   Pulmonary:      Effort: Pulmonary effort is normal. No respiratory distress.      Breath sounds: No stridor. Wheezing (Bilaterally) present. No rhonchi or rales.   Abdominal:      General: Abdomen is flat. Bowel sounds are normal. There is no distension.      Palpations: Abdomen is soft. There is no mass.      Tenderness: There is no abdominal tenderness. There is no guarding or rebound.      Hernia: No hernia is present.   Musculoskeletal:      Cervical back: Normal range of motion.   Skin:     General: Skin is warm and dry.      Capillary Refill: Capillary refill takes less than 2 seconds.   Neurological:      Mental Status: He is alert.      Comments: Altered from baseline        Lines/Drains/Airways       None                    Laboratory:    Recent Labs   Lab 10/06/24  0714   WBC 10.88   HGB 14.6   HCT 45.5      MCV 91.4   RDW 14.3     No results for input(s): "TROPONINI", "CKTOTAL", "CKMB", "BNP" in the last 24 hours.  No results for input(s): "TROPONINI", "CKTOTAL", "CKMB", "BNP" in the last 168 hours.  No results for input(s): "CHOL", "HDL", "LDLCALC", "TRIG", "CHOLHDL" in the last 168 hours. Recent Labs   Lab 10/06/24  0714 10/06/24  1234     --    K 4.4  --    CO2 24  --    BUN 49.8*  --    CREATININE 1.91*  --    CALCIUM 10.7*  --    MG  --  2.60   PHOS  --  3.8     Recent Labs   Lab 10/06/24  0714   ALBUMIN 2.6*   BILITOT 2.1*   AST 99*   ALKPHOS 107   ALT 99*     No results for input(s): "IRON", "TIBC", "FERRITIN", "SATURATEDIRO", "SAVQIXBU81", "FOLATE" in the last 168 hours.  No results for input(s): "TSH", "B3HFSDA", "HGBA1C", "INR", "PROTIME", "PTT" in the last 168 hours.       Other Results:  Estimated Creatinine Clearance: 29.2 mL/min (A) (based on SCr of 1.91 mg/dL (H)).    Current Medications:     Infusions:   lactated ringers   Intravenous Continuous 75 mL/hr at 10/06/24 1246 New Bag at 10/06/24 1246         Scheduled:   atorvastatin  20 mg " Oral Daily    enoxparin  30 mg Subcutaneous Daily         PRN:   atorvastatin tablet 20 mg    enoxaparin injection 30 mg        Microbiology Data:  Microbiology Results (last 7 days)       Procedure Component Value Units Date/Time    Blood Culture #1 **CANNOT BE ORDERED STAT** [[id number]] Collected: 10/06/24 1216    Order Status: Sent Specimen: Blood from Arm, Left Updated: 10/06/24 1223    Blood Culture #2 **CANNOT BE ORDERED STAT** [[id number]] Collected: 10/06/24 1219    Order Status: Sent Specimen: Blood from Hand, Left Updated: 10/06/24 1223    Urine culture [[id number]] Collected: 10/06/24 0713    Order Status: Sent Specimen: Urine Updated: 10/06/24 0749             Antibiotics and Day Number of Therapy:  Antibiotics (From admission, onward)      None             Imaging:  CT Abdomen Pelvis  Without Contrast  Narrative: EXAMINATION:  CT ABDOMEN PELVIS WITHOUT CONTRAST    CLINICAL HISTORY:  Abdominal abscess/infection suspected;    TECHNIQUE:  CT imaging of the abdomen and pelvis without intravenous contrast. Axial, coronal and sagittal reformatted images reviewed. Dose length product is 212 mGycm. Automatic exposure control, adjustment of mA/kV or iterative reconstruction technique used to limit radiation dose.    COMPARISON:  CT 10/17/2023 and 10/20/2021    FINDINGS:  Assessment of the visceral organs and vasculature is limited by the lack of IV contrast.    Liver/biliary: No concerning hepatic findings. Prior cholecystectomy. No biliary dilatation.    Pancreas: Normal.    Spleen: Normal.    Adrenals: Normal.    Genitourinary: Mild right-sided hydronephrosis and hydroureter extending to near the UVJ.  No urinary stone identified.  Two small partially exophytic lesions arising from the upper right kidney stable going back to 2021, likely cysts.  Mild bladder wall thickening with multiple bladder diverticula.  Largest diverticulum measures almost 8 cm.  Mildly enlarged prostate.    Stomach/bowel: No bowel  obstruction. Normal appendix. No discernible bowel inflammation.    Lymph nodes and peritoneum: No pathologically enlarged lymph node identified with noncontrast technique. No ascites or free air.    Vasculature: Aortoiliac atherosclerosis.  No abdominal aortic aneurysm.    Abdominal wall: Partially visualized peritoneal shunt tubing with tube tip in the left upper abdomen.    Lung bases: No consolidation or pleural effusion.    Bones: No acute osseous findings.  Impression: Chronic bladder outlet obstruction with mild right-sided hydronephrosis/hydroureter.  No urinary stone appreciated.    Electronically signed by: Scar Saucedo  Date:    10/06/2024  Time:    09:02  X-Ray Chest AP Portable  Narrative: EXAMINATION:  XR CHEST AP PORTABLE    CLINICAL HISTORY:  , Shortness of breath.    FINDINGS:  No alveolar consolidation, effusion, or pneumothorax is seen.   The thoracic aorta is normal  cardiac silhouette, central pulmonary vessels and mediastinum are normal in size and are grossly unremarkable.   visualized osseous structures are grossly unremarkable..    Some linear densities identified at the left base might represent subsegmental atelectatic changes and or fibrotic streaks  Impression: No acute chest disease is identified.    Electronically signed by: Tremaine Betancourt  Date:    10/06/2024  Time:    07:51        2D ECHO Results    Results for orders placed during the hospital encounter of 04/05/23    Echo Saline Bubble? Yes    Interpretation Summary  · There is no evidence of intracardiac shunting.  · The left ventricle is mildly enlarged with moderately decreased systolic function.  · The estimated ejection fraction is 35%.  · Left ventricular diastolic dysfunction.  · Mild aortic regurgitation.  · Mild tricuspid regurgitation.  · Normal central venous pressure (3 mmHg).  · The estimated PA systolic pressure is 29 mmHg.  · Normal right ventricular size with mildly reduced right ventricular systolic  "function.  · Mild mitral regurgitation.  · Mild right atrial enlargement.        Pulmonary Functions Testing Results:    No results found for: "FEV1", "FVC", "ZRT8PTZ", "TLC", "DLCO"    Assessment & Plan:     WIN on UTI  Chronic bladder outlet obstruction  -BUN/creatinine at presentation 49.8/1.91, baseline normal  -urine culture from 09/14/2024 showed E coli with pan sensitivity, initially placed on Augmentin outpatient for 5 days.  -in the ED:  Patient received 1L LR and 1g Rocephin, received another 1g for a total of 2g today.   -patient is started on Rocephin  2g q.d. for UTI starting tomorrow  -blood cultures x2 pending  -UA shows 2+ protein, 3+ blood, 500 leukocyte esterase, RBC >100, WBC >100  -reflex urine culture pending  -patient is started on LR 75 maintenance, lower rate due to EF 30%  -we will consider consulting Urology for evaluation of chronic bladder outlet obstruction  -patient continued to be hypotensive, was given an additional 500 cc of LR  -Strict I&Os    HFrEF (EF 30%)  Afib  -continue home meds:  Apixaban, atorvastatin  -we will hold Coreg due to persistent hypotension            CODE STATUS: Full Code   Access: PIV  Antibiotics:  Rocephin  Diet: No diet orders on file  DVT Prophylaxis: Eliquis  GI Prophylaxis: none  Fluids:  LR 75      Disposition: Madan Elder is a 78 y.o. male who is admitted for WIN on UTI.      Cynthia Kline DO  [facility], PGY-I  10/06/2024   1:09 PM    "

## 2024-10-06 NOTE — ED PROVIDER NOTES
Encounter Date: 10/6/2024       History     Chief Complaint   Patient presents with    Back Pain     Back pain after fall last week     Mr. 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 Patient states he does not know why he has to be at the ED. He endorses he was in bed, sleeping peacefully. He denied any pain.    The history is provided by the patient. No  was used.     Review of patient's allergies indicates:  No Known Allergies  Past Medical History:   Diagnosis Date    Hypertension     Stroke      Past Surgical History:   Procedure Laterality Date    INSERTION OF PACEMAKER       No family history on file.  Social History     Tobacco Use    Smoking status: Never    Smokeless tobacco: Never   Substance Use Topics    Alcohol use: Yes    Drug use: Never     Review of Systems   Constitutional:  Negative for chills and fever.   HENT:  Negative for congestion, rhinorrhea, sinus pain and sore throat.    Eyes:  Negative for pain.   Respiratory:  Negative for cough, chest tightness and shortness of breath.    Cardiovascular:  Negative for chest pain.   Gastrointestinal:  Negative for abdominal pain.   Musculoskeletal:  Negative for arthralgias and myalgias.   Skin: Negative.    Neurological:  Negative for light-headedness and headaches.   Psychiatric/Behavioral:  Negative for behavioral problems.    All other systems reviewed and are negative.      Physical Exam     Initial Vitals [10/06/24 0618]   BP Pulse Resp Temp SpO2   128/62 67 18 97.9 °F (36.6 °C) 99 %      MAP       --         Physical Exam    Nursing note and vitals reviewed.  Constitutional:   Thin appearance without acute distress   HENT:   Head: Normocephalic and atraumatic.   Decreased hearing bilaterally   Neck: No thyromegaly present. No tracheal deviation present.   Normal range  of motion.  Cardiovascular:  Normal rate, regular rhythm and normal heart sounds.           No murmur heard.  Pulmonary/Chest: He has wheezes (Wheezing upon expiration). He exhibits no tenderness.   Abdominal: Bowel sounds are normal. There is no abdominal tenderness.   Musculoskeletal:      Cervical back: Normal range of motion.     Neurological: He is alert. GCS score is 15. GCS eye subscore is 4. GCS verbal subscore is 5. GCS motor subscore is 6.   Skin: Skin is warm and dry.         ED Course   Procedures  Labs Reviewed   COMPREHENSIVE METABOLIC PANEL - Abnormal       Result Value    Sodium 137      Potassium 4.4      Chloride 98      CO2 24      Glucose 115      Blood Urea Nitrogen 49.8 (*)     Creatinine 1.91 (*)     Calcium 10.7 (*)     Protein Total 8.4 (*)     Albumin 2.6 (*)     Globulin 5.8 (*)     Albumin/Globulin Ratio 0.4 (*)     Bilirubin Total 2.1 (*)           ALT 99 (*)     AST 99 (*)     eGFR 35      Anion Gap 15.0      BUN/Creatinine Ratio 26     URINALYSIS, REFLEX TO URINE CULTURE - Abnormal    Color, UA Red (*)     Appearance, UA Turbid (*)     Specific Gravity, UA 1.011      pH, UA 6.0      Protein, UA 2+ (*)     Glucose, UA Normal      Ketones, UA Negative      Blood, UA 3+ (*)     Bilirubin, UA Negative      Urobilinogen, UA Normal      Nitrites, UA Negative      Leukocyte Esterase,  (*)     RBC, UA >=100 (*)     WBC, UA >=100 (*)     WBC Clumps, UA Moderate (*)     Bacteria, UA Rare      Squamous Epithelial Cells, UA 0-1      Mucous, UA Large (*)     Hyaline Casts, UA None Seen     CK - Abnormal    Creatine Kinase 27 (*)    CBC WITH DIFFERENTIAL - Abnormal    WBC 10.88      RBC 4.98      Hgb 14.6      Hct 45.5      MCV 91.4      MCH 29.3      MCHC 32.1 (*)     RDW 14.3      Platelet 226      MPV 10.2      Neut % 76.7      Lymph % 12.7      Mono % 8.5      Eos % 0.6      Basophil % 0.5      Lymph # 1.38      Neut # 8.35      Mono # 0.93      Eos # 0.06      Baso # 0.05      IG#  0.11 (*)     IG% 1.0      NRBC% 0.0     CULTURE, URINE   BLOOD CULTURE OLG   BLOOD CULTURE OLG   CBC W/ AUTO DIFFERENTIAL    Narrative:     The following orders were created for panel order CBC auto differential.  Procedure                               Abnormality         Status                     ---------                               -----------         ------                     CBC with Differential[[id number]]       Abnormal            Final result                 Please view results for these tests on the individual orders.   LACTIC ACID, PLASMA          Imaging Results              CT Abdomen Pelvis  Without Contrast (Final result)  Result time 10/06/24 09:02:28      Final result by Scar Saucedo MD (10/06/24 09:02:28)                   Impression:      Chronic bladder outlet obstruction with mild right-sided hydronephrosis/hydroureter.  No urinary stone appreciated.      Electronically signed by: Scar Saucedo  Date:    10/06/2024  Time:    09:02               Narrative:    EXAMINATION:  CT ABDOMEN PELVIS WITHOUT CONTRAST    CLINICAL HISTORY:  Abdominal abscess/infection suspected;    TECHNIQUE:  CT imaging of the abdomen and pelvis without intravenous contrast. Axial, coronal and sagittal reformatted images reviewed. Dose length product is 212 mGycm. Automatic exposure control, adjustment of mA/kV or iterative reconstruction technique used to limit radiation dose.    COMPARISON:  CT 10/17/2023 and 10/20/2021    FINDINGS:  Assessment of the visceral organs and vasculature is limited by the lack of IV contrast.    Liver/biliary: No concerning hepatic findings. Prior cholecystectomy. No biliary dilatation.    Pancreas: Normal.    Spleen: Normal.    Adrenals: Normal.    Genitourinary: Mild right-sided hydronephrosis and hydroureter extending to near the UVJ.  No urinary stone identified.  Two small partially exophytic lesions arising from the upper right kidney stable going back to 2021, likely cysts.   Mild bladder wall thickening with multiple bladder diverticula.  Largest diverticulum measures almost 8 cm.  Mildly enlarged prostate.    Stomach/bowel: No bowel obstruction. Normal appendix. No discernible bowel inflammation.    Lymph nodes and peritoneum: No pathologically enlarged lymph node identified with noncontrast technique. No ascites or free air.    Vasculature: Aortoiliac atherosclerosis.  No abdominal aortic aneurysm.    Abdominal wall: Partially visualized peritoneal shunt tubing with tube tip in the left upper abdomen.    Lung bases: No consolidation or pleural effusion.    Bones: No acute osseous findings.                                       X-Ray Chest AP Portable (Final result)  Result time 10/06/24 07:51:26      Final result by Tremaine Betancourt MD (10/06/24 07:51:26)                   Impression:      No acute chest disease is identified.      Electronically signed by: Tremaine Betancourt  Date:    10/06/2024  Time:    07:51               Narrative:    EXAMINATION:  XR CHEST AP PORTABLE    CLINICAL HISTORY:  , Shortness of breath.    FINDINGS:  No alveolar consolidation, effusion, or pneumothorax is seen.   The thoracic aorta is normal  cardiac silhouette, central pulmonary vessels and mediastinum are normal in size and are grossly unremarkable.   visualized osseous structures are grossly unremarkable..    Some linear densities identified at the left base might represent subsegmental atelectatic changes and or fibrotic streaks                                       Medications   lactated ringers bolus 1,000 mL (1,000 mLs Intravenous New Bag 10/6/24 0849)   cefTRIAXone (Rocephin) 1 g in D5W 100 mL IVPB (MB+) (has no administration in time range)   0.9%  NaCl infusion (0 mLs Intravenous Stopped 10/6/24 0921)     Medical Decision Making  Upon admission (0650) ordered CBC, CMP, UA & Urine culture (d/t Hx of UTI), IV NS    Amount and/or Complexity of Data Reviewed  External Data Reviewed: labs and  radiology.  Labs: ordered. Decision-making details documented in ED Course.  Radiology: ordered and independent interpretation performed. Decision-making details documented in ED Course.  Discussion of management or test interpretation with external provider(s): 9:43 AM Consult: I discussed the case with Dr. Mendez ([facility]). Agrees with current management.   Recommends will evaluate in ED      Risk  Prescription drug management.      Additional MDM:   Differential Diagnosis:   Other: The following diagnoses were also considered and will be evaluated: Dementia, Delirium and Age-related changes.           Attending Attestation:   Physician Attestation Statement for Resident:  As the supervising MD   Physician Attestation Statement: I have personally seen and examined this patient.   I agree with the above history.  -:   As the supervising MD I agree with the above PE.     As the supervising MD I agree with the above treatment, course, plan, and disposition.   -: Pt with NO SIRS, chronic cystitis, last culture e coli pan-sensitive. CT reveal stable chronic right kidney obstructive pattern with mild hydronephrosis and urinary bladder diverticuli. WIN noticed for which fluid therapy started, no acidosis or electrolytes imbalances. Will admit for UTI and WIN. As a chronic kidney condition and no sepsis, less likely pat will need urological management.   I have reviewed and agree with the residents interpretation of the following: lab data and CT scans.  I have reviewed the following: old records at this facility.                                       Clinical Impression:  Final diagnoses:  [R06.02] SOB (shortness of breath)  [N17.9, N18.9] Acute renal failure superimposed on chronic kidney disease, unspecified acute renal failure type, unspecified CKD stage (Primary)  [N30.01] Acute cystitis with hematuria          ED Disposition Condition    Observation Angelo Hernandez MD  Resident  10/06/24  0928       Tyler Sarkar MD  10/06/24 0943

## 2024-10-07 PROBLEM — E44.0 MODERATE MALNUTRITION: Status: ACTIVE | Noted: 2024-10-07

## 2024-10-07 LAB
ACINETOBACTER CALCOACETICUS-BAUMANNII COMPLEX (OHS): NOT DETECTED
ALBUMIN SERPL-MCNC: 1.8 G/DL (ref 3.4–4.8)
ALBUMIN/GLOB SERPL: 0.5 RATIO (ref 1.1–2)
ALP SERPL-CCNC: 67 UNIT/L (ref 40–150)
ALT SERPL-CCNC: 54 UNIT/L (ref 0–55)
AMMONIA PLAS-MSCNC: 24.7 UMOL/L (ref 18–72)
ANION GAP SERPL CALC-SCNC: 8 MEQ/L
AST SERPL-CCNC: 46 UNIT/L (ref 5–34)
BACTEROIDES FRAGILIS (OHS): NOT DETECTED
BASOPHILS # BLD AUTO: 0.03 X10(3)/MCL
BASOPHILS NFR BLD AUTO: 0.4 %
BILIRUB SERPL-MCNC: 1 MG/DL
BUN SERPL-MCNC: 29.5 MG/DL (ref 8.4–25.7)
C AURIS DNA BLD POS QL NAA+NON-PROBE: NOT DETECTED
C GATTII+NEOFOR DNA CSF QL NAA+NON-PROBE: NOT DETECTED
CALCIUM SERPL-MCNC: 9 MG/DL (ref 8.8–10)
CANDIDA ALBICANS (OHS): NOT DETECTED
CANDIDA GLABRATA (OHS): NOT DETECTED
CANDIDA KRUSEI (OHS): NOT DETECTED
CANDIDA PARAPSILOSIS (OHS): NOT DETECTED
CANDIDA TROPICALIS (OHS): NOT DETECTED
CHLORIDE SERPL-SCNC: 106 MMOL/L (ref 98–107)
CO2 SERPL-SCNC: 23 MMOL/L (ref 23–31)
CREAT SERPL-MCNC: 0.92 MG/DL (ref 0.73–1.18)
CREAT/UREA NIT SERPL: 32
CTX-M (OHS): NOT DETECTED
ENTEROBACTER CLOACAE COMPLEX (OHS): NOT DETECTED
ENTEROBACTERALES (OHS): DETECTED
ENTEROCOCCUS FAECALIS (OHS): NOT DETECTED
ENTEROCOCCUS FAECIUM (OHS): NOT DETECTED
EOSINOPHIL # BLD AUTO: 0.13 X10(3)/MCL (ref 0–0.9)
EOSINOPHIL NFR BLD AUTO: 1.6 %
ERYTHROCYTE [DISTWIDTH] IN BLOOD BY AUTOMATED COUNT: 14.2 % (ref 11.5–17)
ESCHERICHIA COLI (OHS): DETECTED
GFR SERPLBLD CREATININE-BSD FMLA CKD-EPI: >60 ML/MIN/1.73/M2
GLOBULIN SER-MCNC: 4 GM/DL (ref 2.4–3.5)
GLUCOSE SERPL-MCNC: 111 MG/DL (ref 82–115)
GP B STREP DNA CSF QL NAA+NON-PROBE: NOT DETECTED
HAEM INFLU DNA CSF QL NAA+NON-PROBE: NOT DETECTED
HCT VFR BLD AUTO: 34.4 % (ref 42–52)
HGB BLD-MCNC: 11.3 G/DL (ref 14–18)
HOLD SPECIMEN: NORMAL
IMM GRANULOCYTES # BLD AUTO: 0.07 X10(3)/MCL (ref 0–0.04)
IMM GRANULOCYTES NFR BLD AUTO: 0.9 %
IMP (OHS): NOT DETECTED
KLEBSIELLA AEROGENES (OHS): NOT DETECTED
KLEBSIELLA OXYTOCA (OHS): NOT DETECTED
KLEBSIELLA PNEUMONIAE GROUP (OHS): NOT DETECTED
KPC (OHS): NOT DETECTED
L MONOCYTOG DNA CSF QL NAA+NON-PROBE: NOT DETECTED
LYMPHOCYTES # BLD AUTO: 1.14 X10(3)/MCL (ref 0.6–4.6)
LYMPHOCYTES NFR BLD AUTO: 14.2 %
MCH RBC QN AUTO: 29.6 PG (ref 27–31)
MCHC RBC AUTO-ENTMCNC: 32.8 G/DL (ref 33–36)
MCR-1 (OHS): NOT DETECTED
MCV RBC AUTO: 90.1 FL (ref 80–94)
MECA/C (OHS): ABNORMAL
MECA/C AND MREJ (MRSA)(OHS): ABNORMAL
MONOCYTES # BLD AUTO: 0.73 X10(3)/MCL (ref 0.1–1.3)
MONOCYTES NFR BLD AUTO: 9.1 %
N MEN DNA CSF QL NAA+NON-PROBE: NOT DETECTED
NDM (OHS): NOT DETECTED
NEUTROPHILS # BLD AUTO: 5.93 X10(3)/MCL (ref 2.1–9.2)
NEUTROPHILS NFR BLD AUTO: 73.8 %
NRBC BLD AUTO-RTO: 0 %
OXA-48-LIKE (OHS): NOT DETECTED
PLATELET # BLD AUTO: 191 X10(3)/MCL (ref 130–400)
PMV BLD AUTO: 9.6 FL (ref 7.4–10.4)
POTASSIUM SERPL-SCNC: 3.9 MMOL/L (ref 3.5–5.1)
PROT SERPL-MCNC: 5.8 GM/DL (ref 5.8–7.6)
PROTEUS SPP. (OHS): NOT DETECTED
PSA SERPL-MCNC: 4.12 NG/ML
PSEUDOMONAS AERUGINOSA (OHS): NOT DETECTED
RBC # BLD AUTO: 3.82 X10(6)/MCL (ref 4.7–6.1)
S ENT+BONG DNA STL QL NAA+NON-PROBE: NOT DETECTED
S PNEUM DNA CSF QL NAA+NON-PROBE: NOT DETECTED
SERRATIA MARCESCENS (OHS): NOT DETECTED
SODIUM SERPL-SCNC: 137 MMOL/L (ref 136–145)
STAPHYLOCOCCUS AUREUS (OHS): NOT DETECTED
STAPHYLOCOCCUS EPIDERMIDIS (OHS): NOT DETECTED
STAPHYLOCOCCUS LUGDUNENSIS (OHS): NOT DETECTED
STAPHYLOCOCCUS SPP. (OHS): NOT DETECTED
STENOTROPHOMONAS MALTOPHILIA (OHS): NOT DETECTED
STREPTOCOCCUS PYOGENES (GROUP A)(OHS): NOT DETECTED
STREPTOCOCCUS SPP. (OHS): NOT DETECTED
VANA/B (OHS): ABNORMAL
VIM (OHS): NOT DETECTED
WBC # BLD AUTO: 8.03 X10(3)/MCL (ref 4.5–11.5)

## 2024-10-07 PROCEDURE — 25000003 PHARM REV CODE 250

## 2024-10-07 PROCEDURE — 63600175 PHARM REV CODE 636 W HCPCS

## 2024-10-07 PROCEDURE — 85025 COMPLETE CBC W/AUTO DIFF WBC: CPT

## 2024-10-07 PROCEDURE — 36415 COLL VENOUS BLD VENIPUNCTURE: CPT

## 2024-10-07 PROCEDURE — 20000000 HC ICU ROOM

## 2024-10-07 PROCEDURE — 84153 ASSAY OF PSA TOTAL: CPT

## 2024-10-07 PROCEDURE — 36415 COLL VENOUS BLD VENIPUNCTURE: CPT | Performed by: INTERNAL MEDICINE

## 2024-10-07 PROCEDURE — 80053 COMPREHEN METABOLIC PANEL: CPT

## 2024-10-07 PROCEDURE — 25000003 PHARM REV CODE 250: Performed by: INTERNAL MEDICINE

## 2024-10-07 PROCEDURE — 82140 ASSAY OF AMMONIA: CPT

## 2024-10-07 RX ORDER — ACETAMINOPHEN 500 MG
1000 TABLET ORAL EVERY 6 HOURS PRN
Status: DISCONTINUED | OUTPATIENT
Start: 2024-10-07 | End: 2024-10-17 | Stop reason: HOSPADM

## 2024-10-07 RX ORDER — POLYETHYLENE GLYCOL 3350 17 G/17G
17 POWDER, FOR SOLUTION ORAL DAILY
Status: DISCONTINUED | OUTPATIENT
Start: 2024-10-07 | End: 2024-10-14

## 2024-10-07 RX ADMIN — ATORVASTATIN CALCIUM 20 MG: 20 TABLET, FILM COATED ORAL at 09:10

## 2024-10-07 RX ADMIN — CEFTRIAXONE SODIUM 2 G: 2 INJECTION, POWDER, FOR SOLUTION INTRAMUSCULAR; INTRAVENOUS at 11:10

## 2024-10-07 RX ADMIN — SODIUM CHLORIDE, POTASSIUM CHLORIDE, SODIUM LACTATE AND CALCIUM CHLORIDE: 600; 310; 30; 20 INJECTION, SOLUTION INTRAVENOUS at 04:10

## 2024-10-07 RX ADMIN — POLYETHYLENE GLYCOL 3350 17 G: 17 POWDER, FOR SOLUTION ORAL at 11:10

## 2024-10-07 RX ADMIN — MUPIROCIN: 20 OINTMENT TOPICAL at 08:10

## 2024-10-07 RX ADMIN — SODIUM CHLORIDE, POTASSIUM CHLORIDE, SODIUM LACTATE AND CALCIUM CHLORIDE: 600; 310; 30; 20 INJECTION, SOLUTION INTRAVENOUS at 08:10

## 2024-10-07 RX ADMIN — APIXABAN 5 MG: 2.5 TABLET, FILM COATED ORAL at 09:10

## 2024-10-07 RX ADMIN — MUPIROCIN: 20 OINTMENT TOPICAL at 09:10

## 2024-10-07 RX ADMIN — ACETAMINOPHEN 975 MG: 325 SUPPOSITORY RECTAL at 08:10

## 2024-10-07 NOTE — H&P
[facility] - Emergency Dept  Pulmonary Critical Care Note    Patient Name: Madan Elder  MRN: [id number]  Admission Date: 10/6/2024  Hospital Length of Stay: 0 days  Code Status: Full Code  Attending Provider: Pedro Mckeon MD  Primary Care Provider: Noemí Peña     Subjective:     HPI: Madan Elder is a 77 yo male with PMH of chronic bladder obstruction, recurrent UTIs, atrial fibrillation in Eliquis, HFrEF (EF 30%) s/p AICD placement, and HTN. Patient was brought into the ED due to worsening mentation. During evaluation, patient was confused but able to answer some questions. History primarily obtained from daughter at bedside. Daughter states patient was recently admitted to the hospital due to UTI treated with PO antibiotics which patient had completed. Patient was then admitted to our hospital due to suspected UTI along with hypotension. Daughter reports that patient had his BP medications adjusted during his last admission but reports that he was still taking all of his BP medications instead of stopping Lisinopril as told. Patient was treated with IV fluid bolus and low maintenance fluids due to history of HFrEF but BP remained persistently low with MAPs at 65. Patient was upgraded to the ICU for closer monitoring.       Hospital Course/Significant events:  10/6/24: Upgraded for UTI sepsis vs medication overuse requiring IV vasopressor support    24 Hour Interval History:  N/A    Past Medical History:   Diagnosis Date    Hypertension     Stroke        Past Surgical History:   Procedure Laterality Date    INSERTION OF PACEMAKER         Social History     Socioeconomic History    Marital status:    Tobacco Use    Smoking status: Never    Smokeless tobacco: Never   Substance and Sexual Activity    Alcohol use: Yes    Drug use: Never           Current Outpatient Medications   Medication Instructions    apixaban (ELIQUIS) 5 mg, Oral, 2 times daily    atorvastatin (LIPITOR) 20 mg, Oral,  Daily    carvediloL (COREG) 3.125 mg, Oral, 2 times daily    folic acid (FOLVITE) 1,000 mcg, Oral, Daily    sodium bicarbonate 650 mg, Oral, Daily    VITAMIN B-1 100 mg, Oral, Daily       Current Inpatient Medications   apixaban  5 mg Oral BID    atorvastatin  20 mg Oral Daily    [START ON 10/7/2024] cefTRIAXone (Rocephin) IV (PEDS and ADULTS)  2 g Intravenous Q24H    lactated ringers  500 mL Intravenous Once       Current Intravenous Infusions   lactated ringers   Intravenous Continuous   Stopped at 10/06/24 1430         Review of Systems   Constitutional:  Negative for chills and fever.   HENT:  Negative for congestion, sinus pain and sore throat.    Eyes:  Negative for blurred vision and double vision.   Respiratory:  Negative for cough, sputum production, shortness of breath and wheezing.    Cardiovascular:  Negative for chest pain, palpitations and leg swelling.   Gastrointestinal:  Negative for abdominal pain, nausea and vomiting.   Genitourinary:  Negative for dysuria.   Musculoskeletal:  Negative for myalgias.   Neurological:  Negative for dizziness, focal weakness, seizures and weakness.   Psychiatric/Behavioral:          Confusion          Objective:       Intake/Output Summary (Last 24 hours) at 10/6/2024 2007  Last data filed at 10/6/2024 1700  Gross per 24 hour   Intake 2777.18 ml   Output --   Net 2777.18 ml         Vital Signs (Most Recent):  Temp: 97.9 °F (36.6 °C) (10/06/24 0618)  Pulse: 67 (10/06/24 1857)  Resp: 20 (10/06/24 1857)  BP: 116/72 (10/06/24 1857)  SpO2: 97 % (10/06/24 1857)  Body mass index is 22.05 kg/m².  Weight: 65.8 kg (145 lb) Vital Signs (24h Range):  Temp:  [97.9 °F (36.6 °C)] 97.9 °F (36.6 °C)  Pulse:  [60-67] 67  Resp:  [13-33] 20  SpO2:  [69 %-99 %] 97 %  BP: ()/(35-72) 116/72     Physical Exam  HENT:      Head: Normocephalic.      Mouth/Throat:      Mouth: Mucous membranes are moist.   Eyes:      Conjunctiva/sclera: Conjunctivae normal.      Pupils: Pupils are equal,  "round, and reactive to light.   Cardiovascular:      Rate and Rhythm: Normal rate and regular rhythm.      Pulses: Normal pulses.   Pulmonary:      Breath sounds: Wheezing present.   Abdominal:      General: Abdomen is flat. Bowel sounds are normal. There is no distension.      Palpations: Abdomen is soft.      Tenderness: There is no abdominal tenderness.   Musculoskeletal:         General: Normal range of motion.      Cervical back: Normal range of motion.   Skin:     General: Skin is warm.      Capillary Refill: Capillary refill takes less than 2 seconds.   Neurological:      General: No focal deficit present.      Mental Status: He is alert.      Comments: Confused; only oriented to self and place         Lines/Drains/Airways       Peripheral Intravenous Line  Duration                  Peripheral IV - Single Lumen 10/06/24 0657 18 G Anterior;Proximal;Right Forearm <1 day         Peripheral IV - Single Lumen 10/06/24 1540 20 G Left;Posterior Forearm <1 day                    Significant Labs:    Lab Results   Component Value Date    WBC 10.88 10/06/2024    HGB 14.6 10/06/2024    HCT 45.5 10/06/2024    MCV 91.4 10/06/2024     10/06/2024           BMP  Lab Results   Component Value Date     10/06/2024    K 4.4 10/06/2024    CO2 24 10/06/2024    BUN 49.8 (H) 10/06/2024    CREATININE 1.91 (H) 10/06/2024    CALCIUM 10.7 (H) 10/06/2024    AGAP 15.0 10/06/2024    EGFRNONAA >60 11/05/2021         ABG  No results for input(s): "PH", "PO2", "PCO2", "HCO3", "POCBASEDEF" in the last 168 hours.    Mechanical Ventilation Support:         Significant Imaging:  I have reviewed the pertinent imaging within the past 24 hours.        Assessment/Plan:     Assessment  UTI sepsis vs Medication overuse  WIN 2/2 above  Hx of chronic bladder outlet obstruction  Recurrent UTI in male  HFrEF (EF 30%) s/p AICD  Atrial fibrillation on Eliquis  CVA      Plan  Upgraded to the ICU for vasopressor support  Will order Levophed for " vasopressor support; wean as tolerated  Holding all BP medications at this time  Continue slow maintenance fluids 75 cc/hr due to HFrEF  UA positive for , WBC >100, and rare bacteria  Currently on Rocephin 2g daily; de-escalate pending urine cultures  CT abdomen with chronic bladder outlet obstruction with mild right sided hydronephrosis; consult Urology in the AM  Will continue to monitor    DVT Prophylaxis: Eliquis  GI Prophylaxis: N/A     32 minutes of critical care was time spent personally by me on the following activities: development of treatment plan with patient or surrogate and bedside caregivers, discussions with consultants, evaluation of patient's response to treatment, examination of patient, ordering and performing treatments and interventions, ordering and review of laboratory studies, ordering and review of radiographic studies, pulse oximetry, re-evaluation of patient's condition.  This critical care time did not overlap with that of any other provider or involve time for any procedures.     Phoenix Torrez MD  Pulmonary Critical Care Medicine  [facility] - Emergency Dept  DOS: 10/06/2024

## 2024-10-07 NOTE — ASSESSMENT & PLAN NOTE
Patient meets ASPEN criteria for moderate malnutrition of chronic illness per RD assessment as evidenced by:  Energy Intake (Malnutrition): less than 75% for greater than or equal to 1 month  Weight Loss (Malnutrition): other (see comments) (does not meet criteria)  Subcutaneous Fat (Malnutrition): mild depletion  Muscle Mass (Malnutrition): mild depletion           A minimum of two characteristics is recommended for diagnosis of either severe or non-severe malnutrition.

## 2024-10-07 NOTE — PLAN OF CARE
10/07/24 1150   Discharge Assessment   Assessment Type Discharge Planning Assessment   Confirmed/corrected address, phone number and insurance Yes   Confirmed Demographics Correct on Facesheet   Source of Information family;health record   Reason For Admission SOB, Acute cystitis with hematuria, WIN, Chest pain   People in Home alone   Facility Arrived From: Home   Do you expect to return to your current living situation? Yes   Do you have help at home or someone to help you manage your care at home? Yes   Who are your caregiver(s) and their phone number(s)? Charmaine Vargas (Daughter)  [phone]   Walking or Climbing Stairs Difficulty yes   Walking or Climbing Stairs ambulation difficulty, requires equipment   Mobility Management WC, RW   Dressing/Bathing Difficulty yes   Dressing/Bathing bathing difficulty, assistance 1 person   Dressing/Bathing Management Sitter   Home Accessibility wheelchair accessible   Home Layout Able to live on 1st floor   Equipment Currently Used at Home bedside commode;walker, rolling;wheelchair   Readmission within 30 days? Yes   Patient currently being followed by outpatient case management? No   Do you currently have service(s) that help you manage your care at home? Yes   How Many hours does patient receive services 30   Name and Contact number of agency [facility]; [facility]   Is the pt/caregiver preference to resume services with current agency Yes   Do you take prescription medications? Yes  ([facility])   Do you have prescription coverage? Yes   Coverage M/D   Do you have any problems affording any of your prescribed medications? No   Is the patient taking medications as prescribed? yes   Who is going to help you get home at discharge? Family   How do you get to doctors appointments? family or friend will provide   Are you on dialysis? No   Discharge Plan A Home Health;Home   DME Needed Upon Discharge  none   Discharge Plan discussed with: Adult children    Transition of Care Barriers None   Physical Activity   On average, how many days per week do you engage in moderate to strenuous exercise (like a brisk walk)? 0 days   On average, how many minutes do you engage in exercise at this level? 0 min   Financial Resource Strain   How hard is it for you to pay for the very basics like food, housing, medical care, and heating? Not hard   Housing Stability   In the last 12 months, was there a time when you were not able to pay the mortgage or rent on time? N   At any time in the past 12 months, were you homeless or living in a shelter (including now)? N   Transportation Needs   Has the lack of transportation kept you from medical appointments, meetings, work or from getting things needed for daily living? No   Food Insecurity   Within the past 12 months, you worried that your food would run out before you got the money to buy more. Never true   Stress   Do you feel stress - tense, restless, nervous, or anxious, or unable to sleep at night because your mind is troubled all the time - these days? Pt Unable   Social Isolation   How often do you feel lonely or isolated from those around you?  Patient unable to answer   Alcohol Use   Q1: How often do you have a drink containing alcohol? Never   Q2: How many drinks containing alcohol do you have on a typical day when you are drinking? None   Q3: How often do you have six or more drinks on one occasion? Never   [employer]   In the past 12 months has the electric, gas, oil, or water company threatened to shut off services in your home? No     Pt  with 1 dghtr; Resides alone; [email or web address]; Pt receives 30 hrs LT[facility] through [facility]; Retired  with the [employer]; Home address: [address] [place] [address]; Dghtr, Charmaine, reported she has plans to go on vacation starting tomorrow and returning on Saturday; CM to follow.

## 2024-10-07 NOTE — PROGRESS NOTES
Inpatient Nutrition Assessment    Admit Date: 10/6/2024   Total duration of encounter: 1 day   Patient Age: 78 y.o.    Nutrition Recommendation/Prescription     Continue cardiac diet  Will order boost plus tid; Boost Plus (provides 360 kcal, 14 g protein per serving)   MVI/fe  Biweekly wt  Will monitor nutrition status     Communication of Recommendations: reviewed with nurse and reviewed with patient    Nutrition Assessment     Malnutrition Assessment/Nutrition-Focused Physical Exam    Malnutrition Context: chronic illness (10/07/24 1118)  Malnutrition Level: moderate (10/07/24 1118)  Energy Intake (Malnutrition): less than 75% for greater than or equal to 1 month (10/07/24 1118)  Weight Loss (Malnutrition): other (see comments) (does not meet criteria) (10/07/24 1118)  Subcutaneous Fat (Malnutrition): mild depletion (10/07/24 1118)  Orbital Region (Subcutaneous Fat Loss): mild depletion  Upper Arm Region (Subcutaneous Fat Loss): mild depletion     Muscle Mass (Malnutrition): mild depletion (10/07/24 1118)  Tenriism Region (Muscle Loss): mild depletion  Clavicle Bone Region (Muscle Loss): mild depletion                             A minimum of two characteristics is recommended for diagnosis of either severe or non-severe malnutrition.    Chart Review    Reason Seen: continuous nutrition monitoring    Malnutrition Screening Tool Results              Diagnosis:  UTI, sepsis vs medication overuse, WIN, hx chronic bladder outlet obstruction, CHF, Afib, CVA    Relevant Medical History: chronic bladder obstruction, UTI, afib, CHF, S/P AICD, HTN     Scheduled Medications:  apixaban, 5 mg, BID  atorvastatin, 20 mg, Daily  cefTRIAXone (Rocephin) IV (PEDS and ADULTS), 2 g, Q24H  mupirocin, , BID  polyethylene glycol, 17 g, Daily    Continuous Infusions:  lactated ringers, Last Rate: 75 mL/hr at 10/07/24 0659  NORepinephrine bitartrate-D5W    PRN Medications:  dextrose 10%, 12.5 g, PRN  dextrose 10%, 25 g, PRN  glucagon (human  "recombinant), 1 mg, PRN  glucose, 16 g, PRN  glucose, 24 g, PRN  naloxone, 0.02 mg, PRN  sodium chloride 0.9%, 10 mL, Q12H PRN    Calorie Containing IV Medications: no significant kcals from medications at this time    Recent Labs   Lab 10/06/24  0714 10/06/24  1234 10/07/24  0354 10/07/24  1023     --  137  --    K 4.4  --  3.9  --    CALCIUM 10.7*  --  9.0  --    PHOS  --  3.8  --   --    MG  --  2.60  --   --    CL 98  --  106  --    CO2 24  --  23  --    BUN 49.8*  --  29.5*  --    CREATININE 1.91*  --  0.92  --    EGFRNORACEVR 35  --  >60  --    GLUCOSE 115  --  111  --    BILITOT 2.1*  --  1.0  --    ALKPHOS 107  --  67  --    ALT 99*  --  54  --    AST 99*  --  46*  --    ALBUMIN 2.6*  --  1.8*  --    AMMONIA  --   --   --  24.7   WBC 10.88  --  8.03  --    HGB 14.6  --  11.3*  --    HCT 45.5  --  34.4*  --      Nutrition Orders:  Diet Heart Healthy      Appetite/Oral Intake: fair/50-75% of meals  Factors Affecting Nutritional Intake: impaired cognitive status/motor control and decreased appetite  Social Needs Impacting Access to Food: none identified  Food/Tenriism/Cultural Preferences: none reported  Food Allergies: none reported     Wound(s):  none reported     Comments    (10/7) pt laying in bed during rounds; not able to answer all questions appropriately; nurse reported --pt eats some of food but does better with ONS; will order boost supplement; pt ate approx 50% breakfast meal. Per EMR wt hx --wt stable. Pt with mild fat/muscle wasting. Labs acknowledged.     Anthropometrics    Height: 5' 8" (172.7 cm), Height Method: Estimated  Last Weight: 65.8 kg (145 lb) (10/06/24 0618), Weight Method: Bed Scale  BMI (Calculated): 22.1  BMI Classification: underweight (BMI less than 22 if >65 years of age)        Ideal Body Weight (IBW), Male: 154 lb     % Ideal Body Weight, Male (lb): 94.16 %                 Usual Body Weight (UBW), k kg  % Usual Body Weight: 101.4     Usual Weight Provided By: " patient and EMR weight history    Wt Readings from Last 5 Encounters:   10/06/24 65.8 kg (145 lb)   09/15/24 63.4 kg (139 lb 12.4 oz)   10/17/23 64.9 kg (143 lb)   04/27/23 66.2 kg (146 lb)   04/10/23 74.6 kg (164 lb 9 oz)     Weight Change(s) Since Admission: no recent wt loss   Wt Readings from Last 1 Encounters:   10/06/24 0618 65.8 kg (145 lb)   Admit Weight: 65.8 kg (145 lb) (10/06/24 0618), Weight Method: Stated    Estimated Needs    Weight Used For Calorie Calculations: 65.8 kg (145 lb 1 oz)  Energy Calorie Requirements (kcal): 1974 kcal/d; 30 samra/kg  Energy Need Method: Kcal/kg  Weight Used For Protein Calculations: 65.8 kg (145 lb 1 oz)  Protein Requirements: 79 gm protein/d 1.2 gm/kg  Fluid Requirements (mL): 1974 ml/d; 1ml/samra        Enteral Nutrition     Patient not receiving enteral nutrition at this time.    Parenteral Nutrition     Patient not receiving parenteral nutrition support at this time.    Evaluation of Received Nutrient Intake    Calories: not meeting estimated needs  Protein: not meeting estimated needs    Patient Education     Not applicable.    Nutrition Diagnosis     PES: Inadequate oral intake related to chronic illness as evidenced by eating 75% or less. (new)     PES: Moderate chronic disease or condition related malnutrition related to chronic illness as evidenced by less than 75% needs met for greater than or equal to 1 month, mild fat depletion, and mild muscle depletion. (new)    Nutrition Interventions     Intervention(s): modified composition of meals/snacks, commercial beverage, multivitamin/mineral supplement therapy, and collaboration with other providers    Goal: Meet greater than 80% of nutritional needs by follow-up. (new)  Goal: Maintain weight throughout hospitalization. (new)    Nutrition Goals & Monitoring     Dietitian will monitor: food and beverage intake, weight, and glucose/endocrine profile  Discharge planning: continue heart healthy  diet with boost  oral  supplements  Nutrition Risk/Follow-Up: moderate (follow-up in 3-5 days)   Please consult if re-assessment needed sooner.

## 2024-10-07 NOTE — PLAN OF CARE
Problem: Adult Inpatient Plan of Care  Goal: Plan of Care Review  Outcome: Progressing  Goal: Patient-Specific Goal (Individualized)  Outcome: Progressing  Goal: Absence of Hospital-Acquired Illness or Injury  Outcome: Progressing  Goal: Optimal Comfort and Wellbeing  Outcome: Progressing  Goal: Readiness for Transition of Care  Outcome: Progressing     Problem: Acute Kidney Injury/Impairment  Goal: Fluid and Electrolyte Balance  Outcome: Progressing  Goal: Improved Oral Intake  Outcome: Progressing  Goal: Effective Renal Function  Outcome: Progressing     Problem: Fall Injury Risk  Goal: Absence of Fall and Fall-Related Injury  Outcome: Progressing     Problem: Restraint, Nonviolent  Goal: Absence of Harm or Injury  Outcome: Progressing

## 2024-10-07 NOTE — ED NOTES
Received report, assumed care of pt, pt resting comfortably eating tray that had been provided by previous shift, pt tolerating it well.  Pt on monitor, in NAD

## 2024-10-08 LAB
ALBUMIN SERPL-MCNC: 2 G/DL (ref 3.4–4.8)
ALBUMIN/GLOB SERPL: 0.5 RATIO (ref 1.1–2)
ALP SERPL-CCNC: 71 UNIT/L (ref 40–150)
ALT SERPL-CCNC: 46 UNIT/L (ref 0–55)
ANION GAP SERPL CALC-SCNC: 10 MEQ/L
AST SERPL-CCNC: 37 UNIT/L (ref 5–34)
BACTERIA UR CULT: ABNORMAL
BASOPHILS # BLD AUTO: 0.03 X10(3)/MCL
BASOPHILS NFR BLD AUTO: 0.4 %
BILIRUB SERPL-MCNC: 1.2 MG/DL
BUN SERPL-MCNC: 17.6 MG/DL (ref 8.4–25.7)
CALCIUM SERPL-MCNC: 9.1 MG/DL (ref 8.8–10)
CHLORIDE SERPL-SCNC: 108 MMOL/L (ref 98–107)
CO2 SERPL-SCNC: 21 MMOL/L (ref 23–31)
CREAT SERPL-MCNC: 0.82 MG/DL (ref 0.73–1.18)
CREAT/UREA NIT SERPL: 21
EOSINOPHIL # BLD AUTO: 0.1 X10(3)/MCL (ref 0–0.9)
EOSINOPHIL NFR BLD AUTO: 1.2 %
ERYTHROCYTE [DISTWIDTH] IN BLOOD BY AUTOMATED COUNT: 13.9 % (ref 11.5–17)
GFR SERPLBLD CREATININE-BSD FMLA CKD-EPI: >60 ML/MIN/1.73/M2
GLOBULIN SER-MCNC: 4.4 GM/DL (ref 2.4–3.5)
GLUCOSE SERPL-MCNC: 104 MG/DL (ref 82–115)
HCT VFR BLD AUTO: 38.8 % (ref 42–52)
HGB BLD-MCNC: 12.6 G/DL (ref 14–18)
HOLD SPECIMEN: NORMAL
HOLD SPECIMEN: NORMAL
IMM GRANULOCYTES # BLD AUTO: 0.07 X10(3)/MCL (ref 0–0.04)
IMM GRANULOCYTES NFR BLD AUTO: 0.9 %
LYMPHOCYTES # BLD AUTO: 1.07 X10(3)/MCL (ref 0.6–4.6)
LYMPHOCYTES NFR BLD AUTO: 13.3 %
MCH RBC QN AUTO: 28.8 PG (ref 27–31)
MCHC RBC AUTO-ENTMCNC: 32.5 G/DL (ref 33–36)
MCV RBC AUTO: 88.8 FL (ref 80–94)
MONOCYTES # BLD AUTO: 0.59 X10(3)/MCL (ref 0.1–1.3)
MONOCYTES NFR BLD AUTO: 7.3 %
NEUTROPHILS # BLD AUTO: 6.19 X10(3)/MCL (ref 2.1–9.2)
NEUTROPHILS NFR BLD AUTO: 76.9 %
NRBC BLD AUTO-RTO: 0 %
PLATELET # BLD AUTO: 208 X10(3)/MCL (ref 130–400)
PMV BLD AUTO: 9.6 FL (ref 7.4–10.4)
POTASSIUM SERPL-SCNC: 3.8 MMOL/L (ref 3.5–5.1)
PROT SERPL-MCNC: 6.4 GM/DL (ref 5.8–7.6)
RBC # BLD AUTO: 4.37 X10(6)/MCL (ref 4.7–6.1)
SODIUM SERPL-SCNC: 139 MMOL/L (ref 136–145)
WBC # BLD AUTO: 8.05 X10(3)/MCL (ref 4.5–11.5)

## 2024-10-08 PROCEDURE — 36415 COLL VENOUS BLD VENIPUNCTURE: CPT | Performed by: INTERNAL MEDICINE

## 2024-10-08 PROCEDURE — 80053 COMPREHEN METABOLIC PANEL: CPT

## 2024-10-08 PROCEDURE — 21400001 HC TELEMETRY ROOM

## 2024-10-08 PROCEDURE — 25000003 PHARM REV CODE 250

## 2024-10-08 PROCEDURE — 25000003 PHARM REV CODE 250: Performed by: INTERNAL MEDICINE

## 2024-10-08 PROCEDURE — 63600175 PHARM REV CODE 636 W HCPCS

## 2024-10-08 PROCEDURE — 99233 SBSQ HOSP IP/OBS HIGH 50: CPT | Mod: GC,,, | Performed by: INTERNAL MEDICINE

## 2024-10-08 PROCEDURE — 11000001 HC ACUTE MED/SURG PRIVATE ROOM

## 2024-10-08 PROCEDURE — 36415 COLL VENOUS BLD VENIPUNCTURE: CPT

## 2024-10-08 PROCEDURE — 85025 COMPLETE CBC W/AUTO DIFF WBC: CPT

## 2024-10-08 RX ORDER — THIAMINE HCL 100 MG
100 TABLET ORAL 3 TIMES DAILY
Status: DISCONTINUED | OUTPATIENT
Start: 2024-10-11 | End: 2024-10-16

## 2024-10-08 RX ORDER — FOLIC ACID 1 MG/1
1 TABLET ORAL DAILY
Status: DISCONTINUED | OUTPATIENT
Start: 2024-10-08 | End: 2024-10-17 | Stop reason: HOSPADM

## 2024-10-08 RX ADMIN — MULTIPLE VITAMINS W/ MINERALS TAB 1 TABLET: TAB at 08:10

## 2024-10-08 RX ADMIN — CEFTRIAXONE SODIUM 2 G: 2 INJECTION, POWDER, FOR SOLUTION INTRAMUSCULAR; INTRAVENOUS at 11:10

## 2024-10-08 RX ADMIN — THIAMINE HYDROCHLORIDE 250 MG: 100 INJECTION, SOLUTION INTRAMUSCULAR; INTRAVENOUS at 10:10

## 2024-10-08 RX ADMIN — APIXABAN 5 MG: 2.5 TABLET, FILM COATED ORAL at 08:10

## 2024-10-08 RX ADMIN — MUPIROCIN: 20 OINTMENT TOPICAL at 09:10

## 2024-10-08 RX ADMIN — ATORVASTATIN CALCIUM 20 MG: 20 TABLET, FILM COATED ORAL at 08:10

## 2024-10-08 RX ADMIN — POLYETHYLENE GLYCOL 3350 17 G: 17 POWDER, FOR SOLUTION ORAL at 08:10

## 2024-10-08 RX ADMIN — FOLIC ACID 1 MG: 1 TABLET ORAL at 08:10

## 2024-10-08 RX ADMIN — MUPIROCIN: 20 OINTMENT TOPICAL at 08:10

## 2024-10-08 NOTE — H&P
[facility] - Emergency Dept  Pulmonary Critical Care Note    Patient Name: Madan Elder  MRN: [id number]  Admission Date: 10/6/2024  Hospital Length of Stay: 2 days  Code Status: Full Code  Attending Provider: Adalberto Puga Jr., MD,*  Primary Care Provider: Casey Provider     Subjective:     HPI: Madan Elder is a 77 yo male with PMH of chronic bladder obstruction, recurrent UTIs, atrial fibrillation in Eliquis, HFrEF (EF 30%) s/p AICD placement, and HTN. Patient was brought into the ED due to worsening mentation. During evaluation, patient was confused but able to answer some questions. History primarily obtained from daughter at bedside. Daughter states patient was recently admitted to the hospital due to UTI treated with PO antibiotics which patient had completed. Patient was then admitted to our hospital due to suspected UTI along with hypotension. Daughter reports that patient had his BP medications adjusted during his last admission but reports that he was still taking all of his BP medications instead of stopping Lisinopril as told. Patient was treated with IV fluid bolus and low maintenance fluids due to history of HFrEF but BP remained persistently low with MAPs at 65. Patient was upgraded to the ICU for closer monitoring.       Hospital Course/Significant events:  10/6/24: Upgraded for UTI sepsis vs medication overuse requiring IV vasopressor support    24 Hour Interval History:  Patient febrile overnight with T max 102.4 F that has now resolved. BP has remained stable without vasopressor support. Patient remains agitated at times and refused labs this morning. His Blood culture is positive for E Coli and Urine culture positive for gram negative rods. Daughter brought up concerns yesterday to the nursing staff that patient was most likely still consuming alcohol daily with unknown last intake. She also clarified that patient was not on Lisinopril and it was Vit D that was added as part  of his med regimen.     Past Medical History:   Diagnosis Date    Hypertension     Stroke        Past Surgical History:   Procedure Laterality Date    INSERTION OF PACEMAKER         Social History     Socioeconomic History    Marital status:    Tobacco Use    Smoking status: Never    Smokeless tobacco: Never   Substance and Sexual Activity    Alcohol use: Yes    Drug use: Never     Social Drivers of Health     Financial Resource Strain: Low Risk  (10/7/2024)    Overall Financial Resource Strain (CARDIA)     Difficulty of Paying Living Expenses: Not hard at all   Food Insecurity: Unknown (10/7/2024)    Hunger Vital Sign     Worried About Running Out of Food in the Last Year: Never true   Transportation Needs: No Transportation Needs (10/7/2024)    TRANSPORTATION NEEDS     Transportation : No   Physical Activity: Inactive (10/7/2024)    Exercise Vital Sign     Days of Exercise per Week: 0 days     Minutes of Exercise per Session: 0 min   Stress: Patient Unable To Answer (10/7/2024)    [place] [employer] - Occupational Stress Questionnaire     Feeling of Stress : Patient unable to answer   Housing Stability: Low Risk  (10/7/2024)    Housing Stability Vital Sign     Unable to Pay for Housing in the Last Year: No     Homeless in the Last Year: No           Current Outpatient Medications   Medication Instructions    apixaban (ELIQUIS) 5 mg, Oral, 2 times daily    atorvastatin (LIPITOR) 20 mg, Oral, Daily    carvediloL (COREG) 3.125 mg, Oral, 2 times daily    folic acid (FOLVITE) 1,000 mcg, Oral, Daily    sodium bicarbonate 650 mg, Oral, Daily    VITAMIN B-1 100 mg, Oral, Daily       Current Inpatient Medications   apixaban  5 mg Oral BID    atorvastatin  20 mg Oral Daily    cefTRIAXone (Rocephin) IV (PEDS and ADULTS)  2 g Intravenous Q24H    mupirocin   Nasal BID    polyethylene glycol  17 g Oral Daily       Current Intravenous Infusions   lactated ringers   Intravenous Continuous 50 mL/hr at  10/07/24 2032 New Bag at 10/07/24 2032    NORepinephrine bitartrate-D5W  0-3 mcg/kg/min Intravenous Continuous             Review of Systems   Constitutional:  Negative for chills and fever.   HENT:  Negative for congestion, sinus pain and sore throat.    Eyes:  Negative for blurred vision and double vision.   Respiratory:  Negative for cough, sputum production, shortness of breath and wheezing.    Cardiovascular:  Negative for chest pain, palpitations and leg swelling.   Gastrointestinal:  Negative for abdominal pain, nausea and vomiting.   Genitourinary:  Negative for dysuria.   Musculoskeletal:  Negative for myalgias.   Neurological:  Negative for dizziness, focal weakness, seizures and weakness.   Psychiatric/Behavioral:          Confusion          Objective:       Intake/Output Summary (Last 24 hours) at 10/8/2024 0552  Last data filed at 10/8/2024 0530  Gross per 24 hour   Intake 1487.09 ml   Output 950 ml   Net 537.09 ml         Vital Signs (Most Recent):  Temp: 98.3 °F (36.8 °C) (10/08/24 0400)  Pulse: 79 (10/08/24 0500)  Resp: (!) 23 (10/08/24 0500)  BP: (!) 125/90 (10/08/24 0500)  SpO2: 100 % (10/08/24 0500)  Body mass index is 22.05 kg/m².  Weight: 65.8 kg (145 lb) Vital Signs (24h Range):  Temp:  [96.5 °F (35.8 °C)-102.4 °F (39.1 °C)] 98.3 °F (36.8 °C)  Pulse:  [] 79  Resp:  [15-37] 23  SpO2:  [97 %-100 %] 100 %  BP: ()/(50-97) 125/90     Physical Exam  HENT:      Head: Normocephalic.      Mouth/Throat:      Mouth: Mucous membranes are moist.   Eyes:      Conjunctiva/sclera: Conjunctivae normal.      Pupils: Pupils are equal, round, and reactive to light.   Cardiovascular:      Rate and Rhythm: Normal rate and regular rhythm.      Pulses: Normal pulses.   Pulmonary:      Breath sounds: No wheezing.   Abdominal:      General: Abdomen is flat. Bowel sounds are normal. There is no distension.      Palpations: Abdomen is soft.      Tenderness: There is no abdominal tenderness.   Musculoskeletal:   "       General: Normal range of motion.      Cervical back: Normal range of motion.   Skin:     General: Skin is warm.      Capillary Refill: Capillary refill takes less than 2 seconds.   Neurological:      General: No focal deficit present.      Mental Status: He is alert.      Comments: Oriented to self and place but not time           Lines/Drains/Airways       Peripheral Intravenous Line  Duration                  Peripheral IV - Single Lumen 10/06/24 0657 18 G Anterior;Proximal;Right Forearm 1 day         Peripheral IV - Single Lumen 10/06/24 1540 20 G Left;Posterior Forearm 1 day                    Significant Labs:    Lab Results   Component Value Date    WBC 8.03 10/07/2024    HGB 11.3 (L) 10/07/2024    HCT 34.4 (L) 10/07/2024    MCV 90.1 10/07/2024     10/07/2024           BMP  Lab Results   Component Value Date     10/07/2024    K 3.9 10/07/2024    CO2 23 10/07/2024    BUN 29.5 (H) 10/07/2024    CREATININE 0.92 10/07/2024    CALCIUM 9.0 10/07/2024    AGAP 8.0 10/07/2024    EGFRNONAA >60 11/05/2021         ABG  No results for input(s): "PH", "PO2", "PCO2", "HCO3", "POCBASEDEF" in the last 168 hours.    Mechanical Ventilation Support:         Significant Imaging:  I have reviewed the pertinent imaging within the past 24 hours.        Assessment/Plan:     Assessment  UTI sepsis vs Medication overuse  WIN 2/2 above  Hx of chronic bladder outlet obstruction  Recurrent UTI in male  HFrEF (EF 30%) s/p AICD  Atrial fibrillation on Eliquis  CVA  History of alcohol abuse      Plan  Upgraded to the ICU for vasopressor support  Holding all BP medications at this time  Discontinue maintenance fluids  Blood culture positive for E coli and Urine culture positive for gram negative rods  Currently on Rocephin 2g daily  CT abdomen with chronic bladder outlet obstruction with mild right sided hydronephrosis; will schedule urology appointment outpatient   [facility] protocol ordered  Will start Folic acid, " Multivitamins and thiamine  Patient stable to be be downgraded to the floor today.    DVT Prophylaxis: Eliquis  GI Prophylaxis: N/A     32 minutes of critical care was time spent personally by me on the following activities: development of treatment plan with patient or surrogate and bedside caregivers, discussions with consultants, evaluation of patient's response to treatment, examination of patient, ordering and performing treatments and interventions, ordering and review of laboratory studies, ordering and review of radiographic studies, pulse oximetry, re-evaluation of patient's condition.  This critical care time did not overlap with that of any other provider or involve time for any procedures.     Bartolo Keyes MD  Pulmonary Critical Care Medicine  [facility] Emergency Dept  DOS: 10/08/2024

## 2024-10-09 LAB
ALBUMIN SERPL-MCNC: 2 G/DL (ref 3.4–4.8)
ALBUMIN/GLOB SERPL: 0.5 RATIO (ref 1.1–2)
ALP SERPL-CCNC: 67 UNIT/L (ref 40–150)
ALT SERPL-CCNC: 44 UNIT/L (ref 0–55)
ANION GAP SERPL CALC-SCNC: 9 MEQ/L
AST SERPL-CCNC: 31 UNIT/L (ref 5–34)
BACTERIA BLD CULT: ABNORMAL
BASOPHILS # BLD AUTO: 0.04 X10(3)/MCL
BASOPHILS NFR BLD AUTO: 0.5 %
BILIRUB SERPL-MCNC: 1 MG/DL
BUN SERPL-MCNC: 16.8 MG/DL (ref 8.4–25.7)
CALCIUM SERPL-MCNC: 9 MG/DL (ref 8.8–10)
CHLORIDE SERPL-SCNC: 107 MMOL/L (ref 98–107)
CO2 SERPL-SCNC: 22 MMOL/L (ref 23–31)
CREAT SERPL-MCNC: 0.78 MG/DL (ref 0.73–1.18)
CREAT/UREA NIT SERPL: 22
EOSINOPHIL # BLD AUTO: 0.08 X10(3)/MCL (ref 0–0.9)
EOSINOPHIL NFR BLD AUTO: 0.9 %
ERYTHROCYTE [DISTWIDTH] IN BLOOD BY AUTOMATED COUNT: 14.3 % (ref 11.5–17)
GFR SERPLBLD CREATININE-BSD FMLA CKD-EPI: >60 ML/MIN/1.73/M2
GLOBULIN SER-MCNC: 4.3 GM/DL (ref 2.4–3.5)
GLUCOSE SERPL-MCNC: 113 MG/DL (ref 82–115)
GRAM STN SPEC: ABNORMAL
HCT VFR BLD AUTO: 38.4 % (ref 42–52)
HGB BLD-MCNC: 12.4 G/DL (ref 14–18)
HOLD SPECIMEN: NORMAL
HOLD SPECIMEN: NORMAL
IMM GRANULOCYTES # BLD AUTO: 0.11 X10(3)/MCL (ref 0–0.04)
IMM GRANULOCYTES NFR BLD AUTO: 1.3 %
LYMPHOCYTES # BLD AUTO: 1.49 X10(3)/MCL (ref 0.6–4.6)
LYMPHOCYTES NFR BLD AUTO: 17.4 %
MCH RBC QN AUTO: 29.2 PG (ref 27–31)
MCHC RBC AUTO-ENTMCNC: 32.3 G/DL (ref 33–36)
MCV RBC AUTO: 90.6 FL (ref 80–94)
MONOCYTES # BLD AUTO: 0.69 X10(3)/MCL (ref 0.1–1.3)
MONOCYTES NFR BLD AUTO: 8.1 %
NEUTROPHILS # BLD AUTO: 6.16 X10(3)/MCL (ref 2.1–9.2)
NEUTROPHILS NFR BLD AUTO: 71.8 %
NRBC BLD AUTO-RTO: 0 %
PLATELET # BLD AUTO: 207 X10(3)/MCL (ref 130–400)
PMV BLD AUTO: 9.9 FL (ref 7.4–10.4)
POTASSIUM SERPL-SCNC: 3.8 MMOL/L (ref 3.5–5.1)
PROT SERPL-MCNC: 6.3 GM/DL (ref 5.8–7.6)
RBC # BLD AUTO: 4.24 X10(6)/MCL (ref 4.7–6.1)
SODIUM SERPL-SCNC: 138 MMOL/L (ref 136–145)
WBC # BLD AUTO: 8.57 X10(3)/MCL (ref 4.5–11.5)

## 2024-10-09 PROCEDURE — 97162 PT EVAL MOD COMPLEX 30 MIN: CPT

## 2024-10-09 PROCEDURE — 36415 COLL VENOUS BLD VENIPUNCTURE: CPT

## 2024-10-09 PROCEDURE — 85025 COMPLETE CBC W/AUTO DIFF WBC: CPT

## 2024-10-09 PROCEDURE — 25000003 PHARM REV CODE 250

## 2024-10-09 PROCEDURE — 36415 COLL VENOUS BLD VENIPUNCTURE: CPT | Performed by: INTERNAL MEDICINE

## 2024-10-09 PROCEDURE — 63600175 PHARM REV CODE 636 W HCPCS

## 2024-10-09 PROCEDURE — 87040 BLOOD CULTURE FOR BACTERIA: CPT

## 2024-10-09 PROCEDURE — 11000001 HC ACUTE MED/SURG PRIVATE ROOM

## 2024-10-09 PROCEDURE — 97166 OT EVAL MOD COMPLEX 45 MIN: CPT

## 2024-10-09 PROCEDURE — 97535 SELF CARE MNGMENT TRAINING: CPT

## 2024-10-09 PROCEDURE — 97530 THERAPEUTIC ACTIVITIES: CPT

## 2024-10-09 PROCEDURE — 21400001 HC TELEMETRY ROOM

## 2024-10-09 PROCEDURE — 25000003 PHARM REV CODE 250: Performed by: INTERNAL MEDICINE

## 2024-10-09 PROCEDURE — 80053 COMPREHEN METABOLIC PANEL: CPT

## 2024-10-09 RX ADMIN — THIAMINE HYDROCHLORIDE 250 MG: 100 INJECTION, SOLUTION INTRAMUSCULAR; INTRAVENOUS at 09:10

## 2024-10-09 RX ADMIN — MUPIROCIN: 20 OINTMENT TOPICAL at 08:10

## 2024-10-09 RX ADMIN — MULTIPLE VITAMINS W/ MINERALS TAB 1 TABLET: TAB at 09:10

## 2024-10-09 RX ADMIN — FOLIC ACID 1 MG: 1 TABLET ORAL at 09:10

## 2024-10-09 RX ADMIN — MUPIROCIN: 20 OINTMENT TOPICAL at 09:10

## 2024-10-09 RX ADMIN — ATORVASTATIN CALCIUM 20 MG: 20 TABLET, FILM COATED ORAL at 09:10

## 2024-10-09 RX ADMIN — POLYETHYLENE GLYCOL 3350 17 G: 17 POWDER, FOR SOLUTION ORAL at 09:10

## 2024-10-09 RX ADMIN — APIXABAN 5 MG: 2.5 TABLET, FILM COATED ORAL at 09:10

## 2024-10-09 RX ADMIN — SODIUM CHLORIDE, POTASSIUM CHLORIDE, SODIUM LACTATE AND CALCIUM CHLORIDE: 600; 310; 30; 20 INJECTION, SOLUTION INTRAVENOUS at 03:10

## 2024-10-09 RX ADMIN — CEFTRIAXONE SODIUM 2 G: 2 INJECTION, POWDER, FOR SOLUTION INTRAMUSCULAR; INTRAVENOUS at 11:10

## 2024-10-09 RX ADMIN — APIXABAN 5 MG: 2.5 TABLET, FILM COATED ORAL at 08:10

## 2024-10-09 NOTE — PLAN OF CARE
Problem: Occupational Therapy  Goal: Occupational Therapy Goal  Description: Goals to be met by: d/c     Patient will increase functional independence with ADLs by performing:    UE Dressing with Stand-by Assistance .  LE Dressing with Stand-by Assistance  .  Grooming while seated at sink with Stand-by Assistance.  Toileting from bedside commode with Stand-by Assistance for hygiene and clothing management.   Toilet transfer to bedside commode with Stand-by Assistance.    Outcome: Progressing

## 2024-10-09 NOTE — H&P
[facility]  Progress Note    Patient Name: Madan Elder  MRN: [id number]  Admission Date: 10/6/2024  Hospital Length of Stay: 3 days  Code Status: Full Code  Attending Provider: Rita Piedra MD  Primary Care Provider: Noemí Peña     Subjective:     HPI: Madan Elder is a 79 yo male with PMH of chronic bladder obstruction, recurrent UTIs, atrial fibrillation in Eliquis, HFrEF (EF 30%) s/p AICD placement, and HTN. Patient was brought into the ED due to worsening mentation. During evaluation, patient was confused but able to answer some questions. History primarily obtained from daughter at bedside. Daughter states patient was recently admitted to the hospital due to UTI treated with PO antibiotics which patient had completed. Patient was then admitted to our hospital due to suspected UTI along with hypotension. Daughter reports that patient had his BP medications adjusted during his last admission but reports that he was still taking all of his BP medications instead of stopping Lisinopril as told. Patient was treated with IV fluid bolus and low maintenance fluids due to history of HFrEF but BP remained persistently low with MAPs at 65.     Hospital Course/Significant events:  10/6/24: Upgraded to ICU for UTI sepsis vs medication overuse requiring IV vasopressor support  10/8/2024: Patient downgraded to the floor    Interval History:  Febrile overnight with Tmax of 100.7 F.. Patient had intermittent episodes of agitation while in the ICU but per nursing staff no more episodes since downgrade. He has been having hard time hearing with possible dead battery of his hearing aid. He denies any acute complaints. CBC remarkable for normocytic anemia with stable H&H. CMP essentially unremarkable. Blood culture and urine culture both now pansensitive E coli.         Past Medical History:   Diagnosis Date    Hypertension     Stroke        Past Surgical History:   Procedure  Laterality Date    INSERTION OF PACEMAKER         Social History     Socioeconomic History    Marital status:    Tobacco Use    Smoking status: Never    Smokeless tobacco: Never   Substance and Sexual Activity    Alcohol use: Yes    Drug use: Never     Social Drivers of Health     Financial Resource Strain: Low Risk  (10/7/2024)    Overall Financial Resource Strain (CARDIA)     Difficulty of Paying Living Expenses: Not hard at all   Food Insecurity: Unknown (10/7/2024)    Hunger Vital Sign     Worried About Running Out of Food in the Last Year: Never true   Transportation Needs: No Transportation Needs (10/7/2024)    TRANSPORTATION NEEDS     Transportation : No   Physical Activity: Inactive (10/7/2024)    Exercise Vital Sign     Days of Exercise per Week: 0 days     Minutes of Exercise per Session: 0 min   Stress: Patient Unable To Answer (10/7/2024)    [employer] [place] [employer]ational [employer] - Occupational Stress Questionnaire     Feeling of Stress : Patient unable to answer   Housing Stability: Low Risk  (10/7/2024)    Housing Stability Vital Sign     Unable to Pay for Housing in the Last Year: No     Homeless in the Last Year: No           Current Outpatient Medications   Medication Instructions    apixaban (ELIQUIS) 5 mg, Oral, 2 times daily    atorvastatin (LIPITOR) 20 mg, Oral, Daily    carvediloL (COREG) 3.125 mg, Oral, 2 times daily    folic acid (FOLVITE) 1,000 mcg, Oral, Daily    sodium bicarbonate 650 mg, Oral, Daily    VITAMIN B-1 100 mg, Oral, Daily       Current Inpatient Medications   apixaban  5 mg Oral BID    atorvastatin  20 mg Oral Daily    cefTRIAXone (Rocephin) IV (PEDS and ADULTS)  2 g Intravenous Q24H    folic acid  1 mg Oral Daily    multivitamin  1 tablet Oral Daily    mupirocin   Nasal BID    polyethylene glycol  17 g Oral Daily    thiamine (B-1) 250 mg in D5W 100 mL IVPB  250 mg Intravenous Daily    Followed by    [START ON 10/11/2024] thiamine  100 mg Oral TID       Current  Intravenous Infusions   lactated ringers   Intravenous Continuous 50 mL/hr at 10/09/24 0616 Rate Verify at 10/09/24 0616    NORepinephrine bitartrate-D5W  0-3 mcg/kg/min Intravenous Continuous             Review of Systems   Constitutional:  Negative for chills and fever.   Respiratory:  Negative for cough, sputum production, shortness of breath and wheezing.    Cardiovascular:  Negative for chest pain, palpitations and leg swelling.   Gastrointestinal:  Negative for abdominal pain, nausea and vomiting.   Genitourinary:  Negative for dysuria and frequency.   Musculoskeletal:  Negative for myalgias.   Neurological:  Negative for dizziness, focal weakness, seizures and weakness.          Objective:       Intake/Output Summary (Last 24 hours) at 10/9/2024 0847  Last data filed at 10/9/2024 0616  Gross per 24 hour   Intake 1794.9 ml   Output 450 ml   Net 1344.9 ml         Vital Signs (Most Recent):  Temp: 97.6 °F (36.4 °C) (10/09/24 0711)  Pulse: 74 (10/09/24 0711)  Resp: 17 (10/09/24 0711)  BP: 112/72 (10/09/24 0711)  SpO2: 97 % (10/09/24 0711)  Body mass index is 22.17 kg/m².  Weight: 66.1 kg (145 lb 12.8 oz) Vital Signs (24h Range):  Temp:  [97.5 °F (36.4 °C)-100.7 °F (38.2 °C)] 97.6 °F (36.4 °C)  Pulse:  [47-97] 74  Resp:  [13-32] 17  SpO2:  [72 %-99 %] 97 %  BP: ()/(64-91) 112/72     Physical Exam  Constitutional:       General: He is not in acute distress.     Appearance: Normal appearance.   HENT:      Head: Normocephalic.      Ears:      Comments: Hard of hearing     Mouth/Throat:      Mouth: Mucous membranes are moist.   Eyes:      Conjunctiva/sclera: Conjunctivae normal.      Pupils: Pupils are equal, round, and reactive to light.   Cardiovascular:      Rate and Rhythm: Normal rate and regular rhythm.      Pulses: Normal pulses.      Heart sounds: No murmur heard.  Pulmonary:      Effort: Pulmonary effort is normal. No respiratory distress.      Breath sounds: No wheezing or rhonchi.   Abdominal:       "General: Abdomen is flat. Bowel sounds are normal. There is no distension.      Palpations: Abdomen is soft.      Tenderness: There is no abdominal tenderness.   Musculoskeletal:         General: Normal range of motion.      Cervical back: Normal range of motion.      Right lower leg: No edema.      Left lower leg: No edema.   Skin:     General: Skin is warm.      Capillary Refill: Capillary refill takes less than 2 seconds.   Neurological:      General: No focal deficit present.      Mental Status: He is alert.      Comments: Oriented to self and place but not time           Lines/Drains/Airways       Drain  Duration             Male External Urinary Catheter 10/08/24 0700 1 day              Peripheral Intravenous Line  Duration                  Peripheral IV - Single Lumen 10/08/24 1843 20 G 1 3/4 in Anterior;Left Forearm <1 day                    Significant Labs:    Lab Results   Component Value Date    WBC 8.57 10/09/2024    HGB 12.4 (L) 10/09/2024    HCT 38.4 (L) 10/09/2024    MCV 90.6 10/09/2024     10/09/2024           BMP  Lab Results   Component Value Date     10/09/2024    K 3.8 10/09/2024    CO2 22 (L) 10/09/2024    BUN 16.8 10/09/2024    CREATININE 0.78 10/09/2024    CALCIUM 9.0 10/09/2024    AGAP 9.0 10/09/2024    EGFRNONAA >60 11/05/2021         ABG  No results for input(s): "PH", "PO2", "PCO2", "HCO3", "POCBASEDEF" in the last 168 hours.    Mechanical Ventilation Support:         Significant Imaging:  I have reviewed the pertinent imaging within the past 24 hours.        Assessment/Plan:     Assessment  Urosepsis  WIN resolved  Hx of chronic bladder outlet obstruction  Recurrent UTI in male  HFrEF (EF 30%) s/p AICD  Atrial fibrillation on Eliquis  H/o CVA  History of alcohol abuse      Plan  Blood culture and urine culture positive for pan sensitive E Coli  Repeat blood cultures x 2 ordered today  PT/OT consulted  Currently on Rocephin 2g daily  CT abdomen with chronic bladder outlet " obstruction with mild right sided hydronephrosis; will schedule urology appointment outpatient   [facility] protocol in place  Will start Folic acid, Multivitamins and thiamine    DVT Prophylaxis: Eliquis  GI Prophylaxis: N/A     Disposition: 78 year old male treatment ongoing for urosepsis with IV Antibiotics. Repeat blood culture ordered. Discharge when medically stable.      Bartolo Keyes MD  Internal Medicine - PGY-2

## 2024-10-09 NOTE — PT/OT/SLP EVAL
Physical Therapy Evaluation    Patient Name:  Madan Elder   MRN:  [id number]    Recommendations:     Therapy Intensity Recommendations at Discharge: Moderate Intensity Therapy  Discharge Equipment Recommendations: walker, rolling, shower chair, bedside commode, power chair, grab bar   Equipment to be obtained for discharge: none.  Barriers to discharge:  safety concerns, fall risk, level of skilled assistance required, impaired cognitive status, and decreased caregiver support    Assessment:     Madan Elder is a 78 y.o. male admitted with a medical diagnosis of WIN (acute kidney injury).  1. Acute renal failure superimposed on chronic kidney disease, unspecified acute renal failure type, unspecified CKD stage    2. SOB (shortness of breath)    3. Acute cystitis with hematuria    4. WIN (acute kidney injury)    5. Chest pain       Patient Active Problem List   Diagnosis    Left-sided weakness    Urinary tract infection without hematuria    WIN (acute kidney injury)    Moderate malnutrition      He presents with the following impairments/functional limitations:  weakness, gait instability, impaired balance, impaired endurance, impaired self care skills, impaired functional mobility, impaired cognition, pain, impaired coordination, impaired fine motor, decreased safety awareness, decreased lower extremity function.    Rehab Prognosis: Good.    Patient would benefit from continued skilled acute PT services to: address above listed impairments/functional limitations; receive patient/caregiver education; reduce fall risk; and maximize independency/safety with functional mobility.    -continued: sitting edge of bed, standing edge of bed, side steps at edge of bed, ambulation, with progression of gait distance/frequency/duration and speed, as tolerated/appropriate, with assistance and supervision     Recent Surgery: * No surgery found *      Plan:     During this hospitalization, patient to be seen   to address the  identified impairments/functional limitations via therapeutic activities, therapeutic exercises, gait training and progress toward the established goals.    Plan of Care Expires:  11/06/24    Subjective     Communicated with patient's nurse Kenyetta prior to session.    Patient agreeable to participate in evaluation.     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  Equipment Used at Home: bedside commode, walker, rolling, shower chair, grab bar, power chair  Equipment owned (not currently used): none.  Assistance Upon Discharge: family (daughter) and sitter(s).    Hand dominance: right    Patient denied lightheadedness/dizziness.  Patient denied extremity tingling/numbness.  Patient denied current swallowing difficulty.    Objective:     ALENA Benson in room for evaluation    Patient found supine in bed, with HOB elevated, and with bed rails up bilateral HOB, left FOB, and right FOB with telemetry, peripheral IV, bed alarm (hearing aids) upon PT entry to room.    General Precautions: Standard, fall, seizure, hearing impaired (AICD)   Orthopedic Precautions:N/A   Braces: N/A  Respiratory Status: room air  SAT O2 Check: n/a    Exams:  Orientation: Patient is  person, place (hospital)  Commands: Patient follows 1-step-2-step verbal commands  Fine Motor Coordination:     -     Intact: Rapid alternating ankle DF/PF  Sensation:    -     Intact: light/touch bilat lower extremity  BILAT UE ROM/strength - defer to OT - see OT note for details  RLE ROM: WFL  RLE Strength: WFL  LLE ROM: WFL  LLE Strength: WFL    Functional Mobility:    Bed Mobility:  Rolling Left: stand by assistance  Scooting: stand by assistance  Supine to Sit: stand by assistance  with cues for proper technique  with HOB elevated, hand rail, and firm mattress    Transfers:  Sit to Stand: minimum assistance with hand-held assist and rolling walker  with cues for hand placement and posture  with firm mattress and elevated bed surface    Gait:  Patient ambulated 20ft out of room and back to room and an additional 20ft to/from restroom to observe toilet transfers with hand-held assist and rolling walker and minimum assistance.  Patient demonstrates :       unsteady gait       decreased lupe       decreased bilateral step length       wide base of support       inconsistent bilateral foot placement       flexed posture      slowed, guarded, time consuming movements - all transitional activities.    Other Mobility:  N/A    Balance:  Sit  Patient demonstrated static balance on level surface with modified independence with no verbal cues.  Patient demonstrated dynamic balance on level surface with supervision with minimal verbal cues during moderate excursions.  Stand  Patient demonstrated static balance on level surface  using hand-held assist and rolling walker with contact guard assistance with minimal verbal cues.  *1 episode of patient requiring minimal assist x1 w/ unilateral UE support on rolling walker    Additional Treatment Session  THER ACT x1 unit(s), x15 minutes:  Patient performed:        -sitting balance activities:              weight shifting:  (bed)                 -laterally (left)                  -laterally (right)            scooting:  (bed, bedside commode)                 -forward                 -backward            repositioning at edge of bed       -standing balance activities:              weight shifting:                   -forward                 -backward            static standing - patient diaper soiled and patient required hygiene provided by self and                  then completed by OT...during self hygiene patient loss balance during unilateral UE                  support on rolling walker  *patient initially denied need for toilet or bedside commode and then verbalized immediate need  *OT secured and positioned bedside commode at foot of bed  *patient transitioned to bedside commode w/ rolling walker and minimal assistance w/ cues for sequencing    Patient left  sitting on bedside commode  with telemetry, peripheral IV, hearing aids with all lines intact, call button in reach - pinned to gown w/ instruction to utilize when patient finished, tray table at bedside, patient's nurse notified of above and of need for nursing personnel in room 2/2 safety concerns, door left slightly open but ensured patient privacy on bedside commode.    Education     Patient educated on the importance of early mobility to prevent functional decline during hospital stay.  Patient educated on and assisted with functional mobility as noted above.  Patient educated on PT Plan of Care and role of PT in acute care.  Patient was instructed to utilize staff assistance for mobility/transfers.  White board updated regarding patient's safest level of mobility with staff assistance.    Goals     Multidisciplinary Problems       Physical Therapy Goals       Problem: Physical Therapy    Goal Priority Disciplines Outcome Interventions   Physical Therapy Goal     PT, PT/OT     Description: ESTABLISHED 10/09/2024  Goals to be met by: DISCHARGE  Patient will increase functional independence with mobility by performing:  -.  Supine to sit with Modified [place]  -. Sit to supine with Modified [place]  -. Rolling to Left and Right with Modified [place]  -. Sit to stand transfer with Contact Guard Assistance  -. Gait  x 130 feet with Contact Guard Assistance using Rolling Walker           History:     Past Medical History:   Diagnosis Date    Hypertension     Stroke      Past Surgical History:   Procedure Laterality Date    INSERTION OF PACEMAKER       Time Tracking:     PT Received On: 10/09/24  PT Start Time: 1116     PT Stop Time: 1200  PT Total Time (min): 44 min     Billable Minutes: Evaluation 29 and Therapeutic Activity 15    *co-treat requiring skilled assist x2  2/2 safety concerns w/ standing/out of bed activities (cognitive impairments and assistance required to maintain upright standing posture/position at edge of bed)    Non-Billable Minutes: N/A  10/09/2024

## 2024-10-09 NOTE — PLAN OF CARE
Problem: Adult Inpatient Plan of Care  Goal: Plan of Care Review  Outcome: Progressing  Goal: Patient-Specific Goal (Individualized)  Outcome: Progressing  Goal: Absence of Hospital-Acquired Illness or Injury  Outcome: Progressing  Goal: Optimal Comfort and Wellbeing  Outcome: Progressing  Goal: Readiness for Transition of Care  Outcome: Progressing     Problem: Acute Kidney Injury/Impairment  Goal: Fluid and Electrolyte Balance  Outcome: Progressing  Goal: Improved Oral Intake  Outcome: Progressing  Goal: Effective Renal Function  Outcome: Progressing     Problem: Fall Injury Risk  Goal: Absence of Fall and Fall-Related Injury  Outcome: Progressing     Problem: Heart Failure  Goal: Optimal Coping  Outcome: Progressing  Goal: Optimal Cardiac Output  Outcome: Progressing  Goal: Stable Heart Rate and Rhythm  Outcome: Progressing  Goal: Optimal Functional Ability  Outcome: Progressing  Goal: Fluid and Electrolyte Balance  Outcome: Progressing  Goal: Improved Oral Intake  Outcome: Progressing  Goal: Effective Oxygenation and Ventilation  Outcome: Progressing  Goal: Effective Breathing Pattern During Sleep  Outcome: Progressing     Problem: Wound  Goal: Optimal Coping  Outcome: Progressing  Goal: Optimal Functional Ability  Outcome: Progressing  Goal: Absence of Infection Signs and Symptoms  Outcome: Progressing  Goal: Improved Oral Intake  Outcome: Progressing  Goal: Optimal Pain Control and Function  Outcome: Progressing  Goal: Skin Health and Integrity  Outcome: Progressing  Goal: Optimal Wound Healing  Outcome: Progressing     Problem: Skin Injury Risk Increased  Goal: Skin Health and Integrity  Outcome: Progressing

## 2024-10-09 NOTE — CONSULTS
Patient is seen at bedside to eval and treat a reported wound to left lateral thigh. Pt is seen at bedside today and noted with a small (0.5cm) pink superficial open wound that presents like an abrasion. Wound is not noted over a bony prominence. Wound appears subacute. Pt thinks he may have scratched himself. No purulence or erythema noted. Area is cleaned and dressed with new orders placed. Wound care remains available as needed.

## 2024-10-09 NOTE — PHYSICIAN QUERY
Please clarify the nutritional diagnosis associated with the clinical findings:    Mild protein calorie malnutrition   Chronic illness   Recurrent UTIs secondary to chronic bladder obstruction    Cynthia Kline DO  [facility], PGY-I

## 2024-10-09 NOTE — PLAN OF CARE
Problem: Restraint, Nonviolent  Goal: Absence of Harm or Injury  10/8/2024 2239 by Savannah Castellanos, RN  Outcome: Met  10/8/2024 2238 by Savannah Castellanos, RN  Outcome: Progressing

## 2024-10-09 NOTE — PT/OT/SLP EVAL
Occupational Therapy   Evaluation    Name: Madan Elder  MRN: [id number]  Admitting Diagnosis:   SOB (shortness of breath)  Acute renal failure superimposed on chronic kidney disease, unspecified acute renal failure type, unspecified CKD stage (Primary)   Acute cystitis with hematuria  WIN (acute kidney injury)   Recurrent UTI   HFrEF (EF 30%) s/p AICD  Atrial fibrillation on Eliquis     Hx Stroke   Hx of alcohol abuse     Hospital Course/Significant events:  10/6/24: Upgraded to ICU for UTI sepsis vs medication overuse requiring IV vasopressor support  10/8/2024: Patient downgraded to the floor  Patient Active Problem List   Diagnosis    Left-sided weakness    Urinary tract infection without hematuria    WIN (acute kidney injury)    Moderate malnutrition       Recent Surgery: * No surgery found *      Recommendations:     Discharge Recommendations: Moderate Intensity Therapy  Discharge Equipment Recommendations:  walker, rolling, shower chair, grab bar, bedside commode, other (see comments) (power w/c)  Barriers to discharge:  Decreased caregiver support, Other (Comment) (impaired cognition, level of skilled assist, fall risk , safety concerns)    Assessment:     Madan Elder is a 78 y.o. male with a medical diagnosis of recurrent UTI and WIN (acute kidney injury) and hx CVA .   He presents [place] and somewhat confused with fluctuating PLOF provided and questionable accuracy. Safety concerns for pt to return to living alone with 30 hours of LTPCS and recommend 24/7 supervision at this time. Performance deficits affecting function: weakness, impaired endurance, impaired self care skills, impaired functional mobility, gait instability, impaired balance, impaired cognition, decreased lower extremity function, decreased safety awareness, pain, impaired coordination, impaired fine motor.      Rehab Prognosis: Good; patient would benefit from acute skilled OT services to address these deficits and reach maximum level of  "function.       Plan:     Patient to be seen 3 x/week to address the above listed problems via self-care/home management, therapeutic activities, therapeutic exercises  Plan of Care Expires:  (d/c)  Plan of Care Reviewed with: patient    Subjective     Chief Complaint: back pain with standing and mobility   Patient/Family Comments/goals: return to apt - "I miss it " "Everyone is so nice there"    Occupational Profile:  Living Environment: Pt lives alone in [facility] 5th floor with elevator access and walk in shower with shower seat and grab bars  Previous level of function: Per pt (questionable accuracy) Sitters (LTPCS- 30 hours per week) "but they don't have to touch me/ just watch me shower" " I need them to cook and perform chores"; mod I dressing , toileting (with occasionally incontinent episodes ) and wears brief, feeding and grooming. Pt ambulates short distances in apt with RW and utilizes power chair for long distances and "sometimes in apt"   Roles and Routines: Pt is retired and does not drive; pts daughter provides transportation as needed   Equipment Used at Home: bedside commode, walker, rolling, other (see comments), shower chair, grab bar (power w/c)  Assistance upon Discharge: sitter assist  and daughter    Pain/Comfort:  Pain Rating 1: 0/10  Location - Orientation 1: lower  Location 1: back  Pain Addressed 1: Nurse notified  Pain Rating Post-Intervention 1: 5/10    Patients cultural, spiritual, Sabianism conflicts given the current situation: no    Objective:     Communicated with: Nurse Kenyetta  prior to session.  Patient found HOB elevated with peripheral IV, telemetry, Other (comments) (B hearing aides) , bed alarm upon OT entry to room.    General Precautions: Standard, fall, seizure, hearing impaired  Orthopedic Precautions: N/A  Braces: N/A  Respiratory Status: Room air    Occupational Performance:    Bed Mobility:    Patient completed Supine to Sit with stand by assistance    Functional " "Mobility/Transfers:  Patient completed Sit <> Stand Transfer with minimum assistance  with  rolling walker and multiple attempts during toileting routine    Patient completed Toilet Transfer Step Transfer technique with minimum assistance with  rolling walker and to toilet with grab bar and to [facility]   Functional Mobility: Pt ambulated 20 feet with RW and min A and unsteady gait and safety concerns with limited awareness of deficits     Activities of Daily Living:  Grooming: Pt denied grooming EOB    Upper Body Dressing: minimum assistance don hospital gown "like shirt " while  seated EOB   Lower Body Dressing: stand by assistance don socks and shoes while seated EOB   Toileting: Pt with soiled brief +urine and +BM and unaware ; LOB and requiring mod A to return to midline upon attempt to perform sofia posterior hygiene after +BM; Pt required max A hygiene and clothing mgt and impacted by decreased balance and cognition and safety awareness/insight     Cognitive/Visual Perceptual:  Cognitive/Psychosocial Skills:     -       Oriented to: Person and general place not time or situation    -       Follows Commands/attention:follows basic 1-2 step commands; [place]   -       Safety awareness/insight to disability: impaired   -       Mood/Affect/Coping skills/emotional control: Cooperative and Pleasant    Physical Exam:  Balance: -       sitting static mod I and dynamic supervision ; standing balance static with B UE support CGA  and with unilateral UE support min A and dynamic during toileting task mod A   Dominant hand: -       right  Upper Extremity Range of Motion:  -       Right Upper Extremity: WFL  -       Left Upper Extremity: WFL and min decreased shoulder   Upper Extremity Strength: -       Right Upper Extremity: WFL  -       Left Upper Extremity: WFL except 3-/5 shoulder    Strength: -       Right Upper Extremity: WFL  -       Left Upper Extremity: WFL  Fine Motor Coordination:    -       Impaired  Left hand " thumb/finger opposition skills   and Left hand, manipulation of objects      Treatment & Education:  Pt. educated on OT goals, POC, orientation to environment, use of call bell for assist with transfers OOB or for any other needs due to fall risk. Pt needs reenforcement .     Patient left  sitting on BSC   with all lines intact, call button in reach, nurse  notified, and requested CNA to provide supervision  due to safety concerns while seated on BSC ; recommend bed alarm to be on while in bed.     GOALS:   Multidisciplinary Problems       Occupational Therapy Goals          Problem: Occupational Therapy    Goal Priority Disciplines Outcome Interventions   Occupational Therapy Goal     OT, PT/OT Progressing    Description: Goals to be met by: d/c     Patient will increase functional independence with ADLs by performing:    UE Dressing with Stand-by Assistance .  LE Dressing with Stand-by Assistance  .  Grooming while seated at sink with Stand-by Assistance.  Toileting from bedside commode with Stand-by Assistance for hygiene and clothing management.   Toilet transfer to bedside commode with Stand-by Assistance.                         History:     Past Medical History:   Diagnosis Date    Hypertension     Stroke          Past Surgical History:   Procedure Laterality Date    INSERTION OF PACEMAKER         Time Tracking:     OT Date of Treatment: 10/09/24  OT Start Time: 1115  OT Stop Time: 1200  OT Total Time (min): 45 min    Billable Minutes:Evaluation 30 min   Self Care/Home Management 15 min    10/9/2024

## 2024-10-09 NOTE — PLAN OF CARE
Problem: Adult Inpatient Plan of Care  Goal: Plan of Care Review  Outcome: Progressing  Goal: Patient-Specific Goal (Individualized)  Outcome: Progressing  Goal: Absence of Hospital-Acquired Illness or Injury  Outcome: Progressing  Goal: Optimal Comfort and Wellbeing  Outcome: Progressing  Goal: Readiness for Transition of Care  Outcome: Progressing     Problem: Acute Kidney Injury/Impairment  Goal: Fluid and Electrolyte Balance  Outcome: Progressing  Goal: Improved Oral Intake  Outcome: Progressing  Goal: Effective Renal Function  Outcome: Progressing     Problem: Fall Injury Risk  Goal: Absence of Fall and Fall-Related Injury  Outcome: Progressing     Problem: Heart Failure  Goal: Optimal Coping  Outcome: Progressing  Goal: Optimal Cardiac Output  Outcome: Progressing  Goal: Stable Heart Rate and Rhythm  Outcome: Progressing  Goal: Optimal Functional Ability  Outcome: Progressing  Goal: Fluid and Electrolyte Balance  Outcome: Progressing  Goal: Improved Oral Intake  Outcome: Progressing  Goal: Effective Oxygenation and Ventilation  Outcome: Progressing  Goal: Effective Breathing Pattern During Sleep  Outcome: Progressing     Problem: Skin Injury Risk Increased  Goal: Skin Health and Integrity  Outcome: Progressing

## 2024-10-10 LAB
ALBUMIN SERPL-MCNC: 2.2 G/DL (ref 3.4–4.8)
ALBUMIN/GLOB SERPL: 0.5 RATIO (ref 1.1–2)
ALP SERPL-CCNC: 69 UNIT/L (ref 40–150)
ALT SERPL-CCNC: 50 UNIT/L (ref 0–55)
ANION GAP SERPL CALC-SCNC: 6 MEQ/L
AST SERPL-CCNC: 42 UNIT/L (ref 5–34)
BASOPHILS # BLD AUTO: 0.04 X10(3)/MCL
BASOPHILS NFR BLD AUTO: 0.4 %
BILIRUB SERPL-MCNC: 0.9 MG/DL
BUN SERPL-MCNC: 15.4 MG/DL (ref 8.4–25.7)
CALCIUM SERPL-MCNC: 9.2 MG/DL (ref 8.8–10)
CHLORIDE SERPL-SCNC: 105 MMOL/L (ref 98–107)
CO2 SERPL-SCNC: 25 MMOL/L (ref 23–31)
CREAT SERPL-MCNC: 0.76 MG/DL (ref 0.73–1.18)
CREAT/UREA NIT SERPL: 20
EOSINOPHIL # BLD AUTO: 0.16 X10(3)/MCL (ref 0–0.9)
EOSINOPHIL NFR BLD AUTO: 1.5 %
ERYTHROCYTE [DISTWIDTH] IN BLOOD BY AUTOMATED COUNT: 14.4 % (ref 11.5–17)
GFR SERPLBLD CREATININE-BSD FMLA CKD-EPI: >60 ML/MIN/1.73/M2
GLOBULIN SER-MCNC: 4.7 GM/DL (ref 2.4–3.5)
GLUCOSE SERPL-MCNC: 97 MG/DL (ref 82–115)
HCT VFR BLD AUTO: 38.2 % (ref 42–52)
HGB BLD-MCNC: 12.5 G/DL (ref 14–18)
IMM GRANULOCYTES # BLD AUTO: 0.1 X10(3)/MCL (ref 0–0.04)
IMM GRANULOCYTES NFR BLD AUTO: 0.9 %
LYMPHOCYTES # BLD AUTO: 1.49 X10(3)/MCL (ref 0.6–4.6)
LYMPHOCYTES NFR BLD AUTO: 13.8 %
MCH RBC QN AUTO: 29.4 PG (ref 27–31)
MCHC RBC AUTO-ENTMCNC: 32.7 G/DL (ref 33–36)
MCV RBC AUTO: 89.9 FL (ref 80–94)
MONOCYTES # BLD AUTO: 0.63 X10(3)/MCL (ref 0.1–1.3)
MONOCYTES NFR BLD AUTO: 5.8 %
NEUTROPHILS # BLD AUTO: 8.39 X10(3)/MCL (ref 2.1–9.2)
NEUTROPHILS NFR BLD AUTO: 77.6 %
NRBC BLD AUTO-RTO: 0 %
PLATELET # BLD AUTO: 236 X10(3)/MCL (ref 130–400)
PMV BLD AUTO: 9.4 FL (ref 7.4–10.4)
POTASSIUM SERPL-SCNC: 4.6 MMOL/L (ref 3.5–5.1)
PROT SERPL-MCNC: 6.9 GM/DL (ref 5.8–7.6)
RBC # BLD AUTO: 4.25 X10(6)/MCL (ref 4.7–6.1)
SODIUM SERPL-SCNC: 136 MMOL/L (ref 136–145)
WBC # BLD AUTO: 10.81 X10(3)/MCL (ref 4.5–11.5)

## 2024-10-10 PROCEDURE — 85025 COMPLETE CBC W/AUTO DIFF WBC: CPT

## 2024-10-10 PROCEDURE — 63600175 PHARM REV CODE 636 W HCPCS

## 2024-10-10 PROCEDURE — 80053 COMPREHEN METABOLIC PANEL: CPT

## 2024-10-10 PROCEDURE — 25000003 PHARM REV CODE 250

## 2024-10-10 PROCEDURE — 97530 THERAPEUTIC ACTIVITIES: CPT

## 2024-10-10 PROCEDURE — 21400001 HC TELEMETRY ROOM

## 2024-10-10 PROCEDURE — 36415 COLL VENOUS BLD VENIPUNCTURE: CPT

## 2024-10-10 PROCEDURE — 97110 THERAPEUTIC EXERCISES: CPT

## 2024-10-10 PROCEDURE — 11000001 HC ACUTE MED/SURG PRIVATE ROOM

## 2024-10-10 PROCEDURE — 97535 SELF CARE MNGMENT TRAINING: CPT

## 2024-10-10 RX ADMIN — MUPIROCIN: 20 OINTMENT TOPICAL at 08:10

## 2024-10-10 RX ADMIN — MULTIPLE VITAMINS W/ MINERALS TAB 1 TABLET: TAB at 08:10

## 2024-10-10 RX ADMIN — FOLIC ACID 1 MG: 1 TABLET ORAL at 08:10

## 2024-10-10 RX ADMIN — APIXABAN 5 MG: 2.5 TABLET, FILM COATED ORAL at 08:10

## 2024-10-10 RX ADMIN — CEFTRIAXONE SODIUM 2 G: 2 INJECTION, POWDER, FOR SOLUTION INTRAMUSCULAR; INTRAVENOUS at 10:10

## 2024-10-10 RX ADMIN — POLYETHYLENE GLYCOL 3350 17 G: 17 POWDER, FOR SOLUTION ORAL at 08:10

## 2024-10-10 RX ADMIN — ATORVASTATIN CALCIUM 20 MG: 20 TABLET, FILM COATED ORAL at 08:10

## 2024-10-10 RX ADMIN — THIAMINE HYDROCHLORIDE 250 MG: 100 INJECTION, SOLUTION INTRAMUSCULAR; INTRAVENOUS at 08:10

## 2024-10-10 NOTE — PROGRESS NOTES
[facility]  Progress Note    Patient Name: Madan Elder  MRN: [id number]  Admission Date: 10/6/2024  Hospital Length of Stay: 4 days  Code Status: Full Code  Attending Provider: Rita Piedra MD  Primary Care Provider: Noemí Peña     Subjective:     HPI: Madan Elder is a 77 yo male with PMH of chronic bladder obstruction, recurrent UTIs, atrial fibrillation in Eliquis, HFrEF (EF 30%) s/p AICD placement, and HTN. Patient was brought into the ED due to worsening mentation. During evaluation, patient was confused but able to answer some questions. History primarily obtained from daughter at bedside. Daughter states patient was recently admitted to the hospital due to UTI treated with PO antibiotics which patient had completed. Patient was then admitted to our hospital due to suspected UTI along with hypotension. Daughter reports that patient had his BP medications adjusted during his last admission but reports that he was still taking all of his BP medications instead of stopping Lisinopril as told. Patient was treated with IV fluid bolus and low maintenance fluids due to history of HFrEF but BP remained persistently low with MAPs at 65.     Hospital Course/Significant events:  10/6/24: Upgraded to ICU for UTI sepsis vs medication overuse requiring IV vasopressor support  10/8/2024: Patient downgraded to the floor    Interval History:  No acute events overnight.  Vital signs are stable.  Patient afebrile.  Blood cultures x2 are still in process.  We will continue Rocephin for now as initial culture growing E coli pansensitive.  Plan to do transitioned to oral antibiotic therapy if blood cultures are negative.  We will likely use Levaquin daily and possible discharge today.  PT OT recommend durable medical equipment at home, patient does have assistance through family.  CBC within normal limits.  CMP also within normal limits.  The patient is feeling well this morning.  We  will continue to monitor.        Past Medical History:   Diagnosis Date    Hypertension     Stroke        Past Surgical History:   Procedure Laterality Date    INSERTION OF PACEMAKER         Social History     Socioeconomic History    Marital status:    Tobacco Use    Smoking status: Never    Smokeless tobacco: Never   Substance and Sexual Activity    Alcohol use: Yes    Drug use: Never     Social Drivers of Health     Financial Resource Strain: Patient Unable To Answer (10/9/2024)    Overall Financial Resource Strain (CARDIA)     Difficulty of Paying Living Expenses: Patient unable to answer   Food Insecurity: Patient Unable To Answer (10/9/2024)    Hunger Vital Sign     Worried About Running Out of Food in the Last Year: Patient unable to answer     Ran Out of Food in the Last Year: Patient unable to answer   Transportation Needs: Patient Unable To Answer (10/9/2024)    TRANSPORTATION NEEDS     Transportation : Patient unable to answer   Physical Activity: Inactive (10/7/2024)    Exercise Vital Sign     Days of Exercise per Week: 0 days     Minutes of Exercise per Session: 0 min   Stress: Patient Unable To Answer (10/9/2024)    [employer] - Occupational Stress Questionnaire     Feeling of Stress : Patient unable to answer   Housing Stability: Patient Unable To Answer (10/9/2024)    Housing Stability Vital Sign     Unable to Pay for Housing in the Last Year: Patient unable to answer     Homeless in the Last Year: Patient unable to answer           Current Outpatient Medications   Medication Instructions    apixaban (ELIQUIS) 5 mg, Oral, 2 times daily    atorvastatin (LIPITOR) 20 mg, Oral, Daily    carvediloL (COREG) 3.125 mg, Oral, 2 times daily    folic acid (FOLVITE) 1,000 mcg, Oral, Daily    sodium bicarbonate 650 mg, Oral, Daily    VITAMIN B-1 100 mg, Oral, Daily       Current Inpatient Medications   apixaban  5 mg Oral BID    atorvastatin  20 mg Oral Daily    cefTRIAXone  (Rocephin) IV (PEDS and ADULTS)  2 g Intravenous Q24H    folic acid  1 mg Oral Daily    multivitamin  1 tablet Oral Daily    mupirocin   Nasal BID    polyethylene glycol  17 g Oral Daily    [START ON 10/11/2024] thiamine  100 mg Oral TID       Current Intravenous Infusions          Review of Systems   Constitutional:  Negative for chills and fever.   Respiratory:  Negative for cough, sputum production, shortness of breath and wheezing.    Cardiovascular:  Negative for chest pain, palpitations and leg swelling.   Gastrointestinal:  Negative for abdominal pain, nausea and vomiting.   Genitourinary:  Negative for dysuria and frequency.   Musculoskeletal:  Negative for myalgias.   Neurological:  Negative for dizziness, focal weakness, seizures and weakness.          Objective:       Intake/Output Summary (Last 24 hours) at 10/10/2024 0946  Last data filed at 10/10/2024 0840  Gross per 24 hour   Intake 576.31 ml   Output --   Net 576.31 ml         Vital Signs (Most Recent):  Temp: 98.3 °F (36.8 °C) (10/10/24 0732)  Pulse: 85 (10/10/24 0732)  Resp: 16 (10/10/24 0732)  BP: 96/64 (10/10/24 0732)  SpO2: 97 % (10/10/24 0732)  Body mass index is 22.18 kg/m².  Weight: 66.2 kg (145 lb 14.4 oz) Vital Signs (24h Range):  Temp:  [97.5 °F (36.4 °C)-98.4 °F (36.9 °C)] 98.3 °F (36.8 °C)  Pulse:  [48-98] 85  Resp:  [16-17] 16  SpO2:  [95 %-98 %] 97 %  BP: ()/(45-85) 96/64     Physical Exam  Constitutional:       General: He is not in acute distress.     Appearance: Normal appearance.   HENT:      Head: Normocephalic.      Ears:      Comments: Hard of hearing     Mouth/Throat:      Mouth: Mucous membranes are moist.   Eyes:      Conjunctiva/sclera: Conjunctivae normal.      Pupils: Pupils are equal, round, and reactive to light.   Cardiovascular:      Rate and Rhythm: Normal rate and regular rhythm.      Pulses: Normal pulses.      Heart sounds: No murmur heard.  Pulmonary:      Effort: Pulmonary effort is normal. No respiratory  distress.      Breath sounds: No wheezing or rhonchi.   Abdominal:      General: Abdomen is flat. Bowel sounds are normal. There is no distension.      Palpations: Abdomen is soft.      Tenderness: There is no abdominal tenderness.   Musculoskeletal:         General: Normal range of motion.      Cervical back: Normal range of motion.      Right lower leg: No edema.      Left lower leg: No edema.   Skin:     General: Skin is warm.      Capillary Refill: Capillary refill takes less than 2 seconds.   Neurological:      General: No focal deficit present.      Mental Status: He is alert.      Comments: Oriented to self and place but not time           Lines/Drains/Airways       Drain  Duration             Male External Urinary Catheter 10/08/24 0700 2 days              Peripheral Intravenous Line  Duration                  Peripheral IV - Single Lumen 10/08/24 1843 20 G 1 3/4 in Anterior;Left Forearm 1 day                    Significant Labs:    Lab Results   Component Value Date    WBC 10.81 10/10/2024    HGB 12.5 (L) 10/10/2024    HCT 38.2 (L) 10/10/2024    MCV 89.9 10/10/2024     10/10/2024           BMP  Lab Results   Component Value Date     10/10/2024    K 4.6 10/10/2024    CO2 25 10/10/2024    BUN 15.4 10/10/2024    CREATININE 0.76 10/10/2024    CALCIUM 9.2 10/10/2024    AGAP 6.0 10/10/2024    EGFRNONAA >60 11/05/2021             Assessment/Plan:     Assessment/Plan  Urosepsis  --Blood culture and urine culture positive for pan sensitive E Coli  --Repeat blood cultures x 2 in process, will adjust antibiotic therapy based on sensitivity and speciation  --Currently on Rocephin 2g daily    Recurrent UTI in male  --CT abdomen with chronic bladder outlet obstruction with mild right sided hydronephrosis; will schedule urology appointment outpatient     3.   Atrial fibrillation on Eliquis        HFrEF (EF 30%) s/p AICD  --continue eliquis  --holding GDMT due to hypotension    3.   H/o CVA  --will  monitor    History of alcohol abuse  --[facility] protocol in place  --Will start Folic acid, Multivitamins and thiamine    DVT Prophylaxis: Eliquis  GI Prophylaxis: N/A  ABX: Rocephin     Disposition:  Pending blood culture results today.  Likely to transitioned to Levaquin daily oral and discharge to home with durable medical equipment.    Shan Davila MD  Internal Medicine - PGY-3

## 2024-10-10 NOTE — PT/OT/SLP PROGRESS
Occupational Therapy   Treatment    Name: Madan Elder  MRN: [id number]  Admitting Diagnosis:       SOB (shortness of breath)  Acute renal failure superimposed on chronic kidney disease, unspecified acute renal failure type, unspecified CKD stage (Primary)   Acute cystitis with hematuria  WIN (acute kidney injury)   Recurrent UTI   HFrEF (EF 30%) s/p AICD  Atrial fibrillation on Eliquis      Hx Stroke   Hx of alcohol abuse     Hospital Course/Significant events:  10/6/24: Upgraded to ICU for UTI sepsis vs medication overuse requiring IV vasopressor support  10/8/2024: Patient downgraded to the floor       Recommendations:     Discharge Recommendations: Moderate Intensity Therapy  Discharge Equipment Recommendations:  walker, rolling, shower chair, grab bar, bedside commode, other (see comments) (power w/c);  Barriers to discharge:  Decreased caregiver support, Other (Comment) (impaired cognition, level of skilled assist, fall risk , safety concerns)    Assessment:     Madan Elder is a 78 y.o. male with a medical diagnosis of recurrent UTI and WIN (acute kidney injury) and hx of CVA.  He presents with increased SOB, back pain and elevated heart rate impacting tolerance of OOB activity this session . Performance deficits affecting function are weakness, impaired endurance, impaired self care skills, impaired functional mobility, gait instability, impaired balance, impaired cognition, decreased upper extremity function, decreased lower extremity function, decreased safety awareness, pain, impaired coordination, impaired fine motor.     Rehab Prognosis:  Good; patient would benefit from acute skilled OT services to address these deficits and reach maximum level of function.       Plan:     Patient to be seen 3 x/week to address the above listed problems via therapeutic activities, therapeutic exercises, self-care/home management  Plan of Care Expires:  (d/c)  Plan of Care Reviewed with: patient    Subjective      Chief Complaint: pain > 10/10 lower back  Patient/Family Comments/goals: go back to my apt.   Pain/Comfort:  Pain Rating 1: 0/10  Location - Orientation 1: lower  Location 1: back  Pain Addressed 1: Nurse notified, Cessation of Activity  Pain Rating Post-Intervention 1: 10/10    Objective:     Communicated with: Nurse Kenyetta  prior to session.  Patient found HOB elevated with bed alarm, telemetry, peripheral IV upon OT entry to room.    General Precautions: Standard, fall, hearing impaired, seizure    Orthopedic Precautions:N/A  Braces: N/A  Respiratory Status: Room air     Occupational Performance:     Bed Mobility:    Patient completed Supine to Sit with stand by assistance  Patient completed Sit to Supine with stand by assistance and with increased back pain lifting legs onto bed       Functional Mobility/Transfers:  Patient completed Sit <> Stand Transfer with minimum assistance  with  rolling walker and x 5 trials from EOB; Pts back pain increased with each trial and HR mid 160's. Pts stand tolerance with RW  with CG/min A 20 sec x1, 15 sec x 1 , 10 sec x 2 and 5 sec x 1 and limited by increased  back pain    Functional Mobility: unable to step away from bed due to increased back pain and fall risk     Activities of Daily Living:  Grooming: while seated EOB with supervision, pt wiped face with washcloth and oral rinse; pt denied brushing teeth    Lower Body Dressing: pt attempted to don socks while seated EOB utilizing figure 4 LE position, but due to HR increasing to mid 190's  and increased SOB with attempt , OT donned socks . Pt able to slip on and off  loosely tied sneakers independently .        Treatment & Education:  Pt attempted to perform BUE AROM ex while seated EOB however reported increased back pain with AROM >90 degrees and requested to get back bed.     Patient left HOB elevated with all lines intact, call button in reach, and nurse  notified    GOALS:   Multidisciplinary Problems        Occupational Therapy Goals          Problem: Occupational Therapy    Goal Priority Disciplines Outcome Interventions   Occupational Therapy Goal     OT, PT/OT Progressing    Description: Goals to be met by: d/c     Patient will increase functional independence with ADLs by performing:    UE Dressing with Stand-by Assistance .  LE Dressing with Stand-by Assistance  .  Grooming while seated at sink with Stand-by Assistance.  Toileting from bedside commode with Stand-by Assistance for hygiene and clothing management.   Toilet transfer to bedside commode with Stand-by Assistance.                         Time Tracking:     OT Date of Treatment: 10/10/24  OT Start Time: 0936  OT Stop Time: 1001  OT Total Time (min): 25 min    Billable Minutes:Self Care/Home Management 10 min   Therapeutic Activity 15 min     OT/TASH: OT          10/10/2024

## 2024-10-10 NOTE — PT/OT/SLP PROGRESS
Physical Therapy    Missed Treatment Session - cancel note    Patient Name:  Madan Elder   MRN:  [id number]      -patient not seen at this time secondary to patient not appropriate  -multiple attempts by therapist to coax/motivate patient to participate in therapy session  -patient w/ back pain complaints earlier this a.m. but patients focus of conversation concerns 'somebody taking his furniture'  -therapy session attempts discontinued at this point   -will follow-up as patient is appropriate/available/agreeable to participate and as therapists' schedule allows.

## 2024-10-10 NOTE — PT/OT/SLP PROGRESS
Physical Therapy Treatment    Patient Name:  Madan Elder   MRN:  [id number]    Recommendations     Therapy Intensity Recommendations at Discharge: Moderate Intensity Therapy  Discharge Equipment Recommendations: walker, rolling, shower chair, bedside commode, power chair, grab bar   Barriers to discharge: safety concerns, fall risk, level of skilled assistance required, impaired cognitive status, and decreased caregiver support     Assessment     Madan Elder is a 78 y.o. male admitted with a medical diagnosis of WIN (acute kidney injury).  1. Acute renal failure superimposed on chronic kidney disease, unspecified acute renal failure type, unspecified CKD stage    2. SOB (shortness of breath)    3. Acute cystitis with hematuria    4. WIN (acute kidney injury)    5. Chest pain       Patient Active Problem List   Diagnosis    Left-sided weakness    Urinary tract infection without hematuria    WIN (acute kidney injury)    Moderate malnutrition      He presents with the following impairments/functional limitations:  weakness, gait instability, impaired balance, impaired endurance, impaired self care skills, impaired functional mobility, impaired cognition, decreased lower extremity function, decreased upper extremity function, decreased ROM, impaired coordination, impaired fine motor, decreased safety awareness.    Rehab Prognosis: Good.    Patient would benefit from continued skilled acute PT services to: address above listed impairments/functional limitations; receive patient/caregiver education; reduce fall risk; and maximize independency/safety with functional mobility.    -continued: cardiac chair positioning in bed, bed level activities, as tolerated/appropriate, with assistance and supervision, therapeutic exercises, and resume edge of bed and ambulation away from edge of bed as appropriate    Recent Surgery: * No surgery found *      Plan     During this hospitalization, patient to be seen 5 x/week to address  the identified impairments/functional limitations via gait training, therapeutic exercises and progress toward the established goals.    Plan of Care Expires:  11/06/24    Subjective     Communicated with patient's nurse Kenyetta during therapy session (not available prior to session).    Patient agreeable to participate in treatment session.    *patient confused - talking about people taking furniture and government taking home  *attempted re-directing patient w/o success  *no out of bed activities today 2/2 safety concerns    Chief Complaint: no specific complaints  Patient/Family Comments/goals: none verbalized  Pain/Comfort:  Pain Rating 1: 0/10  Pain Addressed 1: Nurse notified  Pain Rating Post-Intervention 1: 0/10    Objective     Patient found supine in bed, with HOB elevated, and with bed rails up bilateral HOB and left FOB with bed alarm, peripheral IV, telemetry (hearing aid)  upon PT entry to room.    General Precautions: Standard, fall, hearing impaired, seizure   Orthopedic Precautions:N/A   Braces: N/A  Respiratory Status: room air  SAT O2 Check: n/a    Functional Mobility:    Bed Mobility:  Repositioning to head-of-bed: minimum assistance  Repositioning to center-of-bed: minimum assistance  with cues for proper technique    Transfers:  N/A    Gait:  N/A    Other Mobility:  Therapeutic Exercises performed:   1 set times 15 repetitions  active-assist range of motion  bilat lower extremity       -bed level exercises:              hip/knee flexion            hip abduction            hip adduction            ankle pumps            hip internal rotation            hip external rotation  1 set times 20 repetitions  active-assist range of motion  bilat upper extremity       -bed level exercises:               chest press            shoulder flexion            shoulder internal rotation            shoulder external rotation            elbow flexion            elbow extension            forearm supination             forearm pronation            wrist flexion            wrist extension    Balance:  Sit  static balance - N/A  dynamic balance - N/A  Stand  static balance - N/A    Patient left supine in bed, with HOB elevated, and with bed rails up bilateral HOB and right FOB, peripheral IV, telemetry (hearing aid) with all lines intact, call button in reach, tray table at bedside, bedside commode at bedside, patient's nurse notified, and bed alarm on.    Education     Patient educated on and assisted with functional mobility as noted above.  Patient educated on PT Plan of Care  Patient was instructed to utilize staff assistance for mobility/transfers.  White board updated regarding patient's safest level of mobility with staff assistance.    Goals     Multidisciplinary Problems       Physical Therapy Goals       Problem: Physical Therapy    Goal Priority Disciplines Outcome Interventions   Physical Therapy Goal     PT, PT/OT Progressing    Description: REVIEWED 10/10/2024  Goals to be met by: DISCHARGE  Patient will increase functional independence with mobility by performing:  -. Supine to sit with Modified [place] - ONGOING  -. Sit to supine with Modified [place] - ONGOING  -. Rolling to Left and Right with Modified [place] - ONGOING  -. Sit to stand transfer with Contact Guard Assistance - ONGOING  -. Gait  x 130 feet with Contact Guard Assistance using Rolling Walker - ONGOING  -. BILAT UE/LE general ROM ex's - all joints/planes x10 reps each AROM - ESTABLISHED 10/10/02721           Time Tracking     PT Received On: 10/10/24  PT Start Time: 1318     PT Stop Time: 1400  PT Total Time (min): 42 min     Billable Minutes: Therapeutic Activity 4 (bed mobility/patient positioning) and Therapeutic Exercise 38  Non-Billable Minutes: N/A  10/10/2024

## 2024-10-11 PROBLEM — N30.01 ACUTE CYSTITIS WITH HEMATURIA: Status: ACTIVE | Noted: 2024-10-11

## 2024-10-11 PROBLEM — R78.81 BACTEREMIA: Status: ACTIVE | Noted: 2024-10-11

## 2024-10-11 LAB
ALBUMIN SERPL-MCNC: 2.2 G/DL (ref 3.4–4.8)
ALBUMIN/GLOB SERPL: 0.5 RATIO (ref 1.1–2)
ALP SERPL-CCNC: 67 UNIT/L (ref 40–150)
ALT SERPL-CCNC: 51 UNIT/L (ref 0–55)
ANION GAP SERPL CALC-SCNC: 8 MEQ/L
AST SERPL-CCNC: 40 UNIT/L (ref 5–34)
BACTERIA BLD CULT: NORMAL
BASOPHILS # BLD AUTO: 0.03 X10(3)/MCL
BASOPHILS NFR BLD AUTO: 0.3 %
BILIRUB SERPL-MCNC: 1.1 MG/DL
BUN SERPL-MCNC: 15.4 MG/DL (ref 8.4–25.7)
CALCIUM SERPL-MCNC: 9.3 MG/DL (ref 8.8–10)
CHLORIDE SERPL-SCNC: 107 MMOL/L (ref 98–107)
CO2 SERPL-SCNC: 22 MMOL/L (ref 23–31)
CREAT SERPL-MCNC: 0.75 MG/DL (ref 0.73–1.18)
CREAT/UREA NIT SERPL: 21
EOSINOPHIL # BLD AUTO: 0.11 X10(3)/MCL (ref 0–0.9)
EOSINOPHIL NFR BLD AUTO: 1.2 %
ERYTHROCYTE [DISTWIDTH] IN BLOOD BY AUTOMATED COUNT: 14.6 % (ref 11.5–17)
GFR SERPLBLD CREATININE-BSD FMLA CKD-EPI: >60 ML/MIN/1.73/M2
GLOBULIN SER-MCNC: 4.8 GM/DL (ref 2.4–3.5)
GLUCOSE SERPL-MCNC: 103 MG/DL (ref 82–115)
HCT VFR BLD AUTO: 39.1 % (ref 42–52)
HGB BLD-MCNC: 12.9 G/DL (ref 14–18)
IMM GRANULOCYTES # BLD AUTO: 0.09 X10(3)/MCL (ref 0–0.04)
IMM GRANULOCYTES NFR BLD AUTO: 1 %
LYMPHOCYTES # BLD AUTO: 1.43 X10(3)/MCL (ref 0.6–4.6)
LYMPHOCYTES NFR BLD AUTO: 15.1 %
MCH RBC QN AUTO: 29.8 PG (ref 27–31)
MCHC RBC AUTO-ENTMCNC: 33 G/DL (ref 33–36)
MCV RBC AUTO: 90.3 FL (ref 80–94)
MONOCYTES # BLD AUTO: 0.57 X10(3)/MCL (ref 0.1–1.3)
MONOCYTES NFR BLD AUTO: 6 %
NEUTROPHILS # BLD AUTO: 7.24 X10(3)/MCL (ref 2.1–9.2)
NEUTROPHILS NFR BLD AUTO: 76.4 %
NRBC BLD AUTO-RTO: 0 %
PLATELET # BLD AUTO: 207 X10(3)/MCL (ref 130–400)
PMV BLD AUTO: 9.5 FL (ref 7.4–10.4)
POTASSIUM SERPL-SCNC: 4.1 MMOL/L (ref 3.5–5.1)
PROT SERPL-MCNC: 7 GM/DL (ref 5.8–7.6)
RBC # BLD AUTO: 4.33 X10(6)/MCL (ref 4.7–6.1)
SODIUM SERPL-SCNC: 137 MMOL/L (ref 136–145)
WBC # BLD AUTO: 9.47 X10(3)/MCL (ref 4.5–11.5)

## 2024-10-11 PROCEDURE — 36415 COLL VENOUS BLD VENIPUNCTURE: CPT

## 2024-10-11 PROCEDURE — 97116 GAIT TRAINING THERAPY: CPT

## 2024-10-11 PROCEDURE — 21400001 HC TELEMETRY ROOM

## 2024-10-11 PROCEDURE — 25000003 PHARM REV CODE 250

## 2024-10-11 PROCEDURE — 80053 COMPREHEN METABOLIC PANEL: CPT

## 2024-10-11 PROCEDURE — 11000001 HC ACUTE MED/SURG PRIVATE ROOM

## 2024-10-11 PROCEDURE — 85025 COMPLETE CBC W/AUTO DIFF WBC: CPT

## 2024-10-11 PROCEDURE — 97535 SELF CARE MNGMENT TRAINING: CPT

## 2024-10-11 PROCEDURE — 97530 THERAPEUTIC ACTIVITIES: CPT

## 2024-10-11 RX ORDER — LEVOFLOXACIN 750 MG/1
750 TABLET ORAL DAILY
Status: DISCONTINUED | OUTPATIENT
Start: 2024-10-11 | End: 2024-10-17 | Stop reason: HOSPADM

## 2024-10-11 RX ORDER — CARVEDILOL 3.12 MG/1
3.12 TABLET ORAL 2 TIMES DAILY
Status: DISCONTINUED | OUTPATIENT
Start: 2024-10-11 | End: 2024-10-15

## 2024-10-11 RX ORDER — LEVOFLOXACIN 750 MG/1
750 TABLET ORAL DAILY
Qty: 7 TABLET | Refills: 0 | Status: SHIPPED | OUTPATIENT
Start: 2024-10-11 | End: 2024-10-18

## 2024-10-11 RX ADMIN — MULTIPLE VITAMINS W/ MINERALS TAB 1 TABLET: TAB at 08:10

## 2024-10-11 RX ADMIN — THIAMINE HCL TAB 100 MG 100 MG: 100 TAB at 08:10

## 2024-10-11 RX ADMIN — POLYETHYLENE GLYCOL 3350 17 G: 17 POWDER, FOR SOLUTION ORAL at 08:10

## 2024-10-11 RX ADMIN — APIXABAN 5 MG: 2.5 TABLET, FILM COATED ORAL at 08:10

## 2024-10-11 RX ADMIN — CARVEDILOL 3.12 MG: 3.12 TABLET, FILM COATED ORAL at 10:10

## 2024-10-11 RX ADMIN — FOLIC ACID 1 MG: 1 TABLET ORAL at 08:10

## 2024-10-11 RX ADMIN — MUPIROCIN: 20 OINTMENT TOPICAL at 08:10

## 2024-10-11 RX ADMIN — ATORVASTATIN CALCIUM 20 MG: 20 TABLET, FILM COATED ORAL at 08:10

## 2024-10-11 RX ADMIN — LEVOFLOXACIN 750 MG: 750 TABLET, FILM COATED ORAL at 10:10

## 2024-10-11 RX ADMIN — CARVEDILOL 3.12 MG: 3.12 TABLET, FILM COATED ORAL at 08:10

## 2024-10-11 RX ADMIN — THIAMINE HCL TAB 100 MG 100 MG: 100 TAB at 04:10

## 2024-10-11 NOTE — PT/OT/SLP PROGRESS
"Occupational Therapy   Treatment    Name: Madan Elder  MRN: [id number]  Admitting Diagnosis:  WIN (acute kidney injury)   , recurrent UTI, afib, urosepsis    Recommendations:     Discharge Recommendations: Moderate Intensity Therapy  Discharge Equipment Recommendations:  walker, rolling, shower chair, grab bar, bedside commode, other (see comments) (power w/c)  Barriers to discharge:       Assessment:     Madan Elder is a 78 y.o. male with a medical diagnosis of WIN (acute kidney injury).  He presents with decreased balance and decreased activity tolerance. Performance deficits affecting function are weakness, impaired endurance, impaired self care skills, impaired functional mobility, gait instability, impaired balance, impaired cognition, decreased upper extremity function, decreased lower extremity function, decreased safety awareness, pain, impaired coordination, impaired fine motor.     Rehab Prognosis:  Good; patient would benefit from acute skilled OT services to address these deficits and reach maximum level of function.       Plan:     Patient to be seen 3 x/week to address the above listed problems via therapeutic activities, therapeutic exercises, self-care/home management  Plan of Care Expires:  (d/c)  Plan of Care Reviewed with: patient    Subjective     Chief Complaint: pt c/o back pain, at end of session pt c/o "shakiness"   Patient/Family Comments/goals: to return to PLOF, return home  Pain/Comfort:  Pain Rating 1:  (c/o intermittent back pain)  Pain Addressed 1: Reposition    Objective:     Communicated with: nurse prior to session.  Patient found HOB elevated with telemetry, PureWick, bed alarm upon OT entry to room.    General Precautions: Standard, fall    Orthopedic Precautions:N/A  Braces: N/A  Respiratory Status: Room air     Occupational Performance:     Bed Mobility:    Patient completed Rolling/Turning to Left with  modified independence  Patient completed Scooting/Bridging with " modified independence  Patient completed Supine to Sit with supervision  Patient completed Sit to Supine with supervision     Functional Mobility/Transfers:  Patient completed Sit <> Stand Transfer with contact guard assistance and minimum assistance  with  rolling walker   Patient completed Toilet Transfer Step Transfer technique with contact guard assistance with  rolling walker and grab bars  Functional Mobility: pt ambulated in room bed<>toilet yamel 12 ft, bed<>sink yamel 4 ft, with RW CGA-min A due to c/o back pain unsteady gait    Activities of Daily Living:  Grooming: pt combed hair in standing at sink with min A for balance without UE support    Lower Body Dressing: pt doffed/donned B socks figure 4 technique in semi supine, pt able to thread B LE into pillowcase for simulated LE dressing tasks (no personal clothing available)      Standing balance min A, increased flexion with fatigue, fall risk    [facility] 6 Click ADL:      Treatment & Education:  Education on OT POC, use of call button    Patient left supine with all lines intact, call button in reach, and bed alarm on    GOALS:   Multidisciplinary Problems       Occupational Therapy Goals          Problem: Occupational Therapy    Goal Priority Disciplines Outcome Interventions   Occupational Therapy Goal     OT, PT/OT Progressing    Description: Goals to be met by: d/c     Patient will increase functional independence with ADLs by performing:    UE Dressing with Stand-by Assistance .  LE Dressing with Stand-by Assistance  .  Grooming while seated at sink with Stand-by Assistance.  Toileting from bedside commode with Stand-by Assistance for hygiene and clothing management.   Toilet transfer to bedside commode with Stand-by Assistance.                         Time Tracking:     OT Date of Treatment: 10/11/24  OT Start Time: 1156  OT Stop Time: 1230  OT Total Time (min): 34 min    Billable Minutes:Self Care/Home Management 2                10/11/2024

## 2024-10-11 NOTE — PT/OT/SLP PROGRESS
Physical Therapy    Missed Treatment Session - patient refusal note    Patient Name:  Madan Elder   MRN:  [id number]      -patient not seen at this time secondary to patient unwilling to participate  -patient stated he was comfortable and trying to rest  -patients nurse Kenyetta notified  -will follow-up as patient is appropriate/available/agreeable to participate and as therapists' schedule allows.

## 2024-10-11 NOTE — PROGRESS NOTES
[facility]  Progress Note    Patient Name: Madan Elder  MRN: [id number]  Admission Date: 10/6/2024  Hospital Length of Stay: 5 days  Code Status: Full Code  Attending Provider: Rita Piedra MD  Primary Care Provider: Noemí Peña     Subjective:     HPI: Madan Elder is a 79 yo male with PMH of chronic bladder obstruction, recurrent UTIs, atrial fibrillation in Eliquis, HFrEF (EF 30%) s/p AICD placement, and HTN. Patient was brought into the ED due to worsening mentation. During evaluation, patient was confused but able to answer some questions. History primarily obtained from daughter at bedside. Daughter states patient was recently admitted to the hospital due to UTI treated with PO antibiotics which patient had completed. Patient was then admitted to our hospital due to suspected UTI along with hypotension. Daughter reports that patient had his BP medications adjusted during his last admission but reports that he was still taking all of his BP medications instead of stopping Lisinopril as told. Patient was treated with IV fluid bolus and low maintenance fluids due to history of HFrEF but BP remained persistently low with MAPs at 65.     Hospital Course/Significant events:  10/6/24: Upgraded to ICU for UTI sepsis vs medication overuse requiring IV vasopressor support  10/8/2024: Patient downgraded to the floor    Interval History:  CARLOS DOTSON. Patient still hard of hearing 2/2 hearing aids battery non functional however, denies any acute complaints. CBC and CMP stable. Working with PT/OT.         Past Medical History:   Diagnosis Date    Hypertension     Stroke        Past Surgical History:   Procedure Laterality Date    INSERTION OF PACEMAKER         Social History     Socioeconomic History    Marital status:    Tobacco Use    Smoking status: Never    Smokeless tobacco: Never   Substance and Sexual Activity    Alcohol use: Yes    Drug use: Never     Social  Drivers of Health     Financial Resource Strain: Patient Unable To Answer (10/9/2024)    Overall Financial Resource Strain (CARDIA)     Difficulty of Paying Living Expenses: Patient unable to answer   Food Insecurity: Patient Unable To Answer (10/9/2024)    Hunger Vital Sign     Worried About Running Out of Food in the Last Year: Patient unable to answer     Ran Out of Food in the Last Year: Patient unable to answer   Transportation Needs: Patient Unable To Answer (10/9/2024)    TRANSPORTATION NEEDS     Transportation : Patient unable to answer   Physical Activity: Inactive (10/7/2024)    Exercise Vital Sign     Days of Exercise per Week: 0 days     Minutes of Exercise per Session: 0 min   Stress: Patient Unable To Answer (10/9/2024)    [employer] [place] [employer] - Occupational Stress Questionnaire     Feeling of Stress : Patient unable to answer   Housing Stability: Patient Unable To Answer (10/9/2024)    Housing Stability Vital Sign     Unable to Pay for Housing in the Last Year: Patient unable to answer     Homeless in the Last Year: Patient unable to answer           Current Outpatient Medications   Medication Instructions    apixaban (ELIQUIS) 5 mg, Oral, 2 times daily    atorvastatin (LIPITOR) 20 mg, Oral, Daily    carvediloL (COREG) 3.125 mg, Oral, 2 times daily    folic acid (FOLVITE) 1,000 mcg, Oral, Daily    levoFLOXacin (LEVAQUIN) 750 mg, Oral, Daily    sodium bicarbonate 650 mg, Oral, Daily    VITAMIN B-1 100 mg, Oral, Daily       Current Inpatient Medications   apixaban  5 mg Oral BID    atorvastatin  20 mg Oral Daily    folic acid  1 mg Oral Daily    levoFLOXacin  750 mg Oral Daily    multivitamin  1 tablet Oral Daily    polyethylene glycol  17 g Oral Daily    thiamine  100 mg Oral TID       Current Intravenous Infusions          Review of Systems   Constitutional:  Negative for chills and fever.   Respiratory:  Negative for cough, sputum production, shortness of breath and wheezing.     Cardiovascular:  Negative for chest pain, palpitations and leg swelling.   Gastrointestinal:  Negative for abdominal pain, nausea and vomiting.   Genitourinary:  Negative for dysuria and frequency.   Musculoskeletal:  Negative for myalgias.   Neurological:  Negative for dizziness, focal weakness, seizures and weakness.          Objective:       Intake/Output Summary (Last 24 hours) at 10/11/2024 1006  Last data filed at 10/11/2024 0946  Gross per 24 hour   Intake 383.55 ml   Output --   Net 383.55 ml         Vital Signs (Most Recent):  Temp: 97.6 °F (36.4 °C) (10/11/24 0755)  Pulse: 79 (10/11/24 0755)  Resp: 17 (10/11/24 0755)  BP: 134/72 (10/11/24 0755)  SpO2: 98 % (10/11/24 0755)  Body mass index is 22.18 kg/m².  Weight: 66.2 kg (145 lb 14.4 oz) Vital Signs (24h Range):  Temp:  [97.6 °F (36.4 °C)-99.8 °F (37.7 °C)] 97.6 °F (36.4 °C)  Pulse:  [] 79  Resp:  [14-18] 17  SpO2:  [96 %-99 %] 98 %  BP: (126-154)/(72-81) 134/72     Physical Exam  Constitutional:       General: He is not in acute distress.     Appearance: Normal appearance.   HENT:      Head: Normocephalic.      Ears:      Comments: Hard of hearing     Mouth/Throat:      Mouth: Mucous membranes are moist.   Eyes:      Conjunctiva/sclera: Conjunctivae normal.      Pupils: Pupils are equal, round, and reactive to light.   Cardiovascular:      Rate and Rhythm: Normal rate and regular rhythm.      Pulses: Normal pulses.      Heart sounds: No murmur heard.  Pulmonary:      Effort: Pulmonary effort is normal. No respiratory distress.      Breath sounds: No wheezing or rhonchi.   Abdominal:      General: Abdomen is flat. Bowel sounds are normal. There is no distension.      Palpations: Abdomen is soft.      Tenderness: There is no abdominal tenderness.   Musculoskeletal:         General: Normal range of motion.      Cervical back: Normal range of motion.      Right lower leg: No edema.      Left lower leg: No edema.   Skin:     General: Skin is warm.       Capillary Refill: Capillary refill takes less than 2 seconds.   Neurological:      General: No focal deficit present.      Mental Status: He is alert.      Comments: Oriented to self and place but not time           Lines/Drains/Airways       Drain  Duration             Male External Urinary Catheter 10/08/24 0700 3 days              Peripheral Intravenous Line  Duration                  Peripheral IV - Single Lumen 10/08/24 1843 20 G 1 3/4 in Anterior;Left Forearm 2 days                    Significant Labs:    Lab Results   Component Value Date    WBC 9.47 10/11/2024    HGB 12.9 (L) 10/11/2024    HCT 39.1 (L) 10/11/2024    MCV 90.3 10/11/2024     10/11/2024           BMP  Lab Results   Component Value Date     10/11/2024    K 4.1 10/11/2024    CO2 22 (L) 10/11/2024    BUN 15.4 10/11/2024    CREATININE 0.75 10/11/2024    CALCIUM 9.3 10/11/2024    AGAP 8.0 10/11/2024    EGFRNONAA >60 11/05/2021             Assessment/Plan:     Assessment/Plan  Urosepsis  --Blood culture and urine culture positive for pan sensitive E Coli  --Repeat blood culture negative at 24 hours  --Discontinue IV Rocephin. Transition to po Levaquin 750 mg once daily.    Recurrent UTI in male  --CT abdomen with chronic bladder outlet obstruction with mild right sided hydronephrosis; will schedule urology appointment outpatient     3.   Atrial fibrillation on Eliquis        HFrEF (EF 30%) s/p AICD  --continue Eliquis 5 mg BID  -- resuming Coreg 3.125 mg BID    3.   H/o CVA  --will monitor    History of alcohol abuse  --CIWA protocol in place  --Continue Folic acid, Multivitamins and thiamine    DVT Prophylaxis: Eliquis  GI Prophylaxis: N/A  ABX: Levaquin     Disposition:  Repeat Bcx negative at 24 hours. Transition to Levaquin daily oral and discharge home likely tomorrow once daughter is back in town. Home health ordered with PT/OT    Bartolo Keyes MD  Internal Medicine - PGY-2

## 2024-10-11 NOTE — PLAN OF CARE
Problem: Adult Inpatient Plan of Care  Goal: Plan of Care Review  Outcome: Progressing  Goal: Patient-Specific Goal (Individualized)  Outcome: Progressing  Goal: Absence of Hospital-Acquired Illness or Injury  Outcome: Progressing  Goal: Optimal Comfort and Wellbeing  Outcome: Progressing  Goal: Readiness for Transition of Care  Outcome: Progressing     Problem: Fall Injury Risk  Goal: Absence of Fall and Fall-Related Injury  Outcome: Progressing     Problem: Heart Failure  Goal: Optimal Coping  Outcome: Progressing  Goal: Optimal Cardiac Output  Outcome: Progressing  Goal: Stable Heart Rate and Rhythm  Outcome: Progressing  Goal: Optimal Functional Ability  Outcome: Progressing  Goal: Fluid and Electrolyte Balance  Outcome: Progressing  Goal: Improved Oral Intake  Outcome: Progressing  Goal: Effective Oxygenation and Ventilation  Outcome: Progressing  Goal: Effective Breathing Pattern During Sleep  Outcome: Progressing     Problem: Wound  Goal: Optimal Coping  Outcome: Progressing  Goal: Optimal Functional Ability  Outcome: Progressing  Goal: Absence of Infection Signs and Symptoms  Outcome: Progressing  Goal: Improved Oral Intake  Outcome: Progressing  Goal: Optimal Pain Control and Function  Outcome: Progressing  Goal: Skin Health and Integrity  Outcome: Progressing  Goal: Optimal Wound Healing  Outcome: Progressing     Problem: Skin Injury Risk Increased  Goal: Skin Health and Integrity  Outcome: Progressing

## 2024-10-11 NOTE — PROGRESS NOTES
Inpatient Nutrition Assessment    Admit Date: 10/6/2024   Total duration of encounter: 5 days   Patient Age: 78 y.o.    Nutrition Recommendation/Prescription     Continue cardiac diet  Continue Boost Plus TID (provides 360 kcal, 14 g protein per serving)   Continue MVI, Thiamine, Folic Acid  Biweekly wt    Communication of Recommendations: reviewed with nurse and reviewed with patient    Nutrition Assessment     Malnutrition Assessment/Nutrition-Focused Physical Exam    Malnutrition Context: chronic illness (10/07/24 1118)  Malnutrition Level: moderate (10/07/24 1118)  Energy Intake (Malnutrition): less than 75% for greater than or equal to 1 month (10/07/24 1118)  Weight Loss (Malnutrition): other (see comments) (does not meet criteria) (10/07/24 1118)  Subcutaneous Fat (Malnutrition): mild depletion (10/07/24 1118)  Orbital Region (Subcutaneous Fat Loss): mild depletion  Upper Arm Region (Subcutaneous Fat Loss): mild depletion     Muscle Mass (Malnutrition): mild depletion (10/07/24 1118)  [place] [employer] Region (Muscle Loss): mild depletion  Clavicle Bone Region (Muscle Loss): mild depletion                             A minimum of two characteristics is recommended for diagnosis of either severe or non-severe malnutrition.    Chart Review    Reason Seen: follow-up    Malnutrition Screening Tool Results   Have you recently lost weight without trying?: Unsure  Have you been eating poorly because of a decreased appetite?: No   MST Score: 2   Diagnosis:  Urosepsis, Recurrent UTI in male, hx chronic bladder outlet obstruction, CHF, Afib, hx of CVA    Relevant Medical History: chronic bladder obstruction, UTI, afib, CHF, S/P AICD, HTN     Scheduled Medications:  apixaban, 5 mg, BID  atorvastatin, 20 mg, Daily  carvediloL, 3.125 mg, BID  folic acid, 1 mg, Daily  levoFLOXacin, 750 mg, Daily  multivitamin, 1 tablet, Daily  polyethylene glycol, 17 g, Daily  thiamine, 100 mg, TID    Continuous Infusions:     PRN  Medications:  acetaminophen, 1,000 mg, Q6H PRN  dextrose 10%, 12.5 g, PRN  dextrose 10%, 25 g, PRN  glucagon (human recombinant), 1 mg, PRN  glucose, 16 g, PRN  glucose, 24 g, PRN  naloxone, 0.02 mg, PRN  sodium chloride 0.9%, 10 mL, Q12H PRN    Calorie Containing IV Medications: no significant kcals from medications at this time    Recent Labs   Lab 10/06/24  0714 10/06/24  1234 10/07/24  0354 10/07/24  1023 10/08/24  0638 10/09/24  0507 10/10/24  0518 10/11/24  0459     --  137  --  139 138 136 137   K 4.4  --  3.9  --  3.8 3.8 4.6 4.1   CALCIUM 10.7*  --  9.0  --  9.1 9.0 9.2 9.3   PHOS  --  3.8  --   --   --   --   --   --    MG  --  2.60  --   --   --   --   --   --    CL 98  --  106  --  108* 107 105 107   CO2 24  --  23  --  21* 22* 25 22*   BUN 49.8*  --  29.5*  --  17.6 16.8 15.4 15.4   CREATININE 1.91*  --  0.92  --  0.82 0.78 0.76 0.75   EGFRNORACEVR 35  --  >60  --  >60 >60 >60 >60   GLUCOSE 115  --  111  --  104 113 97 103   BILITOT 2.1*  --  1.0  --  1.2 1.0 0.9 1.1   ALKPHOS 107  --  67  --  71 67 69 67   ALT 99*  --  54  --  46 44 50 51   AST 99*  --  46*  --  37* 31 42* 40*   ALBUMIN 2.6*  --  1.8*  --  2.0* 2.0* 2.2* 2.2*   AMMONIA  --   --   --  24.7  --   --   --   --    WBC 10.88  --  8.03  --  8.05 8.57 10.81 9.47   HGB 14.6  --  11.3*  --  12.6* 12.4* 12.5* 12.9*   HCT 45.5  --  34.4*  --  38.8* 38.4* 38.2* 39.1*     Nutrition Orders:  Diet Heart Healthy  Dietary nutrition supplements TID; Boost Plus Nutritional Drink - Any flavor    Appetite/Oral Intake: fair/50-75% of meals  Factors Affecting Nutritional Intake: impaired cognitive status/motor control and decreased appetite  Social Needs Impacting Access to Food: none identified  Food/Anglican/Cultural Preferences: none reported  Food Allergies: none reported  Last Bowel Movement: 10/10/24  Wound(s):     Wound 10/08/24 1730 Skin Tear Left lateral Thigh #1-Tissue loss description: Partial thickness pink superficial wound to left  "thigh    Comments    10/11/24 -- Pt with fair po intake of meals stating "I don't want to over do it", drinking 100% of Boost Plus as ordered; denies n/v or abdominal pain; new bed wt indicates ~4 lb wt loss since admit, continue to supplement meals with Boost Plus & vitamin treatment    (10/7) pt laying in bed during rounds; not able to answer all questions appropriately; nurse reported --pt eats some of food but does better with ONS; will order boost supplement; pt ate approx 50% breakfast meal. Per EMR wt hx --wt stable. Pt with mild fat/muscle wasting. Labs acknowledged.     Anthropometrics    Height: 5' 8" (172.7 cm), Height Method: Estimated  Last Weight: 64 kg (141 lb 3.2 oz) (10/11/24 1100), Weight Method: Bed Scale  BMI (Calculated): 21.5  BMI Classification: underweight (BMI less than 22 if >65 years of age)        Ideal Body Weight (IBW), Male: 154 lb     % Ideal Body Weight, Male (lb): 94.16 %                 Usual Body Weight (UBW), k kg  % Usual Body Weight: 101.4     Usual Weight Provided By: patient and EMR weight history    Wt Readings from Last 5 Encounters:   10/11/24 64 kg (141 lb 3.2 oz)   09/15/24 63.4 kg (139 lb 12.4 oz)   10/17/23 64.9 kg (143 lb)   23 66.2 kg (146 lb)   04/10/23 74.6 kg (164 lb 9 oz)     Weight Change(s) Since Admission: 4 lb wt loss since admit  Wt Readings from Last 1 Encounters:   10/11/24 1100 64 kg (141 lb 3.2 oz)   10/10/24 0613 66.2 kg (145 lb 14.4 oz)   10/09/24 0515 66.1 kg (145 lb 12.8 oz)   10/06/24 06 65.8 kg (145 lb)   Admit Weight: 65.8 kg (145 lb) (10/06/24 0618), Weight Method: Stated    Estimated Needs    Weight Used For Calorie Calculations: 65.8 kg (145 lb 1 oz)  Energy Calorie Requirements (kcal): 1974 kcal/d; 30 samra/kg  Energy Need Method: Kcal/kg  Weight Used For Protein Calculations: 65.8 kg (145 lb 1 oz)  Protein Requirements: 79 gm protein/d 1.2 gm/kg  Fluid Requirements (mL): 1974 ml/d; 1ml/samra        Enteral Nutrition     Patient not " receiving enteral nutrition at this time.    Parenteral Nutrition     Patient not receiving parenteral nutrition support at this time.    Evaluation of Received Nutrient Intake    Calories: meeting estimated needs (meals + ONS)  Protein: meeting estimated needs (meals + ONS)    Patient Education     Not applicable.    Nutrition Diagnosis     PES: Inadequate oral intake related to chronic illness as evidenced by eating 75% or less > 1 month. (active)     PES: Moderate chronic disease or condition related malnutrition related to chronic illness as evidenced by less than 75% needs met for greater than or equal to 1 month, mild fat depletion, and mild muscle depletion. (active)    Nutrition Interventions     Intervention(s): modified composition of meals/snacks, commercial beverage, multivitamin/mineral supplement therapy, and collaboration with other providers    Goal: Meet greater than 80% of nutritional needs by follow-up. (goal met)  Goal: Maintain weight throughout hospitalization. (goal progressing)    Nutrition Goals & Monitoring     Dietitian will monitor: food and beverage intake and weight  Discharge planning: continue heart healthy  diet with boost  oral supplements  Nutrition Risk/Follow-Up: moderate (follow-up in 3-5 days)   Please consult if re-assessment needed sooner.

## 2024-10-11 NOTE — PLAN OF CARE
Problem: Adult Inpatient Plan of Care  Goal: Plan of Care Review  10/11/2024 1717 by Yisel Torrez RN  Outcome: Progressing  10/11/2024 1710 by Yisel Torrez RN  Outcome: Progressing  Goal: Patient-Specific Goal (Individualized)  10/11/2024 1717 by Yisel Torrez, RN  Outcome: Progressing  10/11/2024 1710 by Yisel Torrez RN  Outcome: Progressing  Goal: Absence of Hospital-Acquired Illness or Injury  10/11/2024 1717 by Yisel Torrez RN  Outcome: Progressing  10/11/2024 1710 by Yisel Torrez RN  Outcome: Progressing  Goal: Optimal Comfort and Wellbeing  10/11/2024 1717 by Yisel Torrez, RN  Outcome: Progressing  10/11/2024 1710 by Yisel Torrez, RN  Outcome: Progressing  Goal: Readiness for Transition of Care  10/11/2024 1717 by Yisel Torrez, RN  Outcome: Progressing  10/11/2024 1710 by Yisel Torrez, RN  Outcome: Progressing     Problem: Fall Injury Risk  Goal: Absence of Fall and Fall-Related Injury  10/11/2024 1717 by Yisel Torrez, RN  Outcome: Progressing  10/11/2024 1710 by Yisel Torrez, RN  Outcome: Progressing     Problem: Heart Failure  Goal: Optimal Coping  10/11/2024 1717 by Yisel Torrez, RN  Outcome: Progressing  10/11/2024 1710 by Yisel Torrez, RN  Outcome: Progressing  Goal: Optimal Cardiac Output  10/11/2024 1717 by Yisel Torrez, RN  Outcome: Progressing  10/11/2024 1710 by Yisel Torrez RN  Outcome: Progressing  Goal: Stable Heart Rate and Rhythm  10/11/2024 1717 by Yisel Torrez, RN  Outcome: Progressing  10/11/2024 1710 by Yisel Torrez, RN  Outcome: Progressing  Goal: Optimal Functional Ability  10/11/2024 1717 by Yisel Torrez RN  Outcome: Progressing  10/11/2024 1710 by Yisel Torrez RN  Outcome: Progressing  Goal: Fluid and Electrolyte Balance  10/11/2024 1717 by Yisel Torrez RN  Outcome: Progressing  10/11/2024 1710 by Yisel Torrez RN  Outcome: Progressing  Goal: Improved Oral  Intake  10/11/2024 1717 by Yisel Torrez, RN  Outcome: Progressing  10/11/2024 1710 by Yisel Torrez, RN  Outcome: Progressing  Goal: Effective Oxygenation and Ventilation  10/11/2024 1717 by Yisel Torrez, RN  Outcome: Progressing  10/11/2024 1710 by Yisel Torrez, RN  Outcome: Progressing  Goal: Effective Breathing Pattern During Sleep  10/11/2024 1717 by Yisel Torrez, RN  Outcome: Progressing  10/11/2024 1710 by Yisel Torrez, RN  Outcome: Progressing     Problem: Wound  Goal: Optimal Coping  10/11/2024 1717 by Yisel Torrez, RN  Outcome: Progressing  10/11/2024 1710 by Yisel Torrez, RN  Outcome: Progressing  Goal: Optimal Functional Ability  10/11/2024 1717 by Yisel Torrez, RN  Outcome: Progressing  10/11/2024 1710 by Yisel Torrez, RN  Outcome: Progressing  Goal: Absence of Infection Signs and Symptoms  10/11/2024 1717 by Yisel Torrez, RN  Outcome: Progressing  10/11/2024 1710 by Yisel Torrez, RN  Outcome: Progressing  Goal: Improved Oral Intake  10/11/2024 1717 by Yisel Torrez, RN  Outcome: Progressing  10/11/2024 1710 by Yisel Torrez, RN  Outcome: Progressing  Goal: Optimal Pain Control and Function  10/11/2024 1717 by Yisel Torrez, RN  Outcome: Progressing  10/11/2024 1710 by Yisel Torrez, RN  Outcome: Progressing  Goal: Skin Health and Integrity  10/11/2024 1717 by Yisel Torrez, RN  Outcome: Progressing  10/11/2024 1710 by Yisel Torrez, RN  Outcome: Progressing  Goal: Optimal Wound Healing  10/11/2024 1717 by Yisel Torrez, RN  Outcome: Progressing  10/11/2024 1710 by Yisel Torrez RN  Outcome: Progressing     Problem: Skin Injury Risk Increased  Goal: Skin Health and Integrity  10/11/2024 1717 by Yisel Torrez RN  Outcome: Progressing  10/11/2024 1710 by Yisel Torrez RN  Outcome: Progressing

## 2024-10-11 NOTE — PT/OT/SLP PROGRESS
Physical Therapy Treatment    Patient Name:  Madan Elder   MRN:  [id number]    Recommendations     Therapy Intensity Recommendations at Discharge: Moderate Intensity Therapy  Discharge Equipment Recommendations: walker, rolling, shower chair, bedside commode, power chair, grab bar   Barriers to discharge:  safety concerns, fall risk, level of skilled assistance required, impaired cognitive status, and decreased caregiver support     Assessment     Madan Elder is a 78 y.o. male admitted with a medical diagnosis of WIN (acute kidney injury).  1. Acute renal failure superimposed on chronic kidney disease, unspecified acute renal failure type, unspecified CKD stage    2. SOB (shortness of breath)    3. Acute cystitis with hematuria    4. WIN (acute kidney injury)    5. Chest pain    6. Hydronephrosis, unspecified hydronephrosis type       Patient Active Problem List   Diagnosis    Left-sided weakness    Urinary tract infection without hematuria    WIN (acute kidney injury)    Moderate malnutrition    Acute cystitis with hematuria    Bacteremia      He presents with the following impairments/functional limitations:  weakness, gait instability, impaired balance, impaired endurance, impaired self care skills, impaired functional mobility, impaired cognition, decreased lower extremity function, decreased upper extremity function, decreased ROM, impaired coordination, impaired fine motor, decreased safety awareness.    Rehab Prognosis: Good.    Patient would benefit from continued skilled acute PT services to: address above listed impairments/functional limitations; receive patient/caregiver education; reduce fall risk; and maximize independency/safety with functional mobility.    -continued: sitting edge of bed, up-to-chair, ambulation, with progression of gait distance/frequency/duration and speed, as tolerated/appropriate, with assistance and supervision    Recent Surgery: * No surgery found *      Plan     During  this hospitalization, patient to be seen 5 x/week to address the identified impairments/functional limitations via gait training, therapeutic exercises and progress toward the established goals.    Plan of Care Expires:  11/06/24    Subjective     Communicated with patient's nurse Kenyetta prior to session.    Patient agreeable to participate in treatment session.    Chief Complaint: low back pain  Patient/Family Comments/goals: home  Pain/Comfort:  Pain Rating 1: 5/10  Location - Orientation 1: lower  Location 1: back  Pain Addressed 1: Nurse notified  Pain Rating Post-Intervention 1: 5/10    Objective     Patient found supine in bed, with HOB elevated, and with bed rails up bilateral HOB, left FOB, and right FOB with bed alarm, peripheral IV, PureWick, telemetry (AICD)  upon PT entry to room.    General Precautions: Standard, fall, seizure, hearing impaired   Orthopedic Precautions:N/A   Braces: N/A  Respiratory Status: room air  SAT O2 Check: n/a    Functional Mobility:    Bed Mobility:  Rolling Left: supervision  Rolling Right: supervision  Scooting: stand by assistance  Bridging: stand by assistance  Supine to Sit: stand by assistance  Sit to Supine: stand by assistance  with cues for proper technique  with HOB elevated, hand rail, and firm mattress    Transfers:  Sit to Stand: contact guard-minimum assistance with hand-held assist and rolling walker  with cues for hand placement, foot placement, and posture  with firm mattress    Gait:  Patient ambulated 65ft with hand-held assist and rolling walker and minimum assistance.  Patient demonstrates :       upright posture       occasional unsteady gait       decreased lupe       decreased bilateral step length       wide base of support       decreased bilateral foot/floor clearance      slowed, guarded, time consuming movements - all transitional activities.    Other Mobility:  Therapeutic Activities performed:        -sitting balance activities:               weight shifting:                   -laterally (left)                 -laterally (right)            scooting:                   -forward                 -backward            repositioning at edge of bed       -standing balance activities:              weight shifting:                   -laterally (left)                 -laterally (right)            transitioned to bedside chair from bed (after gait session)            transitioned from bedside chair to bed (HOB)    Balance:  Sit  Patient demonstrated static balance on level surface with modified independence with no verbal cues.  Patient demonstrated dynamic balance on level surface with stand by assistance with no verbal cues during moderate excursions.  Stand  Patient demonstrated static balance on level surface  using hand-held assist and rolling walker with contact guard assistance with minimal verbal cues.    Patient left supine in bed, with HOB elevated, and with bed rails up bilateral HOB and right FOB with peripheral IV, PureWick, telemetry (AICD) with all lines intact, call button in reach, tray table at bedside, patient's nurse notified, and bed alarm on.    Education     Patient educated on and assisted with functional mobility as noted above.  Patient educated on PT Plan of Care  Patient was instructed to utilize staff assistance for mobility/transfers.  White board updated regarding patient's safest level of mobility with staff assistance.    Goals     Multidisciplinary Problems       Physical Therapy Goals       Problem: Physical Therapy    Goal Priority Disciplines Outcome Interventions   Physical Therapy Goal     PT, PT/OT Progressing    Description: REVIEWED 10/11/2024  Goals to be met by: DISCHARGE  Patient will increase functional independence with mobility by performing:  -. Supine to sit with Modified [place] - ONGOING  -. Sit to supine with Modified [place] - ONGOING  -. Rolling to Left and Right with Modified [place] -  ONGOING  -. Sit to stand transfer with Contact Guard Assistance - ONGOING  -. Gait  x 130 feet with Contact Guard Assistance using Rolling Walker - ONGOING  -. BILAT UE/LE general ROM ex's - all joints/planes x10 reps each AROM - ESTABLISHED 10/10/30060 - ONGOING           Time Tracking     PT Received On: 10/11/24  PT Start Time: 0855     PT Stop Time: 0927  PT Total Time (min): 32 min     Billable Minutes: Gait Training 22 and Therapeutic Activity 10  Non-Billable Minutes: N/A  10/11/2024

## 2024-10-12 LAB
ALBUMIN SERPL-MCNC: 2.2 G/DL (ref 3.4–4.8)
ALBUMIN/GLOB SERPL: 0.4 RATIO (ref 1.1–2)
ALP SERPL-CCNC: 69 UNIT/L (ref 40–150)
ALT SERPL-CCNC: 59 UNIT/L (ref 0–55)
ANION GAP SERPL CALC-SCNC: 9 MEQ/L
AST SERPL-CCNC: 45 UNIT/L (ref 5–34)
BASOPHILS # BLD AUTO: 0.03 X10(3)/MCL
BASOPHILS NFR BLD AUTO: 0.3 %
BILIRUB SERPL-MCNC: 1.4 MG/DL
BUN SERPL-MCNC: 19.6 MG/DL (ref 8.4–25.7)
CALCIUM SERPL-MCNC: 9.6 MG/DL (ref 8.8–10)
CHLORIDE SERPL-SCNC: 103 MMOL/L (ref 98–107)
CO2 SERPL-SCNC: 24 MMOL/L (ref 23–31)
CREAT SERPL-MCNC: 0.77 MG/DL (ref 0.73–1.18)
CREAT/UREA NIT SERPL: 25
EOSINOPHIL # BLD AUTO: 0.14 X10(3)/MCL (ref 0–0.9)
EOSINOPHIL NFR BLD AUTO: 1.6 %
ERYTHROCYTE [DISTWIDTH] IN BLOOD BY AUTOMATED COUNT: 14.7 % (ref 11.5–17)
GFR SERPLBLD CREATININE-BSD FMLA CKD-EPI: >60 ML/MIN/1.73/M2
GLOBULIN SER-MCNC: 5 GM/DL (ref 2.4–3.5)
GLUCOSE SERPL-MCNC: 99 MG/DL (ref 82–115)
HCT VFR BLD AUTO: 38.4 % (ref 42–52)
HGB BLD-MCNC: 12.4 G/DL (ref 14–18)
HOLD SPECIMEN: NORMAL
IMM GRANULOCYTES # BLD AUTO: 0.07 X10(3)/MCL (ref 0–0.04)
IMM GRANULOCYTES NFR BLD AUTO: 0.8 %
LYMPHOCYTES # BLD AUTO: 1.2 X10(3)/MCL (ref 0.6–4.6)
LYMPHOCYTES NFR BLD AUTO: 13.4 %
MCH RBC QN AUTO: 29.5 PG (ref 27–31)
MCHC RBC AUTO-ENTMCNC: 32.3 G/DL (ref 33–36)
MCV RBC AUTO: 91.4 FL (ref 80–94)
MONOCYTES # BLD AUTO: 0.45 X10(3)/MCL (ref 0.1–1.3)
MONOCYTES NFR BLD AUTO: 5 %
NEUTROPHILS # BLD AUTO: 7.05 X10(3)/MCL (ref 2.1–9.2)
NEUTROPHILS NFR BLD AUTO: 78.9 %
NRBC BLD AUTO-RTO: 0 %
PLATELET # BLD AUTO: 225 X10(3)/MCL (ref 130–400)
PMV BLD AUTO: 9.5 FL (ref 7.4–10.4)
POTASSIUM SERPL-SCNC: 4.1 MMOL/L (ref 3.5–5.1)
PROT SERPL-MCNC: 7.2 GM/DL (ref 5.8–7.6)
RBC # BLD AUTO: 4.2 X10(6)/MCL (ref 4.7–6.1)
SODIUM SERPL-SCNC: 136 MMOL/L (ref 136–145)
WBC # BLD AUTO: 8.94 X10(3)/MCL (ref 4.5–11.5)

## 2024-10-12 PROCEDURE — 25000003 PHARM REV CODE 250

## 2024-10-12 PROCEDURE — 11000001 HC ACUTE MED/SURG PRIVATE ROOM

## 2024-10-12 PROCEDURE — 36415 COLL VENOUS BLD VENIPUNCTURE: CPT | Performed by: INTERNAL MEDICINE

## 2024-10-12 PROCEDURE — 97116 GAIT TRAINING THERAPY: CPT

## 2024-10-12 PROCEDURE — 80053 COMPREHEN METABOLIC PANEL: CPT

## 2024-10-12 PROCEDURE — 21400001 HC TELEMETRY ROOM

## 2024-10-12 PROCEDURE — 85025 COMPLETE CBC W/AUTO DIFF WBC: CPT

## 2024-10-12 PROCEDURE — 36415 COLL VENOUS BLD VENIPUNCTURE: CPT

## 2024-10-12 PROCEDURE — 97530 THERAPEUTIC ACTIVITIES: CPT

## 2024-10-12 RX ADMIN — MULTIPLE VITAMINS W/ MINERALS TAB 1 TABLET: TAB at 08:10

## 2024-10-12 RX ADMIN — FOLIC ACID 1 MG: 1 TABLET ORAL at 08:10

## 2024-10-12 RX ADMIN — LEVOFLOXACIN 750 MG: 750 TABLET, FILM COATED ORAL at 08:10

## 2024-10-12 RX ADMIN — THIAMINE HCL TAB 100 MG 100 MG: 100 TAB at 03:10

## 2024-10-12 RX ADMIN — CARVEDILOL 3.12 MG: 3.12 TABLET, FILM COATED ORAL at 09:10

## 2024-10-12 RX ADMIN — THIAMINE HCL TAB 100 MG 100 MG: 100 TAB at 09:10

## 2024-10-12 RX ADMIN — APIXABAN 5 MG: 2.5 TABLET, FILM COATED ORAL at 08:10

## 2024-10-12 RX ADMIN — THIAMINE HCL TAB 100 MG 100 MG: 100 TAB at 08:10

## 2024-10-12 RX ADMIN — CARVEDILOL 3.12 MG: 3.12 TABLET, FILM COATED ORAL at 08:10

## 2024-10-12 RX ADMIN — APIXABAN 5 MG: 2.5 TABLET, FILM COATED ORAL at 09:10

## 2024-10-12 RX ADMIN — ATORVASTATIN CALCIUM 20 MG: 20 TABLET, FILM COATED ORAL at 08:10

## 2024-10-12 RX ADMIN — POLYETHYLENE GLYCOL 3350 17 G: 17 POWDER, FOR SOLUTION ORAL at 08:10

## 2024-10-12 NOTE — PROGRESS NOTES
[facility]  Progress Note    Patient Name: Madan Elder  MRN: [id number]  Admission Date: 10/6/2024  Hospital Length of Stay: 6 days  Code Status: Full Code  Attending Provider: Rita Piedra MD  Primary Care Provider: Noemí Peña     Subjective:     HPI: Madan Elder is a 79 yo male with PMH of chronic bladder obstruction, recurrent UTIs, atrial fibrillation in Eliquis, HFrEF (EF 30%) s/p AICD placement, and HTN. Patient was brought into the ED due to worsening mentation. During evaluation, patient was confused but able to answer some questions. History primarily obtained from daughter at bedside. Daughter states patient was recently admitted to the hospital due to UTI treated with PO antibiotics which patient had completed. Patient was then admitted to our hospital due to suspected UTI along with hypotension. Daughter reports that patient had his BP medications adjusted during his last admission but reports that he was still taking all of his BP medications instead of stopping Lisinopril as told. Patient was treated with IV fluid bolus and low maintenance fluids due to history of HFrEF but BP remained persistently low with MAPs at 65.     Hospital Course/Significant events:  10/6/24: Upgraded to ICU for UTI sepsis vs medication overuse requiring IV vasopressor support  10/8/2024: Patient downgraded to the floor    Interval History:  CARLOS DOTSON. Patient resting comfortably with no acute complaints. CBC and CMP stable. Will reach out to find out if patient can be discharged home to his daughter today.         Past Medical History:   Diagnosis Date    Hypertension     Stroke        Past Surgical History:   Procedure Laterality Date    INSERTION OF PACEMAKER         Social History     Socioeconomic History    Marital status:    Tobacco Use    Smoking status: Never    Smokeless tobacco: Never   Substance and Sexual Activity    Alcohol use: Yes    Drug use: Never      Social Drivers of Health     Financial Resource Strain: Patient Unable To Answer (10/9/2024)    Overall Financial Resource Strain (CARDIA)     Difficulty of Paying Living Expenses: Patient unable to answer   Food Insecurity: Patient Unable To Answer (10/9/2024)    Hunger Vital Sign     Worried About Running Out of Food in the Last Year: Patient unable to answer     Ran Out of Food in the Last Year: Patient unable to answer   Transportation Needs: Patient Unable To Answer (10/9/2024)    TRANSPORTATION NEEDS     Transportation : Patient unable to answer   Physical Activity: Inactive (10/7/2024)    Exercise Vital Sign     Days of Exercise per Week: 0 days     Minutes of Exercise per Session: 0 min   Stress: Patient Unable To Answer (10/9/2024)    [employer] - Occupational Stress Questionnaire     Feeling of Stress : Patient unable to answer   Housing Stability: Patient Unable To Answer (10/9/2024)    Housing Stability Vital Sign     Unable to Pay for Housing in the Last Year: Patient unable to answer     Homeless in the Last Year: Patient unable to answer           Current Outpatient Medications   Medication Instructions    apixaban (ELIQUIS) 5 mg, Oral, 2 times daily    atorvastatin (LIPITOR) 20 mg, Oral, Daily    carvediloL (COREG) 3.125 mg, Oral, 2 times daily    folic acid (FOLVITE) 1,000 mcg, Oral, Daily    levoFLOXacin (LEVAQUIN) 750 mg, Oral, Daily    sodium bicarbonate 650 mg, Oral, Daily    VITAMIN B-1 100 mg, Oral, Daily       Current Inpatient Medications   apixaban  5 mg Oral BID    atorvastatin  20 mg Oral Daily    carvediloL  3.125 mg Oral BID    folic acid  1 mg Oral Daily    levoFLOXacin  750 mg Oral Daily    multivitamin  1 tablet Oral Daily    polyethylene glycol  17 g Oral Daily    thiamine  100 mg Oral TID       Current Intravenous Infusions          Review of Systems   Constitutional:  Negative for chills and fever.   Respiratory:  Negative for cough, sputum  production, shortness of breath and wheezing.    Cardiovascular:  Negative for chest pain, palpitations and leg swelling.   Gastrointestinal:  Negative for abdominal pain, nausea and vomiting.   Genitourinary:  Negative for dysuria and frequency.   Musculoskeletal:  Negative for myalgias.   Neurological:  Negative for dizziness, focal weakness, seizures and weakness.          Objective:       Intake/Output Summary (Last 24 hours) at 10/12/2024 1110  Last data filed at 10/11/2024 1621  Gross per 24 hour   Intake --   Output 500 ml   Net -500 ml         Vital Signs (Most Recent):  Temp: 97.8 °F (36.6 °C) (10/12/24 0749)  Pulse: 73 (10/12/24 0749)  Resp: 18 (10/12/24 0749)  BP: 126/71 (10/12/24 0823)  SpO2: 97 % (10/12/24 0749)  Body mass index is 21.47 kg/m².  Weight: 64 kg (141 lb 3.2 oz) Vital Signs (24h Range):  Temp:  [97.5 °F (36.4 °C)-98.3 °F (36.8 °C)] 97.8 °F (36.6 °C)  Pulse:  [72-93] 73  Resp:  [17-18] 18  SpO2:  [90 %-99 %] 97 %  BP: (110-135)/(63-78) 126/71     Physical Exam  Constitutional:       General: He is not in acute distress.     Appearance: Normal appearance.   HENT:      Head: Normocephalic.      Ears:      Comments: Hard of hearing     Mouth/Throat:      Mouth: Mucous membranes are moist.   Eyes:      Conjunctiva/sclera: Conjunctivae normal.      Pupils: Pupils are equal, round, and reactive to light.   Cardiovascular:      Rate and Rhythm: Normal rate and regular rhythm.      Pulses: Normal pulses.      Heart sounds: No murmur heard.  Pulmonary:      Effort: Pulmonary effort is normal. No respiratory distress.      Breath sounds: No wheezing or rhonchi.   Abdominal:      General: Abdomen is flat. Bowel sounds are normal. There is no distension.      Palpations: Abdomen is soft.      Tenderness: There is no abdominal tenderness.   Musculoskeletal:         General: Normal range of motion.      Cervical back: Normal range of motion.      Right lower leg: No edema.      Left lower leg: No edema.    Skin:     General: Skin is warm.      Capillary Refill: Capillary refill takes less than 2 seconds.   Neurological:      General: No focal deficit present.      Mental Status: He is alert.      Comments: Oriented to self and place but not time           Lines/Drains/Airways       Drain  Duration             Male External Urinary Catheter 10/08/24 0700 4 days              Peripheral Intravenous Line  Duration                  Peripheral IV - Single Lumen 10/08/24 1843 20 G 1 3/4 in Anterior;Left Forearm 3 days                    Significant Labs:    Lab Results   Component Value Date    WBC 8.94 10/12/2024    HGB 12.4 (L) 10/12/2024    HCT 38.4 (L) 10/12/2024    MCV 91.4 10/12/2024     10/12/2024           BMP  Lab Results   Component Value Date     10/12/2024    K 4.1 10/12/2024    CO2 24 10/12/2024    BUN 19.6 10/12/2024    CREATININE 0.77 10/12/2024    CALCIUM 9.6 10/12/2024    AGAP 9.0 10/12/2024    EGFRNONAA >60 11/05/2021             Assessment/Plan:     Assessment/Plan  Urosepsis  --Blood culture and urine culture positive for pan sensitive E Coli  --Repeat blood culture negative at 24 hours  --Discontinue IV Rocephin. Transition to po Levaquin 750 mg once daily.    Recurrent UTI in male  --CT abdomen with chronic bladder outlet obstruction with mild right sided hydronephrosis; will schedule urology appointment outpatient     3.   Atrial fibrillation on Eliquis        HFrEF (EF 30%) s/p AICD  --continue Eliquis 5 mg BID  -- resuming Coreg 3.125 mg BID    3.   H/o CVA  --will monitor    History of alcohol abuse  --CIWA protocol in place  --Continue Folic acid, Multivitamins and thiamine    DVT Prophylaxis: Eliquis  GI Prophylaxis: N/A  ABX: Levaquin     Disposition:  Repeat Bcx negative at 24 hours. Transition to Levaquin daily oral and discharge home likely tomorrow once daughter is back in town. Home health ordered with PT/OT    Bartolo Keyes MD  Internal Medicine - PGY-2

## 2024-10-12 NOTE — PT/OT/SLP PROGRESS
Physical Therapy Treatment    Patient Name:  Madan Elder   MRN:  [id number]    Recommendations     Therapy Intensity Recommendations at Discharge: Moderate Intensity Therapy  Discharge Equipment Recommendations: walker, rolling, shower chair, bedside commode, power chair, grab bar   Barriers to discharge: fall risk, level of skilled assistance required, decreased safety awareness, and decreased caregiver support    Assessment     Madan Elder is a 78 y.o. male admitted with a medical diagnosis of WIN (acute kidney injury).  1. Acute renal failure superimposed on chronic kidney disease, unspecified acute renal failure type, unspecified CKD stage    2. SOB (shortness of breath)    3. Acute cystitis with hematuria    4. WIN (acute kidney injury)    5. Chest pain    6. Hydronephrosis, unspecified hydronephrosis type       Patient Active Problem List   Diagnosis    Left-sided weakness    Urinary tract infection without hematuria    WIN (acute kidney injury)    Moderate malnutrition    Acute cystitis with hematuria    Bacteremia      He presents with the following impairments/functional limitations:  weakness, impaired endurance, impaired self care skills, impaired functional mobility, gait instability, impaired balance, decreased lower extremity function, decreased upper extremity function, decreased ROM, impaired coordination, impaired fine motor, decreased safety awareness.    Rehab Prognosis: Good.    Patient would benefit from continued skilled acute PT services to: address above listed impairments/functional limitations; receive patient/caregiver education; reduce fall risk; and maximize independency/safety with functional mobility.    Recent Surgery: * No surgery found *      Plan     During this hospitalization, patient to be seen 5 x/week to address the identified impairments/functional limitations via gait training, therapeutic activities, therapeutic exercises and progress toward the established  goals.    Plan of Care Expires:  11/06/24    Subjective     Communicated with patient's nurse Kenyetta prior to session.    Patient agreeable to participate in treatment session.    Chief Complaint: Back pain  Patient/Family Comments/goals: Patient wants to get better and go home.   Pain/Comfort:  Pain Rating 1:  (Patient c/o back pain but was not able to quantify)  Pain Addressed 1: Reposition, Cessation of Activity, Nurse notified  Pain Rating Post-Intervention 1:  (Patient reported no change in pain.)    Objective     Patient found supine in bed and with HOB elevated with telemetry, PureWick  upon PT entry to room.    General Precautions: Standard, fall   Orthopedic Precautions:N/A   Braces: N/A  Respiratory Status: room air    Functional Mobility:    Bed Mobility:  Rolling w/ supervision  Supine <=> sit w/ CGA and VC  Sat EOB w/ SBA; worked on dynamic sitting balance    Transfers:  Sit <=> stand t/f's x4 w/ RW, Brandy and VC    Gait:  Patient ambulated 75ft x2 with rolling walker and minimum assistance.  Patient demonstrates :       occasional unsteady gait       decreased lupe       decreased bilateral step length       wide base of support      slowed, guarded, time consuming movements - all transitional activities.        Patient left supine in bed and with HOB elevated with all lines intact, call button in reach, and patient's nurse notified.    Education     Patient educated on and assisted with functional mobility as noted above.  Patient educated on PT Plan of Care.  Patient was instructed to utilize staff assistance for mobility/transfers.  White board updated regarding patient's safest level of mobility with staff assistance    Goals     Multidisciplinary Problems       Physical Therapy Goals          Problem: Physical Therapy    Goal Priority Disciplines Outcome Interventions   Physical Therapy Goal     PT, PT/OT Progressing    Description: REVIEWED 10/11/2024  Goals to be met by: DISCHARGE  Patient will  increase functional independence with mobility by performing:  -. Supine to sit with Modified [place] - ONGOING  -. Sit to supine with Modified [place] - ONGOING  -. Rolling to Left and Right with Modified [place] - ONGOING  -. Sit to stand transfer with Contact Guard Assistance - ONGOING  -. Gait  x 130 feet with Contact Guard Assistance using Rolling Walker - ONGOING  -. BILAT UE/LE general ROM ex's - all joints/planes x10 reps each AROM - ESTABLISHED 10/10/27854 - ONGOING                     Time Tracking     PT Received On: 10/12/24  PT Start Time: 1000     PT Stop Time: 1030  PT Total Time (min): 30 min     Billable Minutes: Gait Training 15 and Therapeutic Activity 15    10/12/2024

## 2024-10-12 NOTE — PLAN OF CARE
Problem: Adult Inpatient Plan of Care  Goal: Plan of Care Review  Outcome: Progressing  Goal: Patient-Specific Goal (Individualized)  Outcome: Progressing  Goal: Absence of Hospital-Acquired Illness or Injury  Outcome: Progressing  Goal: Optimal Comfort and Wellbeing  Outcome: Progressing  Goal: Readiness for Transition of Care  Outcome: Progressing     Problem: Acute Kidney Injury/Impairment  Goal: Fluid and Electrolyte Balance  Outcome: Progressing  Goal: Improved Oral Intake  Outcome: Progressing  Goal: Effective Renal Function  Outcome: Progressing     Problem: Heart Failure  Goal: Optimal Coping  Outcome: Progressing  Goal: Optimal Cardiac Output  Outcome: Progressing  Goal: Stable Heart Rate and Rhythm  Outcome: Progressing  Goal: Optimal Functional Ability  Outcome: Progressing  Goal: Fluid and Electrolyte Balance  Outcome: Progressing  Goal: Improved Oral Intake  Outcome: Progressing  Goal: Effective Oxygenation and Ventilation  Outcome: Progressing  Goal: Effective Breathing Pattern During Sleep  Outcome: Progressing     Problem: Wound  Goal: Optimal Coping  Outcome: Progressing  Goal: Optimal Functional Ability  Outcome: Progressing  Goal: Absence of Infection Signs and Symptoms  Outcome: Progressing  Goal: Improved Oral Intake  Outcome: Progressing  Goal: Optimal Pain Control and Function  Outcome: Progressing  Goal: Skin Health and Integrity  Outcome: Progressing  Goal: Optimal Wound Healing  Outcome: Progressing     Problem: Skin Injury Risk Increased  Goal: Skin Health and Integrity  Outcome: Progressing

## 2024-10-13 LAB
ALBUMIN SERPL-MCNC: 2.3 G/DL (ref 3.4–4.8)
ALBUMIN/GLOB SERPL: 0.5 RATIO (ref 1.1–2)
ALP SERPL-CCNC: 72 UNIT/L (ref 40–150)
ALT SERPL-CCNC: 54 UNIT/L (ref 0–55)
ANION GAP SERPL CALC-SCNC: 10 MEQ/L
AST SERPL-CCNC: 36 UNIT/L (ref 5–34)
BACTERIA #/AREA URNS AUTO: ABNORMAL /HPF
BASOPHILS # BLD AUTO: 0.05 X10(3)/MCL
BASOPHILS NFR BLD AUTO: 0.5 %
BILIRUB SERPL-MCNC: 1.2 MG/DL
BILIRUB UR QL STRIP.AUTO: NEGATIVE
BNP BLD-MCNC: 608.2 PG/ML
BUN SERPL-MCNC: 23.6 MG/DL (ref 8.4–25.7)
CALCIUM SERPL-MCNC: 9.9 MG/DL (ref 8.8–10)
CHLORIDE SERPL-SCNC: 105 MMOL/L (ref 98–107)
CLARITY UR: ABNORMAL
CO2 SERPL-SCNC: 21 MMOL/L (ref 23–31)
COLOR UR AUTO: YELLOW
CREAT SERPL-MCNC: 0.85 MG/DL (ref 0.73–1.18)
CREAT/UREA NIT SERPL: 28
EOSINOPHIL # BLD AUTO: 0.13 X10(3)/MCL (ref 0–0.9)
EOSINOPHIL NFR BLD AUTO: 1.3 %
ERYTHROCYTE [DISTWIDTH] IN BLOOD BY AUTOMATED COUNT: 14.6 % (ref 11.5–17)
GFR SERPLBLD CREATININE-BSD FMLA CKD-EPI: >60 ML/MIN/1.73/M2
GLOBULIN SER-MCNC: 5 GM/DL (ref 2.4–3.5)
GLUCOSE SERPL-MCNC: 104 MG/DL (ref 82–115)
GLUCOSE UR QL STRIP: NORMAL
HCT VFR BLD AUTO: 37 % (ref 42–52)
HGB BLD-MCNC: 12.3 G/DL (ref 14–18)
HGB UR QL STRIP: ABNORMAL
HOLD SPECIMEN: NORMAL
HYALINE CASTS #/AREA URNS LPF: ABNORMAL /LPF
IMM GRANULOCYTES # BLD AUTO: 0.08 X10(3)/MCL (ref 0–0.04)
IMM GRANULOCYTES NFR BLD AUTO: 0.8 %
KETONES UR QL STRIP: NEGATIVE
LEUKOCYTE ESTERASE UR QL STRIP: 500
LYMPHOCYTES # BLD AUTO: 1.25 X10(3)/MCL (ref 0.6–4.6)
LYMPHOCYTES NFR BLD AUTO: 12.5 %
MAGNESIUM SERPL-MCNC: 2.7 MG/DL (ref 1.6–2.6)
MCH RBC QN AUTO: 29.5 PG (ref 27–31)
MCHC RBC AUTO-ENTMCNC: 33.2 G/DL (ref 33–36)
MCV RBC AUTO: 88.7 FL (ref 80–94)
MONOCYTES # BLD AUTO: 0.51 X10(3)/MCL (ref 0.1–1.3)
MONOCYTES NFR BLD AUTO: 5.1 %
MUCOUS THREADS URNS QL MICRO: ABNORMAL /LPF
NEUTROPHILS # BLD AUTO: 7.96 X10(3)/MCL (ref 2.1–9.2)
NEUTROPHILS NFR BLD AUTO: 79.8 %
NITRITE UR QL STRIP: NEGATIVE
NRBC BLD AUTO-RTO: 0 %
OHS QRS DURATION: 106 MS
OHS QRS DURATION: 108 MS
OHS QTC CALCULATION: 442 MS
OHS QTC CALCULATION: 468 MS
PH UR STRIP: 6 [PH]
PHOSPHATE SERPL-MCNC: 3.6 MG/DL (ref 2.3–4.7)
PLATELET # BLD AUTO: 238 X10(3)/MCL (ref 130–400)
PMV BLD AUTO: 9.7 FL (ref 7.4–10.4)
POCT GLUCOSE: 106 MG/DL (ref 70–110)
POCT GLUCOSE: 118 MG/DL (ref 70–110)
POCT GLUCOSE: 126 MG/DL (ref 70–110)
POTASSIUM SERPL-SCNC: 4 MMOL/L (ref 3.5–5.1)
PROT SERPL-MCNC: 7.3 GM/DL (ref 5.8–7.6)
PROT UR QL STRIP: ABNORMAL
RBC # BLD AUTO: 4.17 X10(6)/MCL (ref 4.7–6.1)
RBC #/AREA URNS AUTO: ABNORMAL /HPF
SODIUM SERPL-SCNC: 136 MMOL/L (ref 136–145)
SP GR UR STRIP.AUTO: 1.02 (ref 1–1.03)
SQUAMOUS #/AREA URNS LPF: ABNORMAL /HPF
TROPONIN I SERPL-MCNC: 0.01 NG/ML (ref 0–0.04)
TROPONIN I SERPL-MCNC: 0.03 NG/ML (ref 0–0.04)
UROBILINOGEN UR STRIP-ACNC: NORMAL
WBC # BLD AUTO: 9.98 X10(3)/MCL (ref 4.5–11.5)
WBC #/AREA URNS AUTO: >100 /HPF

## 2024-10-13 PROCEDURE — 25000003 PHARM REV CODE 250

## 2024-10-13 PROCEDURE — 93005 ELECTROCARDIOGRAM TRACING: CPT

## 2024-10-13 PROCEDURE — 36415 COLL VENOUS BLD VENIPUNCTURE: CPT

## 2024-10-13 PROCEDURE — 80053 COMPREHEN METABOLIC PANEL: CPT

## 2024-10-13 PROCEDURE — 87086 URINE CULTURE/COLONY COUNT: CPT

## 2024-10-13 PROCEDURE — 85025 COMPLETE CBC W/AUTO DIFF WBC: CPT

## 2024-10-13 PROCEDURE — 36415 COLL VENOUS BLD VENIPUNCTURE: CPT | Performed by: INTERNAL MEDICINE

## 2024-10-13 PROCEDURE — 84100 ASSAY OF PHOSPHORUS: CPT

## 2024-10-13 PROCEDURE — 97116 GAIT TRAINING THERAPY: CPT

## 2024-10-13 PROCEDURE — 21400001 HC TELEMETRY ROOM

## 2024-10-13 PROCEDURE — 83735 ASSAY OF MAGNESIUM: CPT

## 2024-10-13 PROCEDURE — 97530 THERAPEUTIC ACTIVITIES: CPT

## 2024-10-13 PROCEDURE — 84484 ASSAY OF TROPONIN QUANT: CPT

## 2024-10-13 PROCEDURE — 11000001 HC ACUTE MED/SURG PRIVATE ROOM

## 2024-10-13 PROCEDURE — 81001 URINALYSIS AUTO W/SCOPE: CPT

## 2024-10-13 PROCEDURE — 51701 INSERT BLADDER CATHETER: CPT

## 2024-10-13 PROCEDURE — 83880 ASSAY OF NATRIURETIC PEPTIDE: CPT

## 2024-10-13 RX ORDER — DOCUSATE SODIUM 100 MG/1
100 CAPSULE, LIQUID FILLED ORAL DAILY PRN
Status: DISCONTINUED | OUTPATIENT
Start: 2024-10-13 | End: 2024-10-13

## 2024-10-13 RX ORDER — DOCUSATE SODIUM 100 MG/1
100 CAPSULE, LIQUID FILLED ORAL DAILY PRN
Status: DISCONTINUED | OUTPATIENT
Start: 2024-10-14 | End: 2024-10-16

## 2024-10-13 RX ORDER — DOCUSATE SODIUM 100 MG/1
100 CAPSULE, LIQUID FILLED ORAL ONCE
Status: DISCONTINUED | OUTPATIENT
Start: 2024-10-13 | End: 2024-10-13

## 2024-10-13 RX ADMIN — APIXABAN 5 MG: 2.5 TABLET, FILM COATED ORAL at 09:10

## 2024-10-13 RX ADMIN — FOLIC ACID 1 MG: 1 TABLET ORAL at 09:10

## 2024-10-13 RX ADMIN — CARVEDILOL 3.12 MG: 3.12 TABLET, FILM COATED ORAL at 08:10

## 2024-10-13 RX ADMIN — LEVOFLOXACIN 750 MG: 750 TABLET, FILM COATED ORAL at 09:10

## 2024-10-13 RX ADMIN — CARVEDILOL 3.12 MG: 3.12 TABLET, FILM COATED ORAL at 09:10

## 2024-10-13 RX ADMIN — THIAMINE HCL TAB 100 MG 100 MG: 100 TAB at 09:10

## 2024-10-13 RX ADMIN — THIAMINE HCL TAB 100 MG 100 MG: 100 TAB at 03:10

## 2024-10-13 RX ADMIN — APIXABAN 5 MG: 2.5 TABLET, FILM COATED ORAL at 08:10

## 2024-10-13 RX ADMIN — THIAMINE HCL TAB 100 MG 100 MG: 100 TAB at 08:10

## 2024-10-13 RX ADMIN — DOCUSATE SODIUM 100 MG: 100 CAPSULE, LIQUID FILLED ORAL at 02:10

## 2024-10-13 RX ADMIN — MULTIPLE VITAMINS W/ MINERALS TAB 1 TABLET: TAB at 09:10

## 2024-10-13 RX ADMIN — POLYETHYLENE GLYCOL 3350 17 G: 17 POWDER, FOR SOLUTION ORAL at 09:10

## 2024-10-13 RX ADMIN — ATORVASTATIN CALCIUM 20 MG: 20 TABLET, FILM COATED ORAL at 09:10

## 2024-10-13 NOTE — NURSING
Daughter here to  father for discharge, she has questions and concerns about his health, long call notified.

## 2024-10-13 NOTE — NURSING
Called to room by daughter, states her father is sweating. His face is noted to be flushed with sweat to the forehead and patient c/o feeling hot. Temp 97.3 (o) his blood sugar was check = 126. Cool towel was placed on his his and symptoms resolved quickly.

## 2024-10-13 NOTE — NURSING
Telemetry called notifying that pt had 3-4 beat PVC run. Pt asymptomatic, lying in bed with no complaints at this time. Prashant MANZANARES notified.

## 2024-10-13 NOTE — PROGRESS NOTES
[facility]  Progress Note    Patient Name: Madan Elder  MRN: [id number]  Admission Date: 10/6/2024  Hospital Length of Stay: 7 days  Code Status: Full Code  Attending Provider: Rita Piedra MD  Primary Care Provider: Noemí Peña     Subjective:     HPI: Madan Elder is a 77 yo male with PMH of chronic bladder obstruction, recurrent UTIs, atrial fibrillation in Eliquis, HFrEF (EF 30%) s/p AICD placement, and HTN. Patient was brought into the ED due to worsening mentation. During evaluation, patient was confused but able to answer some questions. History primarily obtained from daughter at bedside. Daughter states patient was recently admitted to the hospital due to UTI treated with PO antibiotics which patient had completed. Patient was then admitted to our hospital due to suspected UTI along with hypotension. Daughter reports that patient had his BP medications adjusted during his last admission but reports that he was still taking all of his BP medications instead of stopping Lisinopril as told. Patient was treated with IV fluid bolus and low maintenance fluids due to history of HFrEF but BP remained persistently low with MAPs at 65.     Hospital Course/Significant events:  10/6/24: Upgraded to ICU for UTI sepsis vs medication overuse requiring IV vasopressor support  10/8/2024: Patient downgraded to the floor    Interval History:  Overnight, night team was contacted by nursing staff due to patient being hypotensive and diaphoretic.  Good oxygen saturations on room air.  Troponin and EKG ordered at that time.  Daughter presented to the hospital today with concerns about her father's current care in future outlook.  She is concerned that father is diaphoretic and also seems confused from baseline.  On my examination, patient diaphoretic but otherwise in no acute distress.  No significant abnormalities on cardiopulmonary auscultation.  Patient AAO x2 but difficult to  assess due to hearing difficulty.  Will order repeat EKG, troponin, BNP, and chest x-ray. Obtain CT head in setting of presenting AMS and current mentation difference from baseline per daughter.     Daughter to return to hospital tomorrow morning to discuss with primary team's options for placement if indicated. She seems to be interested in this.     Past Medical History:   Diagnosis Date    Hypertension     Stroke        Past Surgical History:   Procedure Laterality Date    INSERTION OF PACEMAKER         Social History     Socioeconomic History    Marital status:    Tobacco Use    Smoking status: Never    Smokeless tobacco: Never   Substance and Sexual Activity    Alcohol use: Yes    Drug use: Never     Social Drivers of Health     Financial Resource Strain: Patient Unable To Answer (10/9/2024)    Overall Financial Resource Strain (CARDIA)     Difficulty of Paying Living Expenses: Patient unable to answer   Food Insecurity: Patient Unable To Answer (10/9/2024)    Hunger Vital Sign     Worried About Running Out of Food in the Last Year: Patient unable to answer     Ran Out of Food in the Last Year: Patient unable to answer   Transportation Needs: Patient Unable To Answer (10/9/2024)    TRANSPORTATION NEEDS     Transportation : Patient unable to answer   Physical Activity: Inactive (10/7/2024)    Exercise Vital Sign     Days of Exercise per Week: 0 days     Minutes of Exercise per Session: 0 min   Stress: Patient Unable To Answer (10/9/2024)    [employer] [place] [employer] - Occupational Stress Questionnaire     Feeling of Stress : Patient unable to answer   Housing Stability: Patient Unable To Answer (10/9/2024)    Housing Stability Vital Sign     Unable to Pay for Housing in the Last Year: Patient unable to answer     Homeless in the Last Year: Patient unable to answer           Current Outpatient Medications   Medication Instructions    apixaban (ELIQUIS) 5 mg, Oral, 2 times daily     atorvastatin (LIPITOR) 20 mg, Oral, Daily    carvediloL (COREG) 3.125 mg, Oral, 2 times daily    folic acid (FOLVITE) 1,000 mcg, Oral, Daily    levoFLOXacin (LEVAQUIN) 750 mg, Oral, Daily    sodium bicarbonate 650 mg, Oral, Daily    VITAMIN B-1 100 mg, Oral, Daily       Current Inpatient Medications   apixaban  5 mg Oral BID    atorvastatin  20 mg Oral Daily    carvediloL  3.125 mg Oral BID    folic acid  1 mg Oral Daily    levoFLOXacin  750 mg Oral Daily    multivitamin  1 tablet Oral Daily    polyethylene glycol  17 g Oral Daily    thiamine  100 mg Oral TID       Current Intravenous Infusions          Review of Systems   Constitutional:  Negative for chills and fever.   Respiratory:  Negative for cough, sputum production, shortness of breath and wheezing.    Cardiovascular:  Negative for chest pain, palpitations and leg swelling.   Gastrointestinal:  Negative for abdominal pain, nausea and vomiting.   Genitourinary:  Negative for dysuria and frequency.   Musculoskeletal:  Negative for myalgias.   Neurological:  Negative for dizziness, focal weakness, seizures and weakness.          Objective:       Intake/Output Summary (Last 24 hours) at 10/13/2024 1410  Last data filed at 10/13/2024 1300  Gross per 24 hour   Intake 894 ml   Output 525 ml   Net 369 ml         Vital Signs (Most Recent):  Temp: 97.8 °F (36.6 °C) (10/13/24 1129)  Pulse: 84 (10/13/24 1129)  Resp: 18 (10/13/24 1129)  BP: 119/77 (10/13/24 1129)  SpO2: 98 % (10/13/24 1129)  Body mass index is 21.29 kg/m².  Weight: 63.5 kg (140 lb) Vital Signs (24h Range):  Temp:  [97.6 °F (36.4 °C)-98.2 °F (36.8 °C)] 97.8 °F (36.6 °C)  Pulse:  [71-94] 84  Resp:  [18] 18  SpO2:  [96 %-98 %] 98 %  BP: ()/(54-83) 119/77     Physical Exam  Constitutional:       General: He is not in acute distress.     Appearance: Normal appearance.   HENT:      Head: Normocephalic.      Ears:      Comments: Hard of hearing     Mouth/Throat:      Mouth: Mucous membranes are moist.    Eyes:      Conjunctiva/sclera: Conjunctivae normal.      Pupils: Pupils are equal, round, and reactive to light.   Cardiovascular:      Rate and Rhythm: Normal rate and regular rhythm.      Pulses: Normal pulses.      Heart sounds: No murmur heard.  Pulmonary:      Effort: Pulmonary effort is normal. No respiratory distress.      Breath sounds: No wheezing or rhonchi.   Abdominal:      General: Abdomen is flat. Bowel sounds are normal. There is no distension.      Palpations: Abdomen is soft.      Tenderness: There is no abdominal tenderness.   Musculoskeletal:         General: Normal range of motion.      Cervical back: Normal range of motion.      Right lower leg: No edema.      Left lower leg: No edema.   Skin:     General: Skin is warm.      Capillary Refill: Capillary refill takes less than 2 seconds.   Neurological:      General: No focal deficit present.      Mental Status: He is alert.      Comments: AOx1           Lines/Drains/Airways       Peripheral Intravenous Line  Duration                  Peripheral IV - Single Lumen 10/08/24 1843 20 G 1 3/4 in Anterior;Left Forearm 4 days                    Significant Labs:    Lab Results   Component Value Date    WBC 9.98 10/13/2024    HGB 12.3 (L) 10/13/2024    HCT 37.0 (L) 10/13/2024    MCV 88.7 10/13/2024     10/13/2024           BMP  Lab Results   Component Value Date     10/13/2024    K 4.0 10/13/2024    CO2 21 (L) 10/13/2024    BUN 23.6 10/13/2024    CREATININE 0.85 10/13/2024    CALCIUM 9.9 10/13/2024    AGAP 10.0 10/13/2024    EGFRNONAA >60 11/05/2021             Assessment/Plan:     Assessment/Plan    Urosepsis, improved  --Blood culture and urine culture positive for pan sensitive E Coli  --Repeat blood culture negative at 72 hours  --Continue PO Levaquin 750 mg once daily.    Recurrent UTI in male  --CT abdomen with chronic bladder outlet obstruction with mild right sided hydronephrosis; will schedule urology appointment outpatient     3.    Atrial fibrillation on Eliquis        HFrEF (EF 30%) s/p AICD  -- continue Eliquis 5 mg BID  -- resuming Coreg 3.125 mg BID    4.   H/o CVA  -- will monitor  --CT head ordered    5.   History of alcohol abuse  --CIWA protocol in place  --Continue Folic acid, Multivitamins and thiamine    6. Diaphoresis   --CXR, BNP, Troponin, and EKG ordered  --patient with good O2 saturation on RA currently     DVT Prophylaxis: Eliquis  GI Prophylaxis: N/A  ABX: Levaquin     Disposition:  Repeat Bcx negative at 72 hours. Transition to Levaquin daily oral. Investigate diaphoresis and remaining altered mentation from baseline per daughter.     Jesse Soliz, DO  [facility] PGY-2

## 2024-10-13 NOTE — CARE UPDATE
Notified by nurse of decreased urinary output and bladder scan with >530 cc retention. Patient also continues to have transient episodes of diaphoresis and flushing. Will perform in-and-out cath and reevaluate. Repeating UA and urine cultures as well. Of note, patient hs history of  shunt per patient's daughter at bedside.     Galina Shay MD  [employer] [facility], PGY-1

## 2024-10-13 NOTE — CARE UPDATE
Informed by patient nurse, BHAVYA Montalvo, that patient was diaphoretic and BP was 92/55, MAP 67, SpO2 97 on RA.  EKG ordered, and consulted with my upper level Dr. Granger who was currently participating in an intubation.     Following procedure was notified by nurse that patient stabilized with /83, MAP 99, blood glucose 118. Patient was no longer diaphoretic and resting.     Later was again contacted by patient's nurse that BP was 90/54, MAP 66 and diaphoretic. Cold compresses were applied by nursing staff. Went to examine patient at bedside. EKG was being completed. Was resting comfortably and not diaphoretic. Denied any CP/SOB, N/V/D, +constipation. EKG with artifact and possible T-wave inversions. Troponin ordered. Also patient last BM unknown, Colace added to help soften stools.

## 2024-10-13 NOTE — NURSING
"Pt lying in bed, diaphoretic and states, "I am hot". Applied cool compress to pt head to decrease sweating. Pt BP 87/53 with pulse 91. Recycled BP 92/55 with MAP 67. O2 97% on room air. Prashant MANZANARES notified, stated, "will speak with higher up to see what to do with situation". Asked for EKG for pt d/t HR going in the 90's then 140's and then decreasing back to 90's.   "

## 2024-10-13 NOTE — NURSING
Message sent to Dr Soliz about decreased urinary output and bladder scan result, awaiting response.

## 2024-10-13 NOTE — NURSING
"Pt is not BS check, checked just to be sure and BS read 118. BP now 131/83 with MAP of 99. Pt now stating he feels cold. Pt is no longer diaphoretic, and is now trying to sleep. Prashant notified, stated "Okay, just keep us updated and we'll take a look at that EKG".  "

## 2024-10-13 NOTE — NURSING
Prashant and Bárbara MANZANARES on floor to assess pt. Pt HR jumped into 170's but did not sustain. EKG reading sinus rhythm with 1st degree AV block with frequent premature ventricular complexes. Troponin will be put in to rule out any chest pain. Colace PRN.

## 2024-10-13 NOTE — PT/OT/SLP PROGRESS
Physical Therapy Treatment    Patient Name:  Madan Elder   MRN:  [id number]    Recommendations     Therapy Intensity Recommendations at Discharge: Low Intensity Therapy  Discharge Equipment Recommendations: walker, rolling, shower chair, bedside commode, power chair, grab bar   Barriers to discharge: fall risk, level of skilled assistance required, and decreased safety awareness    Assessment     Madan Elder is a 78 y.o. male admitted with a medical diagnosis of WIN (acute kidney injury).  1. Acute renal failure superimposed on chronic kidney disease, unspecified acute renal failure type, unspecified CKD stage    2. SOB (shortness of breath)    3. Acute cystitis with hematuria    4. WIN (acute kidney injury)    5. Chest pain    6. Hydronephrosis, unspecified hydronephrosis type    7. Hypotensive episode    8. Hypotension       Patient Active Problem List   Diagnosis    Left-sided weakness    Urinary tract infection without hematuria    WIN (acute kidney injury)    Moderate malnutrition    Acute cystitis with hematuria    Bacteremia      He presents with the following impairments/functional limitations:  weakness, impaired endurance, impaired self care skills, impaired functional mobility, gait instability, impaired balance, decreased lower extremity function, decreased upper extremity function, decreased ROM, impaired coordination, impaired fine motor, decreased safety awareness.    Rehab Prognosis: Good.    Patient would benefit from continued skilled acute PT services to: address above listed impairments/functional limitations; receive patient/caregiver education; reduce fall risk; and maximize independency/safety with functional mobility.    Recent Surgery: * No surgery found *      Plan     During this hospitalization, patient to be seen 5 x/week to address the identified impairments/functional limitations via gait training, therapeutic activities, therapeutic exercises and progress toward the established  goals.    Plan of Care Expires:  11/06/24    Subjective     Communicated with patient's nurse Gavi prior to session.    Patient agreeable to participate in treatment session.    Chief Complaint: Low back pain  Patient/Family Comments/goals: Patient wants to get better and go home.   Pain/Comfort:  Pain Rating 1: 6/10  Location - Orientation 1: lower  Location 1: back  Pain Addressed 1: Reposition, Distraction, Cessation of Activity  Pain Rating Post-Intervention 1: 6/10    Objective     Patient found supine in bed and with HOB elevated with PureWick, telemetry  upon PT entry to room.    General Precautions: Standard, fall   Orthopedic Precautions:N/A   Braces: N/A  Respiratory Status: room air    Functional Mobility:    Bed Mobility:  Rolling w/ supervision and VC  Supine <=> sit w/ CGA and VC   Sat EOB w/ SBA     Transfers:  Sit <=> stand t/f's x3 w/ RW, CGA/Brandy and VC    Gait:  Patient ambulated 130ft x2 with rolling walker and contact guard assistance.  Patient demonstrates :       occasional unsteady gait       decreased lupe       decreased bilateral step length       wide base of support      slowed, guarded, time consuming movements - all transitional activities.        Patient left supine in bed and with HOB elevated with call button in reach, patient's nurse notified, and bed alarm on.    Education     Patient educated on and assisted with functional mobility as noted above.  Patient educated on PT Plan of Care.  Patient was instructed to utilize staff assistance for mobility/transfers.  White board updated regarding patient's safest level of mobility with staff assistance    Goals     Multidisciplinary Problems       Physical Therapy Goals          Problem: Physical Therapy    Goal Priority Disciplines Outcome Interventions   Physical Therapy Goal     PT, PT/OT Progressing    Description: REVIEWED 10/11/2024  Goals to be met by: DISCHARGE  Patient will increase functional independence with mobility by  performing:  -. Supine to sit with Modified [place] - ONGOING  -. Sit to supine with Modified [place] - ONGOING  -. Rolling to Left and Right with Modified [place] - ONGOING  -. Sit to stand transfer with Contact Guard Assistance - ONGOING  -. Gait  x 130 feet with Contact Guard Assistance using Rolling Walker - ONGOING  -. BILAT UE/LE general ROM ex's - all joints/planes x10 reps each AROM - ESTABLISHED 10/10/79051 - ONGOING                     Time Tracking     PT Received On: 10/13/24  PT Start Time: 1125     PT Stop Time: 1155  PT Total Time (min): 30 min     Billable Minutes: Gait Training 20 and Therapeutic Activity 10    10/13/2024

## 2024-10-14 LAB
ALBUMIN SERPL-MCNC: 2.4 G/DL (ref 3.4–4.8)
ALBUMIN/GLOB SERPL: 0.5 RATIO (ref 1.1–2)
ALP SERPL-CCNC: 72 UNIT/L (ref 40–150)
ALT SERPL-CCNC: 50 UNIT/L (ref 0–55)
ANION GAP SERPL CALC-SCNC: 10 MEQ/L
AST SERPL-CCNC: 32 UNIT/L (ref 5–34)
BACTERIA BLD CULT: NORMAL
BACTERIA BLD CULT: NORMAL
BASOPHILS # BLD AUTO: 0.03 X10(3)/MCL
BASOPHILS NFR BLD AUTO: 0.3 %
BILIRUB SERPL-MCNC: 1.2 MG/DL
BUN SERPL-MCNC: 30.1 MG/DL (ref 8.4–25.7)
CALCIUM SERPL-MCNC: 9.9 MG/DL (ref 8.8–10)
CHLORIDE SERPL-SCNC: 105 MMOL/L (ref 98–107)
CO2 SERPL-SCNC: 22 MMOL/L (ref 23–31)
CREAT SERPL-MCNC: 0.9 MG/DL (ref 0.73–1.18)
CREAT/UREA NIT SERPL: 33
EOSINOPHIL # BLD AUTO: 0.08 X10(3)/MCL (ref 0–0.9)
EOSINOPHIL NFR BLD AUTO: 0.9 %
ERYTHROCYTE [DISTWIDTH] IN BLOOD BY AUTOMATED COUNT: 14.7 % (ref 11.5–17)
GFR SERPLBLD CREATININE-BSD FMLA CKD-EPI: >60 ML/MIN/1.73/M2
GLOBULIN SER-MCNC: 5.2 GM/DL (ref 2.4–3.5)
GLUCOSE SERPL-MCNC: 114 MG/DL (ref 82–115)
HCT VFR BLD AUTO: 40.2 % (ref 42–52)
HGB BLD-MCNC: 12.8 G/DL (ref 14–18)
IMM GRANULOCYTES # BLD AUTO: 0.09 X10(3)/MCL (ref 0–0.04)
IMM GRANULOCYTES NFR BLD AUTO: 1 %
LYMPHOCYTES # BLD AUTO: 1.09 X10(3)/MCL (ref 0.6–4.6)
LYMPHOCYTES NFR BLD AUTO: 11.9 %
MCH RBC QN AUTO: 28.8 PG (ref 27–31)
MCHC RBC AUTO-ENTMCNC: 31.8 G/DL (ref 33–36)
MCV RBC AUTO: 90.5 FL (ref 80–94)
MONOCYTES # BLD AUTO: 0.49 X10(3)/MCL (ref 0.1–1.3)
MONOCYTES NFR BLD AUTO: 5.3 %
NEUTROPHILS # BLD AUTO: 7.39 X10(3)/MCL (ref 2.1–9.2)
NEUTROPHILS NFR BLD AUTO: 80.6 %
NRBC BLD AUTO-RTO: 0 %
OHS QRS DURATION: 100 MS
OHS QTC CALCULATION: 477 MS
PLATELET # BLD AUTO: 209 X10(3)/MCL (ref 130–400)
PMV BLD AUTO: 9.6 FL (ref 7.4–10.4)
POTASSIUM SERPL-SCNC: 4 MMOL/L (ref 3.5–5.1)
PROT SERPL-MCNC: 7.6 GM/DL (ref 5.8–7.6)
RBC # BLD AUTO: 4.44 X10(6)/MCL (ref 4.7–6.1)
SODIUM SERPL-SCNC: 137 MMOL/L (ref 136–145)
WBC # BLD AUTO: 9.17 X10(3)/MCL (ref 4.5–11.5)

## 2024-10-14 PROCEDURE — 25000003 PHARM REV CODE 250

## 2024-10-14 PROCEDURE — 11000001 HC ACUTE MED/SURG PRIVATE ROOM

## 2024-10-14 PROCEDURE — 80053 COMPREHEN METABOLIC PANEL: CPT

## 2024-10-14 PROCEDURE — 97110 THERAPEUTIC EXERCISES: CPT

## 2024-10-14 PROCEDURE — 85025 COMPLETE CBC W/AUTO DIFF WBC: CPT

## 2024-10-14 PROCEDURE — 63600175 PHARM REV CODE 636 W HCPCS

## 2024-10-14 PROCEDURE — 97535 SELF CARE MNGMENT TRAINING: CPT

## 2024-10-14 PROCEDURE — 36415 COLL VENOUS BLD VENIPUNCTURE: CPT

## 2024-10-14 PROCEDURE — 97116 GAIT TRAINING THERAPY: CPT

## 2024-10-14 PROCEDURE — 97530 THERAPEUTIC ACTIVITIES: CPT

## 2024-10-14 RX ORDER — POLYETHYLENE GLYCOL 3350 17 G/17G
17 POWDER, FOR SOLUTION ORAL 2 TIMES DAILY
Status: DISCONTINUED | OUTPATIENT
Start: 2024-10-14 | End: 2024-10-17 | Stop reason: HOSPADM

## 2024-10-14 RX ORDER — TAMSULOSIN HYDROCHLORIDE 0.4 MG/1
0.4 CAPSULE ORAL DAILY
Status: DISCONTINUED | OUTPATIENT
Start: 2024-10-14 | End: 2024-10-17 | Stop reason: HOSPADM

## 2024-10-14 RX ORDER — SOTALOL HYDROCHLORIDE 80 MG/1
80 TABLET ORAL 2 TIMES DAILY
COMMUNITY

## 2024-10-14 RX ADMIN — SODIUM CHLORIDE, POTASSIUM CHLORIDE, SODIUM LACTATE AND CALCIUM CHLORIDE 500 ML: 600; 310; 30; 20 INJECTION, SOLUTION INTRAVENOUS at 06:10

## 2024-10-14 RX ADMIN — ATORVASTATIN CALCIUM 20 MG: 20 TABLET, FILM COATED ORAL at 09:10

## 2024-10-14 RX ADMIN — POLYETHYLENE GLYCOL 3350 17 G: 17 POWDER, FOR SOLUTION ORAL at 09:10

## 2024-10-14 RX ADMIN — THIAMINE HCL TAB 100 MG 100 MG: 100 TAB at 09:10

## 2024-10-14 RX ADMIN — APIXABAN 5 MG: 2.5 TABLET, FILM COATED ORAL at 09:10

## 2024-10-14 RX ADMIN — MULTIPLE VITAMINS W/ MINERALS TAB 1 TABLET: TAB at 09:10

## 2024-10-14 RX ADMIN — FOLIC ACID 1 MG: 1 TABLET ORAL at 09:10

## 2024-10-14 RX ADMIN — ACETAMINOPHEN 1000 MG: 500 TABLET ORAL at 12:10

## 2024-10-14 RX ADMIN — LEVOFLOXACIN 750 MG: 750 TABLET, FILM COATED ORAL at 09:10

## 2024-10-14 RX ADMIN — THIAMINE HCL TAB 100 MG 100 MG: 100 TAB at 04:10

## 2024-10-14 NOTE — PLAN OF CARE
Patient daughter, Charmaine, is now in agreement with SNF placement. She was provided with a list of providers and has chosen [facility]. [facility] Will follow.

## 2024-10-14 NOTE — CONSULTS
Consult for SNF noted. Spoke to patient's daughter, Charmaine Vargas, P: [phone], who stated that she does not want to discuss placement or any other discharge options at this time. Dr. Stoddard has been updated. DUSTIN called and STEPHANIE faxed to [facility].

## 2024-10-14 NOTE — PROGRESS NOTES
[facility]  Progress Note    Patient Name: Madan Elder  MRN: [id number]  Admission Date: 10/6/2024  Hospital Length of Stay: 8 days  Code Status: Full Code  Attending Provider: Rita Piedra MD  Primary Care Provider: Noemí Peña     Subjective:     HPI: Madan Elder is a 79 yo male with PMH of chronic bladder obstruction, recurrent UTIs, atrial fibrillation in Eliquis, HFrEF (EF 30%) s/p AICD placement, NPH s/p  shunt (2019) and HTN. Patient was brought into the ED due to worsening mentation. During evaluation, patient was confused but able to answer some questions. History primarily obtained from daughter at bedside. Daughter states patient was recently admitted to the hospital due to UTI treated with PO antibiotics which patient had completed. Patient was then admitted to our hospital due to suspected UTI along with hypotension. Daughter reports that patient had his BP medications adjusted during his last admission but reports that he was still taking all of his BP medications instead of stopping Lisinopril as told. Patient was treated with IV fluid bolus and low maintenance fluids due to history of HFrEF but BP remained persistently low with MAPs at 65.     Hospital Course/Significant events:  10/6/24: Upgraded to ICU for UTI sepsis vs medication overuse requiring IV vasopressor support  10/8/2024: Patient downgraded to the floor    Interval History:  NAEON. No more reports of diaphoresis or hypotension. CT head and CXR from yesterday were unremarkable for any acute changes. Patient did have urinary retention that was relieved with in and out cath. Patient resting comfortably in bed and denies any acute complaints. CBC and CMP stable from prior.     Past Medical History:   Diagnosis Date    Hypertension     Stroke        Past Surgical History:   Procedure Laterality Date    INSERTION OF PACEMAKER         Social History     Socioeconomic History    Marital status:     Tobacco Use    Smoking status: Never    Smokeless tobacco: Never   Substance and Sexual Activity    Alcohol use: Yes    Drug use: Never     Social Drivers of Health     Financial Resource Strain: Patient Unable To Answer (10/9/2024)    Overall Financial Resource Strain (CARDIA)     Difficulty of Paying Living Expenses: Patient unable to answer   Food Insecurity: Patient Unable To Answer (10/9/2024)    Hunger Vital Sign     Worried About Running Out of Food in the Last Year: Patient unable to answer     Ran Out of Food in the Last Year: Patient unable to answer   Transportation Needs: Patient Unable To Answer (10/9/2024)    TRANSPORTATION NEEDS     Transportation : Patient unable to answer   Physical Activity: Inactive (10/7/2024)    Exercise Vital Sign     Days of Exercise per Week: 0 days     Minutes of Exercise per Session: 0 min   Stress: Patient Unable To Answer (10/9/2024)    [place] [employer] - Occupational Stress Questionnaire     Feeling of Stress : Patient unable to answer   Housing Stability: Patient Unable To Answer (10/9/2024)    Housing Stability Vital Sign     Unable to Pay for Housing in the Last Year: Patient unable to answer     Homeless in the Last Year: Patient unable to answer           Current Outpatient Medications   Medication Instructions    apixaban (ELIQUIS) 5 mg, Oral, 2 times daily    atorvastatin (LIPITOR) 20 mg, Oral, Daily    carvediloL (COREG) 3.125 mg, Oral, 2 times daily    folic acid (FOLVITE) 1,000 mcg, Oral, Daily    levoFLOXacin (LEVAQUIN) 750 mg, Oral, Daily    sodium bicarbonate 650 mg, Oral, Daily    VITAMIN B-1 100 mg, Oral, Daily       Current Inpatient Medications   apixaban  5 mg Oral BID    atorvastatin  20 mg Oral Daily    carvediloL  3.125 mg Oral BID    folic acid  1 mg Oral Daily    levoFLOXacin  750 mg Oral Daily    multivitamin  1 tablet Oral Daily    polyethylene glycol  17 g Oral Daily    thiamine  100 mg Oral TID       Current  Intravenous Infusions          Review of Systems   Constitutional:  Negative for chills and fever.   Respiratory:  Negative for cough, sputum production, shortness of breath and wheezing.    Cardiovascular:  Negative for chest pain, palpitations and leg swelling.   Gastrointestinal:  Negative for abdominal pain, nausea and vomiting.   Genitourinary:  Negative for dysuria and frequency.   Musculoskeletal:  Negative for myalgias.   Neurological:  Negative for dizziness, focal weakness, seizures and weakness.          Objective:       Intake/Output Summary (Last 24 hours) at 10/14/2024 0818  Last data filed at 10/14/2024 0419  Gross per 24 hour   Intake 836 ml   Output 825 ml   Net 11 ml         Vital Signs (Most Recent):  Temp: 97.5 °F (36.4 °C) (10/14/24 0739)  Pulse: 70 (10/14/24 0739)  Resp: 18 (10/14/24 0739)  BP: (!) 145/80 (10/14/24 0739)  SpO2: 100 % (10/14/24 0739)  Body mass index is 21.33 kg/m².  Weight: 63.6 kg (140 lb 4.8 oz) Vital Signs (24h Range):  Temp:  [97.3 °F (36.3 °C)-98.8 °F (37.1 °C)] 97.5 °F (36.4 °C)  Pulse:  [] 70  Resp:  [17-28] 18  SpO2:  [95 %-100 %] 100 %  BP: ()/(54-80) 145/80     Physical Exam  Constitutional:       General: He is not in acute distress.     Appearance: Normal appearance.   HENT:      Head: Normocephalic.      Ears:      Comments: Hard of hearing     Mouth/Throat:      Mouth: Mucous membranes are moist.   Eyes:      Conjunctiva/sclera: Conjunctivae normal.      Pupils: Pupils are equal, round, and reactive to light.   Cardiovascular:      Rate and Rhythm: Normal rate and regular rhythm.      Pulses: Normal pulses.      Heart sounds: No murmur heard.  Pulmonary:      Effort: Pulmonary effort is normal. No respiratory distress.      Breath sounds: No wheezing or rhonchi.   Abdominal:      General: Abdomen is flat. Bowel sounds are normal. There is no distension.      Palpations: Abdomen is soft.      Tenderness: There is no abdominal tenderness.    Musculoskeletal:         General: Normal range of motion.      Cervical back: Normal range of motion.      Right lower leg: No edema.      Left lower leg: No edema.   Skin:     General: Skin is warm.      Capillary Refill: Capillary refill takes less than 2 seconds.   Neurological:      General: No focal deficit present.      Mental Status: He is alert.      Comments: Unable to assess as patient cannot hear well           Lines/Drains/Airways       Peripheral Intravenous Line  Duration                  Peripheral IV - Single Lumen 10/08/24 1843 20 G 1 3/4 in Anterior;Left Forearm 5 days                    Significant Labs:    Lab Results   Component Value Date    WBC 9.17 10/14/2024    HGB 12.8 (L) 10/14/2024    HCT 40.2 (L) 10/14/2024    MCV 90.5 10/14/2024     10/14/2024           BMP  Lab Results   Component Value Date     10/14/2024    K 4.0 10/14/2024    CO2 22 (L) 10/14/2024    BUN 30.1 (H) 10/14/2024    CREATININE 0.90 10/14/2024    CALCIUM 9.9 10/14/2024    AGAP 10.0 10/14/2024    EGFRNONAA >60 11/05/2021             Assessment/Plan:     Assessment/Plan    Urosepsis, improved  --Blood culture and urine culture positive for pan sensitive E Coli  --Repeat blood culture negative at 72 hours  --Continue PO Levaquin 750 mg once daily.    Recurrent complicated UTI (in male)  --CT abdomen with chronic bladder outlet obstruction with mild right sided hydronephrosis; will schedule urology appointment outpatient   -- episode of urinary retention overnight  -- will order Retroperitoneal US to assess  -- Repeat UA still remarkable for Pyuria, reflex culture pending    Atrial fibrillation on Eliquis  HFrEF (EF 30%) s/p AICD  -- continue Eliquis 5 mg BID  -- resuming Coreg 3.125 mg BID    H/o CVA  -- will monitor  --CT head ordered    History of alcohol abuse  --[facility] protocol in place  --Continue Folic acid, Multivitamins and thiamine    H/o NPH s/p  shunt  -- done in 2019  -- CT head shows Unchanged  ventriculostomy drain in place via right parietooccipital approach     DVT Prophylaxis: Eliquis  GI Prophylaxis: N/A  ABX: Levaquin     Disposition:  Repeat Bcx negative at 72 hours. Transitioned to Levaquin daily oral. PT/OT consulted. Daughter now interested in SNF placement as unable to take him home.  consulted to assist with discharge planning.     Bartolo Keyes MD  Internal Medicine - PGY-2

## 2024-10-14 NOTE — PHYSICIAN QUERY
Due to the conflicting clinical picture, please clinically validate the urosepsis diagnosis. If validated, please provide additional clinical support for the diagnosis.   The condition is confirmed. Please specify clinical support (signs, symptoms, and treatment) for the confirmed diagnosis: Dysuria, frequency, positive cultures

## 2024-10-14 NOTE — PT/OT/SLP PROGRESS
Physical Therapy Treatment    Patient Name:  Madan Elder   MRN:  [id number]    Recommendations     Therapy Intensity Recommendations at Discharge: Moderate Intensity Therapy  Discharge Equipment Recommendations: walker, rolling, shower chair, bedside commode, power chair, grab bar   Barriers to discharge: fall risk, severity of deficits, level of skilled assistance required, decreased endurance, and impaired cognitive status    Assessment     Madan Elder is a 78 y.o. male admitted with a medical diagnosis of  WIN (acute kidney injury).  1. Acute renal failure superimposed on chronic kidney disease, unspecified acute renal failure type, unspecified CKD stage    2. SOB (shortness of breath)    3. Acute cystitis with hematuria    4. WIN (acute kidney injury)    5. Chest pain    6. Hydronephrosis, unspecified hydronephrosis type    7. Hypotensive episode    8. Hypotension       Patient Active Problem List   Diagnosis    Left-sided weakness    Urinary tract infection without hematuria    WIN (acute kidney injury)    Moderate malnutrition    Acute cystitis with hematuria    Bacteremia      He presents with the following impairments/functional limitations:  weakness, impaired endurance, impaired functional mobility, gait instability, impaired balance, pain, impaired cognition.    Rehab Prognosis: Good.    Patient would benefit from continued skilled acute PT services to: address above listed impairments/functional limitations; receive patient/caregiver education; reduce fall risk; and maximize independency/safety with functional mobility.    Recent Surgery: * No surgery found *      Plan     During this hospitalization, patient to be seen 5 x/week to address the identified impairments/functional limitations via gait training, therapeutic activities, therapeutic exercises and progress toward the established goals.    Plan of Care Expires:  11/06/24    Subjective     Communicated with patient's nurse prior to  session.    Patient agreeable to participate in treatment session.    Chief Complaint: pain  Patient/Family Comments/goals: to get stronger  Pain/Comfort:  Pain Rating 1: 7/10  Location - Side 1: Bilateral  Location 1: back  Pain Addressed 1: Nurse notified  Pain Rating Post-Intervention 1: 7/10    Objective     Patient found sitting in chair with peripheral IV  upon PT entry to room.    General Precautions: Standard, fall   Orthopedic Precautions:N/A   Braces: N/A  Respiratory Status: room air    Functional Mobility:    Bed Mobility:  Seated in chair at start of session and left in chair at end of session    Transfers:  Sit to Stand: contact guard assistance and minimum assistance with rolling walker  Stand to Sit: contact guard assistance and minimum assistance with rolling walker  with cues for hand placement    Gait:  Patient ambulated 130ft, seated rest, x 80 feet with rolling walker and minimum assistance.  Patient demonstrates :       unsteady gait       decreased lupe       decreased bilateral step length.    Other Mobility:  Therapeutic Activities performed:        -sitting balance activities:              scooting:                   -forward       -standing balance activities:              Pt stood to doff/nathan brief and clean 2/2 soiling.     Patient left sitting in chair with all lines intact, call button in reach, tray table at bedside, and patient's nurse notified.    Education     Patient educated on and assisted with functional mobility as noted above.  Patient educated on PT Plan of Care.  Patient was instructed to utilize staff assistance for mobility/transfers.  White board updated regarding patient's safest level of mobility with staff assistance    Goals     Multidisciplinary Problems       Physical Therapy Goals          Problem: Physical Therapy    Goal Priority Disciplines Outcome Interventions   Physical Therapy Goal     PT, PT/OT Progressing    Description: REVIEWED 10/11/2024  Goals to  be met by: DISCHARGE  Patient will increase functional independence with mobility by performing:  -. Supine to sit with Modified Burke - ONGOING  -. Sit to supine with Modified Burke - ONGOING  -. Rolling to Left and Right with Modified Burke - ONGOING  -. Sit to stand transfer with Contact Guard Assistance - ONGOING  -. Gait  x 130 feet with Contact Guard Assistance using Rolling Walker - ONGOING  -. BILAT UE/LE general ROM ex's - all joints/planes x10 reps each AROM - ESTABLISHED 10/10/58171 - ONGOING                     Time Tracking     PT Received On: 10/14/24  PT Start Time: 1100     PT Stop Time: 1125  PT Total Time (min): 25 min     Billable Minutes: Gait Training 15 mins and Therapeutic Activity 10 mins    10/14/2024

## 2024-10-14 NOTE — PLAN OF CARE
Problem: Adult Inpatient Plan of Care  Goal: Plan of Care Review  Outcome: Progressing  Goal: Patient-Specific Goal (Individualized)  Outcome: Progressing  Goal: Absence of Hospital-Acquired Illness or Injury  Outcome: Progressing  Goal: Optimal Comfort and Wellbeing  Outcome: Progressing  Goal: Readiness for Transition of Care  Outcome: Progressing     Problem: Acute Kidney Injury/Impairment  Goal: Fluid and Electrolyte Balance  Outcome: Progressing  Goal: Improved Oral Intake  Outcome: Progressing  Goal: Effective Renal Function  Outcome: Progressing     Problem: Fall Injury Risk  Goal: Absence of Fall and Fall-Related Injury  Outcome: Progressing     Problem: Heart Failure  Goal: Optimal Coping  Outcome: Progressing  Goal: Optimal Cardiac Output  Outcome: Progressing  Goal: Stable Heart Rate and Rhythm  Outcome: Progressing  Goal: Optimal Functional Ability  Outcome: Progressing  Goal: Fluid and Electrolyte Balance  Outcome: Progressing  Goal: Improved Oral Intake  Outcome: Progressing  Goal: Effective Oxygenation and Ventilation  Outcome: Progressing  Goal: Effective Breathing Pattern During Sleep  Outcome: Progressing     Problem: Wound  Goal: Optimal Coping  Outcome: Progressing  Goal: Optimal Functional Ability  Outcome: Progressing  Goal: Absence of Infection Signs and Symptoms  Outcome: Progressing  Goal: Improved Oral Intake  Outcome: Progressing  Goal: Optimal Pain Control and Function  Outcome: Progressing  Goal: Skin Health and Integrity  Outcome: Progressing  Goal: Optimal Wound Healing  Outcome: Progressing

## 2024-10-14 NOTE — PT/OT/SLP PROGRESS
Occupational Therapy   Treatment    Name: Madan Elder  MRN: [id number]  Admitting Diagnosis:  WIN (acute kidney injury)   recurrent UTI, afib, urosepsis     Recommendations:     Discharge Recommendations: Moderate Intensity Therapy (VS home with 24/7 caregiver assist)  Discharge Equipment Recommendations:  walker, rolling, shower chair, wheelchair, bedside commode, grab bar  Barriers to discharge:  Decreased caregiver support, Other (Comment) (impaired cognition, level of skilled assist, fall risk , safety concerns)    Assessment:     Madan Elder is a 78 y.o. male with a medical diagnosis of WIN (acute kidney injury).  He presents with decreased activity tolerance and decreased balance impacting safety when OOB . Performance deficits affecting function are weakness, impaired endurance, impaired self care skills, impaired functional mobility, gait instability, impaired balance, decreased lower extremity function, decreased upper extremity function, decreased safety awareness, pain, impaired coordination, impaired fine motor, decreased ROM.     Rehab Prognosis:  Good; patient would benefit from acute skilled OT services to address these deficits and reach maximum level of function.       Plan:     Patient to be seen 3 x/week to address the above listed problems via self-care/home management, therapeutic activities, therapeutic exercises  Plan of Care Expires:  (d/c)  Plan of Care Reviewed with: patient    Subjective     Chief Complaint: no c/o of back pain this session  Patient/Family Comments/goals: return home   Pain/Comfort:  Pain Rating 1: 0/10  Pain Addressed 1: Nurse notified  Pain Rating Post-Intervention 1: 0/10    Objective:     Communicated with: Nurse Benitez prior to session.  Patient found HOB elevated with telemetry, peripheral IV upon OT entry to room. Daughter present and expressing concerns re: urinary retention and recurrent UTI's.     General Precautions: Standard, fall    Orthopedic  Precautions:N/A  Braces: N/A  Respiratory Status: Room air     Occupational Performance:     Bed Mobility:    Patient completed Supine to Sit with supervision     Functional Mobility/Transfers:  Patient completed Sit <> Stand Transfer with minimum assistance  with  rolling walker from EOB x 2 trials and CGA with RW step transfer to bedside chair.   Functional Mobility: Pt ambulated ~ 12 feet during session - bed to sink and sink to bedside chair and with RW and CGA and flexed posture erich after prolonged standing at sink. VC for upright posture and management of RW     Activities of Daily Living:  Grooming: contact guard assistance to occasional min A during unsupported standing at sink to wash and dry hands ; and CGA with unilateral UE support while combing  hair  and oral rinse  Lower Body Dressing: stand by assistance doff and donning B socks while seated EOB utilizing figure 4 position.     Treatment & Education:  While seated EOB, pt performed B UE strengthening ex and to improve activity tolerance - 3 x 10 reps shoulder o/h and chest press.     Pts daughter educated on decreased balance and activity tolerance and safety concerns re: going home. Pts daughter reported that pt has meals on wheels and 30 hours sitter assist during the day and she checks in on him at night . Recommended mod intensity therapy to maximize functional gains for safe return home and or  24/7 sup at this time if pt goes home     Patient left up in chair with all lines intact, call button in reach, and nurse  notified and daughter present     GOALS:   Multidisciplinary Problems       Occupational Therapy Goals          Problem: Occupational Therapy    Goal Priority Disciplines Outcome Interventions   Occupational Therapy Goal     OT, PT/OT Progressing    Description: Goals to be met by: d/c     Patient will increase functional independence with ADLs by performing:    UE Dressing with Stand-by Assistance .  LE Dressing with Stand-by  Assistance  .  Grooming while seated at sink with Stand-by Assistance.  Toileting from bedside commode with Stand-by Assistance for hygiene and clothing management.   Toilet transfer to bedside commode with Stand-by Assistance.                         Time Tracking:     OT Date of Treatment: 10/14/24  OT Start Time: 0956  OT Stop Time: 1019  OT Total Time (min): 23 min    Billable Minutes:Self Care/Home Management 13 min   Therapeutic Exercise 10 min     OT/TASH: OT          10/14/2024

## 2024-10-15 LAB
ALBUMIN SERPL-MCNC: 2.4 G/DL (ref 3.4–4.8)
ALBUMIN/GLOB SERPL: 0.5 RATIO (ref 1.1–2)
ALP SERPL-CCNC: 70 UNIT/L (ref 40–150)
ALT SERPL-CCNC: 44 UNIT/L (ref 0–55)
AMORPH URATE CRY URNS QL MICRO: ABNORMAL /UL
ANION GAP SERPL CALC-SCNC: 10 MEQ/L
AST SERPL-CCNC: 35 UNIT/L (ref 5–34)
BACTERIA #/AREA URNS AUTO: ABNORMAL /HPF
BASOPHILS # BLD AUTO: 0.02 X10(3)/MCL
BASOPHILS NFR BLD AUTO: 0.2 %
BILIRUB SERPL-MCNC: 1.3 MG/DL
BILIRUB UR QL STRIP.AUTO: NEGATIVE
BUN SERPL-MCNC: 33.1 MG/DL (ref 8.4–25.7)
CALCIUM SERPL-MCNC: 9.7 MG/DL (ref 8.8–10)
CHLORIDE SERPL-SCNC: 106 MMOL/L (ref 98–107)
CLARITY UR: ABNORMAL
CO2 SERPL-SCNC: 22 MMOL/L (ref 23–31)
COLOR UR AUTO: ABNORMAL
CREAT SERPL-MCNC: 0.98 MG/DL (ref 0.73–1.18)
CREAT/UREA NIT SERPL: 34
EOSINOPHIL # BLD AUTO: 0.1 X10(3)/MCL (ref 0–0.9)
EOSINOPHIL NFR BLD AUTO: 1.1 %
ERYTHROCYTE [DISTWIDTH] IN BLOOD BY AUTOMATED COUNT: 14.6 % (ref 11.5–17)
GFR SERPLBLD CREATININE-BSD FMLA CKD-EPI: >60 ML/MIN/1.73/M2
GLOBULIN SER-MCNC: 5 GM/DL (ref 2.4–3.5)
GLUCOSE SERPL-MCNC: 117 MG/DL (ref 82–115)
GLUCOSE UR QL STRIP: NORMAL
HCT VFR BLD AUTO: 37.1 % (ref 42–52)
HGB BLD-MCNC: 12.4 G/DL (ref 14–18)
HGB UR QL STRIP: ABNORMAL
HOLD SPECIMEN: NORMAL
HOLD SPECIMEN: NORMAL
HYALINE CASTS #/AREA URNS LPF: ABNORMAL /LPF
IMM GRANULOCYTES # BLD AUTO: 0.04 X10(3)/MCL (ref 0–0.04)
IMM GRANULOCYTES NFR BLD AUTO: 0.4 %
KETONES UR QL STRIP: NEGATIVE
LEUKOCYTE ESTERASE UR QL STRIP: 500
LYMPHOCYTES # BLD AUTO: 1.14 X10(3)/MCL (ref 0.6–4.6)
LYMPHOCYTES NFR BLD AUTO: 12.2 %
MCH RBC QN AUTO: 29.5 PG (ref 27–31)
MCHC RBC AUTO-ENTMCNC: 33.4 G/DL (ref 33–36)
MCV RBC AUTO: 88.3 FL (ref 80–94)
MONOCYTES # BLD AUTO: 0.45 X10(3)/MCL (ref 0.1–1.3)
MONOCYTES NFR BLD AUTO: 4.8 %
NEUTROPHILS # BLD AUTO: 7.56 X10(3)/MCL (ref 2.1–9.2)
NEUTROPHILS NFR BLD AUTO: 81.3 %
NITRITE UR QL STRIP: NEGATIVE
NRBC BLD AUTO-RTO: 0 %
PH UR STRIP: 7.5 [PH]
PLATELET # BLD AUTO: 222 X10(3)/MCL (ref 130–400)
PMV BLD AUTO: 9.3 FL (ref 7.4–10.4)
POTASSIUM SERPL-SCNC: 4 MMOL/L (ref 3.5–5.1)
PROT SERPL-MCNC: 7.4 GM/DL (ref 5.8–7.6)
PROT UR QL STRIP: ABNORMAL
RBC # BLD AUTO: 4.2 X10(6)/MCL (ref 4.7–6.1)
RBC #/AREA URNS AUTO: ABNORMAL /HPF
SODIUM SERPL-SCNC: 138 MMOL/L (ref 136–145)
SP GR UR STRIP.AUTO: 1.02 (ref 1–1.03)
SQUAMOUS #/AREA URNS LPF: ABNORMAL /HPF
UROBILINOGEN UR STRIP-ACNC: NORMAL
WBC # BLD AUTO: 9.31 X10(3)/MCL (ref 4.5–11.5)
WBC #/AREA URNS AUTO: ABNORMAL /HPF

## 2024-10-15 PROCEDURE — 97535 SELF CARE MNGMENT TRAINING: CPT

## 2024-10-15 PROCEDURE — 25000003 PHARM REV CODE 250

## 2024-10-15 PROCEDURE — 85025 COMPLETE CBC W/AUTO DIFF WBC: CPT

## 2024-10-15 PROCEDURE — 80053 COMPREHEN METABOLIC PANEL: CPT

## 2024-10-15 PROCEDURE — 11000001 HC ACUTE MED/SURG PRIVATE ROOM

## 2024-10-15 PROCEDURE — 63600175 PHARM REV CODE 636 W HCPCS

## 2024-10-15 PROCEDURE — 36415 COLL VENOUS BLD VENIPUNCTURE: CPT

## 2024-10-15 PROCEDURE — 87040 BLOOD CULTURE FOR BACTERIA: CPT

## 2024-10-15 PROCEDURE — 81001 URINALYSIS AUTO W/SCOPE: CPT

## 2024-10-15 PROCEDURE — 97116 GAIT TRAINING THERAPY: CPT

## 2024-10-15 RX ADMIN — THIAMINE HCL TAB 100 MG 100 MG: 100 TAB at 10:10

## 2024-10-15 RX ADMIN — SODIUM CHLORIDE, POTASSIUM CHLORIDE, SODIUM LACTATE AND CALCIUM CHLORIDE 500 ML: 600; 310; 30; 20 INJECTION, SOLUTION INTRAVENOUS at 10:10

## 2024-10-15 RX ADMIN — DOCUSATE SODIUM 100 MG: 100 CAPSULE, LIQUID FILLED ORAL at 04:10

## 2024-10-15 RX ADMIN — APIXABAN 5 MG: 2.5 TABLET, FILM COATED ORAL at 09:10

## 2024-10-15 RX ADMIN — MULTIPLE VITAMINS W/ MINERALS TAB 1 TABLET: TAB at 10:10

## 2024-10-15 RX ADMIN — TAMSULOSIN HYDROCHLORIDE 0.4 MG: 0.4 CAPSULE ORAL at 10:10

## 2024-10-15 RX ADMIN — LEVOFLOXACIN 750 MG: 750 TABLET, FILM COATED ORAL at 10:10

## 2024-10-15 RX ADMIN — POLYETHYLENE GLYCOL 3350 17 G: 17 POWDER, FOR SOLUTION ORAL at 09:10

## 2024-10-15 RX ADMIN — ATORVASTATIN CALCIUM 20 MG: 20 TABLET, FILM COATED ORAL at 10:10

## 2024-10-15 RX ADMIN — FOLIC ACID 1 MG: 1 TABLET ORAL at 10:10

## 2024-10-15 RX ADMIN — THIAMINE HCL TAB 100 MG 100 MG: 100 TAB at 02:10

## 2024-10-15 RX ADMIN — APIXABAN 5 MG: 2.5 TABLET, FILM COATED ORAL at 10:10

## 2024-10-15 RX ADMIN — POLYETHYLENE GLYCOL 3350 17 G: 17 POWDER, FOR SOLUTION ORAL at 10:10

## 2024-10-15 RX ADMIN — THIAMINE HCL TAB 100 MG 100 MG: 100 TAB at 09:10

## 2024-10-15 NOTE — PT/OT/SLP PROGRESS
Physical Therapy Treatment    Patient Name:  Madan Elder   MRN:  [id number]    Recommendations     Therapy Intensity Recommendations at Discharge: Moderate Intensity Therapy  Discharge Equipment Recommendations: walker, rolling, wheelchair, shower chair, bedside commode, grab bar (patient has equipment)   Barriers to discharge: safety concerns, fall risk, level of skilled assistance required, impaired cognitive status, and decreased caregiver support     Assessment     Madan Elder is a 78 y.o. male admitted with a medical diagnosis of WIN (acute kidney injury).  1. Acute renal failure superimposed on chronic kidney disease, unspecified acute renal failure type, unspecified CKD stage    2. SOB (shortness of breath)    3. Acute cystitis with hematuria    4. WIN (acute kidney injury)    5. Chest pain    6. Hydronephrosis, unspecified hydronephrosis type    7. Hypotensive episode    8. Hypotension       Patient Active Problem List   Diagnosis    Left-sided weakness    Urinary tract infection without hematuria    WIN (acute kidney injury)    Moderate malnutrition    Acute cystitis with hematuria    Bacteremia      He presents with the following impairments/functional limitations:  weakness, gait instability, impaired balance, impaired endurance, impaired self care skills, impaired functional mobility, impaired cognition, decreased lower extremity function, decreased safety awareness, impaired coordination, impaired fine motor, impaired cardiopulmonary response to activity.    Rehab Prognosis: Good.    Patient would benefit from continued skilled acute PT services to: address above listed impairments/functional limitations; receive patient/caregiver education; reduce fall risk; and maximize independency/safety with functional mobility.    -continued: cardiac chair positioning in bed, sitting edge of bed, up-to-chair, standing edge of bed, ambulation, with progression of gait distance/frequency/duration and speed, with  wheelchair in close tow, as tolerated/appropriate, with assistance and supervision w/ bed/chair alarm    Recent Surgery: * No surgery found *      Plan     During this hospitalization, patient to be seen 5 x/week to address the identified impairments/functional limitations via gait training, therapeutic activities, therapeutic exercises and progress toward the established goals.    Plan of Care Expires:  11/06/24    Subjective     Communicated with patient's nurse Kaylen prior to session.    Patient agreeable to participate in treatment session.    Chief Complaint: none  Patient/Family Comments/goals: none verbalized  Pain/Comfort:  Pain Rating 1: 0/10  Pain Addressed 1: Nurse notified  Pain Rating Post-Intervention 1: 0/10    Objective     OT Kelley and PM&R TECH Noe present during therapy session    Patient found supine in bed, with HOB elevated, and with bed rails up bilateral HOB, left FOB, and right FOB with peripheral IV, alfonso catheter, w/ patients daughter in room, upon PT entry to room.    General Precautions: Standard, fall, hearing impaired, seizure (AICD)   Orthopedic Precautions:N/A   Braces: N/A  Respiratory Status: room air  SAT O2 Check: n/a    Functional Mobility:    Bed Mobility:  Rolling Left: stand by assistance  Rolling Right: stand by assistance  Supine to Sit: stand by assistance  Sit to Supine: stand by assistance  with cues for proper technique  with HOB elevated, hand rail, and firm mattress    Transfers:  Sit to Stand: contact guard-minimum assistance with hand-held assist and rolling walker  with cues for hand placement  with firm mattress    Gait:  Patient ambulated 130ft  Sitting rest x2 minutes in bedside chair  Patient ambulated 12ft (to/from sink)  Standing activities at sink  w/ OT  Sitting rest x2 minutes  Patient ambulated 70ft  with hand-held assist and rolling walker and contact guard assistance.  Patient demonstrates :       occasional unsteady gait       decreased  lupe       decreased bilateral step length       decreased bilateral foot/floor clearance       inconsistent bilateral foot placement      slowed, guarded, time consuming movements - all transitional activities.  *PM&R TECH Noe following patient w/ wheelchair in close tow during gait sessions    Other Mobility:  N/A    Balance:  Sit  Patient demonstrated static balance on level surface with supervision with no verbal cues.  Patient demonstrated dynamic balance on level surface with supervision with no verbal cues during moderate excursions.  Stand  Patient demonstrated static balance on level surface  using hand-held assist and rolling walker with stand by-contact guard assistance with no verbal cues.    Patient left supine in bed, with HOB elevated, and with bed rails up bilateral HOB and right FOB with peripheral IV, alfonso catheter with all lines intact, call button in reach, tray table at bedside, bedside commode at bedside, patient's nurse notified, patients daughter present, and bed alarm on.    Education     Patient educated on and assisted with functional mobility as noted above.  Patient educated on PT Plan of Care  Patient was instructed to utilize staff assistance for mobility/transfers.  White board updated regarding patient's safest level of mobility with staff assistance.    Goals     Multidisciplinary Problems       Physical Therapy Goals       Problem: Physical Therapy    Goal Priority Disciplines Outcome Interventions   Physical Therapy Goal     PT, PT/OT Progressing    Description: REVIEWED 10/15/2024  Goals to be met by: DISCHARGE  Patient will increase functional independence with mobility by performing:  -. Supine to sit with Modified [place] - ONGOING  -. Sit to supine with Modified [place] - ONGOING  -. Rolling to Left and Right with Modified [place] - ONGOING  -. Sit to stand transfer with Contact Guard Assistance - ONGOING  -. Gait  x 130 feet with Contact Guard Assistance  using Rolling Walker - ONGOING - MET 10/15/2024  -. BILAT UE/LE general ROM ex's - all joints/planes x10 reps each AROM - ESTABLISHED 10/10/07094 - ONGOING           Time Tracking     PT Received On: 10/15/24  PT Start Time: 1344     PT Stop Time: 1411  PT Total Time (min): 27 min     Billable Minutes: Gait Training 16  Non-Billable Minutes: N/A and assisted other therapist 11  10/15/2024

## 2024-10-15 NOTE — PHYSICIAN QUERY
please clinically validate the sepsis. If validated, please provide additional clinical support for the sepsis.   The condition is confirmed. Please specify clinical support (signs, symptoms, and treatment) for the confirmed diagnosis: microbiology findings

## 2024-10-15 NOTE — PT/OT/SLP PROGRESS
Physical Therapy    Missed Treatment Session - cancel note    Patient Name:  Madan Elder   MRN:  [id number]      Patient not seen at this time secondary to Nurse/ [employer] hold.    Will follow-up as patient is appropriate/available/agreeable to participate and as therapists' schedule allows.

## 2024-10-15 NOTE — PLAN OF CARE
Received message from Radha with [facility] stating that patient has been denied. Patient's daughter, Charmaine, has been updated. She states that she doesn't want to choose another facility at this time.

## 2024-10-15 NOTE — PLAN OF CARE
Problem: Adult Inpatient Plan of Care  Goal: Plan of Care Review  Outcome: Progressing  Goal: Patient-Specific Goal (Individualized)  Outcome: Progressing  Goal: Absence of Hospital-Acquired Illness or Injury  Outcome: Progressing  Goal: Optimal Comfort and Wellbeing  Outcome: Progressing  Goal: Readiness for Transition of Care  Outcome: Progressing     Problem: Acute Kidney Injury/Impairment  Goal: Fluid and Electrolyte Balance  Outcome: Progressing  Goal: Improved Oral Intake  Outcome: Progressing  Goal: Effective Renal Function  Outcome: Progressing     Problem: Fall Injury Risk  Goal: Absence of Fall and Fall-Related Injury  Outcome: Progressing     Problem: Heart Failure  Goal: Optimal Coping  Outcome: Progressing  Goal: Optimal Cardiac Output  Outcome: Progressing  Goal: Stable Heart Rate and Rhythm  Outcome: Progressing  Goal: Optimal Functional Ability  Outcome: Progressing  Goal: Fluid and Electrolyte Balance  Outcome: Progressing  Goal: Improved Oral Intake  Outcome: Progressing  Goal: Effective Oxygenation and Ventilation  Outcome: Progressing  Goal: Effective Breathing Pattern During Sleep  Outcome: Progressing     Problem: Wound  Goal: Optimal Coping  Outcome: Progressing  Goal: Optimal Functional Ability  Outcome: Progressing  Goal: Absence of Infection Signs and Symptoms  Outcome: Progressing  Goal: Improved Oral Intake  Outcome: Progressing  Goal: Optimal Pain Control and Function  Outcome: Progressing  Goal: Skin Health and Integrity  Outcome: Progressing  Goal: Optimal Wound Healing  Outcome: Progressing     Problem: Skin Injury Risk Increased  Goal: Skin Health and Integrity  Outcome: Progressing     Problem: Infection  Goal: Absence of Infection Signs and Symptoms  Outcome: Progressing

## 2024-10-15 NOTE — PHYSICIAN QUERY
Please specify the diagnosis or diagnoses associated with clinical findings:  Other condition (please specify): Urosepsis

## 2024-10-15 NOTE — PT/OT/SLP PROGRESS
Occupational Therapy   Treatment    Name: Madan Elder  MRN: [id number]  Admitting Diagnosis:  WIN (acute kidney injury)     recurrent UTI, afib, urosepsis        Recommendations:     Discharge Recommendations: Moderate Intensity Therapy (or 24/7 supervision and caretaker assist at home)  Discharge Equipment Recommendations:  walker, rolling, shower chair, wheelchair, bedside commode, grab bar  Barriers to discharge:  Decreased caregiver support, Other (Comment) (impaired cognition, level of skilled assist, fall risk , safety concerns)    Assessment:     Madan Elder is a 78 y.o. male with a medical diagnosis of WIN (acute kidney injury) and urosepsis.  He presents with improved tolerance of OOB activity and no c/o of back pain  during   this session. Pt is [place] and with poor functioning hearing aides. Impaired cognition , judgement , insight and safety concerns for return home to prior living situation. Performance deficits affecting function are weakness, impaired endurance, impaired self care skills, impaired functional mobility, gait instability, impaired balance, impaired cognition, decreased lower extremity function, decreased safety awareness, impaired coordination.     Rehab Prognosis:  Good; patient would benefit from acute skilled OT services to address these deficits and reach maximum level of function.       Plan:     Patient to be seen 3 x/week to address the above listed problems via self-care/home management, therapeutic activities, therapeutic exercises  Plan of Care Expires:  (d/c)  Plan of Care Reviewed with: patient, daughter    Subjective     Chief Complaint: none stated   Patient/Family Comments/goals: none stated this session  Pain/Comfort:  Pain Rating 1: 0/10  Pain Addressed 1: Nurse notified  Pain Rating Post-Intervention 1: 0/10    Objective:     Communicated with: Nurse Rodriguez prior to session.  Patient found supine with peripheral IV, alfonso catheter upon OT entry to room. Daughter present  upon entry but left upon arrival.     General Precautions: Standard, fall    Orthopedic Precautions:N/A  Braces: N/A  Respiratory Status: Room air     Occupational Performance:     Bed Mobility:    Patient completed Supine to Sit with stand by assistance and verbal cue to improve efficiency   Patient completed Sit to Supine with stand by assistance and varbal cue to improve efficiency      Functional Mobility/Transfers:  Patient completed Sit <> Stand Transfer with contact guard assistance and to min A   with  rolling walker and from EOB and from chair and with verbal cue hand placement    Patient completed Bed <> Chair Transfer using Step Transfer technique with contact guard assistance with rolling walker and verbal cue safety due to attempting to sit prior to full transition to chair   Functional Mobility: CGA ambulation with RW ~ 12 feet chair to and from sink  during self care tasks; Ambulation in  hallway with PT- see PT note for specifics ; during gait, unsteadiness, narrow PARDEEP and increased fatigue after prolonged standing of ~ 3 min at sink and during second ambulation attempt    Activities of Daily Living:  Grooming: stand by assistance and to occasional CGA standing at sink during unsupported stand while combing hair and wash and dry hands and face; standing endurance 3 min and fatigue and decreased balance noted with prolonged standing    Upper Body Dressing: minimum assistance don hospital gown around back like robe while seated EOB   Lower Body Dressing: s/u don socks long sitting in bed and with figure 4 positioning of LE's and supervision don slip on shoes and able to readjust back of shoe       Treatment & Education:  Pt. educated on OT goals, POC,  use of call bell for assist with transfers OOB or for any other needs due to fall risk.  Pt needs ongoing reenforcement .    Patient left HOB elevated with bed alarm on and  all lines intact, call button in reach, and nurse  notified and daughter present      GOALS:   Multidisciplinary Problems       Occupational Therapy Goals          Problem: Occupational Therapy    Goal Priority Disciplines Outcome Interventions   Occupational Therapy Goal     OT, PT/OT Progressing    Description: Goals to be met by: d/c     Patient will increase functional independence with ADLs by performing:    UE Dressing with Stand-by Assistance .  LE Dressing with Stand-by Assistance  .  Grooming while seated at sink with Stand-by Assistance.  Toileting from bedside commode with Stand-by Assistance for hygiene and clothing management.   Toilet transfer to bedside commode with Stand-by Assistance.                         Time Tracking:     OT Date of Treatment: 10/15/24  OT Start Time: 1344  OT Stop Time: 1411  OT Total Time (min): 27 min    Billable Minutes:Self Care/Home Management 11 min   Total Time 27 min as PT assist 16 min     OT/TASH: OT          10/15/2024

## 2024-10-15 NOTE — PROGRESS NOTES
[facility]  Progress Note    Patient Name: Madan Elder  MRN: [id number]  Admission Date: 10/6/2024  Hospital Length of Stay: 9 days  Code Status: Full Code  Attending Provider: Rita Piedra MD  Primary Care Provider: Noemí Peña     Subjective:     HPI: Madan Elder is a 77 yo male with PMH of chronic bladder obstruction, recurrent UTIs, atrial fibrillation in Eliquis, HFrEF (EF 30%) s/p AICD placement, NPH s/p  shunt (2019) and HTN. Patient was brought into the ED due to worsening mentation. During evaluation, patient was confused but able to answer some questions. History primarily obtained from daughter at bedside. Daughter states patient was recently admitted to the hospital due to UTI treated with PO antibiotics which patient had completed. Patient was then admitted to our hospital due to suspected UTI along with hypotension. Daughter reports that patient had his BP medications adjusted during his last admission but reports that he was still taking all of his BP medications instead of stopping Lisinopril as told. Patient was treated with IV fluid bolus and low maintenance fluids due to history of HFrEF but BP remained persistently low with MAPs at 65.     Hospital Course/Significant events:  10/6/24: Upgraded to ICU for UTI sepsis vs medication overuse requiring IV vasopressor support  10/8/2024: Patient downgraded to the floor    Interval History:  Patient has been having issues with urinary retention. He had two in & out cath done yesterday with removal of 600 and 500 ml of urine respectively. Per chart review, patient has had episodes of retention in the past but never followed up with urology. I had a lengthy discussion about his care with his daughter who was upset that he is not at baseline. Explained to her that first, there has been difficulty with communication about accurate symptoms given his faulty hearing aid. Nursing staff did reach out to audiology  and nothing they can fix while inpatient. Daughter is aware that he needs it replaced as well. However, every physical exam has been benign.  Retroperitoneal ultrasound performed yesterday showed no significant sonographic abnormality of the kidneys and no hydronephrosis either.  I had discussed with the daughter that we may reach out to our Urology about recommendation however there out of office for the next 2 weeks.  Per nursing report, daughter placed ice packs on patient causing him to get cold, sugar and unable to obtain temperature.  Hector Hugger were placed overnight to get him warm and able to obtain temperature.  I also reached out to pharmacy to obtain most recent medicine history and patient known to be taking Flomax.  Per chart review he did have elevated PSA attributed to BPH, I had placed order to start that yesterday however it was again refused by daughter.  This morning, patient again resting comfortably in bed pleasant and denies any chest pain, shortness for breath, abdominal pain.  I specifically asked if he had any pain with urination and he denied.  Gives 2 thumbs up saying that he is okay.  Vitals wise patient afebrile, soft BP, and on room air.  CBC stable with normocytic anemia.  CMP essentially unremarkable.  Even his urine culture obtained on 10/13/2024 prelim result is no growth at 24 hours.  At this point, we may consider indwelling catheter however daughter does not seem to understand that this may be his new baseline.  Most importantly he also needs his hearing aid fixed so that appropriate communication can be made.    Past Medical History:   Diagnosis Date    Hypertension     Stroke        Past Surgical History:   Procedure Laterality Date    INSERTION OF PACEMAKER         Social History     Socioeconomic History    Marital status:    Tobacco Use    Smoking status: Never    Smokeless tobacco: Never   Substance and Sexual Activity    Alcohol use: Yes    Drug use: Never      Social Drivers of Health     Financial Resource Strain: Patient Unable To Answer (10/9/2024)    Overall Financial Resource Strain (CARDIA)     Difficulty of Paying Living Expenses: Patient unable to answer   Food Insecurity: Patient Unable To Answer (10/9/2024)    Hunger Vital Sign     Worried About Running Out of Food in the Last Year: Patient unable to answer     Ran Out of Food in the Last Year: Patient unable to answer   Transportation Needs: Patient Unable To Answer (10/9/2024)    TRANSPORTATION NEEDS     Transportation : Patient unable to answer   Physical Activity: Inactive (10/7/2024)    Exercise Vital Sign     Days of Exercise per Week: 0 days     Minutes of Exercise per Session: 0 min   Stress: Patient Unable To Answer (10/9/2024)    [employer] - Occupational Stress Questionnaire     Feeling of Stress : Patient unable to answer   Housing Stability: Patient Unable To Answer (10/9/2024)    Housing Stability Vital Sign     Unable to Pay for Housing in the Last Year: Patient unable to answer     Homeless in the Last Year: Patient unable to answer           Current Outpatient Medications   Medication Instructions    apixaban (ELIQUIS) 5 mg, Oral, 2 times daily    atorvastatin (LIPITOR) 20 mg, Oral, Daily    carvediloL (COREG) 3.125 mg, Oral, 2 times daily    folic acid (FOLVITE) 1,000 mcg, Oral, Daily    levoFLOXacin (LEVAQUIN) 750 mg, Oral, Daily    sodium bicarbonate 650 mg, Oral, Daily    sotaloL (BETAPACE) 80 mg, Oral, 2 times daily    VITAMIN B-1 100 mg, Oral, Daily       Current Inpatient Medications   apixaban  5 mg Oral BID    atorvastatin  20 mg Oral Daily    carvediloL  3.125 mg Oral BID    folic acid  1 mg Oral Daily    levoFLOXacin  750 mg Oral Daily    multivitamin  1 tablet Oral Daily    polyethylene glycol  17 g Oral BID    tamsulosin  0.4 mg Oral Daily    thiamine  100 mg Oral TID       Current Intravenous Infusions          Review of Systems   Constitutional:   Negative for chills and fever.   Respiratory:  Negative for cough, sputum production, shortness of breath and wheezing.    Cardiovascular:  Negative for chest pain, palpitations and leg swelling.   Gastrointestinal:  Negative for abdominal pain, nausea and vomiting.   Genitourinary:  Negative for dysuria and frequency.   Musculoskeletal:  Negative for myalgias.   Neurological:  Negative for dizziness, focal weakness, seizures and weakness.          Objective:       Intake/Output Summary (Last 24 hours) at 10/15/2024 0829  Last data filed at 10/15/2024 0600  Gross per 24 hour   Intake 609.49 ml   Output 1100 ml   Net -490.51 ml         Vital Signs (Most Recent):  Temp: 98.9 °F (37.2 °C) (10/15/24 0729)  Pulse: 81 (10/15/24 0729)  Resp: 17 (10/15/24 0331)  BP: (!) 93/55 (10/15/24 0729)  SpO2: 97 % (10/15/24 0729)  Body mass index is 20.53 kg/m².  Weight: 61.2 kg (135 lb) Vital Signs (24h Range):  Temp:  [97.4 °F (36.3 °C)-99.3 °F (37.4 °C)] 98.9 °F (37.2 °C)  Pulse:  [] 81  Resp:  [16-18] 17  SpO2:  [96 %-100 %] 97 %  BP: ()/(47-82) 93/55     Physical Exam  Constitutional:       General: He is not in acute distress.     Appearance: Normal appearance.   HENT:      Head: Normocephalic.      Ears:      Comments: Hard of hearing     Mouth/Throat:      Mouth: Mucous membranes are moist.   Eyes:      Conjunctiva/sclera: Conjunctivae normal.      Pupils: Pupils are equal, round, and reactive to light.   Cardiovascular:      Rate and Rhythm: Normal rate and regular rhythm.      Pulses: Normal pulses.      Heart sounds: No murmur heard.  Pulmonary:      Effort: Pulmonary effort is normal. No respiratory distress.      Breath sounds: No wheezing or rhonchi.   Abdominal:      General: Abdomen is flat. Bowel sounds are normal. There is no distension.      Palpations: Abdomen is soft.      Tenderness: There is no abdominal tenderness.   Musculoskeletal:         General: Normal range of motion.      Cervical back:  Normal range of motion.      Right lower leg: No edema.      Left lower leg: No edema.   Skin:     General: Skin is warm.      Capillary Refill: Capillary refill takes less than 2 seconds.   Neurological:      General: No focal deficit present.      Mental Status: He is alert.      Comments: Unable to assess as patient cannot hear well           Lines/Drains/Airways       Peripheral Intravenous Line  Duration                  Peripheral IV - Single Lumen 10/08/24 1843 20 G 1 3/4 in Anterior;Left Forearm 6 days                    Significant Labs:    Lab Results   Component Value Date    WBC 9.31 10/15/2024    HGB 12.4 (L) 10/15/2024    HCT 37.1 (L) 10/15/2024    MCV 88.3 10/15/2024     10/15/2024           BMP  Lab Results   Component Value Date     10/15/2024    K 4.0 10/15/2024    CO2 22 (L) 10/15/2024    BUN 33.1 (H) 10/15/2024    CREATININE 0.98 10/15/2024    CALCIUM 9.7 10/15/2024    AGAP 10.0 10/15/2024    EGFRNONAA >60 11/05/2021             Assessment/Plan:     Urosepsis, improved  --Blood culture and urine culture positive for pan sensitive E Coli  --Repeat blood culture negative at 72 hours  - Repeat urine culture form 10/13/204 - NG at 24 hours  --Continue PO Levaquin 750 mg once daily.    Recurrent complicated UTI (in male)  Urinary retention  BPH  --CT abdomen with chronic bladder outlet obstruction with mild right sided hydronephrosis; will schedule urology appointment outpatient   -- retroperitoneal ultrasound showed no hydronephrosis and normal sonographic kidneys.  Some bladder diverticula noted.  -- repeat urine culture from 10/13/2024 shows no growth at 24 hours.  -- will consider indwelling cath since patient already had 2 in & out done overnight.   -- will start Tamsulosin 0.4 mg daily  -- bladder training and void trials to assess void status     Intermittent hypotension  Constipation  -- low BP noted at times   -- Afebrile  -- hypotension likely from Valsalva related 2/2 patient  straining to pee and perhaps also trying to have a bowel movement  --  ml bolus now  -- Abd XR ordered  -- already on Miralax BID and Docusate prn, will escalate pending imaging results    Atrial fibrillation on Eliquis  HFrEF (EF 30%) s/p AICD  -- continue Eliquis 5 mg BID  -- will hold Coreg 3.125 mg BID given episodes of soft BP with MAP 67-70    H/o CVA  -- will monitor  --CT head No appreciable acute intracranial abnormality.     History of alcohol abuse  --CIWA protocol in place  --Continue Folic acid, Multivitamins and thiamine    H/o NPH s/p  shunt  -- done in 2019  -- CT head shows Unchanged ventriculostomy drain in place via right parietooccipital approach     DVT Prophylaxis: Eliquis  GI Prophylaxis: N/A  ABX: Levaquin     Disposition:  Intermittent hypotension and acute urinary retention. Repeat Bcx and UA ordered. PT/OT consulted. Consider Tamsulosin and bladder training.  consulted to assist with discharge planning to SNF.     Bartolo Keyes MD  Internal Medicine - PGY-2

## 2024-10-16 LAB
ALBUMIN SERPL-MCNC: 2.2 G/DL (ref 3.4–4.8)
ALBUMIN/GLOB SERPL: 0.5 RATIO (ref 1.1–2)
ALP SERPL-CCNC: 64 UNIT/L (ref 40–150)
ALT SERPL-CCNC: 43 UNIT/L (ref 0–55)
ANION GAP SERPL CALC-SCNC: 7 MEQ/L
APICAL FOUR CHAMBER EJECTION FRACTION: 37 %
APICAL TWO CHAMBER EJECTION FRACTION: 41 %
AST SERPL-CCNC: 34 UNIT/L (ref 5–34)
AV INDEX (PROSTH): 0.74
AV MEAN GRADIENT: 2.7 MMHG
AV PEAK GRADIENT: 4.8 MMHG
AV VALVE AREA BY VELOCITY RATIO: 2.1 CM²
AV VALVE AREA: 2.8 CM²
AV VELOCITY RATIO: 0.55
BACTERIA UR CULT: NO GROWTH
BASOPHILS # BLD AUTO: 0.03 X10(3)/MCL
BASOPHILS NFR BLD AUTO: 0.4 %
BILIRUB SERPL-MCNC: 1 MG/DL
BSA FOR ECHO PROCEDURE: 1.74 M2
BUN SERPL-MCNC: 25.2 MG/DL (ref 8.4–25.7)
CALCIUM SERPL-MCNC: 9.2 MG/DL (ref 8.8–10)
CHLORIDE SERPL-SCNC: 106 MMOL/L (ref 98–107)
CO2 SERPL-SCNC: 24 MMOL/L (ref 23–31)
CREAT SERPL-MCNC: 0.86 MG/DL (ref 0.73–1.18)
CREAT/UREA NIT SERPL: 29
CV ECHO LV RWT: 0.5 CM
DOP CALC AO PEAK VEL: 1.1 M/S
DOP CALC AO VTI: 16 CM
DOP CALC LVOT AREA: 3.8 CM2
DOP CALC LVOT DIAMETER: 2.2 CM
DOP CALC LVOT PEAK VEL: 0.6 M/S
DOP CALC LVOT STROKE VOLUME: 45.2 CM3
DOP CALC MV VTI: 18.7 CM
DOP CALCLVOT PEAK VEL VTI: 11.9 CM
E WAVE DECELERATION TIME: 256.02 MSEC
E/A RATIO: 3.96
E/E' RATIO: 15.17 M/S
ECHO LV POSTERIOR WALL: 1.2 CM (ref 0.6–1.1)
EOSINOPHIL # BLD AUTO: 0.12 X10(3)/MCL (ref 0–0.9)
EOSINOPHIL NFR BLD AUTO: 1.5 %
ERYTHROCYTE [DISTWIDTH] IN BLOOD BY AUTOMATED COUNT: 14.8 % (ref 11.5–17)
FRACTIONAL SHORTENING: 10.4 % (ref 28–44)
GFR SERPLBLD CREATININE-BSD FMLA CKD-EPI: >60 ML/MIN/1.73/M2
GLOBULIN SER-MCNC: 4.7 GM/DL (ref 2.4–3.5)
GLUCOSE SERPL-MCNC: 131 MG/DL (ref 82–115)
HCT VFR BLD AUTO: 34.2 % (ref 42–52)
HGB BLD-MCNC: 11.2 G/DL (ref 14–18)
HR MV ECHO: 83 BPM
IMM GRANULOCYTES # BLD AUTO: 0.03 X10(3)/MCL (ref 0–0.04)
IMM GRANULOCYTES NFR BLD AUTO: 0.4 %
INTERVENTRICULAR SEPTUM: 1 CM (ref 0.6–1.1)
LEFT ATRIUM SIZE: 3.72 CM
LEFT INTERNAL DIMENSION IN SYSTOLE: 4.3 CM (ref 2.1–4)
LEFT VENTRICLE DIASTOLIC VOLUME INDEX: 62.39 ML/M2
LEFT VENTRICLE DIASTOLIC VOLUME: 109.19 ML
LEFT VENTRICLE END DIASTOLIC VOLUME APICAL 2 CHAMBER: 102.01 ML
LEFT VENTRICLE END DIASTOLIC VOLUME APICAL 4 CHAMBER: 108.01 ML
LEFT VENTRICLE MASS INDEX: 110.8 G/M2
LEFT VENTRICLE SYSTOLIC VOLUME INDEX: 46.8 ML/M2
LEFT VENTRICLE SYSTOLIC VOLUME: 81.93 ML
LEFT VENTRICULAR INTERNAL DIMENSION IN DIASTOLE: 4.8 CM (ref 3.5–6)
LEFT VENTRICULAR MASS: 194 G
LV LATERAL E/E' RATIO: 13 M/S
LV SEPTAL E/E' RATIO: 18.2 M/S
LVED V (TEICH): 109.19 ML
LVES V (TEICH): 81.93 ML
LVOT MG: 0.86 MMHG
LVOT MV: 0.45 CM/S
LYMPHOCYTES # BLD AUTO: 1.2 X10(3)/MCL (ref 0.6–4.6)
LYMPHOCYTES NFR BLD AUTO: 15.3 %
MCH RBC QN AUTO: 29.5 PG (ref 27–31)
MCHC RBC AUTO-ENTMCNC: 32.7 G/DL (ref 33–36)
MCV RBC AUTO: 90 FL (ref 80–94)
MONOCYTES # BLD AUTO: 0.5 X10(3)/MCL (ref 0.1–1.3)
MONOCYTES NFR BLD AUTO: 6.4 %
MV MEAN GRADIENT: 1 MMHG
MV PEAK A VEL: 0.23 M/S
MV PEAK E VEL: 0.91 M/S
MV PEAK GRADIENT: 3 MMHG
MV STENOSIS PRESSURE HALF TIME: 74.25 MS
MV VALVE AREA BY CONTINUITY EQUATION: 2.42 CM2
MV VALVE AREA P 1/2 METHOD: 2.96 CM2
NEUTROPHILS # BLD AUTO: 5.95 X10(3)/MCL (ref 2.1–9.2)
NEUTROPHILS NFR BLD AUTO: 76 %
NRBC BLD AUTO-RTO: 0 %
OHS LV EJECTION FRACTION SIMPSONS BIPLANE MOD: 35 %
PISA TR MAX VEL: 2.18 M/S
PLATELET # BLD AUTO: 178 X10(3)/MCL (ref 130–400)
PMV BLD AUTO: 9.4 FL (ref 7.4–10.4)
POTASSIUM SERPL-SCNC: 3.9 MMOL/L (ref 3.5–5.1)
PROT SERPL-MCNC: 6.9 GM/DL (ref 5.8–7.6)
RA PRESSURE ESTIMATED: 3 MMHG
RBC # BLD AUTO: 3.8 X10(6)/MCL (ref 4.7–6.1)
RIGHT VENTRICULAR END-DIASTOLIC DIMENSION: 3.49 CM
RV TB RVSP: 5 MMHG
SINUS: 3.3 CM
SODIUM SERPL-SCNC: 137 MMOL/L (ref 136–145)
TDI LATERAL: 0.07 M/S
TDI SEPTAL: 0.05 M/S
TDI: 0.06 M/S
TR MAX PG: 19 MMHG
TRICUSPID ANNULAR PLANE SYSTOLIC EXCURSION: 1.98 CM
TV REST PULMONARY ARTERY PRESSURE: 22 MMHG
WBC # BLD AUTO: 7.83 X10(3)/MCL (ref 4.5–11.5)
Z-SCORE OF LEFT VENTRICULAR DIMENSION IN END DIASTOLE: -0.09
Z-SCORE OF LEFT VENTRICULAR DIMENSION IN END SYSTOLE: 2.86

## 2024-10-16 PROCEDURE — 25000003 PHARM REV CODE 250

## 2024-10-16 PROCEDURE — 36415 COLL VENOUS BLD VENIPUNCTURE: CPT

## 2024-10-16 PROCEDURE — 80053 COMPREHEN METABOLIC PANEL: CPT

## 2024-10-16 PROCEDURE — 85025 COMPLETE CBC W/AUTO DIFF WBC: CPT

## 2024-10-16 PROCEDURE — 11000001 HC ACUTE MED/SURG PRIVATE ROOM

## 2024-10-16 RX ORDER — DOCUSATE SODIUM 100 MG
200 CAPSULE ORAL EVERY 8 HOURS
Status: DISCONTINUED | OUTPATIENT
Start: 2024-10-16 | End: 2024-10-17 | Stop reason: HOSPADM

## 2024-10-16 RX ORDER — AMOXICILLIN 250 MG
1 CAPSULE ORAL DAILY
Status: DISCONTINUED | OUTPATIENT
Start: 2024-10-16 | End: 2024-10-17 | Stop reason: HOSPADM

## 2024-10-16 RX ADMIN — Medication 200 ML: at 02:10

## 2024-10-16 RX ADMIN — LEVOFLOXACIN 750 MG: 750 TABLET, FILM COATED ORAL at 09:10

## 2024-10-16 RX ADMIN — SENNOSIDES AND DOCUSATE SODIUM 1 TABLET: 50; 8.6 TABLET ORAL at 09:10

## 2024-10-16 RX ADMIN — APIXABAN 5 MG: 2.5 TABLET, FILM COATED ORAL at 09:10

## 2024-10-16 RX ADMIN — FOLIC ACID 1 MG: 1 TABLET ORAL at 09:10

## 2024-10-16 RX ADMIN — Medication 200 ML: at 08:10

## 2024-10-16 RX ADMIN — TAMSULOSIN HYDROCHLORIDE 0.4 MG: 0.4 CAPSULE ORAL at 09:10

## 2024-10-16 RX ADMIN — ATORVASTATIN CALCIUM 20 MG: 20 TABLET, FILM COATED ORAL at 09:10

## 2024-10-16 RX ADMIN — Medication 1 ENEMA: at 02:10

## 2024-10-16 RX ADMIN — MULTIPLE VITAMINS W/ MINERALS TAB 1 TABLET: TAB at 09:10

## 2024-10-16 RX ADMIN — POLYETHYLENE GLYCOL 3350 17 G: 17 POWDER, FOR SOLUTION ORAL at 09:10

## 2024-10-16 NOTE — PLAN OF CARE
Spoke to daughter, Charmaine, to offer sending additional referral to a SNF facility of her choice, stating that CM wants the safest possible discharge for patient and that he could benefit from additional therapy before going home. She stated that she does not want patient to go to a nursing home and that the swing beds in [facility] are too far from her home. Asked her to let CM know if she changes her mind.

## 2024-10-16 NOTE — PT/OT/SLP PROGRESS
Physical Therapy    Missed Treatment Session - patient refusal note    Patient Name:  Madan Elder   MRN:  [id number]      Patient not seen at this time secondary to Patient unwilling to participate.    Will follow-up as patient is appropriate/available/agreeable to participate and as therapists' schedule allows.

## 2024-10-16 NOTE — PROGRESS NOTES
[facility]  Progress Note    Patient Name: Madan Elder  MRN: [id number]  Admission Date: 10/6/2024  Hospital Length of Stay: 10 days  Code Status: Full Code  Attending Provider: Rita Piedra MD  Primary Care Provider: Noemí Peña     Subjective:     HPI: Madan Elder is a 79 yo male with PMH of chronic bladder obstruction, recurrent UTIs, atrial fibrillation in Eliquis, HFrEF (EF 30%) s/p AICD placement, NPH s/p  shunt (2019) and HTN. Patient was brought into the ED due to worsening mentation. During evaluation, patient was confused but able to answer some questions. History primarily obtained from daughter at bedside. Daughter states patient was recently admitted to the hospital due to UTI treated with PO antibiotics which patient had completed. Patient was then admitted to our hospital due to suspected UTI along with hypotension. Daughter reports that patient had his BP medications adjusted during his last admission but reports that he was still taking all of his BP medications instead of stopping Lisinopril as told. Patient was treated with IV fluid bolus and low maintenance fluids due to history of HFrEF but BP remained persistently low with MAPs at 65.     Hospital Course/Significant events:  10/6/24: Upgraded to ICU for UTI sepsis vs medication overuse requiring IV vasopressor support  10/8/2024: Patient downgraded to the floor    Interval History:  NAEON, afebrile and BP stable. He had intermittent episodes of hypotension yesterday that self resolved on recheck. CBC and CMP stable. Abd XR showed stool burden. Patient has not had a bowel movement yet per nursing report. Repeat UA showed improved WBC count from prior. Repeat Bcx pending. Concerns also from nursing staff about coughing with fluid intake.     Past Medical History:   Diagnosis Date    Hypertension     Stroke        Past Surgical History:   Procedure Laterality Date    INSERTION OF PACEMAKER          Social History     Socioeconomic History    Marital status:    Tobacco Use    Smoking status: Never    Smokeless tobacco: Never   Substance and Sexual Activity    Alcohol use: Yes    Drug use: Never     Social Drivers of Health     Financial Resource Strain: Patient Unable To Answer (10/9/2024)    Overall Financial Resource Strain (CARDIA)     Difficulty of Paying Living Expenses: Patient unable to answer   Food Insecurity: Patient Unable To Answer (10/9/2024)    Hunger Vital Sign     Worried About Running Out of Food in the Last Year: Patient unable to answer     Ran Out of Food in the Last Year: Patient unable to answer   Transportation Needs: Patient Unable To Answer (10/9/2024)    TRANSPORTATION NEEDS     Transportation : Patient unable to answer   Physical Activity: Inactive (10/7/2024)    Exercise Vital Sign     Days of Exercise per Week: 0 days     Minutes of Exercise per Session: 0 min   Stress: Patient Unable To Answer (10/9/2024)    [employer] - Occupational Stress Questionnaire     Feeling of Stress : Patient unable to answer   Housing Stability: Patient Unable To Answer (10/9/2024)    Housing Stability Vital Sign     Unable to Pay for Housing in the Last Year: Patient unable to answer     Homeless in the Last Year: Patient unable to answer           Current Outpatient Medications   Medication Instructions    apixaban (ELIQUIS) 5 mg, Oral, 2 times daily    atorvastatin (LIPITOR) 20 mg, Oral, Daily    carvediloL (COREG) 3.125 mg, Oral, 2 times daily    folic acid (FOLVITE) 1,000 mcg, Oral, Daily    levoFLOXacin (LEVAQUIN) 750 mg, Oral, Daily    sodium bicarbonate 650 mg, Oral, Daily    sotaloL (BETAPACE) 80 mg, Oral, 2 times daily    VITAMIN B-1 100 mg, Oral, Daily       Current Inpatient Medications   apixaban  5 mg Oral BID    atorvastatin  20 mg Oral Daily    electrolytes-dextrose  200 mL Oral Q8H    folic acid  1 mg Oral Daily    levoFLOXacin  750 mg Oral  Daily    multivitamin  1 tablet Oral Daily    polyethylene glycol  17 g Oral BID    senna-docusate 8.6-50 mg  1 tablet Oral Daily    tamsulosin  0.4 mg Oral Daily       Current Intravenous Infusions          Review of Systems   Constitutional:  Negative for chills and fever.   Respiratory:  Negative for cough, sputum production, shortness of breath and wheezing.    Cardiovascular:  Negative for chest pain, palpitations and leg swelling.   Gastrointestinal:  Negative for abdominal pain, nausea and vomiting.   Genitourinary:  Negative for dysuria.   Musculoskeletal:  Negative for myalgias.   Neurological:  Negative for dizziness, focal weakness, seizures and weakness.          Objective:       Intake/Output Summary (Last 24 hours) at 10/16/2024 1109  Last data filed at 10/16/2024 0942  Gross per 24 hour   Intake 1507.14 ml   Output 1075 ml   Net 432.14 ml         Vital Signs (Most Recent):  Temp: 98.1 °F (36.7 °C) (10/16/24 0745)  Pulse: 92 (10/16/24 0745)  Resp: 18 (10/16/24 0745)  BP: 123/72 (10/16/24 0745)  SpO2: 95 % (10/16/24 0745)  Body mass index is 21.36 kg/m².  Weight: 63.7 kg (140 lb 8 oz) Vital Signs (24h Range):  Temp:  [97.6 °F (36.4 °C)-98.5 °F (36.9 °C)] 98.1 °F (36.7 °C)  Pulse:  [] 92  Resp:  [17-18] 18  SpO2:  [94 %-99 %] 95 %  BP: ()/(45-76) 123/72     Physical Exam  Constitutional:       General: He is not in acute distress.     Appearance: Normal appearance.   HENT:      Head: Normocephalic.      Ears:      Comments: Hard of hearing     Mouth/Throat:      Mouth: Mucous membranes are moist.   Eyes:      Conjunctiva/sclera: Conjunctivae normal.      Pupils: Pupils are equal, round, and reactive to light.   Cardiovascular:      Rate and Rhythm: Normal rate and regular rhythm.      Pulses: Normal pulses.      Heart sounds: No murmur heard.  Pulmonary:      Effort: Pulmonary effort is normal. No respiratory distress.      Breath sounds: No wheezing or rhonchi.   Abdominal:      General:  Abdomen is flat. Bowel sounds are normal. There is no distension.      Palpations: Abdomen is soft.      Tenderness: There is abdominal tenderness (to deep palpation in the RLQ).   Musculoskeletal:         General: Normal range of motion.      Cervical back: Normal range of motion.      Right lower leg: No edema.      Left lower leg: No edema.   Skin:     General: Skin is warm.      Capillary Refill: Capillary refill takes less than 2 seconds.   Neurological:      General: No focal deficit present.      Mental Status: He is alert.      Comments: Unable to assess as patient cannot hear well           Lines/Drains/Airways       Drain  Duration                  Urethral Catheter 10/15/24 0900 1 day              Peripheral Intravenous Line  Duration                  Peripheral IV - Single Lumen 10/16/24 0625 20 G Anterior;Left Forearm <1 day                    Significant Labs:    Lab Results   Component Value Date    WBC 7.83 10/16/2024    HGB 11.2 (L) 10/16/2024    HCT 34.2 (L) 10/16/2024    MCV 90.0 10/16/2024     10/16/2024           BMP  Lab Results   Component Value Date     10/16/2024    K 3.9 10/16/2024    CO2 24 10/16/2024    BUN 25.2 10/16/2024    CREATININE 0.86 10/16/2024    CALCIUM 9.2 10/16/2024    AGAP 7.0 10/16/2024    EGFRNONAA >60 11/05/2021             Assessment/Plan:     Urosepsis, improved  --Blood culture and urine culture positive for pan sensitive E Coli  --Repeat blood culture negative at 72 hours  - Repeat blood culture pending  - Repeat urine culture form 10/13/204 - NG at 24 hours  --Continue PO Levaquin 750 mg once daily.    Recurrent complicated UTI (in male)  Urinary retention  BPH  --CT abdomen with chronic bladder outlet obstruction with mild right sided hydronephrosis; will schedule urology appointment outpatient   -- retroperitoneal ultrasound showed no hydronephrosis and normal sonographic kidneys.  Some bladder diverticula noted.  -- repeat urine culture from 10/13/2024  shows no growth at 24 hours.  -- Alfonso in place, alfonso care ongoing  -- will start Tamsulosin 0.4 mg daily  -- bladder training and void trials to assess void status and wean off Alfonso    Intermittent hypotension  Constipation  -- low BP noted at times   -- Afebrile  -- hypotension likely from Valsalva related 2/2 patient straining to pee and perhaps also trying to have a bowel movement  -- Abd XR noted for stool burden  -- Continue on Miralax BID. Started senna docusate once daily. If no bowel movement by noon the will consider enema    Atrial fibrillation on Eliquis  HFrEF (EF 30%) s/p AICD  -- continue Eliquis 5 mg BID  -- will hold Coreg 3.125 mg BID given episodes of soft BP with MAP 67-70    H/o CVA  -- will monitor  --CT head No appreciable acute intracranial abnormality.     History of alcohol abuse  --CIWA protocol in place  --Continue Folic acid, Multivitamins and thiamine    H/o NPH s/p  shunt  -- done in 2019  -- CT head shows Unchanged ventriculostomy drain in place via right parietooccipital approach     DVT Prophylaxis: Eliquis  GI Prophylaxis: N/A  ABX: Levaquin     Disposition:  Intermittent hypotension and acute urinary retention. PT/OT consulted. Consider Tamsulosin and bladder training.  consulted to assist with discharge planning to SNF however, daughter has not made a choice since her first option was denied.    Bartolo Keyes MD  Internal Medicine - PGY-2

## 2024-10-16 NOTE — NURSING
"MD team at bedside after "hot flash" with low BP episode. Pt's BP has returned to acceptable range. Pt never noted to be in distress. MD team communicating with daughter at bedside. Will continue to monitor.   "

## 2024-10-16 NOTE — PROGRESS NOTES
[facility]  Progress Note    Patient Name: Madan Elder  MRN: [id number]  Admission Date: 10/6/2024  Hospital Length of Stay: 10 days  Code Status: Full Code  Attending Provider: Rita Piedra MD  Primary Care Provider: Noemí Peña     Subjective:     HPI: Madan Elder is a 77 yo male with PMH of chronic bladder obstruction, recurrent UTIs, atrial fibrillation in Eliquis, HFrEF (EF 30%) s/p AICD placement, NPH s/p  shunt (2019) and HTN. Patient was brought into the ED due to worsening mentation. During evaluation, patient was confused but able to answer some questions. History primarily obtained from daughter at bedside. Daughter states patient was recently admitted to the hospital due to UTI treated with PO antibiotics which patient had completed. Patient was then admitted to our hospital due to suspected UTI along with hypotension. Daughter reports that patient had his BP medications adjusted during his last admission but reports that he was still taking all of his BP medications instead of stopping Lisinopril as told. Patient was treated with IV fluid bolus and low maintenance fluids due to history of HFrEF but BP remained persistently low with MAPs at 65.     Hospital Course/Significant events:  10/6/24: Upgraded to ICU for UTI sepsis vs medication overuse requiring IV vasopressor support  10/8/2024: Patient downgraded to the floor    Interval History:  NAEON, afebrile and BP stable. He had intermittent episodes of hypotension yesterday that self resolved on recheck. CBC and CMP stable. Abd XR showed stool burden. Patient has not had a bowel movement yet per nursing report. Repeat UA showed improved WBC count from prior. Repeat Bcx pending.     Past Medical History:   Diagnosis Date    Hypertension     Stroke        Past Surgical History:   Procedure Laterality Date    INSERTION OF PACEMAKER         Social History     Socioeconomic History    Marital status:     Tobacco Use    Smoking status: Never    Smokeless tobacco: Never   Substance and Sexual Activity    Alcohol use: Yes    Drug use: Never     Social Drivers of Health     Financial Resource Strain: Patient Unable To Answer (10/9/2024)    Overall Financial Resource Strain (CARDIA)     Difficulty of Paying Living Expenses: Patient unable to answer   Food Insecurity: Patient Unable To Answer (10/9/2024)    Hunger Vital Sign     Worried About Running Out of Food in the Last Year: Patient unable to answer     Ran Out of Food in the Last Year: Patient unable to answer   Transportation Needs: Patient Unable To Answer (10/9/2024)    TRANSPORTATION NEEDS     Transportation : Patient unable to answer   Physical Activity: Inactive (10/7/2024)    Exercise Vital Sign     Days of Exercise per Week: 0 days     Minutes of Exercise per Session: 0 min   Stress: Patient Unable To Answer (10/9/2024)    Citizen of [place] [employer] - Occupational Stress Questionnaire     Feeling of Stress : Patient unable to answer   Housing Stability: Patient Unable To Answer (10/9/2024)    Housing Stability Vital Sign     Unable to Pay for Housing in the Last Year: Patient unable to answer     Homeless in the Last Year: Patient unable to answer           Current Outpatient Medications   Medication Instructions    apixaban (ELIQUIS) 5 mg, Oral, 2 times daily    atorvastatin (LIPITOR) 20 mg, Oral, Daily    carvediloL (COREG) 3.125 mg, Oral, 2 times daily    folic acid (FOLVITE) 1,000 mcg, Oral, Daily    levoFLOXacin (LEVAQUIN) 750 mg, Oral, Daily    sodium bicarbonate 650 mg, Oral, Daily    sotaloL (BETAPACE) 80 mg, Oral, 2 times daily    VITAMIN B-1 100 mg, Oral, Daily       Current Inpatient Medications   apixaban  5 mg Oral BID    atorvastatin  20 mg Oral Daily    electrolytes-dextrose  200 mL Oral Q8H    folic acid  1 mg Oral Daily    levoFLOXacin  750 mg Oral Daily    multivitamin  1 tablet Oral Daily    polyethylene glycol  17  g Oral BID    senna-docusate 8.6-50 mg  1 tablet Oral Daily    tamsulosin  0.4 mg Oral Daily    thiamine  100 mg Oral TID       Current Intravenous Infusions          Review of Systems   Constitutional:  Negative for chills and fever.   Respiratory:  Negative for cough, sputum production, shortness of breath and wheezing.    Cardiovascular:  Negative for chest pain, palpitations and leg swelling.   Gastrointestinal:  Negative for abdominal pain, nausea and vomiting.   Genitourinary:  Negative for dysuria and frequency.   Musculoskeletal:  Negative for myalgias.   Neurological:  Negative for dizziness, focal weakness, seizures and weakness.          Objective:       Intake/Output Summary (Last 24 hours) at 10/16/2024 0855  Last data filed at 10/16/2024 0600  Gross per 24 hour   Intake 1107.14 ml   Output 1625 ml   Net -517.86 ml         Vital Signs (Most Recent):  Temp: 98.1 °F (36.7 °C) (10/16/24 0745)  Pulse: 92 (10/16/24 0745)  Resp: 18 (10/16/24 0745)  BP: 123/72 (10/16/24 0745)  SpO2: 95 % (10/16/24 0745)  Body mass index is 21.13 kg/m².  Weight: 63 kg (139 lb) Vital Signs (24h Range):  Temp:  [97.6 °F (36.4 °C)-98.5 °F (36.9 °C)] 98.1 °F (36.7 °C)  Pulse:  [] 92  Resp:  [17-18] 18  SpO2:  [94 %-99 %] 95 %  BP: ()/(45-76) 123/72     Physical Exam  Constitutional:       General: He is not in acute distress.     Appearance: Normal appearance.   HENT:      Head: Normocephalic.      Ears:      Comments: Hard of hearing     Mouth/Throat:      Mouth: Mucous membranes are moist.   Eyes:      Conjunctiva/sclera: Conjunctivae normal.      Pupils: Pupils are equal, round, and reactive to light.   Cardiovascular:      Rate and Rhythm: Normal rate and regular rhythm.      Pulses: Normal pulses.      Heart sounds: No murmur heard.  Pulmonary:      Effort: Pulmonary effort is normal. No respiratory distress.      Breath sounds: No wheezing or rhonchi.   Abdominal:      General: Abdomen is flat. Bowel sounds are  normal. There is no distension.      Palpations: Abdomen is soft.      Tenderness: There is abdominal tenderness (to deep palpation in the RLQ).   Musculoskeletal:         General: Normal range of motion.      Cervical back: Normal range of motion.      Right lower leg: No edema.      Left lower leg: No edema.   Skin:     General: Skin is warm.      Capillary Refill: Capillary refill takes less than 2 seconds.   Neurological:      General: No focal deficit present.      Mental Status: He is alert.      Comments: Unable to assess as patient cannot hear well           Lines/Drains/Airways       Drain  Duration                  Urethral Catheter 10/15/24 0900 <1 day              Peripheral Intravenous Line  Duration                  Peripheral IV - Single Lumen 10/16/24 0625 20 G Anterior;Left Forearm <1 day                    Significant Labs:    Lab Results   Component Value Date    WBC 7.83 10/16/2024    HGB 11.2 (L) 10/16/2024    HCT 34.2 (L) 10/16/2024    MCV 90.0 10/16/2024     10/16/2024           BMP  Lab Results   Component Value Date     10/16/2024    K 3.9 10/16/2024    CO2 24 10/16/2024    BUN 25.2 10/16/2024    CREATININE 0.86 10/16/2024    CALCIUM 9.2 10/16/2024    AGAP 7.0 10/16/2024    EGFRNONAA >60 11/05/2021             Assessment/Plan:     Urosepsis, improved  --Blood culture and urine culture positive for pan sensitive E Coli  --Repeat blood culture negative at 72 hours  - Repeat blood culture pending  - Repeat urine culture form 10/13/204 - NG at 24 hours  --Continue PO Levaquin 750 mg once daily.    Recurrent complicated UTI (in male)  Urinary retention  BPH  --CT abdomen with chronic bladder outlet obstruction with mild right sided hydronephrosis; will schedule urology appointment outpatient   -- retroperitoneal ultrasound showed no hydronephrosis and normal sonographic kidneys.  Some bladder diverticula noted.  -- repeat urine culture from 10/13/2024 shows no growth at 24 hours.  --  Alfonso in place, alfonso care ongoing  -- will start Tamsulosin 0.4 mg daily  -- bladder training and void trials to assess void status and wean off Alfonso    Intermittent hypotension  Constipation  -- low BP noted at times   -- Afebrile  -- hypotension likely from Valsalva related 2/2 patient straining to pee and perhaps also trying to have a bowel movement  -- Abd XR noted for stool burden  -- Continue on Miralax BID. Started senna docusate once daily. If no bowel movement by noon the will consider enema    Atrial fibrillation on Eliquis  HFrEF (EF 30%) s/p AICD  -- continue Eliquis 5 mg BID  -- will hold Coreg 3.125 mg BID given episodes of soft BP with MAP 67-70    H/o CVA  -- will monitor  --CT head No appreciable acute intracranial abnormality.     History of alcohol abuse  --CIWA protocol in place  --Continue Folic acid, Multivitamins and thiamine    H/o NPH s/p  shunt  -- done in 2019  -- CT head shows Unchanged ventriculostomy drain in place via right parietooccipital approach     DVT Prophylaxis: Eliquis  GI Prophylaxis: N/A  ABX: Levaquin     Disposition:  Intermittent hypotension and acute urinary retention. PT/OT consulted. Consider Tamsulosin and bladder training.  consulted to assist with discharge planning to SNF however, daughter has not made a choice since her first option was denied.    Bartolo Keyes MD  Internal Medicine - PGY-2

## 2024-10-16 NOTE — PROGRESS NOTES
Inpatient Nutrition Assessment    Admit Date: 10/6/2024   Total duration of encounter: 10 days   Patient Age: 78 y.o.    Nutrition Recommendation/Prescription     Liberalize diet to Low Na diet to encourage po intake  Continue Boost Plus TID (provides 360 kcal, 14 g protein per serving)   Continue MVI, Thiamine, Folic Acid  Biweekly wt  Bowel regimen for constipation  Consider appetite stimulant as medically appropriate    Communication of Recommendations: reviewed with nurse, reviewed with patient, and reviewed with caregiver    Nutrition Assessment     Malnutrition Assessment/Nutrition-Focused Physical Exam    Malnutrition Context: chronic illness (10/07/24 1118)  Malnutrition Level: moderate (10/07/24 1118)  Energy Intake (Malnutrition): less than 75% for greater than or equal to 1 month (10/07/24 1118)  Weight Loss (Malnutrition): other (see comments) (does not meet criteria) (10/07/24 1118)  Subcutaneous Fat (Malnutrition): mild depletion (10/07/24 1118)  Orbital Region (Subcutaneous Fat Loss): mild depletion  Upper Arm Region (Subcutaneous Fat Loss): mild depletion     Muscle Mass (Malnutrition): mild depletion (10/07/24 1118)  Restoration Region (Muscle Loss): mild depletion  Clavicle Bone Region (Muscle Loss): mild depletion                             A minimum of two characteristics is recommended for diagnosis of either severe or non-severe malnutrition.    Chart Review    Reason Seen: follow-up    Malnutrition Screening Tool Results   Have you recently lost weight without trying?: Unsure  Have you been eating poorly because of a decreased appetite?: No   MST Score: 2   Diagnosis:  Urosepsis, Recurrent UTI in male, hx chronic bladder outlet obstruction, CHF, Afib, hx of CVA    Relevant Medical History: chronic bladder obstruction, UTI, afib, CHF, S/P AICD, HTN     Scheduled Medications:  apixaban, 5 mg, BID  atorvastatin, 20 mg, Daily  electrolytes-dextrose, 200 mL, Q8H  folic acid, 1 mg, Daily  levoFLOXacin,  750 mg, Daily  multivitamin, 1 tablet, Daily  polyethylene glycol, 17 g, BID  senna-docusate 8.6-50 mg, 1 tablet, Daily  tamsulosin, 0.4 mg, Daily    Continuous Infusions:     PRN Medications:  acetaminophen, 1,000 mg, Q6H PRN  dextrose 10%, 12.5 g, PRN  dextrose 10%, 25 g, PRN  glucagon (human recombinant), 1 mg, PRN  glucose, 16 g, PRN  glucose, 24 g, PRN  naloxone, 0.02 mg, PRN  sodium chloride 0.9%, 10 mL, Q12H PRN    Calorie Containing IV Medications: no significant kcals from medications at this time    Recent Labs   Lab 10/10/24  0518 10/11/24  0459 10/12/24  0448 10/13/24  0455 10/13/24  0603 10/14/24  0501 10/15/24  0456 10/16/24  0504    137 136 136  --  137 138 137   K 4.6 4.1 4.1 4.0  --  4.0 4.0 3.9   CALCIUM 9.2 9.3 9.6 9.9  --  9.9 9.7 9.2   PHOS  --   --   --   --  3.6  --   --   --    MG  --   --   --   --  2.70*  --   --   --     107 103 105  --  105 106 106   CO2 25 22* 24 21*  --  22* 22* 24   BUN 15.4 15.4 19.6 23.6  --  30.1* 33.1* 25.2   CREATININE 0.76 0.75 0.77 0.85  --  0.90 0.98 0.86   EGFRNORACEVR >60 >60 >60 >60  --  >60 >60 >60   GLUCOSE 97 103 99 104  --  114 117* 131*   BILITOT 0.9 1.1 1.4 1.2  --  1.2 1.3 1.0   ALKPHOS 69 67 69 72  --  72 70 64   ALT 50 51 59* 54  --  50 44 43   AST 42* 40* 45* 36*  --  32 35* 34   ALBUMIN 2.2* 2.2* 2.2* 2.3*  --  2.4* 2.4* 2.2*   WBC 10.81 9.47 8.94 9.98  --  9.17 9.31 7.83   HGB 12.5* 12.9* 12.4* 12.3*  --  12.8* 12.4* 11.2*   HCT 38.2* 39.1* 38.4* 37.0*  --  40.2* 37.1* 34.2*     Nutrition Orders:  Diet Heart Healthy  Dietary nutrition supplements TID; Boost Plus Nutritional Drink - Any flavor    Appetite/Oral Intake: poor/25-50% of meals  Factors Affecting Nutritional Intake: abdominal pain, impaired cognitive status/motor control, constipation, and decreased appetite  Social Needs Impacting Access to Food: none identified  Food/Episcopal/Cultural Preferences: none reported  Food Allergies: none reported  Last Bowel Movement:  "10/11/24  Wound(s):     Wound 10/08/24 1730 Skin Tear Left lateral Thigh #1-Tissue loss description: Partial thickness pink superficial wound to left thigh    Comments    10/16/24 -- Pt with abdominal pain with constipation over the last couple days; daughter at bedside reports pt ate gumbo only yesterday, declined dinner last pm & breakfast this am; receiving daily miralax & also prune juice yesterday with no result; No n/v; Pt continues to drink ONS as ordered, gumbo ordered again for lunch today & fluid being offered; current weight stable over the last week    10/11/24 -- Pt with fair po intake of meals stating "I don't want to over do it", drinking 100% of Boost Plus as ordered; denies n/v or abdominal pain; new bed wt indicates ~4 lb wt loss since admit, continue to supplement meals with Boost Plus & vitamin treatment    (10/7) pt laying in bed during rounds; not able to answer all questions appropriately; nurse reported --pt eats some of food but does better with ONS; will order boost supplement; pt ate approx 50% breakfast meal. Per EMR wt hx --wt stable. Pt with mild fat/muscle wasting. Labs acknowledged.     Anthropometrics    Height: 5' 8" (172.7 cm), Height Method: Estimated  Last Weight: 63.7 kg (140 lb 8 oz) (10/16/24 1033), Weight Method: Bed Scale  BMI (Calculated): 21.4  BMI Classification: underweight (BMI less than 22 if >65 years of age)        Ideal Body Weight (IBW), Male: 154 lb     % Ideal Body Weight, Male (lb): 90.26 %                 Usual Body Weight (UBW), k kg  % Usual Body Weight: 101.4     Usual Weight Provided By: patient and EMR weight history    Wt Readings from Last 5 Encounters:   10/16/24 63.7 kg (140 lb 8 oz)   09/15/24 63.4 kg (139 lb 12.4 oz)   10/17/23 64.9 kg (143 lb)   23 66.2 kg (146 lb)   04/10/23 74.6 kg (164 lb 9 oz)     Weight Change(s) Since Admission: 4 lb wt loss since admit  Wt Readings from Last 1 Encounters:   10/16/24 1033 63.7 kg (140 lb 8 oz) "   10/16/24 0745 63 kg (139 lb)   10/16/24 0351 63 kg (139 lb)   10/15/24 0421 61.2 kg (135 lb)   10/14/24 0543 63.6 kg (140 lb 4.8 oz)   10/13/24 0516 63.5 kg (140 lb)   10/11/24 1100 64 kg (141 lb 3.2 oz)   10/10/24 0613 66.2 kg (145 lb 14.4 oz)   10/09/24 0515 66.1 kg (145 lb 12.8 oz)   10/06/24 0618 65.8 kg (145 lb)   Admit Weight: 65.8 kg (145 lb) (10/06/24 0618), Weight Method: Stated    Estimated Needs    Weight Used For Calorie Calculations: 65.8 kg (145 lb 1 oz)  Energy Calorie Requirements (kcal): 1974 kcal/d; 30 samra/kg  Energy Need Method: Kcal/kg  Weight Used For Protein Calculations: 65.8 kg (145 lb 1 oz)  Protein Requirements: 79 gm protein/d 1.2 gm/kg  Fluid Requirements (mL): 1974 ml/d; 1ml/samra        Enteral Nutrition     Patient not receiving enteral nutrition at this time.    Parenteral Nutrition     Patient not receiving parenteral nutrition support at this time.    Evaluation of Received Nutrient Intake    Calories: not meeting estimated needs  Protein: not meeting estimated needs    Patient Education     Not applicable.    Nutrition Diagnosis     PES: Inadequate oral intake related to chronic illness as evidenced by eating 75% or less > 1 month. (active)     PES: Moderate chronic disease or condition related malnutrition related to chronic illness as evidenced by less than 75% needs met for greater than or equal to 1 month, mild fat depletion, and mild muscle depletion. (active)    Nutrition Interventions     Intervention(s): modified composition of meals/snacks, commercial beverage, multivitamin/mineral supplement therapy, and collaboration with other providers    Goal: Meet greater than 80% of nutritional needs by follow-up. (goal progressing)  Goal: Maintain weight throughout hospitalization. (goal progressing)    Nutrition Goals & Monitoring     Dietitian will monitor: food and beverage intake and weight  Discharge planning: continue low sodium diet with boost  oral supplements  Nutrition  Risk/Follow-Up: moderate (follow-up in 3-5 days)   Please consult if re-assessment needed sooner.

## 2024-10-17 VITALS
DIASTOLIC BLOOD PRESSURE: 61 MMHG | BODY MASS INDEX: 21.32 KG/M2 | RESPIRATION RATE: 18 BRPM | WEIGHT: 140.69 LBS | TEMPERATURE: 98 F | HEIGHT: 68 IN | OXYGEN SATURATION: 99 % | HEART RATE: 74 BPM | SYSTOLIC BLOOD PRESSURE: 110 MMHG

## 2024-10-17 LAB
ALBUMIN SERPL-MCNC: 2.3 G/DL (ref 3.4–4.8)
ALBUMIN/GLOB SERPL: 0.5 RATIO (ref 1.1–2)
ALP SERPL-CCNC: 69 UNIT/L (ref 40–150)
ALT SERPL-CCNC: 37 UNIT/L (ref 0–55)
ANION GAP SERPL CALC-SCNC: 8 MEQ/L
AST SERPL-CCNC: 29 UNIT/L (ref 5–34)
BASOPHILS # BLD AUTO: 0.04 X10(3)/MCL
BASOPHILS NFR BLD AUTO: 0.6 %
BILIRUB SERPL-MCNC: 1.6 MG/DL
BUN SERPL-MCNC: 18.8 MG/DL (ref 8.4–25.7)
CALCIUM SERPL-MCNC: 9.5 MG/DL (ref 8.8–10)
CHLORIDE SERPL-SCNC: 106 MMOL/L (ref 98–107)
CO2 SERPL-SCNC: 24 MMOL/L (ref 23–31)
CREAT SERPL-MCNC: 0.86 MG/DL (ref 0.73–1.18)
CREAT/UREA NIT SERPL: 22
EOSINOPHIL # BLD AUTO: 0.09 X10(3)/MCL (ref 0–0.9)
EOSINOPHIL NFR BLD AUTO: 1.2 %
ERYTHROCYTE [DISTWIDTH] IN BLOOD BY AUTOMATED COUNT: 14.6 % (ref 11.5–17)
GFR SERPLBLD CREATININE-BSD FMLA CKD-EPI: >60 ML/MIN/1.73/M2
GLOBULIN SER-MCNC: 5 GM/DL (ref 2.4–3.5)
GLUCOSE SERPL-MCNC: 106 MG/DL (ref 82–115)
HCT VFR BLD AUTO: 35.9 % (ref 42–52)
HGB BLD-MCNC: 11.6 G/DL (ref 14–18)
IMM GRANULOCYTES # BLD AUTO: 0.03 X10(3)/MCL (ref 0–0.04)
IMM GRANULOCYTES NFR BLD AUTO: 0.4 %
LYMPHOCYTES # BLD AUTO: 1.32 X10(3)/MCL (ref 0.6–4.6)
LYMPHOCYTES NFR BLD AUTO: 18.3 %
MCH RBC QN AUTO: 29.4 PG (ref 27–31)
MCHC RBC AUTO-ENTMCNC: 32.3 G/DL (ref 33–36)
MCV RBC AUTO: 91.1 FL (ref 80–94)
MONOCYTES # BLD AUTO: 0.63 X10(3)/MCL (ref 0.1–1.3)
MONOCYTES NFR BLD AUTO: 8.7 %
NEUTROPHILS # BLD AUTO: 5.1 X10(3)/MCL (ref 2.1–9.2)
NEUTROPHILS NFR BLD AUTO: 70.8 %
NRBC BLD AUTO-RTO: 0 %
PLATELET # BLD AUTO: 181 X10(3)/MCL (ref 130–400)
PMV BLD AUTO: 9.3 FL (ref 7.4–10.4)
POTASSIUM SERPL-SCNC: 3.8 MMOL/L (ref 3.5–5.1)
PROT SERPL-MCNC: 7.3 GM/DL (ref 5.8–7.6)
RBC # BLD AUTO: 3.94 X10(6)/MCL (ref 4.7–6.1)
SODIUM SERPL-SCNC: 138 MMOL/L (ref 136–145)
WBC # BLD AUTO: 7.21 X10(3)/MCL (ref 4.5–11.5)

## 2024-10-17 PROCEDURE — 85025 COMPLETE CBC W/AUTO DIFF WBC: CPT

## 2024-10-17 PROCEDURE — 80053 COMPREHEN METABOLIC PANEL: CPT

## 2024-10-17 PROCEDURE — 36415 COLL VENOUS BLD VENIPUNCTURE: CPT

## 2024-10-17 NOTE — PT/OT/SLP DISCHARGE
POST DISCHARGE DOCUMENTATION - 10/17/2024 1:40 PM    Physical Therapy Discharge Summary    Name: Madan Elder  MRN: [id number]   Principal Problem: WIN (acute kidney injury)   1. Acute renal failure superimposed on chronic kidney disease, unspecified acute renal failure type, unspecified CKD stage    2. SOB (shortness of breath)    3. Acute cystitis with hematuria    4. WIN (acute kidney injury)    5. Chest pain    6. Hydronephrosis, unspecified hydronephrosis type    7. Hypotensive episode    8. Hypotension    9. HFrEF (heart failure with reduced ejection fraction)       Patient Active Problem List   Diagnosis    Left-sided weakness    Urinary tract infection without hematuria    WIN (acute kidney injury)    Moderate malnutrition    Acute cystitis with hematuria    Bacteremia      Recommendations - per last treatment session     Therapy Intensity Recommendations at Discharge: Moderate Intensity Therapy  Discharge Equipment Recommendations: walker, rolling, wheelchair, shower chair, bedside commode, grab bar (patient has equipment)     Assessment:     Patient left the hospital against medical advice.    Patient last seen by therapy on 10/15/2024 - see note for last known status of patient  *note - patient declined therapy on 10/16/2024    Objective     GOALS: 1 OUT OF 6 STG's met by patient  Multidisciplinary Problems       Physical Therapy Goals       Problem: Physical Therapy    Goal Priority Disciplines Outcome Interventions   Physical Therapy Goal     PT, PT/OT Progressing    Description: REVIEWED 10/15/2024  Goals to be met by: DISCHARGE  Patient will increase functional independence with mobility by performing:  -. Supine to sit with Modified [place] - ONGOING - NOT MET  -. Sit to supine with Modified [place] - ONGOING - NOT MET  -. Rolling to Left and Right with Modified [place] - ONGOING - NOT MET  -. Sit to stand transfer with Contact Guard Assistance - ONGOING - NOT MET  -. Gait  x 130 feet  with Contact Guard Assistance using Rolling Walker - ONGOING - MET 10/15/2024  -. BILAT UE/LE general ROM ex's - all joints/planes x10 reps each AROM - ESTABLISHED 10/10/42580 - ONGOING - NOT MET           Plan     Patient Discharged to: N/A - patient left against medical advice per chart.    10/17/2024

## 2024-10-17 NOTE — PROGRESS NOTES
[facility]  Progress Note    Patient Name: Madan Elder  MRN: [id number]  Admission Date: 10/6/2024  Hospital Length of Stay: 11 days  Code Status: Full Code  Attending Provider: Rita Piedra MD  Primary Care Provider: Noemí Peña     Subjective:     HPI: Madan Elder is a 79 yo male with PMH of chronic bladder obstruction, recurrent UTIs, atrial fibrillation in Eliquis, HFrEF (EF 30%) s/p AICD placement, NPH s/p  shunt (2019) and HTN. Patient was brought into the ED due to worsening mentation. During evaluation, patient was confused but able to answer some questions. History primarily obtained from daughter at bedside. Daughter states patient was recently admitted to the hospital due to UTI treated with PO antibiotics which patient had completed. Patient was then admitted to our hospital due to suspected UTI along with hypotension. Daughter reports that patient had his BP medications adjusted during his last admission but reports that he was still taking all of his BP medications instead of stopping Lisinopril as told. Patient was treated with IV fluid bolus and low maintenance fluids due to history of HFrEF but BP remained persistently low with MAPs at 65.     Hospital Course/Significant events:  10/6/24: Upgraded to ICU for UTI sepsis vs medication overuse requiring IV vasopressor support  10/8/2024: Patient downgraded to the floor    Interval History:  Patient had enema and digital disimpaction yesterday which helped with his bowel movement. He also had 2 large bowel movements overnight. Nurse reports choking with liquids so patient NPO until speech eval. Today, he has no acute complaints. VSS. CBC and CMP stable.     Past Medical History:   Diagnosis Date    Hypertension     Stroke        Past Surgical History:   Procedure Laterality Date    INSERTION OF PACEMAKER         Social History     Socioeconomic History    Marital status:    Tobacco Use    Smoking  status: Never    Smokeless tobacco: Never   Substance and Sexual Activity    Alcohol use: Yes    Drug use: Never     Social Drivers of Health     Financial Resource Strain: Patient Unable To Answer (10/9/2024)    Overall Financial Resource Strain (CARDIA)     Difficulty of Paying Living Expenses: Patient unable to answer   Food Insecurity: Patient Unable To Answer (10/9/2024)    Hunger Vital Sign     Worried About Running Out of Food in the Last Year: Patient unable to answer     Ran Out of Food in the Last Year: Patient unable to answer   Transportation Needs: Patient Unable To Answer (10/9/2024)    TRANSPORTATION NEEDS     Transportation : Patient unable to answer   Physical Activity: Inactive (10/7/2024)    Exercise Vital Sign     Days of Exercise per Week: 0 days     Minutes of Exercise per Session: 0 min   Stress: Patient Unable To Answer (10/9/2024)    [employer] - Occupational Stress Questionnaire     Feeling of Stress : Patient unable to answer   Housing Stability: Patient Unable To Answer (10/9/2024)    Housing Stability Vital Sign     Unable to Pay for Housing in the Last Year: Patient unable to answer     Homeless in the Last Year: Patient unable to answer           Current Outpatient Medications   Medication Instructions    apixaban (ELIQUIS) 5 mg, Oral, 2 times daily    atorvastatin (LIPITOR) 20 mg, Oral, Daily    carvediloL (COREG) 3.125 mg, Oral, 2 times daily    folic acid (FOLVITE) 1,000 mcg, Oral, Daily    levoFLOXacin (LEVAQUIN) 750 mg, Oral, Daily    sodium bicarbonate 650 mg, Oral, Daily    sotaloL (BETAPACE) 80 mg, Oral, 2 times daily    VITAMIN B-1 100 mg, Oral, Daily       Current Inpatient Medications   apixaban  5 mg Oral BID    atorvastatin  20 mg Oral Daily    electrolytes-dextrose  200 mL Oral Q8H    folic acid  1 mg Oral Daily    levoFLOXacin  750 mg Oral Daily    multivitamin  1 tablet Oral Daily    polyethylene glycol  17 g Oral BID    senna-docusate  8.6-50 mg  1 tablet Oral Daily    tamsulosin  0.4 mg Oral Daily       Current Intravenous Infusions          Review of Systems   Constitutional:  Negative for chills and fever.   Respiratory:  Negative for cough, sputum production, shortness of breath and wheezing.    Cardiovascular:  Negative for chest pain, palpitations and leg swelling.   Gastrointestinal:  Negative for abdominal pain, nausea and vomiting.   Genitourinary:  Negative for dysuria.   Musculoskeletal:  Negative for myalgias.   Neurological:  Negative for dizziness, focal weakness, seizures and weakness.          Objective:       Intake/Output Summary (Last 24 hours) at 10/17/2024 0850  Last data filed at 10/17/2024 0550  Gross per 24 hour   Intake 400 ml   Output 750 ml   Net -350 ml         Vital Signs (Most Recent):  Temp: 97.5 °F (36.4 °C) (10/17/24 0735)  Pulse: 74 (10/17/24 0735)  Resp: 18 (10/17/24 0735)  BP: 110/61 (10/17/24 0735)  SpO2: 99 % (10/17/24 0735)  Body mass index is 21.39 kg/m².  Weight: 63.8 kg (140 lb 11.2 oz) Vital Signs (24h Range):  Temp:  [97.5 °F (36.4 °C)-98.7 °F (37.1 °C)] 97.5 °F (36.4 °C)  Pulse:  [] 74  Resp:  [18] 18  SpO2:  [95 %-99 %] 99 %  BP: ()/(60-72) 110/61     Physical Exam  Constitutional:       General: He is not in acute distress.     Appearance: Normal appearance.   HENT:      Head: Normocephalic.      Ears:      Comments: Hard of hearing     Mouth/Throat:      Mouth: Mucous membranes are moist.   Eyes:      Conjunctiva/sclera: Conjunctivae normal.      Pupils: Pupils are equal, round, and reactive to light.   Cardiovascular:      Rate and Rhythm: Normal rate and regular rhythm.      Pulses: Normal pulses.      Heart sounds: No murmur heard.  Pulmonary:      Effort: Pulmonary effort is normal. No respiratory distress.      Breath sounds: No wheezing or rhonchi.   Abdominal:      General: Abdomen is flat. Bowel sounds are normal. There is no distension.      Palpations: Abdomen is soft.       Tenderness: There is no abdominal tenderness.   Musculoskeletal:         General: Normal range of motion.      Cervical back: Normal range of motion.      Right lower leg: No edema.      Left lower leg: No edema.   Skin:     General: Skin is warm.      Capillary Refill: Capillary refill takes less than 2 seconds.   Neurological:      General: No focal deficit present.      Mental Status: He is alert.      Comments: Unable to assess as patient cannot hear well           Lines/Drains/Airways       Drain  Duration                  Urethral Catheter 10/15/24 0900 1 day              Peripheral Intravenous Line  Duration                  Peripheral IV - Single Lumen 10/16/24 0625 20 G Anterior;Left Forearm 1 day                    Significant Labs:    Lab Results   Component Value Date    WBC 7.21 10/17/2024    HGB 11.6 (L) 10/17/2024    HCT 35.9 (L) 10/17/2024    MCV 91.1 10/17/2024     10/17/2024           BMP  Lab Results   Component Value Date     10/17/2024    K 3.8 10/17/2024    CO2 24 10/17/2024    BUN 18.8 10/17/2024    CREATININE 0.86 10/17/2024    CALCIUM 9.5 10/17/2024    AGAP 8.0 10/17/2024    EGFRNONAA >60 11/05/2021             Assessment/Plan:     Urosepsis, improved  --Blood culture and urine culture positive for pan sensitive E Coli  --Repeat blood culture 10/09 - negative at 5 days  - Repeat blood culture 10/15 - also negative at 24 hours   - Repeat urine culture form 10/13/204 - No growth  --Continue PO Levaquin 750 mg once daily.    Recurrent complicated UTI (in male)  Urinary retention  BPH  --CT abdomen with chronic bladder outlet obstruction with mild right sided hydronephrosis; will schedule urology appointment outpatient   -- retroperitoneal ultrasound showed no hydronephrosis and normal sonographic kidneys.  Some bladder diverticula noted.  -- repeat urine culture from 10/13/2024 shows no growth at 24 hours.  -- Alfonso in place, alfonso care ongoing  -- will start Tamsulosin 0.4 mg  daily  -- bladder training and void trials to assess void status and wean off Lindsey    Intermittent hypotension  Constipation  -- low BP noted at times   -- Afebrile  -- hypotension likely from Valsalva related 2/2 patient straining to pee and perhaps also trying to have a bowel movement  -- Abd XR noted for stool burden  - enema and digital disimpaction 10/16  - continue with Miralax and senna docusate  - encouraged ORT    Atrial fibrillation on Eliquis  HFrEF (EF 30%) s/p AICD  -- continue Eliquis 5 mg BID  -- will hold Coreg 3.125 mg BID given episodes of soft BP with MAP 67-70  - TTE 10/15 with EF of 35% and mild VHD    H/o CVA  -- will monitor  --CT head No appreciable acute intracranial abnormality.     History of alcohol abuse  --CIWA protocol in place  --Continue Folic acid, Multivitamins  - discontinued thiamine with possible adverse effects of diaphoresis    H/o NPH s/p  shunt  -- done in 2019  -- CT head shows Unchanged ventriculostomy drain in place via right parietooccipital approach     DVT Prophylaxis: Eliquis  GI Prophylaxis: N/A  ABX: Levaquin     Disposition:  Intermittent hypotension and acute urinary retention. Constipation resolved. PT/OT consulted. Consider Tamsulosin and bladder training.  consulted to assist with discharge planning to SNF however, daughter has not made a choice since her first option was denied.    Bartolo Keyes MD  Internal Medicine - PGY-2

## 2024-10-17 NOTE — DISCHARGE SUMMARY
AMA Note     Date of AMA: 10/17/2024    Despite our efforts, MrRadha Elder has decided to leave AGAINST MEDICAL ADVICE on account of his daughter Charmaine Vargas (legal POA as per her). The POA understands his medical comorbidities including his pending further evaluation. Extensive discussion regarding risks of leaving AMA were had, including but not limited to permanent disability, death, etc. Patient and POA had the opportunity to ask questions about his medical condition and all questions were answered.    They were also informed about possible cardiology consultation and outpatient referral to urology and reaching out to outside urology for possible transfer, however she denied any further eval or care. When signing AMA paperwork, she mentioned that she was the legal POA and her signature would suffice and patient does not need to sign.   Again, I explained the importance of remaining for further investigation, but she continues to refuse.  The patient and POA has been informed to return for care if at any time they experience worsening symptoms and advise to follow up  with his local medical physician for follow-up ASAP.    Bartolo Keyes MD  Internal Medicine - PGY-2    10/17/2024

## 2024-10-17 NOTE — PT/OT/SLP DISCHARGE
Occupational Therapy Discharge Summary    Madan Elder  MRN: [id number]   Principal Problem: WIN (acute kidney injury)      Patient Discharged from acute Occupational Therapy on 10/17/24.  Please refer to prior OT note dated 10/15/24 for functional status.    Assessment:      Patient was discharged unexpectedly.  Information required to complete an accurate discharge summary is unknown.  Refer to therapy initial evaluation and last progress note for initial and most recent functional status and goal achievement.  Recommendations made may be found in medical record.Pt progressing but has not met goals . Recommended SNF and or home with  24/7 supervision and caretaker assist. Pt is fall risk. Pt left AMA with daughter who is POA.  OT provided education at previous sessions of d/c recommendations .     Objective:     GOALS:   Multidisciplinary Problems       Occupational Therapy Goals          Problem: Occupational Therapy    Goal Priority Disciplines Outcome Interventions   Occupational Therapy Goal     OT, PT/OT Pt left AMA - goals not met    Description: Goals to be met by: d/c     Patient will increase functional independence with ADLs by performing:    UE Dressing with Stand-by Assistance - not met.  LE Dressing with Stand-by Assistance  .- not met  Grooming while seated at sink with Stand-by Assistance.-not met  Toileting from bedside commode with Stand-by Assistance for hygiene and clothing management.-not met   Toilet transfer to bedside commode with Stand-by Assistance.- not met                         Reasons for Discontinuation of Therapy Services  Pt left AMA with daughter who is POA       Plan:     Patient Discharged to:  Pt left AMA- Recommending 24/7 supervision and caretaker assist     10/17/2024

## 2024-10-18 ENCOUNTER — EXTERNAL HOME HEALTH (OUTPATIENT)
Dept: HOME HEALTH SERVICES | Facility: HOSPITAL | Age: 79
End: 2024-10-18
Payer: MEDICARE

## 2024-10-20 LAB
BACTERIA BLD CULT: NORMAL
BACTERIA BLD CULT: NORMAL

## 2024-10-30 ENCOUNTER — HOSPITAL ENCOUNTER (INPATIENT)
Facility: HOSPITAL | Age: 79
LOS: 5 days | Discharge: NURSING, REHAB OR LONG-TERM CARE | DRG: 312 | End: 2024-11-04
Attending: STUDENT IN AN ORGANIZED HEALTH CARE EDUCATION/TRAINING PROGRAM | Admitting: INTERNAL MEDICINE
Payer: MEDICARE

## 2024-10-30 DIAGNOSIS — R61 DIAPHORESIS: Primary | ICD-10-CM

## 2024-10-30 DIAGNOSIS — Z98.2 S/P VP SHUNT: ICD-10-CM

## 2024-10-30 DIAGNOSIS — G91.2 NORMAL PRESSURE HYDROCEPHALUS: ICD-10-CM

## 2024-10-30 DIAGNOSIS — R55 NEAR SYNCOPE: ICD-10-CM

## 2024-10-30 DIAGNOSIS — R07.9 CHEST PAIN: ICD-10-CM

## 2024-10-30 LAB
ALBUMIN SERPL-MCNC: 3 G/DL (ref 3.4–4.8)
ALBUMIN/GLOB SERPL: 0.6 RATIO (ref 1.1–2)
ALP SERPL-CCNC: 96 UNIT/L (ref 40–150)
ALT SERPL-CCNC: 24 UNIT/L (ref 0–55)
ANION GAP SERPL CALC-SCNC: 8 MEQ/L
AST SERPL-CCNC: 20 UNIT/L (ref 5–34)
BACTERIA #/AREA URNS AUTO: ABNORMAL /HPF
BASOPHILS # BLD AUTO: 0.03 X10(3)/MCL
BASOPHILS NFR BLD AUTO: 0.3 %
BILIRUB SERPL-MCNC: 1.1 MG/DL
BILIRUB UR QL STRIP.AUTO: NEGATIVE
BUN SERPL-MCNC: 23.8 MG/DL (ref 8.4–25.7)
CALCIUM SERPL-MCNC: 9.5 MG/DL (ref 8.8–10)
CHLORIDE SERPL-SCNC: 106 MMOL/L (ref 98–107)
CLARITY UR: ABNORMAL
CO2 SERPL-SCNC: 22 MMOL/L (ref 23–31)
COLOR UR AUTO: YELLOW
CREAT SERPL-MCNC: 0.93 MG/DL (ref 0.72–1.25)
CREAT/UREA NIT SERPL: 26
EOSINOPHIL # BLD AUTO: 0.42 X10(3)/MCL (ref 0–0.9)
EOSINOPHIL NFR BLD AUTO: 4.6 %
ERYTHROCYTE [DISTWIDTH] IN BLOOD BY AUTOMATED COUNT: 15.8 % (ref 11.5–17)
GFR SERPLBLD CREATININE-BSD FMLA CKD-EPI: >60 ML/MIN/1.73/M2
GLOBULIN SER-MCNC: 5.4 GM/DL (ref 2.4–3.5)
GLUCOSE SERPL-MCNC: 97 MG/DL (ref 82–115)
GLUCOSE UR QL STRIP: NORMAL
HCT VFR BLD AUTO: 40.2 % (ref 42–52)
HGB BLD-MCNC: 12.6 G/DL (ref 14–18)
HGB UR QL STRIP: ABNORMAL
HYALINE CASTS #/AREA URNS LPF: ABNORMAL /LPF
IMM GRANULOCYTES # BLD AUTO: 0.04 X10(3)/MCL (ref 0–0.04)
IMM GRANULOCYTES NFR BLD AUTO: 0.4 %
KETONES UR QL STRIP: NEGATIVE
LEUKOCYTE ESTERASE UR QL STRIP: 25
LYMPHOCYTES # BLD AUTO: 1.94 X10(3)/MCL (ref 0.6–4.6)
LYMPHOCYTES NFR BLD AUTO: 21.3 %
MCH RBC QN AUTO: 29 PG (ref 27–31)
MCHC RBC AUTO-ENTMCNC: 31.3 G/DL (ref 33–36)
MCV RBC AUTO: 92.4 FL (ref 80–94)
MONOCYTES # BLD AUTO: 0.66 X10(3)/MCL (ref 0.1–1.3)
MONOCYTES NFR BLD AUTO: 7.2 %
MUCOUS THREADS URNS QL MICRO: ABNORMAL /LPF
NEUTROPHILS # BLD AUTO: 6.02 X10(3)/MCL (ref 2.1–9.2)
NEUTROPHILS NFR BLD AUTO: 66.2 %
NITRITE UR QL STRIP: NEGATIVE
NRBC BLD AUTO-RTO: 0 %
OHS QRS DURATION: 102 MS
OHS QTC CALCULATION: 458 MS
PH UR STRIP: 6 [PH]
PLATELET # BLD AUTO: 245 X10(3)/MCL (ref 130–400)
PMV BLD AUTO: 9.6 FL (ref 7.4–10.4)
POTASSIUM SERPL-SCNC: 4 MMOL/L (ref 3.5–5.1)
PROT SERPL-MCNC: 8.4 GM/DL (ref 5.8–7.6)
PROT UR QL STRIP: ABNORMAL
RBC # BLD AUTO: 4.35 X10(6)/MCL (ref 4.7–6.1)
RBC #/AREA URNS AUTO: >100 /HPF
SODIUM SERPL-SCNC: 136 MMOL/L (ref 136–145)
SP GR UR STRIP.AUTO: 1.02 (ref 1–1.03)
SQUAMOUS #/AREA URNS LPF: ABNORMAL /HPF
TROPONIN I SERPL-MCNC: 0.03 NG/ML (ref 0–0.04)
UROBILINOGEN UR STRIP-ACNC: NORMAL
WBC # BLD AUTO: 9.11 X10(3)/MCL (ref 4.5–11.5)
WBC #/AREA URNS AUTO: ABNORMAL /HPF

## 2024-10-30 PROCEDURE — 21400001 HC TELEMETRY ROOM

## 2024-10-30 PROCEDURE — 80053 COMPREHEN METABOLIC PANEL: CPT | Performed by: NURSE PRACTITIONER

## 2024-10-30 PROCEDURE — 85025 COMPLETE CBC W/AUTO DIFF WBC: CPT | Performed by: NURSE PRACTITIONER

## 2024-10-30 PROCEDURE — 84484 ASSAY OF TROPONIN QUANT: CPT | Performed by: NURSE PRACTITIONER

## 2024-10-30 PROCEDURE — 81001 URINALYSIS AUTO W/SCOPE: CPT | Performed by: NURSE PRACTITIONER

## 2024-10-30 PROCEDURE — 25000003 PHARM REV CODE 250: Performed by: STUDENT IN AN ORGANIZED HEALTH CARE EDUCATION/TRAINING PROGRAM

## 2024-10-30 PROCEDURE — 25000003 PHARM REV CODE 250: Performed by: NURSE PRACTITIONER

## 2024-10-30 PROCEDURE — 11000001 HC ACUTE MED/SURG PRIVATE ROOM

## 2024-10-30 RX ORDER — SODIUM CHLORIDE 0.9 % (FLUSH) 0.9 %
10 SYRINGE (ML) INJECTION
Status: DISCONTINUED | OUTPATIENT
Start: 2024-10-30 | End: 2024-11-04 | Stop reason: HOSPADM

## 2024-10-30 RX ORDER — AMOXICILLIN 250 MG
1 CAPSULE ORAL 2 TIMES DAILY PRN
Status: DISCONTINUED | OUTPATIENT
Start: 2024-10-30 | End: 2024-11-04 | Stop reason: HOSPADM

## 2024-10-30 RX ORDER — IBUPROFEN 200 MG
16 TABLET ORAL
Status: DISCONTINUED | OUTPATIENT
Start: 2024-10-30 | End: 2024-11-04 | Stop reason: HOSPADM

## 2024-10-30 RX ORDER — ASPIRIN 81 MG/1
81 TABLET ORAL DAILY
COMMUNITY

## 2024-10-30 RX ORDER — ONDANSETRON HYDROCHLORIDE 2 MG/ML
4 INJECTION, SOLUTION INTRAVENOUS EVERY 4 HOURS PRN
Status: DISCONTINUED | OUTPATIENT
Start: 2024-10-30 | End: 2024-11-04 | Stop reason: HOSPADM

## 2024-10-30 RX ORDER — POLYETHYLENE GLYCOL 3350 17 G/17G
17 POWDER, FOR SOLUTION ORAL DAILY
COMMUNITY

## 2024-10-30 RX ORDER — CHOLECALCIFEROL (VITAMIN D3) 25 MCG
2000 TABLET ORAL DAILY
Status: ON HOLD | COMMUNITY
End: 2024-11-04

## 2024-10-30 RX ORDER — ACETAMINOPHEN 500 MG
1000 TABLET ORAL EVERY 6 HOURS PRN
Status: DISCONTINUED | OUTPATIENT
Start: 2024-10-30 | End: 2024-11-04 | Stop reason: HOSPADM

## 2024-10-30 RX ORDER — NALOXONE HCL 0.4 MG/ML
0.02 VIAL (ML) INJECTION
Status: DISCONTINUED | OUTPATIENT
Start: 2024-10-30 | End: 2024-11-04 | Stop reason: HOSPADM

## 2024-10-30 RX ORDER — PROCHLORPERAZINE EDISYLATE 5 MG/ML
5 INJECTION INTRAMUSCULAR; INTRAVENOUS EVERY 6 HOURS PRN
Status: DISCONTINUED | OUTPATIENT
Start: 2024-10-30 | End: 2024-11-04 | Stop reason: HOSPADM

## 2024-10-30 RX ORDER — FOLIC ACID 1 MG/1
1000 TABLET ORAL DAILY
Status: DISCONTINUED | OUTPATIENT
Start: 2024-10-31 | End: 2024-11-04 | Stop reason: HOSPADM

## 2024-10-30 RX ORDER — ASPIRIN 81 MG/1
81 TABLET ORAL DAILY
Status: DISCONTINUED | OUTPATIENT
Start: 2024-10-31 | End: 2024-11-04 | Stop reason: HOSPADM

## 2024-10-30 RX ORDER — TALC
6 POWDER (GRAM) TOPICAL NIGHTLY PRN
Status: DISCONTINUED | OUTPATIENT
Start: 2024-10-30 | End: 2024-11-04 | Stop reason: HOSPADM

## 2024-10-30 RX ORDER — TAMSULOSIN HYDROCHLORIDE 0.4 MG/1
0.4 CAPSULE ORAL NIGHTLY
Status: DISCONTINUED | OUTPATIENT
Start: 2024-10-30 | End: 2024-11-04 | Stop reason: HOSPADM

## 2024-10-30 RX ORDER — POLYETHYLENE GLYCOL 3350 17 G/17G
17 POWDER, FOR SOLUTION ORAL DAILY
Status: DISCONTINUED | OUTPATIENT
Start: 2024-10-31 | End: 2024-11-04 | Stop reason: HOSPADM

## 2024-10-30 RX ORDER — BISACODYL 10 MG/1
10 SUPPOSITORY RECTAL DAILY PRN
Status: DISCONTINUED | OUTPATIENT
Start: 2024-10-30 | End: 2024-11-04 | Stop reason: HOSPADM

## 2024-10-30 RX ORDER — ALUMINUM HYDROXIDE, MAGNESIUM HYDROXIDE, AND SIMETHICONE 1200; 120; 1200 MG/30ML; MG/30ML; MG/30ML
30 SUSPENSION ORAL 4 TIMES DAILY PRN
Status: DISCONTINUED | OUTPATIENT
Start: 2024-10-30 | End: 2024-11-04 | Stop reason: HOSPADM

## 2024-10-30 RX ORDER — ATORVASTATIN CALCIUM 10 MG/1
20 TABLET, FILM COATED ORAL NIGHTLY
Status: DISCONTINUED | OUTPATIENT
Start: 2024-10-30 | End: 2024-11-04 | Stop reason: HOSPADM

## 2024-10-30 RX ORDER — GLUCAGON 1 MG
1 KIT INJECTION
Status: DISCONTINUED | OUTPATIENT
Start: 2024-10-30 | End: 2024-11-04 | Stop reason: HOSPADM

## 2024-10-30 RX ORDER — IBUPROFEN 200 MG
24 TABLET ORAL
Status: DISCONTINUED | OUTPATIENT
Start: 2024-10-30 | End: 2024-11-04 | Stop reason: HOSPADM

## 2024-10-30 RX ORDER — TAMSULOSIN HYDROCHLORIDE 0.4 MG/1
0.4 CAPSULE ORAL NIGHTLY
COMMUNITY

## 2024-10-30 RX ORDER — PROPRANOLOL HYDROCHLORIDE 20 MG/1
40 TABLET ORAL 2 TIMES DAILY
Status: DISCONTINUED | OUTPATIENT
Start: 2024-10-31 | End: 2024-11-03

## 2024-10-30 RX ORDER — SODIUM CHLORIDE 9 MG/ML
500 INJECTION, SOLUTION INTRAVENOUS
Status: COMPLETED | OUTPATIENT
Start: 2024-10-30 | End: 2024-10-30

## 2024-10-30 RX ORDER — PROPRANOLOL HYDROCHLORIDE 40 MG/1
40 TABLET ORAL 2 TIMES DAILY
Status: ON HOLD | COMMUNITY
End: 2024-11-04

## 2024-10-30 RX ADMIN — SODIUM CHLORIDE 500 ML: 9 INJECTION, SOLUTION INTRAVENOUS at 04:10

## 2024-10-30 RX ADMIN — APIXABAN 5 MG: 5 TABLET, FILM COATED ORAL at 11:10

## 2024-10-30 RX ADMIN — ATORVASTATIN CALCIUM 20 MG: 10 TABLET, FILM COATED ORAL at 11:10

## 2024-10-30 RX ADMIN — TAMSULOSIN HYDROCHLORIDE 0.4 MG: 0.4 CAPSULE ORAL at 11:10

## 2024-10-30 NOTE — ED PROVIDER NOTES
Encounter Date: 10/30/2024    SCRIBE #1 NOTE: I, Batsheva Wilson, am scribing for, and in the presence of,  Nicholas Escobar IV, MD. I have scribed the following portions of the note - Other sections scribed: HPI, ROS, PE.       History     Chief Complaint   Patient presents with    Loss of Consciousness     Near syncopal episode while at dr's office prior to arrival. Became diaphoretic and dizzy. Denies complaints on arrival to ED. Baseline GCS 14.     The patient is a 78 year old male with a history of HTN, CVA, and CKD presenting to the ED for a near syncopal episode prior to arrival. The patient complains of dizziness, decreased urine output, decreased bowel movements, and diaphoresis; he denies chest pain and shortness of breath. The patient's daughter at bedside reports that the patient has recently been experiencing episodes of profuse sweating and dizziness accommodated with drops in blood pressure. The patient recently started taking Sotalol, and the patient's daughter notes that the episodes started whenever he switched medications. The patient had an episode at his urologist's office today and was referred to the ED; the patient's daughter notes that [facility] said that they would like to see the patient. The patient's daughter at bedside reports that the patient was diagnosed with a UTI and sepsis 2 weeks ago. The patient reports that during the episodes, he gets dizzy, but does not feel like he will faint.     The history is provided by the patient and a relative. No  was used.     Review of patient's allergies indicates:  No Known Allergies  Past Medical History:   Diagnosis Date    Hypertension     Stroke      Past Surgical History:   Procedure Laterality Date    INSERTION OF PACEMAKER       No family history on file.  Social History     Tobacco Use    Smoking status: Never    Smokeless tobacco: Never   Substance Use Topics    Alcohol use: Yes    Drug use: Never     Review of Systems    Constitutional:  Positive for diaphoresis. Negative for chills and fever.   HENT:  Negative for congestion, rhinorrhea and sore throat.    Eyes:  Negative for visual disturbance.   Respiratory:  Negative for cough and shortness of breath.    Cardiovascular:  Negative for chest pain.   Gastrointestinal:  Negative for abdominal pain, nausea and vomiting.   Genitourinary:  Positive for decreased urine volume. Negative for dysuria and hematuria.   Musculoskeletal:  Negative for joint swelling.   Skin:  Negative for rash.   Neurological:  Positive for dizziness. Negative for weakness.   Psychiatric/Behavioral:  Negative for confusion.    All other systems reviewed and are negative.      Physical Exam     Initial Vitals [10/30/24 1452]   BP Pulse Resp Temp SpO2   (!) 119/56 (!) 56 18 97.7 °F (36.5 °C) 100 %      MAP       --         Physical Exam    Nursing note and vitals reviewed.  Constitutional: He is not diaphoretic. No distress.   HENT:   Head: Normocephalic and atraumatic.   Neck: Neck supple.   Normal range of motion.  Cardiovascular:  Normal rate and regular rhythm.           Pulmonary/Chest: Breath sounds normal. No respiratory distress.   Abdominal: Abdomen is soft. He exhibits no distension. There is no abdominal tenderness.   Genitourinary:    Genitourinary Comments: Lindsey catheter in place with dark, cloudy urine.      Musculoskeletal:         General: No edema.      Cervical back: Normal range of motion and neck supple.     Neurological: He is alert and oriented to person, place, and time. He has normal strength. No cranial nerve deficit or sensory deficit.   Skin: Skin is warm. Capillary refill takes less than 2 seconds.   Psychiatric: He has a normal mood and affect.         ED Course   Procedures  Labs Reviewed   COMPREHENSIVE METABOLIC PANEL - Abnormal       Result Value    Sodium 136      Potassium 4.0      Chloride 106      CO2 22 (*)     Glucose 97      Blood Urea Nitrogen 23.8      Creatinine 0.93       Calcium 9.5      Protein Total 8.4 (*)     Albumin 3.0 (*)     Globulin 5.4 (*)     Albumin/Globulin Ratio 0.6 (*)     Bilirubin Total 1.1      ALP 96      ALT 24      AST 20      eGFR >60      Anion Gap 8.0      BUN/Creatinine Ratio 26     URINALYSIS, REFLEX TO URINE CULTURE - Abnormal    Color, UA Yellow      Appearance, UA Turbid (*)     Specific Gravity, UA 1.021      pH, UA 6.0      Protein, UA 1+ (*)     Glucose, UA Normal      Ketones, UA Negative      Blood, UA 3+ (*)     Bilirubin, UA Negative      Urobilinogen, UA Normal      Nitrites, UA Negative      Leukocyte Esterase, UA 25 (*)     RBC, UA >100 (*)     WBC, UA 6-10 (*)     Bacteria, UA Trace      Squamous Epithelial Cells, UA None Seen      Mucous, UA Trace (*)     Hyaline Casts, UA 0-2 (*)    CBC WITH DIFFERENTIAL - Abnormal    WBC 9.11      RBC 4.35 (*)     Hgb 12.6 (*)     Hct 40.2 (*)     MCV 92.4      MCH 29.0      MCHC 31.3 (*)     RDW 15.8      Platelet 245      MPV 9.6      Neut % 66.2      Lymph % 21.3      Mono % 7.2      Eos % 4.6      Basophil % 0.3      Lymph # 1.94      Neut # 6.02      Mono # 0.66      Eos # 0.42      Baso # 0.03      IG# 0.04      IG% 0.4      NRBC% 0.0     TROPONIN I - Normal    Troponin-I 0.034     CBC W/ AUTO DIFFERENTIAL    Narrative:     The following orders were created for panel order CBC Auto Differential.  Procedure                               Abnormality         Status                     ---------                               -----------         ------                     CBC with Differential[[id number]]       Abnormal            Final result                 Please view results for these tests on the individual orders.          Imaging Results              X-Ray Chest AP Portable (Final result)  Result time 10/30/24 17:21:07      Final result by Scar Saucedo MD (10/30/24 17:21:07)                   Impression:      No acute pulmonary process identified.      Electronically signed by: Scar  Lewis  Date:    10/30/2024  Time:    17:21               Narrative:    EXAMINATION:  XR CHEST AP PORTABLE    CLINICAL HISTORY:  Syncope and collapse    TECHNIQUE:  Frontal view(s) of the chest.    COMPARISON:  Radiography 10/13/2024    FINDINGS:  Left-sided AICD.  Cardiac silhouette upper normal in size.  The lungs are well-inflated.  No consolidation identified.  No significant pleural effusion or discernible pneumothorax.                                       Medications   sodium chloride 0.9% flush 10 mL (has no administration in time range)   melatonin tablet 6 mg (has no administration in time range)   ondansetron injection 4 mg (has no administration in time range)   prochlorperazine injection Soln 5 mg (has no administration in time range)   senna-docusate 8.6-50 mg per tablet 1 tablet (has no administration in time range)   bisacodyL suppository 10 mg (has no administration in time range)   aluminum-magnesium hydroxide-simethicone 200-200-20 mg/5 mL suspension 30 mL (has no administration in time range)   acetaminophen tablet 1,000 mg (has no administration in time range)   naloxone 0.4 mg/mL injection 0.02 mg (has no administration in time range)   glucose chewable tablet 16 g (has no administration in time range)   glucose chewable tablet 24 g (has no administration in time range)   glucagon (human recombinant) injection 1 mg (has no administration in time range)   dextrose 10% bolus 125 mL 125 mL (has no administration in time range)   dextrose 10% bolus 250 mL 250 mL (has no administration in time range)   apixaban tablet 5 mg (has no administration in time range)   aspirin EC tablet 81 mg (has no administration in time range)   atorvastatin tablet 20 mg (has no administration in time range)   folic acid tablet 1,000 mcg (has no administration in time range)   polyethylene glycol packet 17 g (has no administration in time range)   propranoloL tablet 40 mg (has no administration in time range)    tamsulosin 24 hr capsule 0.4 mg (has no administration in time range)   0.9%  NaCl infusion (0 mLs Intravenous Stopped 10/30/24 1812)     Medical Decision Making  77 yo prsenting with intermittenet hypotension, diaphoresis - concerning for presyncope/cardiac events  Sent in by [facility]  Initial workup here reassuring, did have a self limited episode of diaphoresis, hypotension here. Discussed with [facility], will follow along, hospitalist will admit     The differential diagnosis includes, but is not limited to,   Judging by the patient's chief complaint and pertinent history, the patient has the following possible differential diagnoses, including but not limited to the following.  Some of these are deemed to be lower likelihood and some more likely based on my physical exam and history combined with possible lab work and/or imaging studies.   Please see the pertinent studies, and refer to the HPI.  Some of these diagnoses will take further evaluation to fully rule out, perhaps as an outpatient and the patient was encouraged to follow up when discharged for more comprehensive evaluation.    Arrhythmia, dehydration, orthostatic episode, intracranial hemorrhage, seizure, anemia, infection, electrolyte derangement, drug use, hypoglycemic episode, ACS, PE, structural heart disease, AAA rupture, ectopic pregnancy, aortic dissection, CVA         Problems Addressed:  Diaphoresis: acute illness or injury that poses a threat to life or bodily functions  Near syncope: acute illness or injury that poses a threat to life or bodily functions    Amount and/or Complexity of Data Reviewed  Independent Historian:      Details: The patient's daughter at bedside reports that the patient has recently been experiencing episodes of profuse sweating and dizziness accommodated with drops in blood pressure. The patient recently started taking Sotalol, and the patient's daughter notes that the episodes started whenever he switched medications. The  patient had an episode at his urologist's office today and was referred to the ED; the patient's daughter notes that [facility] said that they would like to see the patient. The patient's daughter at bedside reports that the patient was diagnosed with a UTI and sepsis 2 weeks ago.  Radiology: ordered and independent interpretation performed.     Details: Head CT - no obvious bleed or mass       Risk  Prescription drug management.  Decision regarding hospitalization.            Scribe Attestation:   Scribe #1: I performed the above scribed service and the documentation accurately describes the services I performed. I attest to the accuracy of the note.    Attending Attestation:           Physician Attestation for Scribe:  Physician Attestation Statement for Scribe #1: I, Nicholas Escobar IV, MD, reviewed documentation, as scribed by Batsheva Rachel in my presence, and it is both accurate and complete.             ED Course as of 10/30/24 2317   Wed Oct 30, 2024   1808 [facility].  [GW]   1815 Discussed case with [facility]. They will see patient for consult.  [GW]   1914 Dr. Waller - will admit [AC]      ED Course User Index  [AC] Nicholas Escobar IV, MD  [GW] Batsheva Rachel                           Clinical Impression:  Final diagnoses:  [R55] Near syncope  [R61] Diaphoresis (Primary)          ED Disposition Condition    Admit Stable                Nicholas Escobar IV, MD  10/30/24 2317

## 2024-10-31 LAB
OHS QRS DURATION: 102 MS
OHS QTC CALCULATION: 458 MS
POCT GLUCOSE: 101 MG/DL (ref 70–110)
POCT GLUCOSE: 106 MG/DL (ref 70–110)
POCT GLUCOSE: 118 MG/DL (ref 70–110)
POCT GLUCOSE: 84 MG/DL (ref 70–110)

## 2024-10-31 PROCEDURE — 25000003 PHARM REV CODE 250: Performed by: NURSE PRACTITIONER

## 2024-10-31 PROCEDURE — 11000001 HC ACUTE MED/SURG PRIVATE ROOM

## 2024-10-31 PROCEDURE — 21400001 HC TELEMETRY ROOM

## 2024-10-31 RX ORDER — AMIODARONE HYDROCHLORIDE 200 MG/1
200 TABLET ORAL 2 TIMES DAILY
Status: DISCONTINUED | OUTPATIENT
Start: 2024-11-03 | End: 2024-11-04 | Stop reason: HOSPADM

## 2024-10-31 RX ORDER — AMIODARONE HYDROCHLORIDE 200 MG/1
400 TABLET ORAL 2 TIMES DAILY
Status: COMPLETED | OUTPATIENT
Start: 2024-10-31 | End: 2024-11-02

## 2024-10-31 RX ORDER — AMIODARONE HYDROCHLORIDE 200 MG/1
200 TABLET ORAL DAILY
Status: DISCONTINUED | OUTPATIENT
Start: 2024-11-06 | End: 2024-11-04 | Stop reason: HOSPADM

## 2024-10-31 RX ADMIN — POLYETHYLENE GLYCOL 3350 17 G: 17 POWDER, FOR SOLUTION ORAL at 08:10

## 2024-10-31 RX ADMIN — Medication 6 MG: at 11:10

## 2024-10-31 RX ADMIN — AMIODARONE HYDROCHLORIDE 400 MG: 200 TABLET ORAL at 03:10

## 2024-10-31 RX ADMIN — PROPRANOLOL HYDROCHLORIDE 40 MG: 20 TABLET ORAL at 08:10

## 2024-10-31 RX ADMIN — ATORVASTATIN CALCIUM 20 MG: 10 TABLET, FILM COATED ORAL at 11:10

## 2024-10-31 RX ADMIN — TAMSULOSIN HYDROCHLORIDE 0.4 MG: 0.4 CAPSULE ORAL at 11:10

## 2024-10-31 RX ADMIN — AMIODARONE HYDROCHLORIDE 400 MG: 200 TABLET ORAL at 11:10

## 2024-10-31 RX ADMIN — ASPIRIN 81 MG: 81 TABLET, COATED ORAL at 08:10

## 2024-10-31 RX ADMIN — APIXABAN 5 MG: 5 TABLET, FILM COATED ORAL at 08:10

## 2024-10-31 RX ADMIN — FOLIC ACID 1000 MCG: 1 TABLET ORAL at 08:10

## 2024-10-31 NOTE — PLAN OF CARE
10/31/24 1340   Discharge Assessment   Assessment Type Discharge Planning Assessment   Confirmed/corrected address, phone number and insurance Yes   Communicated DIAN with patient/caregiver Date not available/Unable to determine   Reason For Admission Near Syncope   [facility] in Home facility resident   Facility Arrived From: [facility]   Prior to hospitilization cognitive status: Unable to Assess   Current cognitive status: Unable to Assess   Are you on dialysis? No   Do you take coumadin? No   Discharge Plan A Skilled Nursing Facility   Discharge Plan B Skilled Nursing Facility   Discharge Plan discussed with: Patient   Transition of Care Barriers None   OTHER   Name(s) of [facility] SNF - [facility]     Patient at [facility] for skilled nursing services prior to admission. He was admitted their recently with plans to return to home once therapy completed.

## 2024-10-31 NOTE — NURSING
"@1400: Patient bed alarm sounding, entered room to find patient sitting up in bed, stated "I seen a sign on the door and I was trying to read it and this started making noise." Instructed to call staff for assistance with moving. At this time indwelling alfonso catheter noted with dark red urine to  bag, likely from trauma. Notified Dr. Zurita. Consult to [facility]rology placed. Alfonso irrigated with 60cc of sterile water with light red urine return. Alfonso secured. Catheter care performed. Bed alarm reset.  "

## 2024-10-31 NOTE — NURSING
@ 1451: Patient noted to be diaphoretic & hypotensive. Glucose of 103. Notified Dr. Zurita, informed to notify Cardiology for possible medication adjustment.

## 2024-10-31 NOTE — CONSULTS
[facility] - 4th Floor Medical Telemetry    Cardiology  Consult Note    Patient Name: Madan Elder  MRN: [id number]  Admission Date: 10/30/2024  Hospital Length of Stay: 1 days  Code Status: DNR   Attending Provider: Roshan Waller MD   Consulting Provider: Carly Guillory NP  Primary Care Physician: Casey, Provider  Principal Problem:<principal problem not specified>    Patient information was obtained from patient, past medical records, and ER records.     Subjective:     Reason for Consult: ICD shock     HPI: 78-year-old male that is known to [facility]/Dr. Newsome with PMHx of Persistent Afib, alcoholic CMO/AICD, CVA, HTN, & VHD.  He presented to [facility] with complaints of low BP and diaphoresis. Of note the patient was recent admitted to [facility] for the same complaints and was discharged on 10.25.24 after an 8 day admission for urinary retention. He has a hx of recurrent UTI that was + for E.Coli on 10.6.24 (treatment initiated at [facility] but patient left AMA).  Cardiology changed Sotalol to propranolol. Neurology evaluated for diaphoresis and suspected to be secondary to autonomic dysfunction. Seizures were ruled out.  MRI brain without contrast revealed severe generalized cortical and brainstem atrophy, chronic white matter changes and collapsed ventricles.  It was recommended he follow-up with Neurosurgery regarding  shunt settings and ventricular collapse.  Discharged to [facility]. At the time of my exam patient is awake and alert. Speech clear. Able to state his name and knows he's in the hospital but otherwise unable to provide any information. ICD was interrogated and showed 1 defibrillation for VT. [facility] was consulted for ICD shock.      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 ,mother PPM  Social  History: never smoker, hx alcohol abuse     Previous Cardiac Diagnostics:     AICD Interrogation (10.30.24):        TTE 04.05.23:  There is no evidence of intracardiac shunting.  The left ventricle is mildly enlarged with moderately decreased systolic function.  The estimated ejection fraction is 35%.  Left ventricular diastolic dysfunction.  Mild aortic regurgitation.  Mild tricuspid regurgitation.  Normal central venous pressure (3 mmHg).  The estimated PA systolic pressure is 29 mmHg.  Normal right ventricular size with mildly reduced right ventricular systolic function.  Mild mitral regurgitation.  Mild right atrial enlargement.     CUS 04.05.23:  The right internal carotid artery demonstrated less than 50% stenosis.  The left internal carotid artery demonstrated less than 50% stenosis.  Bilateral vertebral arteries were patent with antegrade flow.     TTE 10.04.2022:  LV is mildly enlarged. Global LVSR is moderately decreased. LVEF 30%. LV diastolic function is impaired (Grade I) with normal LA pressure. Moderate MR. Mild to moderate TR. Mild AR. Mild AS noted with adequate cuspal excursion. PASP 28mmHg. Intracardiac device wire is visualized in right heart.       CUS 10.04.2022:  1-39% stenosis mid LICA  1-39% stenosis pRICA  Antegrade flow to bilateral vertebral arteries     [facility] 06.20.2017:  LM: large vessel without evidence of obstructive CAD.   LAD: 60% distal stenosis  Lcx: 60%  distal stenosis  RCA: large and dominant with 60% narrowing of PDA  Mean RA pressure 3mmHG., RV pressure 55/3mmHg, PAP 55/20mmHg. These values represent the presence of moderate pulmonary HTN. PCWP 20-24mmHg which was moderately to severely elevated. No significant gradient across aortic valve. LVEDP 20mmHg. CO 2.3L/min with CI 1.4L/min      Past Medical History:   Diagnosis Date    Hypertension     Stroke      Past Surgical History:   Procedure Laterality Date    INSERTION OF PACEMAKER       Review of patient's allergies  indicates:  No Known Allergies  Current Facility-Administered Medications on File Prior to Encounter   Medication    [DISCONTINUED] GENERIC EXTERNAL MEDICATION    [DISCONTINUED] GENERIC EXTERNAL MEDICATION    [DISCONTINUED] GENERIC EXTERNAL MEDICATION    [DISCONTINUED] GENERIC EXTERNAL MEDICATION     Current Outpatient Medications on File Prior to Encounter   Medication Sig    apixaban (ELIQUIS) 5 mg Tab Take 5 mg by mouth 2 (two) times daily.    aspirin (ECOTRIN) 81 MG EC tablet Take 81 mg by mouth once daily.    folic acid (FOLVITE) 1 MG tablet Take 1,000 mcg by mouth once daily.    polyethylene glycol (GLYCOLAX) 17 gram PwPk Take 17 g by mouth once daily.    propranoloL (INDERAL) 40 MG tablet Take 40 mg by mouth 2 (two) times daily.    tamsulosin (FLOMAX) 0.4 mg Cap Take 0.4 mg by mouth nightly.    VITAMIN B-1 100 MG tablet Take 100 mg by mouth once daily.    vitamin D (VITAMIN D3) 1000 units Tab Take 2,000 Units by mouth once daily.    atorvastatin (LIPITOR) 20 MG tablet Take 1 tablet (20 mg total) by mouth once daily. (Patient taking differently: Take 20 mg by mouth every evening.)    carvediloL (COREG) 3.125 MG tablet Take 1 tablet (3.125 mg total) by mouth 2 (two) times daily.    sodium bicarbonate 650 MG tablet Take 1 tablet (650 mg total) by mouth once daily. for 5 days         Review of Systems   Reason unable to perform ROS: Patient is extremely hard of hearing.     Objective:     Vital Signs (Most Recent):  Temp: 97.6 °F (36.4 °C) (10/31/24 0721)  Pulse: 67 (10/31/24 0721)  Resp: 20 (10/31/24 0424)  BP: 108/66 (10/31/24 0721)  SpO2: 96 % (10/31/24 0721) Vital Signs (24h Range):  Temp:  [97.5 °F (36.4 °C)-97.7 °F (36.5 °C)] 97.6 °F (36.4 °C)  Pulse:  [56-80] 67  Resp:  [15-22] 20  SpO2:  [91 %-100 %] 96 %  BP: ()/(43-82) 108/66   Weight: 82.5 kg (181 lb 14.1 oz)  Body mass index is 27.65 kg/m².  SpO2: 96 %       Intake/Output Summary (Last 24 hours) at 10/31/2024 0905  Last data filed at 10/31/2024  "0635  Gross per 24 hour   Intake --   Output 400 ml   Net -400 ml     Lines/Drains/Airways       Peripheral Intravenous Line  Duration                  Peripheral IV - Single Lumen 10/30/24 1644 20 G Anterior;Right Forearm <1 day                  Significant Labs:   Chemistries:   Recent Labs   Lab 10/28/24  0700 10/30/24  1555    136   K 4.1 4.0    106   CO2 26 22*   BUN 26.0* 23.8   CREATININE 0.91 0.93   CALCIUM 8.9 9.5   BILITOT 1.0 1.1   ALKPHOS 77 96   ALT 24 24   AST 17 20   GLUCOSE 90 97   TROPONINI  --  0.034        CBC/Anemia Labs: Coags:    Recent Labs   Lab 10/28/24  0700 10/30/24  1555   WBC 6.04 9.11   HGB 10.6* 12.6*   HCT 33.6* 40.2*    245   MCV 90.8 92.4   RDW 15.6 15.8   FOLATE 17.7  --     No results for input(s): "PT", "INR", "APTT" in the last 168 hours.     Significant Imaging:  Imaging Results              X-Ray Chest AP Portable (Final result)  Result time 10/30/24 17:21:07      Final result by Scar Saucedo MD (10/30/24 17:21:07)                   Impression:      No acute pulmonary process identified.      Electronically signed by: Scar Saucedo  Date:    10/30/2024  Time:    17:21               Narrative:    EXAMINATION:  XR CHEST AP PORTABLE    CLINICAL HISTORY:  Syncope and collapse    TECHNIQUE:  Frontal view(s) of the chest.    COMPARISON:  Radiography 10/13/2024    FINDINGS:  Left-sided AICD.  Cardiac silhouette upper normal in size.  The lungs are well-inflated.  No consolidation identified.  No significant pleural effusion or discernible pneumothorax.                                    EKG:       Telemetry:  Afib    Physical Exam  HENT:      Mouth/Throat:      Mouth: Mucous membranes are moist.   Cardiovascular:      Rate and Rhythm: Normal rate. Rhythm irregular.      Pulses: Normal pulses.      Heart sounds: Normal heart sounds. No murmur heard.  Pulmonary:      Effort: Pulmonary effort is normal.   Abdominal:      Palpations: Abdomen is soft.   Skin:     " General: Skin is warm.   Neurological:      Mental Status: He is alert.       Home Medications:   Current Facility-Administered Medications on File Prior to Encounter   Medication Dose Route Frequency Provider Last Rate Last Admin    [DISCONTINUED] GENERIC EXTERNAL MEDICATION     Provider, Generic External Data        [DISCONTINUED] GENERIC EXTERNAL MEDICATION     Provider, Generic External Data        [DISCONTINUED] GENERIC EXTERNAL MEDICATION     Provider, Generic External Data        [DISCONTINUED] GENERIC EXTERNAL MEDICATION     Provider, Generic External Data         Current Outpatient Medications on File Prior to Encounter   Medication Sig Dispense Refill    apixaban (ELIQUIS) 5 mg Tab Take 5 mg by mouth 2 (two) times daily.      aspirin (ECOTRIN) 81 MG EC tablet Take 81 mg by mouth once daily.      folic acid (FOLVITE) 1 MG tablet Take 1,000 mcg by mouth once daily.      polyethylene glycol (GLYCOLAX) 17 gram PwPk Take 17 g by mouth once daily.      propranoloL (INDERAL) 40 MG tablet Take 40 mg by mouth 2 (two) times daily.      tamsulosin (FLOMAX) 0.4 mg Cap Take 0.4 mg by mouth nightly.      VITAMIN B-1 100 MG tablet Take 100 mg by mouth once daily.      vitamin D (VITAMIN D3) 1000 units Tab Take 2,000 Units by mouth once daily.      atorvastatin (LIPITOR) 20 MG tablet Take 1 tablet (20 mg total) by mouth once daily. (Patient taking differently: Take 20 mg by mouth every evening.) 90 tablet 3    carvediloL (COREG) 3.125 MG tablet Take 1 tablet (3.125 mg total) by mouth 2 (two) times daily. 60 tablet 11    sodium bicarbonate 650 MG tablet Take 1 tablet (650 mg total) by mouth once daily. for 5 days 5 tablet 0     Current Schedule Inpatient Medications:   apixaban  5 mg Oral BID    aspirin  81 mg Oral Daily    atorvastatin  20 mg Oral QHS    folic acid  1,000 mcg Oral Daily    polyethylene glycol  17 g Oral Daily    propranoloL  40 mg Oral BID    tamsulosin  0.4 mg Oral Nightly     Continuous  Infusions:    Assessment:   ICD Discharge    - VT episode with 1 defibrillation   Near syncope s/t VT   Recurrent hypotension   Chronic systolic Heart failure    - LVEF 35%  Alcoholic CMO   -Medtronic AICD  Persistent Afib    - CHADS VASC score 6   -On Eliquis outpatient  Native CAD   -[facility] 6.2017: LM: no obstructive CAD. LAD: 60% distal stenosis. Lcx: 60%  distal stenosis. RCA: large/dominant; 60% narrowing of PDA  Bladder outlet obstruction and urinary retention   Hx hydrocephalus, S/P  shunt, evidence ventricular collapse  - 10/23/24 - MRI brain without contrast revealed severe generalized cortical and brainstem atrophy, chronic white matter changes and collapsed ventricles.    HTN  HLD  Hx Alcohol abuse  Hx CVA right posterior frontal and left anterior temporal regions with residual left arm weakness/paresthesias    Plan:   ICD interrogation reviewed   Start Amiodarone PO Taper dose   Cont, propranolol 40mg   Cont. Eliquis for CVA risk reduction   Cont. ASA & Statin   AM Labs: CBC, CMP, Mag        Thank you for your consult.     Carly Guillory NP  Cardiology  [facility] - 4th Floor Medical Telemetry  10/31/2024

## 2024-10-31 NOTE — NURSING
@7961: Message sent to PETER Burns. Returned call with no knew orders, except to check with Dr. Zurita for neurology consult. Dr. Zurita informed, will assess.

## 2024-10-31 NOTE — H&P
[facility] Hospital Medicine - H&P Note    Patient Name: Madan Elder  : 1945  MRN: [id number]  PCP: Casey, Provider  Admitting Physician:  SAL Waller MD  Admission Class: IP- Inpatient   Code status: Full        Allergies   Patient has no known allergies.    Chief Complaint   Near syncope    History of Present Illness   78-year-old male whose history includes PAF, systolic heart failure, hydrocephalus with  shunt, and bladder outlet obstruction.  Presented to the ED with complaints of low blood pressure and diaphoresis. Patient had similar symptoms recently which were evaluated during an admission to [facility].     Discharged from [facility] on 10/25/24 following an 8 day admission during which time he was urinary retention. Has history of recurrent UTIs and prior to this he had a recent pan-sensitive E. Coli on 10/6 (treatment initiated at [facility] but patient left AMA). Lindsey placed and was discharged home with it with plans to leave it in until he was more ambulatory. Cardiology changed Sotalol to propranolol. Neurology evaluated for diaphoresis and suspected to be secondary to autonomic dysfunction. Seizures were ruled out.  MRI brain without contrast revealed severe generalized cortical and brainstem atrophy, chronic white matter changes and collapsed ventricles.  It was recommended he follow-up with Neurosurgery regarding  shunt settings and ventricular collapse.  Discharged to [facility].    At the time of my exam patient is awake and alert. Speech clear. Able to state his name and knows he's in the hospital but otherwise unable to provide any information. I spoke with his daughter, Charmaine Vargas, on the phone. She reports patient had a urology follow up today but shortly after arriving he became hypotensive and diaphoretic and EMS summoned. VS on arrival: T 97.7, P 56, R 18, B/P 119/56, Sats 100% on room air. Initial labs unremarkable. UA collected from chronic  indwelling alfonso showed 1+ protein, 3+ blood, 25 leuk est, RBC > 100, WBC 6-10 and trace bacteria.  Chest x-ray negative for acute findings. Had several episodes of witnessed hypotension in ED.     ROS   Except as documented, all other systems reviewed and negative     Past Medical History   Paroxysmal atrial fibrillation  Chronic HFrEF - LVEF 35% - ECHO 10/16/24  Hypertension  Hydrocephalus  Bladder outlet obstruction    Past Surgical History   ICD - Evera MRI XT  DDMB1 model   shunt       Social History   No alcohol, tobacco or illicit drug use.     Screening for Social Drivers for health:  Patient screened for food insecurity, housing instability, transportation needs, utility difficulties, and interpersonal safety (select all that apply as identified as concern)  []Housing or Food  []Transportation Needs  []Utility Difficulties  []Interpersonal safety  [x]None      Family History   Reviewed and negative    Home Medications     Eliquis 5 mg b.i.d.  Aspirin 81 mg daily  Atorvastatin 20 mg q.h.s.  Folic acid 1 mg daily  MiraLax 17 g daily  Propranolol 40 mg b.i.d.  Tamsulosin 0.4 mg q.h.s.  Vitamin-B 100 mg daily  Vitamin-D 2000 units daily     Physical Exam   Vital Signs  Temp:  [97.7 °F (36.5 °C)]   Pulse:  [56-80]   Resp:  [15-22]   BP: ()/(45-81)   SpO2:  [91 %-100 %]    General: Appears comfortable  HEENT: NC/AT  Neck:  No JVD  Chest: CTABL  CVS: Regular rhythm. Normal S1/S2.  Abdomen: nondistended, normoactive BS, soft and non-tender.  MSK: No obvious deformity or joint swelling  Skin: Warm and dry  Neuro: AAOx3, no focal neurological deficit  Psych: Cooperative    Labs     Recent Labs     10/28/24  0700 10/30/24  1555   WBC 6.04 9.11   RBC 3.70* 4.35*   HGB 10.6* 12.6*   HCT 33.6* 40.2*   MCV 90.8 92.4   MCH 28.6 29.0   MCHC 31.5* 31.3*   RDW 15.6 15.8    245     Recent Labs     10/28/24  0700   FOLATE 17.7      Recent Labs     10/28/24  0700 10/30/24  1555    136   K 4.1 4.0   CO2 26  "22*   BUN 26.0* 23.8   CREATININE 0.91 0.93   EGFRNORACEVR >60 >60   GLUCOSE 90 97   CALCIUM 8.9 9.5   ALBUMIN 2.7* 3.0*   GLOBULIN 4.1* 5.4*   ALKPHOS 77 96   ALT 24 24   AST 17 20   BILITOT 1.0 1.1   CHOL 86  --    TRIG 68  --    LDL 39.00*  --    HDL 33*  --    TSH 1.413  --      No results for input(s): "LACTIC" in the last 72 hours.  Recent Labs     10/30/24  1555   TROPONINI 0.034        Microbiology Results (last 7 days)       ** No results found for the last 168 hours. **           Imaging   X-Ray Chest AP Portable  Narrative: EXAMINATION:  XR CHEST AP PORTABLE    CLINICAL HISTORY:  Syncope and collapse    TECHNIQUE:  Frontal view(s) of the chest.    COMPARISON:  Radiography 10/13/2024    FINDINGS:  Left-sided AICD.  Cardiac silhouette upper normal in size.  The lungs are well-inflated.  No consolidation identified.  No significant pleural effusion or discernible pneumothorax.  Impression: No acute pulmonary process identified.    Electronically signed by: Scar Saucedo  Date:    10/30/2024  Time:    17:21      ECHO 10/15/24      Left Ventricle: The left ventricle is normal in size. Mildly increased wall thickness. There is concentric remodeling. Moderate global hypokinesis present. There is moderately reduced systolic function. Biplane (2D) method of discs ejection fraction is 35%. There is diastolic dysfunction but grade cannot be determined.    Right Ventricle: Normal right ventricular cavity size. Systolic function is normal.    Left Atrium: Small left atrial size. It appears to be compressed.    Right Atrium: Right atrium is mildly dilated. Lead present in the right atrium.    Aortic Valve: The aortic valve is a trileaflet valve. There is mild aortic valve sclerosis. There is no stenosis. There is mild aortic regurgitation.    Mitral Valve: The mitral valve is structurally normal. There is no stenosis. There is trace regurgitation.    Tricuspid Valve: There is mild regurgitation.    Pulmonic Valve: " There is no significant regurgitation.    Aorta: Aortic root is normal in size measuring 3.3 cm.    Pulmonary Artery: No pulmonary hypertension. The estimated pulmonary artery systolic pressure is 22 mmHg.    IVC/SVC: Normal venous pressure at 3 mmHg.    Pericardium: Epicardial fat appears visualized. There is a trivial effusion.  Assessment & Plan     Near syncope secondary to recurrent hypotension - possible autonomic dysfunction vs arrhythmia   - ICD interrogation - shows episodes of VT/VF with delivered shock.   - consult [facility] - spoke to DEVONTE Hair NP, and they will see in consult  - check orthostatic VS    Bladder outlet obstruction and urinary retention  - continue indwelling catheter   - reschedule f/u with urology,   Paroxysmal atrial fibrillation - currently in sinus rhythm  - monitor telemetry  - Continue Eliquis     Chronic HFrEF - LVEF 35% - stable    Hx hydrocephalus, S/P  shunt, evidence ventricular collapse  - 10/23/24 - MRI brain without contrast revealed severe generalized cortical and brainstem atrophy, chronic white matter changes and collapsed ventricles.  It was recommended he follow-up with Neurosurgery regarding  shunt settings and ventricular collapse      VTE Prophylaxis: Rufus LOCO, Heidy Garcia, [facility] have discussed this patients case with Dr. Waller who agrees with the diagnosis and treatment plan.      ________________________________________________________________  I, Dr. Roshan Waller assumed care of this patient.  For the patient encounter, I performed the substantive portion of the visit, I reviewed the NPPA documentation, treatment plan, and medical decision making.  I had face to face time with this patient.  I have personally reviewed the labs and test results that are presently available. I have reviewed or attempted to review medical records based upon their availability. If patient was admitted under observational status it is with my approval.      Recurrent  episodes of symptomatic hypotension, self-resolves.  Current exam no FND, CV RRR, euvolemic.  ICD shows episodes of V-tach and VFib that have been receiving shocks....  Cardiology is consulted and following.  Autonomic instability can be considered once a cardiogenic etiology is excluded.    Time seen: 11pm 10/30  Roshan Waller MD

## 2024-10-31 NOTE — PLAN OF CARE
Problem: Adult Inpatient Plan of Care  Goal: Plan of Care Review  Outcome: Met  Goal: Patient-Specific Goal (Individualized)  Outcome: Met  Goal: Absence of Hospital-Acquired Illness or Injury  Outcome: Met  Goal: Optimal Comfort and Wellbeing  Outcome: Met  Goal: Readiness for Transition of Care  Outcome: Progressing

## 2024-11-01 LAB
ALBUMIN SERPL-MCNC: 2.7 G/DL (ref 3.4–4.8)
ALBUMIN/GLOB SERPL: 0.7 RATIO (ref 1.1–2)
ALP SERPL-CCNC: 82 UNIT/L (ref 40–150)
ALT SERPL-CCNC: 21 UNIT/L (ref 0–55)
ANION GAP SERPL CALC-SCNC: 8 MEQ/L
AST SERPL-CCNC: 21 UNIT/L (ref 5–34)
BASOPHILS # BLD AUTO: 0.02 X10(3)/MCL
BASOPHILS NFR BLD AUTO: 0.4 %
BILIRUB SERPL-MCNC: 1.4 MG/DL
BUN SERPL-MCNC: 22.9 MG/DL (ref 8.4–25.7)
CALCIUM SERPL-MCNC: 8.9 MG/DL (ref 8.8–10)
CHLORIDE SERPL-SCNC: 107 MMOL/L (ref 98–107)
CO2 SERPL-SCNC: 25 MMOL/L (ref 23–31)
CREAT SERPL-MCNC: 0.9 MG/DL (ref 0.72–1.25)
CREAT/UREA NIT SERPL: 25
EOSINOPHIL # BLD AUTO: 0.29 X10(3)/MCL (ref 0–0.9)
EOSINOPHIL NFR BLD AUTO: 5.6 %
ERYTHROCYTE [DISTWIDTH] IN BLOOD BY AUTOMATED COUNT: 15.5 % (ref 11.5–17)
GFR SERPLBLD CREATININE-BSD FMLA CKD-EPI: >60 ML/MIN/1.73/M2
GLOBULIN SER-MCNC: 4.1 GM/DL (ref 2.4–3.5)
GLUCOSE SERPL-MCNC: 92 MG/DL (ref 82–115)
HCT VFR BLD AUTO: 36.4 % (ref 42–52)
HGB BLD-MCNC: 11.8 G/DL (ref 14–18)
IMM GRANULOCYTES # BLD AUTO: 0.02 X10(3)/MCL (ref 0–0.04)
IMM GRANULOCYTES NFR BLD AUTO: 0.4 %
LYMPHOCYTES # BLD AUTO: 1.45 X10(3)/MCL (ref 0.6–4.6)
LYMPHOCYTES NFR BLD AUTO: 28.2 %
MAGNESIUM SERPL-MCNC: 2.2 MG/DL (ref 1.6–2.6)
MCH RBC QN AUTO: 29 PG (ref 27–31)
MCHC RBC AUTO-ENTMCNC: 32.4 G/DL (ref 33–36)
MCV RBC AUTO: 89.4 FL (ref 80–94)
MONOCYTES # BLD AUTO: 0.34 X10(3)/MCL (ref 0.1–1.3)
MONOCYTES NFR BLD AUTO: 6.6 %
NEUTROPHILS # BLD AUTO: 3.02 X10(3)/MCL (ref 2.1–9.2)
NEUTROPHILS NFR BLD AUTO: 58.8 %
NRBC BLD AUTO-RTO: 0 %
PLATELET # BLD AUTO: 213 X10(3)/MCL (ref 130–400)
PMV BLD AUTO: 9.6 FL (ref 7.4–10.4)
POTASSIUM SERPL-SCNC: 4.2 MMOL/L (ref 3.5–5.1)
PROT SERPL-MCNC: 6.8 GM/DL (ref 5.8–7.6)
RBC # BLD AUTO: 4.07 X10(6)/MCL (ref 4.7–6.1)
SODIUM SERPL-SCNC: 140 MMOL/L (ref 136–145)
WBC # BLD AUTO: 5.14 X10(3)/MCL (ref 4.5–11.5)

## 2024-11-01 PROCEDURE — 80053 COMPREHEN METABOLIC PANEL: CPT | Performed by: NURSE PRACTITIONER

## 2024-11-01 PROCEDURE — 85025 COMPLETE CBC W/AUTO DIFF WBC: CPT | Performed by: NURSE PRACTITIONER

## 2024-11-01 PROCEDURE — 83735 ASSAY OF MAGNESIUM: CPT | Performed by: NURSE PRACTITIONER

## 2024-11-01 PROCEDURE — 25000003 PHARM REV CODE 250: Performed by: NURSE PRACTITIONER

## 2024-11-01 PROCEDURE — 36415 COLL VENOUS BLD VENIPUNCTURE: CPT | Performed by: NURSE PRACTITIONER

## 2024-11-01 PROCEDURE — 11000001 HC ACUTE MED/SURG PRIVATE ROOM

## 2024-11-01 PROCEDURE — 21400001 HC TELEMETRY ROOM

## 2024-11-01 PROCEDURE — 99223 1ST HOSP IP/OBS HIGH 75: CPT | Mod: FS,,, | Performed by: NEUROLOGICAL SURGERY

## 2024-11-01 RX ADMIN — POLYETHYLENE GLYCOL 3350 17 G: 17 POWDER, FOR SOLUTION ORAL at 10:11

## 2024-11-01 RX ADMIN — AMIODARONE HYDROCHLORIDE 400 MG: 200 TABLET ORAL at 08:11

## 2024-11-01 RX ADMIN — ATORVASTATIN CALCIUM 20 MG: 10 TABLET, FILM COATED ORAL at 08:11

## 2024-11-01 RX ADMIN — FOLIC ACID 1000 MCG: 1 TABLET ORAL at 10:11

## 2024-11-01 RX ADMIN — AMIODARONE HYDROCHLORIDE 400 MG: 200 TABLET ORAL at 10:11

## 2024-11-01 RX ADMIN — TAMSULOSIN HYDROCHLORIDE 0.4 MG: 0.4 CAPSULE ORAL at 08:11

## 2024-11-01 RX ADMIN — Medication 6 MG: at 08:11

## 2024-11-01 RX ADMIN — PROPRANOLOL HYDROCHLORIDE 40 MG: 20 TABLET ORAL at 10:11

## 2024-11-01 RX ADMIN — ASPIRIN 81 MG: 81 TABLET, COATED ORAL at 10:11

## 2024-11-01 NOTE — PROGRESS NOTES
[facility] - 4th Floor Medical Telemetry    Cardiology  Progress Note    Patient Name: Madan Elder  MRN: [id number]  Admission Date: 10/30/2024  Hospital Length of Stay: 2 days  Code Status: DNR   Attending Physician: Tyler Zurita MD   Primary Care Physician: Casey, Provider  Expected Discharge Date:   Principal Problem:<principal problem not specified>    Subjective:     Reason for Consult: ICD shock      HPI: 78-year-old male that is known to [facility]/Dr. Newsome with PMHx of Persistent Afib, alcoholic CMO/AICD, CVA, HTN, & VHD.  He presented to [facility] with complaints of low BP and diaphoresis. Of note the patient was recent admitted to [facility] for the same complaints and was discharged on 10.25.24 after an 8 day admission for urinary retention. He has a hx of recurrent UTI that was + for E.Coli on 10.6.24 (treatment initiated at [facility] but patient left AMA).  Cardiology changed Sotalol to propranolol. Neurology evaluated for diaphoresis and suspected to be secondary to autonomic dysfunction. Seizures were ruled out.  MRI brain without contrast revealed severe generalized cortical and brainstem atrophy, chronic white matter changes and collapsed ventricles.  It was recommended he follow-up with Neurosurgery regarding  shunt settings and ventricular collapse.  Discharged to [facility]. At the time of my exam patient is awake and alert. Speech clear. Able to state his name and knows he's in the hospital but otherwise unable to provide any information. ICD was interrogated and showed 1 defibrillation for VT. [facility] was consulted for ICD shock.      Hospital Course:   11.1.24: Patient seen laying in bed. He is severely hard of hearing. He did not have any other episodes of VT/VF.      PMH: Alcoholic CMO/AICD, Persistent AFib, CVA/left sided weakness, HTN, VHD, chronic combined systolic and diastolic HF/EF 30%,   PSH: Medtronic dual chamber AICD, LHC, exploratory lap  Family History:  father- PPM. ,mother PPM  Social History: never smoker, hx alcohol abuse     Previous Cardiac Diagnostics:      AICD Interrogation (10.30.24):         TTE 04.05.23:  There is no evidence of intracardiac shunting.  The left ventricle is mildly enlarged with moderately decreased systolic function.  The estimated ejection fraction is 35%.  Left ventricular diastolic dysfunction.  Mild aortic regurgitation.  Mild tricuspid regurgitation.  Normal central venous pressure (3 mmHg).  The estimated PA systolic pressure is 29 mmHg.  Normal right ventricular size with mildly reduced right ventricular systolic function.  Mild mitral regurgitation.  Mild right atrial enlargement.     CUS 04.05.23:  The right internal carotid artery demonstrated less than 50% stenosis.  The left internal carotid artery demonstrated less than 50% stenosis.  Bilateral vertebral arteries were patent with antegrade flow.     TTE 10.04.2022:  LV is mildly enlarged. Global LVSR is moderately decreased. LVEF 30%. LV diastolic function is impaired (Grade I) with normal LA pressure. Moderate MR. Mild to moderate TR. Mild AR. Mild AS noted with adequate cuspal excursion. PASP 28mmHg. Intracardiac device wire is visualized in right heart.     CUS 10.04.2022:  1-39% stenosis mid LICA  1-39% stenosis pRICA  Antegrade flow to bilateral vertebral arteries     [facility] 06.20.2017:  LM: large vessel without evidence of obstructive CAD.   LAD: 60% distal stenosis  Lcx: 60%  distal stenosis  RCA: large and dominant with 60% narrowing of PDA  Mean RA pressure 3mmHG., RV pressure 55/3mmHg, PAP 55/20mmHg. These values represent the presence of moderate pulmonary HTN. PCWP 20-24mmHg which was moderately to severely elevated. No significant gradient across aortic valve. LVEDP 20mmHg. CO 2.3L/min with CI 1.4L/min    Review of Systems   Cardiovascular:  Positive for chest pain and syncope.   Respiratory:  Positive for shortness of breath.      Objective:     Vital Signs (Most  "Recent):  Temp: 97.5 °F (36.4 °C) (11/01/24 0727)  Pulse: 64 (11/01/24 0727)  Resp: 18 (11/01/24 0727)  BP: 127/71 (11/01/24 0727)  SpO2: 96 % (11/01/24 0727) Vital Signs (24h Range):  Temp:  [97.4 °F (36.3 °C)-97.7 °F (36.5 °C)] 97.5 °F (36.4 °C)  Pulse:  [60-64] 64  Resp:  [18-20] 18  SpO2:  [94 %-99 %] 96 %  BP: ()/(42-77) 127/71   Weight: 82.5 kg (181 lb 14.1 oz)  Body mass index is 27.65 kg/m².  SpO2: 96 %       Intake/Output Summary (Last 24 hours) at 11/1/2024 0814  Last data filed at 11/1/2024 0619  Gross per 24 hour   Intake --   Output 860 ml   Net -860 ml     Lines/Drains/Airways       Peripheral Intravenous Line  Duration                  Peripheral IV - Single Lumen 10/30/24 1644 20 G Anterior;Right Forearm 1 day                    Significant Labs:   Chemistries:   Recent Labs   Lab 10/28/24  0700 10/30/24  1555 11/01/24  0522    136 140   K 4.1 4.0 4.2    106 107   CO2 26 22* 25   BUN 26.0* 23.8 22.9   CREATININE 0.91 0.93 0.90   CALCIUM 8.9 9.5 8.9   BILITOT 1.0 1.1 1.4   ALKPHOS 77 96 82   ALT 24 24 21   AST 17 20 21   GLUCOSE 90 97 92   MG  --   --  2.20   TROPONINI  --  0.034  --         CBC/Anemia Labs: Coags:    Recent Labs   Lab 10/28/24  0700 10/30/24  1555 11/01/24  0522   WBC 6.04 9.11 5.14   HGB 10.6* 12.6* 11.8*   HCT 33.6* 40.2* 36.4*    245 213   MCV 90.8 92.4 89.4   RDW 15.6 15.8 15.5   FOLATE 17.7  --   --     No results for input(s): "PT", "INR", "APTT" in the last 168 hours.     Telemetry:  NSR    Physical Exam  HENT:      Nose: Nose normal.   Cardiovascular:      Rate and Rhythm: Normal rate and regular rhythm.      Pulses: Normal pulses.      Heart sounds: Normal heart sounds. No murmur heard.  Pulmonary:      Effort: Pulmonary effort is normal. No respiratory distress.      Breath sounds: Normal breath sounds.   Skin:     General: Skin is dry.         Current Schedule Inpatient Medications:   [START ON 11/6/2024] amiodarone  200 mg Oral Daily    [START ON " 11/3/2024] amiodarone  200 mg Oral BID    amiodarone  400 mg Oral BID    aspirin  81 mg Oral Daily    atorvastatin  20 mg Oral QHS    folic acid  1,000 mcg Oral Daily    polyethylene glycol  17 g Oral Daily    propranoloL  40 mg Oral BID    tamsulosin  0.4 mg Oral Nightly     Continuous Infusions:      Assessment:   ICD Discharge    - VT episode with 1 defibrillation   Near syncope s/t VT   Recurrent hypotension   Hx of hydrocephalus s/p  shunt ~ evidence of ventricular collapse   - MRI (10.23.24): MRI brain without contrast revealed severe generalized cortical and brainstem atrophy, chronic white matter changes and collapsed ventricles.   Chronic systolic Heart failure    - LVEF 35%  Alcoholic CMO   -Medtronic AICD  PAfib ~ currently A paced   - CHADS VASC score 6   -On Eliquis outpatient  Native CAD   -[facility] 6.2017: LM: no obstructive CAD. LAD: 60% distal stenosis. Lcx: 60%  distal stenosis. RCA: large/dominant; 60% narrowing of PDA  Bladder outlet obstruction and urinary retention   Hx hydrocephalus, S/P  shunt, evidence ventricular collapse  - 10/23/24 - MRI brain without contrast revealed severe generalized cortical and brainstem atrophy, chronic white matter changes and collapsed ventricles.    HTN  HLD  Hx Alcohol abuse  Hx CVA right posterior frontal and left anterior temporal regions with residual left arm weakness/paresthesias      Plan:   ICD interrogation reviewed   cont Amiodarone PO Taper dose (400mg BID x3 days then, 200mg BID x3 days, then 200mg daily)  Cont, propranolol 40mg   Cont. Eliquis for CVA risk reduction   Cont. ASA & Statin   Case discussed with Dr. Cornejo ~ no further recommendations from a cardiology standpoint   Cardiology to sign off, please reconsult if needed           Carly Guillory NP  Cardiology  [facility] - 4th Floor Medical Telemetry

## 2024-11-01 NOTE — CONSULTS
[facility] - 4th Floor Medical Telemetry  Neurosurgery  Consult Note    Inpatient consult to Neurosurgery  Consult performed by: Elsa Delvalle PA  Consult ordered by: Tyler Zurita MD        Subjective:     Chief Complaint/Reason for Admission: Shunt eval    History of Present Illness: Patient is a 78-year-old male whose history includes PAF, systolic heart failure, hydrocephalus with  shunt 9/24/2019 with Dr. Taylor, and bladder outlet obstruction.  Presented to the ED with complaints of low blood pressure and diaphoresis. Patient had similar symptoms recently which were evaluated during an admission to [facility].      Discharged from [facility] on 10/25/24 following an 8 day admission during which time he had urinary retention. Has history of recurrent UTIs and prior to this he had a recent pan-sensitive E. Coli on 10/6 (treatment initiated at [facility] but patient left AMA). Alfonso placed and was discharged home with it with plans to leave it in until he was more ambulatory. Cardiology changed Sotalol to propranolol. Neurology evaluated for diaphoresis and suspected to be secondary to autonomic dysfunction. Seizures were ruled out.  MRI brain without contrast revealed severe generalized cortical and brainstem atrophy, chronic white matter changes and collapsed ventricles.  It was recommended he follow-up with Neurosurgery regarding  shunt settings and ventricular collapse.  Discharged to [facility].     Spoke with his daughter, Charmaine Vargas, on the phone. She reports patient had a urology follow up 10/30 but shortly after arriving he became hypotensive and diaphoretic and EMS summoned. VS on arrival: T 97.7, P 56, R 18, B/P 119/56, Sats 100% on room air. Initial labs unremarkable. UA collected from chronic indwelling alfonso showed 1+ protein, 3+ blood, 25 leuk est, RBC > 100, WBC 6-10 and trace bacteria.  Chest x-ray negative for acute findings. Had several episodes of witnessed hypotension  in ED.    Apparently patient has had a complete workup concerning his near-syncope with recommendations to follow-up with Neurosurgery regarding  shunt settings and ventricular collapse. MRI brain completed showed severe generalized cortical and brain atrophy with chronic white matter changes and collapsed ventricles. Dr. Duff has been consulted for evaluation and treatment recommendations.    On PE today he is sitting up in bed, NAD. He denies HA and N/V. He states he has had blurred vision for many years. He is oriented to person and place. Confused on time and situation. No other complaints at time of rounds.    PTA Medications   Medication Sig    apixaban (ELIQUIS) 5 mg Tab Take 5 mg by mouth 2 (two) times daily.    aspirin (ECOTRIN) 81 MG EC tablet Take 81 mg by mouth once daily.    folic acid (FOLVITE) 1 MG tablet Take 1,000 mcg by mouth once daily.    polyethylene glycol (GLYCOLAX) 17 gram PwPk Take 17 g by mouth once daily.    propranoloL (INDERAL) 40 MG tablet Take 40 mg by mouth 2 (two) times daily.    tamsulosin (FLOMAX) 0.4 mg Cap Take 0.4 mg by mouth nightly.    VITAMIN B-1 100 MG tablet Take 100 mg by mouth once daily.    vitamin D (VITAMIN D3) 1000 units Tab Take 2,000 Units by mouth once daily.    atorvastatin (LIPITOR) 20 MG tablet Take 1 tablet (20 mg total) by mouth once daily. (Patient taking differently: Take 20 mg by mouth every evening.)    carvediloL (COREG) 3.125 MG tablet Take 1 tablet (3.125 mg total) by mouth 2 (two) times daily.    sodium bicarbonate 650 MG tablet Take 1 tablet (650 mg total) by mouth once daily. for 5 days       Review of patient's allergies indicates:  No Known Allergies    Past Medical History:   Diagnosis Date    Hypertension     Stroke      Past Surgical History:   Procedure Laterality Date    INSERTION OF PACEMAKER       Family History    None       Tobacco Use    Smoking status: Never    Smokeless tobacco: Never   Substance and Sexual Activity    Alcohol use: Yes     Drug use: Never    Sexual activity: Not on file     Review of Systems  Objective:     Weight: 82.5 kg (181 lb 14.1 oz)  Body mass index is 27.65 kg/m².  Vital Signs (Most Recent):  Temp: 97.5 °F (36.4 °C) (11/01/24 1103)  Pulse: 64 (11/01/24 1103)  Resp: 18 (11/01/24 1103)  BP: 106/68 (11/01/24 1103)  SpO2: 96 % (11/01/24 1103) Vital Signs (24h Range):  Temp:  [97.4 °F (36.3 °C)-97.7 °F (36.5 °C)] 97.5 °F (36.4 °C)  Pulse:  [60-64] 64  Resp:  [18-20] 18  SpO2:  [94 %-99 %] 96 %  BP: ()/(42-77) 106/68     Date 11/01/24 0700 - 11/02/24 0659   Shift 0528-1858 9289-2594 9346-1633 24 Hour Total   INTAKE   P.O. 240   240   Shift Total(mL/kg) 240(2.9)   240(2.9)   OUTPUT   Shift Total(mL/kg)       Weight (kg) 82.5 82.5 82.5 82.5       Physical Exam:  Nursing note and vitals reviewed.    Constitutional: He appears well-developed. No distress.     Eyes: Pupils are equal, round, and reactive to light. EOM are normal.     Cardiovascular: Intact distal pulses.     Psych/Behavior:   Alert, oriented to person and place. Confused on year. Speech clear. Follows commands briskly in all ext.     Musculoskeletal:        Neck: Range of motion is full. There is no tenderness.        Back: Range of motion is full. There is no tenderness.        Right Upper Extremities: Range of motion is full. Muscle strength is 5/5.        Left Upper Extremities: Range of motion is full. Muscle strength is 5/5.       Right Lower Extremities: Range of motion is full. Muscle strength is 5/5.        Left Lower Extremities: Range of motion is full. Muscle strength is 5/5.     Neurological:        Sensory: There is no sensory deficit in the trunk. There is no sensory deficit in the extremities.        DTRs: He displays no Babinski's sign on the right side. He displays no Babinski's sign on the left side.        Cranial nerves: Cranial nerve(s) II, III, IV, V, VI, VII, VIII, IX, X, XI and XII are intact.     Shunt valve pumps briskly, slow to  "refill    Significant Labs:  Recent Labs   Lab 10/30/24  1555 11/01/24  0522    140   K 4.0 4.2    107   CO2 22* 25   BUN 23.8 22.9   CREATININE 0.93 0.90   CALCIUM 9.5 8.9   MG  --  2.20     Recent Labs   Lab 10/30/24  1555 11/01/24  0522   WBC 9.11 5.14   HGB 12.6* 11.8*   HCT 40.2* 36.4*    213     No results for input(s): "LABPT", "INR", "APTT" in the last 48 hours.  Microbiology Results (last 7 days)       ** No results found for the last 168 hours. **          Significant Diagnostics:  MRI Brain Without Contrast  Order: [id number]  Narrative    Indication:  Decreased level of consciousness initial encounter, October 23, 2024 at 2:48 PM    Technique:  Sagittal axial and coronal MRI images were obtained through the brain using T1-T2 FLAIR and diffusion weighted sequences.  No contrast was given.    Findings:  On sagittal T1-weighted images, the pituitary gland pituitary infundibulum optic chiasm liu, and cerebellum are unremarkable. 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 On diffusion weighted images, there is no evidence of acute CVA. There is a right-sided occipital ventriculostomy catheter in place with reservoir overlying the right occipital lobe. There is resulting metallic artifact partially obscuring the right occipital lobe. There is diffuse cerebral and cerebellar atrophy with chronic white matter small vessel ischemic change. The gray-white differentiation is well preserved throughout all cerebral and cerebellar areas.  There is no bleed, midline shift, mass effect or acute focal lesion.  The ventricles and sulci are dilated in proportion to underlying atrophy. The internal auditory canals are unremarkable.  The optic globes, optic nerves and surrounding structures are unremarkable.  The paranasal sinuses are clear and well aerated. The suprasellar cistern basal cisterns and cerebellopontine angle regions are within normal limits.    Impression: Diffuse atrophy with right-sided " ventriculostomy catheter in place. There is no evidence of acute intracranial disease.    CT HEAD WITHOUT CONTRAST     CLINICAL HISTORY:  Mental status change, unknown cause;     TECHNIQUE:  Low dose axial CT images obtained throughout the head without intravenous contrast.  Axial, sagittal and coronal reconstructions were performed and interpreted.     DLP: 2015 mGycm     All CT scans at this location are performed using dose optimization techniques as appropriate to a performed exam including the following automated exposure control, adjustment of the mA and/or kV according to patient size and/or use of iterative reconstruction technique     COMPARISON:  CT head 09/14/2024     FINDINGS:  BRAIN: Severe cerebral atrophy.  Unchanged white matter change.  No hemorrhage.  No mass effect or midline shift.  The posterior fossa and midline structures are unremarkable.     VENTRICLES: Unchanged ventriculostomy drain in place via right parietooccipital approach.  The ventricles are decompressed.     EXTRA-AXIAL: No abnormal extra-axial collections.     BONES: Calvarium is intact.     SINUSES AND MASTOIDS: Chronic change at the mastoids.     Impression:     No appreciable acute intracranial abnormality.  No detrimental change compared to previous.    Assessment/Plan:     AMS    Patient is GCS 14 with no c/o HA  MRI reviewed; no acute intracranial findings present  CT head from 10/13/24 with no changes compared to previous    He has had stable imaging dating back to April 2023  His last shunt adjustment in the office was 10/23/2019 in which shunt was set to 2  I will have Dr. Duff review imaging; further recs to follow    Thank you for your consult.     JOSEPH Singleton  Neurosurgery  [facility] - 4th Floor Medical Telemetry

## 2024-11-01 NOTE — PROGRESS NOTES
[facility] Hospital Medicine Progress Note        Chief Complaint: Inpatient Follow-up for near-syncope    HPI:   78-year-old male whose history includes PAF, systolic heart failure, hydrocephalus with  shunt, and bladder outlet obstruction.  Presented to the ED with complaints of low blood pressure and diaphoresis. Patient had similar symptoms recently which were evaluated during an admission to [facility].      Discharged from [facility] on 10/25/24 following an 8 day admission during which time he had urinary retention. Has history of recurrent UTIs and prior to this he had a recent pan-sensitive E. Coli on 10/6 (treatment initiated at [facility] but patient left AMA). Alfonso placed and was discharged home with it with plans to leave it in until he was more ambulatory. Cardiology changed Sotalol to propranolol. Neurology evaluated for diaphoresis and suspected to be secondary to autonomic dysfunction. Seizures were ruled out.  MRI brain without contrast revealed severe generalized cortical and brainstem atrophy, chronic white matter changes and collapsed ventricles.  It was recommended he follow-up with Neurosurgery regarding  shunt settings and ventricular collapse.  Discharged to [facility].     At the time of my exam patient is awake and alert. Speech clear. Able to state his name and knows he's in the hospital but otherwise unable to provide any information. I spoke with his daughter, Charmaine Vargas, on the phone. She reports patient had a urology follow up today but shortly after arriving he became hypotensive and diaphoretic and EMS summoned. VS on arrival: T 97.7, P 56, R 18, B/P 119/56, Sats 100% on room air. Initial labs unremarkable. UA collected from chronic indwelling alfonso showed 1+ protein, 3+ blood, 25 leuk est, RBC > 100, WBC 6-10 and trace bacteria.  Chest x-ray negative for acute findings. Had several episodes of witnessed hypotension in ED.    Interval Hx:   The  patient was seen and examined.  Apparently patient has had a complete workup concerning his near-syncope with recommendations to follow-up with Neurosurgery regarding  shunt settings and ventricular collapse.  MRI brain completed showed severe generalized cortical and brain atrophy with chronic white matter changes and collapsed ventricles.  Neurosurgery has been consulted, recommendations pending.    At the time of my visit, patient asleep however wakes up to verbal stimuli.  Had some episodes of profuse sweating overnight however this stabilizes without any intervention.    Case was discussed with patient's nurse and  on the floor.    Objective/physical exam:  General: In no acute distress, afebrile  Chest: Clear to auscultation bilaterally  Heart: RRR, +S1, S2, no appreciable murmur  Abdomen: Soft, nontender, BS +  MSK:  Generalized weakness   Neurologic:  Asleep, wakes up to verbal stimuli, nonfocal     VITAL SIGNS: 24 HRS MIN & MAX LAST   Temp  Min: 97.4 °F (36.3 °C)  Max: 97.7 °F (36.5 °C) 97.7 °F (36.5 °C)   BP  Min: 86/42  Max: 142/77 (!) 104/56   Pulse  Min: 60  Max: 67  63   Resp  Min: 19  Max: 20 20   SpO2  Min: 94 %  Max: 99 % 97 %     I have reviewed the following labs:  Recent Labs   Lab 10/28/24  0700 10/30/24  1555 11/01/24  0522   WBC 6.04 9.11 5.14   RBC 3.70* 4.35* 4.07*   HGB 10.6* 12.6* 11.8*   HCT 33.6* 40.2* 36.4*   MCV 90.8 92.4 89.4   MCH 28.6 29.0 29.0   MCHC 31.5* 31.3* 32.4*   RDW 15.6 15.8 15.5    245 213   MPV 9.8 9.6 9.6     Recent Labs   Lab 10/28/24  0700 10/30/24  1555 11/01/24  0522    136 140   K 4.1 4.0 4.2    106 107   CO2 26 22* 25   BUN 26.0* 23.8 22.9   CREATININE 0.91 0.93 0.90   CALCIUM 8.9 9.5 8.9   MG  --   --  2.20   ALBUMIN 2.7* 3.0* 2.7*   ALKPHOS 77 96 82   ALT 24 24 21   AST 17 20 21   BILITOT 1.0 1.1 1.4     Microbiology Results (last 7 days)       ** No results found for the last 168 hours. **             See below for  Radiology    Assessment/Plan:    Recurrent Near syncope secondary to recurrent hypotension - possible autonomic dysfunction vs arrhythmia   - ICD interrogation - shows episodes of VT/VF with delivered shock.   - consult CIS - recommended NSx eval of  shunt   - check orthostatic VS     Bladder outlet obstruction and urinary retention  - continue indwelling catheter   - reschedule f/u with urology,      Paroxysmal atrial fibrillation - currently in sinus rhythm  - monitor telemetry  - Continue Eliquis      Chronic HFrEF - LVEF 35% - stable     Hx hydrocephalus, S/P  shunt, evidence ventricular collapse  - 10/23/24 - MRI brain without contrast revealed severe generalized cortical and brainstem atrophy, chronic white matter changes and collapsed ventricles.  It was recommended he follow-up with Neurosurgery regarding  shunt settings and ventricular collapse  -neurosurgery consulted, recommendations pending       VTE prophylaxis:  Eliquis    Patient condition:  Fair  Anticipated discharge and Disposition:   To be decided      All diagnosis and differential diagnosis have been reviewed; assessment and plan has been documented; I have personally reviewed the labs and test results that are presently available; I have reviewed the patients medication list; I have reviewed the consulting providers response and recommendations. I have reviewed or attempted to review medical records based upon their availability    All of the patient's questions have been  addressed and answered. Patient's is agreeable to the above stated plan. I will continue to monitor closely and make adjustments to medical management as needed.    Portions of this note dictated using EMR integrated voice recognition software, and may be subject to voice recognition errors not corrected at proofreading. Please contact writer for clarification if needed.   _____________________________________________________________________    Malnutrition  Status:    Scheduled Med:   [START ON 11/6/2024] amiodarone  200 mg Oral Daily    [START ON 11/3/2024] amiodarone  200 mg Oral BID    amiodarone  400 mg Oral BID    aspirin  81 mg Oral Daily    atorvastatin  20 mg Oral QHS    folic acid  1,000 mcg Oral Daily    polyethylene glycol  17 g Oral Daily    propranoloL  40 mg Oral BID    tamsulosin  0.4 mg Oral Nightly      Continuous Infusions:     PRN Meds:    Current Facility-Administered Medications:     acetaminophen, 1,000 mg, Oral, Q6H PRN    aluminum-magnesium hydroxide-simethicone, 30 mL, Oral, QID PRN    bisacodyL, 10 mg, Rectal, Daily PRN    dextrose 10%, 12.5 g, Intravenous, PRN    dextrose 10%, 25 g, Intravenous, PRN    glucagon (human recombinant), 1 mg, Intramuscular, PRN    glucose, 16 g, Oral, PRN    glucose, 24 g, Oral, PRN    melatonin, 6 mg, Oral, Nightly PRN    naloxone, 0.02 mg, Intravenous, PRN    ondansetron, 4 mg, Intravenous, Q4H PRN    prochlorperazine, 5 mg, Intravenous, Q6H PRN    senna-docusate 8.6-50 mg, 1 tablet, Oral, BID PRN    sodium chloride 0.9%, 10 mL, Intravenous, PRN     Radiology:  I have personally reviewed the following imaging and agree with the radiologist.     X-Ray Chest AP Portable  Narrative: EXAMINATION:  XR CHEST AP PORTABLE    CLINICAL HISTORY:  Syncope and collapse    TECHNIQUE:  Frontal view(s) of the chest.    COMPARISON:  Radiography 10/13/2024    FINDINGS:  Left-sided AICD.  Cardiac silhouette upper normal in size.  The lungs are well-inflated.  No consolidation identified.  No significant pleural effusion or discernible pneumothorax.  Impression: No acute pulmonary process identified.    Electronically signed by: Scar Saucedo  Date:    10/30/2024  Time:    17:21      Paulina Sprague MD  Department of Hospital Medicine   [facility]   11/01/2024

## 2024-11-01 NOTE — PROGRESS NOTES
[facility] Hospital Medicine Progress Note        Chief Complaint: Inpatient Follow-up for Near syncope at urologist office    HPI: 78-year-old male whose history includes PAF, systolic heart failure, hydrocephalus with  shunt, and bladder outlet obstruction.  Presented to the ED with complaints of low blood pressure and diaphoresis. Patient had similar symptoms recently which were evaluated during an admission to [facility].      Discharged from [facility] on 10/25/24 following an 8 day admission during which time he had urinary retention. Has history of recurrent UTIs and prior to this he had a recent pan-sensitive E. Coli on 10/6 (treatment initiated at [facility] but patient left AMA). Alfonso placed and was discharged home with it with plans to leave it in until he was more ambulatory. Cardiology changed Sotalol to propranolol. Neurology evaluated for diaphoresis and suspected to be secondary to autonomic dysfunction. Seizures were ruled out.  MRI brain without contrast revealed severe generalized cortical and brainstem atrophy, chronic white matter changes and collapsed ventricles.  It was recommended he follow-up with Neurosurgery regarding  shunt settings and ventricular collapse.  Discharged to [facility].     At the time of my exam patient is awake and alert. Speech clear. Able to state his name and knows he's in the hospital but otherwise unable to provide any information. I spoke with his daughter, Charmaine Vargas, on the phone. She reports patient had a urology follow up today but shortly after arriving he became hypotensive and diaphoretic and EMS summoned. VS on arrival: T 97.7, P 56, R 18, B/P 119/56, Sats 100% on room air. Initial labs unremarkable. UA collected from chronic indwelling alfonso showed 1+ protein, 3+ blood, 25 leuk est, RBC > 100, WBC 6-10 and trace bacteria.  Chest x-ray negative for acute findings. Had several episodes of witnessed hypotension in ED.      Interval Hx:     10/31/2024 Dr. Zurita-patient admitted for recurrent near-syncope with a recent admission to [facility].  At [facility] he had complete workup concerning his near-syncope with recommendations to follow-up with neurosurgery regarding BP shunt settings and ventricular collapse.  He had an MRI of the brain without contrast on 10/23/2024 showing severe generalized cortical and brain atrophy, chronic white matter changes and collapse ventricles.    Patient very hard of hearing with mood swings that could be secondary to his frustration for not being able to comprehend nurses, maybe early dementia but let us rule out  shunt malfunction.  We will consult Neurosurgery as per recommendations.            Case was discussed with patient's nurse and  on the floor.  Objective/physical exam:  General: Appears comfortable now that we moved him closer to nurses station with a bigger TV  HEENT: NC/AT  Neck:  No JVD  Chest: CTABL/ ICD Left upper chest   CVS: Regular rhythm. Normal S1/S2.  Abdomen: nondistended, normoactive BS, soft and non-tender.  MSK: No obvious deformity or joint swelling  Skin: Warm and dry  Neuro: AAOx3, no focal neurological deficit  Psych: Cooperative    Lindsey cath w bloody urine     VITAL SIGNS: 24 HRS MIN & MAX LAST   Temp  Min: 97.5 °F (36.4 °C)  Max: 97.6 °F (36.4 °C) 97.5 °F (36.4 °C)   BP  Min: 86/42  Max: 149/82 124/67   Pulse  Min: 60  Max: 76  64   Resp  Min: 19  Max: 20 20   SpO2  Min: 93 %  Max: 99 % 97 %     I have reviewed the following labs:  Recent Labs   Lab 10/28/24  0700 10/30/24  1555   WBC 6.04 9.11   RBC 3.70* 4.35*   HGB 10.6* 12.6*   HCT 33.6* 40.2*   MCV 90.8 92.4   MCH 28.6 29.0   MCHC 31.5* 31.3*   RDW 15.6 15.8    245   MPV 9.8 9.6     Recent Labs   Lab 10/28/24  0700 10/30/24  1555    136   K 4.1 4.0    106   CO2 26 22*   BUN 26.0* 23.8   CREATININE 0.91 0.93   CALCIUM 8.9 9.5   ALBUMIN 2.7* 3.0*   ALKPHOS 77 96   ALT 24 24   AST 17 20    BILITOT 1.0 1.1     Microbiology Results (last 7 days)       ** No results found for the last 168 hours. **             See below for Radiology    Assessment/Plan:  Recurrent Near syncope secondary to recurrent hypotension - possible autonomic dysfunction vs arrhythmia   - ICD interrogation - shows episodes of VT/VF with delivered shock.   - consult CIS - recommended NS eval of  shunt   - check orthostatic VS     Bladder outlet obstruction and urinary retention  - continue indwelling catheter   - reschedule f/u with urology,     Paroxysmal atrial fibrillation - currently in sinus rhythm  - monitor telemetry  - Continue Eliquis      Chronic HFrEF - LVEF 35% - stable     Hx hydrocephalus, S/P  shunt, evidence ventricular collapse  - 10/23/24 - MRI brain without contrast revealed severe generalized cortical and brainstem atrophy, chronic white matter changes and collapsed ventricles.  It was recommended he follow-up with Neurosurgery regarding  shunt settings and ventricular collapse    VTE prophylaxis: eliquis     Patient condition:  Stable    Anticipated discharge and Disposition:   tbd      All diagnosis and differential diagnosis have been reviewed; assessment and plan has been documented; I have personally reviewed the labs and test results that are presently available; I have reviewed the patients medication list; I have reviewed the consulting providers response and recommendations. I have reviewed or attempted to review medical records based upon their availability    All of the patient's questions have been  addressed and answered. Patient's is agreeable to the above stated plan. I will continue to monitor closely and make adjustments to medical management as needed.    Portions of this note dictated using EMR integrated voice recognition software, and may be subject to voice recognition errors not corrected at proofreading. Please contact writer for clarification if needed.    _____________________________________________________________________    Malnutrition Status:    Scheduled Med:   [START ON 11/6/2024] amiodarone  200 mg Oral Daily    [START ON 11/3/2024] amiodarone  200 mg Oral BID    amiodarone  400 mg Oral BID    aspirin  81 mg Oral Daily    atorvastatin  20 mg Oral QHS    folic acid  1,000 mcg Oral Daily    polyethylene glycol  17 g Oral Daily    propranoloL  40 mg Oral BID    tamsulosin  0.4 mg Oral Nightly      Continuous Infusions:       Radiology:  I have personally reviewed the following imaging and agree with the radiologist.     X-Ray Chest AP Portable  Narrative: EXAMINATION:  XR CHEST AP PORTABLE    CLINICAL HISTORY:  Syncope and collapse    TECHNIQUE:  Frontal view(s) of the chest.    COMPARISON:  Radiography 10/13/2024    FINDINGS:  Left-sided AICD.  Cardiac silhouette upper normal in size.  The lungs are well-inflated.  No consolidation identified.  No significant pleural effusion or discernible pneumothorax.  Impression: No acute pulmonary process identified.    Electronically signed by: Scar Saucedo  Date:    10/30/2024  Time:    17:21      Tyler Zurita MD  Department of Hospital Medicine   [facility]   10/31/2024

## 2024-11-01 NOTE — PROGRESS NOTES
[facility] - 4th Floor Medical Telemetry  Wound Care    Patient Name:  Madan Elder   MRN:  [id number]  Date: 11/1/2024  Diagnosis: <principal problem not specified>    History:     Past Medical History:   Diagnosis Date    Hypertension     Stroke        Social History     Socioeconomic History    Marital status:    Tobacco Use    Smoking status: Never    Smokeless tobacco: Never   Substance and Sexual Activity    Alcohol use: Yes    Drug use: Never     Social Drivers of Health     Financial Resource Strain: Low Risk  (10/17/2024)    Received from [facility] and Its Subsidiaries and Affiliates    Overall Financial Resource Strain (CARDIA)     Difficulty of Paying Living Expenses: Not hard at all   Food Insecurity: No Food Insecurity (10/17/2024)    Received from [facility] and Its Subsidiaries and Affiliates    Hunger Vital Sign     Worried About Running Out of Food in the Last Year: Never true     Ran Out of Food in the Last Year: Never true   Transportation Needs: No Transportation Needs (10/17/2024)    [facility]    PRAPARE - Transportation     Lack of Transportation (Medical): No     Lack of Transportation (Non-Medical): No   Physical Activity: Inactive (10/7/2024)    Exercise Vital Sign     Days of Exercise per Week: 0 days     Minutes of Exercise per Session: 0 min   Stress: Patient Unable To Answer (10/9/2024)    [employer] - Occupational Stress Questionnaire     Feeling of Stress : Patient unable to answer   Housing Stability: Unknown (10/17/2024)    Received from [facility] and Its Subsidiaries and Affiliates    Housing Stability Vital Sign     Unable to Pay for Housing in the Last Year: No     Number of Times Moved in the Last Year: 0        Precautions:     Allergies as of 10/30/2024    (No Known Allergies)       [facility] Assessment Details/Treatment      11/01/24 1320   WO Assessment   Visit Date 11/01/24   Visit Time 1320   Consult Type New   [facility] Wound   Intervention chart review;assessed;applied;orders   Teaching on-going   Positioning   Body Position sitting up in bed   Head of Bed (HOB) Positioning HOB at 30-45 degrees        Wound 10/31/24 0800 Ulceration anterior Penis   Date First Assessed/Time First Assessed: 10/31/24 0800   Present on Original Admission: Yes  Primary Wound Type: Ulceration  Orientation: anterior  Location: Penis   Wound Image    Dressing Appearance Open to air   Drainage Amount Scant   Drainage Characteristics/Odor Serous   Appearance Pink;Moist   Tissue loss description Partial thickness   Black (%), Wound Tissue Color 0 %   Red (%), Wound Tissue Color 100 %   Yellow (%), Wound Tissue Color 0 %   Periwound Area Dry;Pink   Wound Edges Undefined   Care Cleansed with:;Soap and water        Wound 10/31/24 0800 Pressure Injury Buttocks   Date First Assessed/Time First Assessed: 10/31/24 0800   Present on Original Admission: Yes  Primary Wound Type: Pressure Injury  Location: Buttocks   Wound Image    Dressing Appearance Open to air   Drainage Amount None   Drainage Characteristics/Odor No odor   Appearance Pink;Moist   Tissue loss description Partial thickness   Black (%), Wound Tissue Color 0 %   Red (%), Wound Tissue Color 100 %   Yellow (%), Wound Tissue Color 0 %   Periwound Area Intact;Dry;Pink   Wound Edges Undefined   Care Cleansed with:;Soap and water   Dressing Applied;Silver;Calcium alginate;Foam     WO consulted for penis and butt. Discussed plan of care with assigned nurse. Treatment recommendations to be put in place. Butt: Cleanse with hibiclens, dry well. Apply large square foam and change every 2 days. Penis: Cleanse with hibiclens, dry well. Leave open to air. Educated staff on the importance of  maintaining good hygiene regimen and offloading patient, verbalized understanding. Patient mobilizes self with assistance. Nursing to continue with treatment recommendations and other preventative measures. No further questions at this time. Will follow up.   11/01/2024

## 2024-11-02 LAB
ANION GAP SERPL CALC-SCNC: 7 MEQ/L
APTT PPP: 32 SECONDS (ref 23.2–33.7)
BASOPHILS # BLD AUTO: 0.02 X10(3)/MCL
BASOPHILS NFR BLD AUTO: 0.3 %
BUN SERPL-MCNC: 22.9 MG/DL (ref 8.4–25.7)
CALCIUM SERPL-MCNC: 8.6 MG/DL (ref 8.8–10)
CHLORIDE SERPL-SCNC: 107 MMOL/L (ref 98–107)
CO2 SERPL-SCNC: 25 MMOL/L (ref 23–31)
CREAT SERPL-MCNC: 0.86 MG/DL (ref 0.72–1.25)
CREAT/UREA NIT SERPL: 27
EOSINOPHIL # BLD AUTO: 0.27 X10(3)/MCL (ref 0–0.9)
EOSINOPHIL NFR BLD AUTO: 4.6 %
ERYTHROCYTE [DISTWIDTH] IN BLOOD BY AUTOMATED COUNT: 15.4 % (ref 11.5–17)
GFR SERPLBLD CREATININE-BSD FMLA CKD-EPI: >60 ML/MIN/1.73/M2
GLUCOSE SERPL-MCNC: 89 MG/DL (ref 82–115)
HCT VFR BLD AUTO: 34.8 % (ref 42–52)
HGB BLD-MCNC: 11.3 G/DL (ref 14–18)
IMM GRANULOCYTES # BLD AUTO: 0.02 X10(3)/MCL (ref 0–0.04)
IMM GRANULOCYTES NFR BLD AUTO: 0.3 %
INR PPP: 1.2
LYMPHOCYTES # BLD AUTO: 1.55 X10(3)/MCL (ref 0.6–4.6)
LYMPHOCYTES NFR BLD AUTO: 26.2 %
MCH RBC QN AUTO: 29 PG (ref 27–31)
MCHC RBC AUTO-ENTMCNC: 32.5 G/DL (ref 33–36)
MCV RBC AUTO: 89.2 FL (ref 80–94)
MONOCYTES # BLD AUTO: 0.45 X10(3)/MCL (ref 0.1–1.3)
MONOCYTES NFR BLD AUTO: 7.6 %
NEUTROPHILS # BLD AUTO: 3.61 X10(3)/MCL (ref 2.1–9.2)
NEUTROPHILS NFR BLD AUTO: 61 %
NRBC BLD AUTO-RTO: 0 %
PLATELET # BLD AUTO: 204 X10(3)/MCL (ref 130–400)
PMV BLD AUTO: 9.4 FL (ref 7.4–10.4)
POTASSIUM SERPL-SCNC: 4.4 MMOL/L (ref 3.5–5.1)
PROTHROMBIN TIME: 15.3 SECONDS (ref 12.5–14.5)
RBC # BLD AUTO: 3.9 X10(6)/MCL (ref 4.7–6.1)
SODIUM SERPL-SCNC: 139 MMOL/L (ref 136–145)
WBC # BLD AUTO: 5.92 X10(3)/MCL (ref 4.5–11.5)

## 2024-11-02 PROCEDURE — 25000003 PHARM REV CODE 250: Performed by: NURSE PRACTITIONER

## 2024-11-02 PROCEDURE — 36415 COLL VENOUS BLD VENIPUNCTURE: CPT | Performed by: NEUROLOGICAL SURGERY

## 2024-11-02 PROCEDURE — 97161 PT EVAL LOW COMPLEX 20 MIN: CPT

## 2024-11-02 PROCEDURE — 85610 PROTHROMBIN TIME: CPT | Performed by: NEUROLOGICAL SURGERY

## 2024-11-02 PROCEDURE — 85025 COMPLETE CBC W/AUTO DIFF WBC: CPT | Performed by: NEUROLOGICAL SURGERY

## 2024-11-02 PROCEDURE — 97166 OT EVAL MOD COMPLEX 45 MIN: CPT

## 2024-11-02 PROCEDURE — 85730 THROMBOPLASTIN TIME PARTIAL: CPT | Performed by: NEUROLOGICAL SURGERY

## 2024-11-02 PROCEDURE — 21400001 HC TELEMETRY ROOM

## 2024-11-02 PROCEDURE — 80048 BASIC METABOLIC PNL TOTAL CA: CPT | Performed by: NEUROLOGICAL SURGERY

## 2024-11-02 PROCEDURE — 25000003 PHARM REV CODE 250: Performed by: STUDENT IN AN ORGANIZED HEALTH CARE EDUCATION/TRAINING PROGRAM

## 2024-11-02 RX ORDER — LACTULOSE 10 G/15ML
15 SOLUTION ORAL 3 TIMES DAILY
Status: DISCONTINUED | OUTPATIENT
Start: 2024-11-02 | End: 2024-11-04 | Stop reason: HOSPADM

## 2024-11-02 RX ORDER — MUPIROCIN 20 MG/G
OINTMENT TOPICAL 2 TIMES DAILY
Status: DISCONTINUED | OUTPATIENT
Start: 2024-11-04 | End: 2024-11-04 | Stop reason: HOSPADM

## 2024-11-02 RX ADMIN — PROPRANOLOL HYDROCHLORIDE 40 MG: 20 TABLET ORAL at 08:11

## 2024-11-02 RX ADMIN — AMIODARONE HYDROCHLORIDE 400 MG: 200 TABLET ORAL at 08:11

## 2024-11-02 RX ADMIN — POLYETHYLENE GLYCOL 3350 17 G: 17 POWDER, FOR SOLUTION ORAL at 09:11

## 2024-11-02 RX ADMIN — AMIODARONE HYDROCHLORIDE 400 MG: 200 TABLET ORAL at 09:11

## 2024-11-02 RX ADMIN — TAMSULOSIN HYDROCHLORIDE 0.4 MG: 0.4 CAPSULE ORAL at 08:11

## 2024-11-02 RX ADMIN — APIXABAN 5 MG: 5 TABLET, FILM COATED ORAL at 06:11

## 2024-11-02 RX ADMIN — ASPIRIN 81 MG: 81 TABLET, COATED ORAL at 09:11

## 2024-11-02 RX ADMIN — ATORVASTATIN CALCIUM 20 MG: 10 TABLET, FILM COATED ORAL at 08:11

## 2024-11-02 RX ADMIN — FOLIC ACID 1000 MCG: 1 TABLET ORAL at 09:11

## 2024-11-02 NOTE — PT/OT/SLP EVAL
Physical Therapy Evaluation    Patient Name:  Madan Elder   MRN:  [id number]    Recommendations:     Discharge therapy intensity: Low Intensity Therapy   Discharge Equipment Recommendations: none   Barriers to discharge: None    Assessment:     Madan Elder is a 78 y.o. male admitted with a medical diagnosis of pt is s/p  shunt with normal pressure hydrocephalus admitted for low BP and diaphoresis  .  He presents with the following impairments/functional limitations: weakness, impaired endurance, gait instability, impaired functional mobility, impaired self care skills, decreased safety awareness .    Rehab Prognosis: Good; patient would benefit from acute skilled PT services to address these deficits and reach maximum level of function.    Recent Surgery: * No surgery found *      Plan:     During this hospitalization, patient would benefit from acute PT services   to address the identified rehab impairments via gait training, therapeutic activities, therapeutic exercises and progress toward the following goals:    Plan of Care Expires:  12/14/24    Subjective     Chief Complaint:   Patient/Family Comments/goals:   Pain/Comfort:  Pain Rating 1: 0/10    Patients cultural, spiritual, Pentec[employer]alism conflicts given the current situation:      Living Environment:  Pt  self reports that he lives in a senior living housing building in [facility] that has an elevator. However, the chart states that he resides at [facility]  Prior to admission, patients level of function was mod ind.  Equipment used at home: wheelchair, walker, rolling.  DME owned (not currently used): .  Upon discharge, patient will have assistance from himself.    Objective:     Communicated with nurse prior to session.  Patient found supine with alfonso catheter, telemetry  upon PT entry to room.    General Precautions: Standard, fall, hearing impaired  Orthopedic Precautions:N/A   Braces: N/A  Respiratory Status: Room air  Blood Pressure:        Exams:  RLE ROM: WFL  RLE Strength: WFL  LLE ROM: WFL  LLE Strength: WFL  Skin integrity: Visible skin intact      Functional Mobility:  Bed Mobility:     Scooting: stand by assistance  Supine to Sit: stand by assistance  Sit to Supine: stand by assistance  Transfers:     Sit to Stand:  stand by assistance with rolling walker  Bed to Chair: stand by assistance with  rolling walker  using  Step Transfer  Gait: pt ambulated 100ft with rw sba      AM-PAC 6 CLICK MOBILITY  Total Score:        Treatment & Education:      Patient provided with verbal education education regarding PT role/goals/POC, fall prevention, and safety awareness.  Understanding was verbalized.     Patient left supine with all lines intact and call button in reach.    GOALS:   Multidisciplinary Problems       Physical Therapy Goals          Problem: Physical Therapy    Goal Priority Disciplines Outcome Interventions   Physical Therapy Goal     PT, PT/OT Progressing    Description: Pt will be seen for the following goals  1. Pt will be ind with bed mobility  2. Pt will be mod ind with transfers with a rw  3. Pt will ambulate 200ft with a rw with mod ind                       History:     Past Medical History:   Diagnosis Date    Hypertension     Stroke        Past Surgical History:   Procedure Laterality Date    INSERTION OF PACEMAKER         Time Tracking:     PT Received On:    PT Start Time: 0845     PT Stop Time: 0903  PT Total Time (min): 18 min     Billable Minutes: Evaluation 18      11/02/2024

## 2024-11-02 NOTE — PT/OT/SLP EVAL
Occupational Therapy  Evaluation    Name: Madan Elder  MRN: [id number]  Admitting Diagnosis: AMS  Recent Surgery: * No surgery found *      Recommendations:     Discharge therapy intensity: Moderate Intensity Therapy   Discharge Equipment Recommendations:  to be determined by next level of care  Barriers to discharge:       Assessment:     Madan Elder is a 78 y.o. male with a medical diagnosis of AMS, low BP and diaphoresis, near syncope, a-fib, autonomic dysfunction vs arrhythmia, near syncope, a-fib, hx of  shunt.  He presents with the following performance deficits affecting function: weakness, impaired endurance, impaired self care skills, impaired functional mobility, gait instability, impaired balance.   Pt resides at [facility]. Ambulates with RW at baseline and mod I for dressing. Today, pt limited by orthostatic vitals upon standing: /74 HR 85, standing 91/62 . Able to take 5 side steps with CGA, very [place]. Recommend back to skilled with mod intensity.     Rehab Prognosis: Good; patient would benefit from acute skilled OT services to address these deficits and reach maximum level of function.       Plan:     Patient to be seen 4 x/week to address the above listed problems via self-care/home management, therapeutic activities, therapeutic exercises  Plan of Care Expires: 12/02/24  Plan of Care Reviewed with: patient    Subjective       Occupational Profile:  Living Environment: [facility]  Previous level of function: mod I with RW ; assist for showers   Equipment Used at Home: walker, rolling, wheelchair  Assistance upon Discharge: none     Pain/Comfort:  Pain Rating 1: 0/10    Patients cultural, spiritual, Jain conflicts given the current situation:      Objective:     OT communicated with nrsg prior to session.      Patient was found HOB elevated with alfonso catheter upon OT entry to room.    General Precautions: Standard, fall, hearing impaired  Orthopedic Precautions:     Braces:      Vital Signs: Orthostatics: Supine  , Sitting 123/74, Standing 91/62    Bed Mobility:    Patient completed Supine to Sit with minimum assistance  Patient completed Sit to Supine with minimum assistance    Functional Mobility/Transfers:  Patient completed Sit <> Stand Transfer with minimum assistance  with  rolling walker   Functional Mobility: 5 side steps; CGA for standing during BP assessment; BP dropped to 91/62 with standing     Activities of Daily Living:  Lower Body Dressing: contact guard assistance    Toileting: maximal assistance ; alfonso cath     AMPAC 6 Click ADL:  AMPA Total Score: 19    Upper Extremity Function:  Right Upper Extremity:   WFL    Left Upper Extremity:  WFL    Balance:   Static standing balance:CGA/min A with RW     Therapeutic Positioning  Risk for acquired pressure injuries is decreased due to ability to get to BSC/toilet with assist.      Skin assessment: all bony prominences were assessed    Findings: known area of altered skin integrity at sacral     OT recommendations for therapeutic positioning throughout hospitalization:   Follow [facility] Pressure Injury Prevention Protocol      Patient Education:  Patient and family were provided with verbal education education regarding OT role/goals/POC, fall prevention, and safety awareness.  Understanding was verbalized.     Patient left HOB elevated with all lines intact, call button in reach, bed alarm on, and family present.    GOALS:   Multidisciplinary Problems       Occupational Therapy Goals          Problem: Occupational Therapy    Goal Priority Disciplines Outcome Interventions   Occupational Therapy Goal     OT, PT/OT Progressing    Description: Goals to be met by: in 1 month      Patient will increase functional independence with ADLs by performing:    UE Dressing with Modified [place].  LE Dressing with Modified [place].  Grooming while standing at sink with Modified [place].  Toileting from toilet with  Modified [place] for hygiene and clothing management.   Toilet transfer to toilet with Modified [place].                         History:     Past Medical History:   Diagnosis Date    Hypertension     Stroke          Past Surgical History:   Procedure Laterality Date    INSERTION OF PACEMAKER         Time Tracking:     OT Date of Treatment:    OT Start Time: 1125  OT Stop Time: 1145  OT Total Time (min): 20 min    Billable Minutes:Evaluation Moderate Complexity     11/2/2024

## 2024-11-02 NOTE — PLAN OF CARE
Problem: Occupational Therapy  Goal: Occupational Therapy Goal  Description: Goals to be met by: in 1 month      Patient will increase functional independence with ADLs by performing:    UE Dressing with Modified [place].  LE Dressing with Modified [place].  Grooming while standing at sink with Modified [place].  Toileting from toilet with Modified [place] for hygiene and clothing management.   Toilet transfer to toilet with Modified [place].    Outcome: Progressing

## 2024-11-02 NOTE — PROGRESS NOTES
[facility] Hospital Medicine Progress Note        Chief Complaint: Inpatient Follow-up for near-syncope    HPI:   78-year-old male whose history includes PAF, systolic heart failure, hydrocephalus with  shunt, and bladder outlet obstruction.  Presented to the ED with complaints of low blood pressure and diaphoresis. Patient had similar symptoms recently which were evaluated during an admission to [facility].      Discharged from [facility] on 10/25/24 following an 8 day admission during which time he had urinary retention. Has history of recurrent UTIs and prior to this he had a recent pan-sensitive E. Coli on 10/6 (treatment initiated at [facility] but patient left AMA). Alfonso placed and was discharged home with it with plans to leave it in until he was more ambulatory. Cardiology changed Sotalol to propranolol. Neurology evaluated for diaphoresis and suspected to be secondary to autonomic dysfunction. Seizures were ruled out.  MRI brain without contrast revealed severe generalized cortical and brainstem atrophy, chronic white matter changes and collapsed ventricles.  It was recommended he follow-up with Neurosurgery regarding  shunt settings and ventricular collapse.  Discharged to [facility].     At the time of my exam patient is awake and alert. Speech clear. Able to state his name and knows he's in the hospital but otherwise unable to provide any information. I spoke with his daughter, Charmaine Vargas, on the phone. She reports patient had a urology follow up today but shortly after arriving he became hypotensive and diaphoretic and EMS summoned. VS on arrival: T 97.7, P 56, R 18, B/P 119/56, Sats 100% on room air. Initial labs unremarkable. UA collected from chronic indwelling alfonso showed 1+ protein, 3+ blood, 25 leuk est, RBC > 100, WBC 6-10 and trace bacteria.  Chest x-ray negative for acute findings. Had several episodes of witnessed hypotension in ED. MRI brain completed showed  severe generalized cortical and brain atrophy with chronic white matter changes and collapsed ventricles. Neurosurgery has been consulted, stated patient has right parietal  shunt is functional with symptoms of hypotension/diaphoresis not thought to be related to NPH; no surgical intervention recommended by Neurosurgery.  Will consult ENT for otomastoiditis per Neurosurgery recommendations.  PT/OT on board; recommending low/moderate intensity therapy    Interval Hx:   Today, patient stated he was doing well and had no new complaints.  Adding lactulose for constipation.  No further episodes of hypotension/diaphoresis.  Will plan for DC back to SNF tomorrow.    Case was discussed with patient's nurse on the floor.    Objective/physical exam:  General: alert male lying comfortably in bed, in no acute distress  HENT: oral and oropharyngeal mucosa moist, pink, with no erythema or exudates, no ear pain or discharge  Neck: normal neck movement, no lymph nodes or swellings, no JVD or Carotid bruit  Respiratory: clear breathing sounds bilaterally, no crackles, rales, ronchi or wheezes  Cardiovascular: clear S1 and S2, no murmurs, rubs or gallops  Peripheral Vascular: no lesions, ulcers or erosions, normal peripheral pulses and no pedal edema  Gastrointestinal: soft, non-tender, non-distended abdomen, no guarding, rigidity or rebound tenderness, normal bowel sounds  Integumentary: normal skin color, no rashes or lesions  Neuro: AAO x 3; motor strength 5/5 in B/L UEs & LEs; sensation intact to gross and fine touch B/L; CN II-XII grossly intact    VITAL SIGNS: 24 HRS MIN & MAX LAST   Temp  Min: 97.4 °F (36.3 °C)  Max: 98.6 °F (37 °C) 97.5 °F (36.4 °C)   BP  Min: 85/49  Max: 124/54 110/64   Pulse  Min: 60  Max: 78  74   Resp  Min: 18  Max: 20 18   SpO2  Min: 94 %  Max: 99 % 97 %     I have reviewed the following labs:  Recent Labs   Lab 10/30/24  1555 11/01/24  0522 11/02/24  0538   WBC 9.11 5.14 5.92   RBC 4.35* 4.07* 3.90*    HGB 12.6* 11.8* 11.3*   HCT 40.2* 36.4* 34.8*   MCV 92.4 89.4 89.2   MCH 29.0 29.0 29.0   MCHC 31.3* 32.4* 32.5*   RDW 15.8 15.5 15.4    213 204   MPV 9.6 9.6 9.4     Recent Labs   Lab 10/28/24  0700 10/30/24  1555 11/01/24  0522 11/02/24  0538    136 140 139   K 4.1 4.0 4.2 4.4    106 107 107   CO2 26 22* 25 25   BUN 26.0* 23.8 22.9 22.9   CREATININE 0.91 0.93 0.90 0.86   CALCIUM 8.9 9.5 8.9 8.6*   MG  --   --  2.20  --    ALBUMIN 2.7* 3.0* 2.7*  --    ALKPHOS 77 96 82  --    ALT 24 24 21  --    AST 17 20 21  --    BILITOT 1.0 1.1 1.4  --      Assessment/Plan:    Recurrent Near syncope secondary to recurrent hypotension - possible autonomic dysfunction vs arrhythmia   Bladder outlet obstruction with urinary retention   PAF   HFrEF   Hx hydrocephalus, S/P  shunt, evidence ventricular collapse    Continues to be admitted   Reporting no new complaints   Cardiology signed off with recommendations for amiodarone taper as well as propranolol 40 mg b.i.d.   Neurosurgery signed off with no further recommendations   PT/OT on board; recommending low/moderate intensity therapy   Continued on amiodarone 400 mg b.i.d. followed by amiodarone 200 mg b.i.d. followed by amiodarone 200 mg daily   Continues to be on aspirin, atorvastatin, folic acid, MiraLax, propranolol b.i.d., tamsulosin  Resuming home Eliquis  Will plan for DC back to SNF tomorrow    VTE prophylaxis:  Eliquis    Patient condition:  Fair    Anticipated discharge and Disposition:   SNF versus       All diagnosis and differential diagnosis have been reviewed; assessment and plan has been documented; I have personally reviewed the labs and test results that are presently available; I have reviewed the patients medication list; I have reviewed the consulting providers response and recommendations. I have reviewed or attempted to review medical records based upon their availability    All of the patient's questions have been  addressed and  answered. Patient's is agreeable to the above stated plan. I will continue to monitor closely and make adjustments to medical management as needed.    Portions of this note dictated using EMR integrated voice recognition software, and may be subject to voice recognition errors not corrected at proofreading. Please contact writer for clarification if needed.   _____________________________________________________________________    Radiology:  I have personally reviewed the following imaging and agree with the radiologist.     CT Head Without Contrast  Narrative: EXAMINATION:  CT HEAD WITHOUT CONTRAST    CLINICAL HISTORY:  intermittent confusion, s/p  shunt for NPH, eval for hydrocephalus; (Idiopathic) normal pressure hydrocephalus    TECHNIQUE:  Low dose axial images were obtained through the head.  Coronal and sagittal reformations were also performed. Contrast was not administered.    Automatic exposure control was utilized to reduce the patient's radiation dose.    DLP= 868    COMPARISON:  10/13/2024    FINDINGS:  Hemorrhage: No acute intracranial hemorrhage is seen.CSF spaces: The ventricles, sulci and basal cisterns all appear moderately prominent consistent with global cerebral atrophy, unchanged from prior. The previously noted ventriculostomy tube is stable in place coursing through a right parietal craniotomy with its tip within the frontal horn of the left lateral ventricle.Brain parenchyma: No acute infarct is identified. Moderate stable appearing microvascular change is seen in portions of the periventricular and deep white matter tracts.Cerebellum: Unremarkable.Vascular: Unremarkable venous sinuses. Mild to moderate stable appearing atheromatous calcification of the intracranial arteries is seen.Sella and skull base: The sella appears to be within normal limits for age.Cerebellopontine angles: Within normal limits.Herniation: None.Intracranial calcifications: Incidental note is made of bilateral  choroid plexus calcification. Incidental note is made of some pineal region calcification. Incidental note is made of some calcification of the falx.Calvarium: No acute linear or depressed skull fracture is seen.Maxillofacial Structures:Paranasal sinuses: The visualized paranasal sinuses appear clear with no mucoperiosteal thickening or air fluid levels identified.Orbits: The orbits appear unremarkable.Zygomatic arches: The zygomatic arches are intact and unremarkable.Temporal bones and mastoids: The left mastoid air cells and middle ear are stable in this patient status post left mastoidectomy. There is opacity in the right mastoid air cells and right middle ear similar to the prior study.TMJ: The mandibular condyles appear normally placed with respect to the mandibular fossa.  Impression: 1. There is opacity in the right mastoid air cells and right middle ear similar to the prior study. This suggests right-sided otomastoiditis. Correlate clinically.2. No acute intracranial process identified. Details and other findings as noted above. Findings appear grossly unchanged from prior    Electronically signed by: Fernando Sousa  Date:    11/02/2024  Time:    10:19      Salvatore Urbina MD  Department of Hospital Medicine   [facility]   11/02/2024

## 2024-11-02 NOTE — PLAN OF CARE
Problem: Physical Therapy  Goal: Physical Therapy Goal  Description: Pt will be seen for the following goals  1. Pt will be ind with bed mobility  2. Pt will be mod ind with transfers with a rw  3. Pt will ambulate 200ft with a rw with mod ind  Outcome: Progressing

## 2024-11-02 NOTE — PLAN OF CARE
Problem: Adult Inpatient Plan of Care  Goal: Readiness for Transition of Care  Outcome: Progressing     Problem: Skin Injury Risk Increased  Goal: Skin Health and Integrity  Outcome: Progressing

## 2024-11-03 PROCEDURE — 25000003 PHARM REV CODE 250: Performed by: NURSE PRACTITIONER

## 2024-11-03 PROCEDURE — 21400001 HC TELEMETRY ROOM

## 2024-11-03 PROCEDURE — 63600175 PHARM REV CODE 636 W HCPCS: Performed by: STUDENT IN AN ORGANIZED HEALTH CARE EDUCATION/TRAINING PROGRAM

## 2024-11-03 PROCEDURE — 25000003 PHARM REV CODE 250: Performed by: STUDENT IN AN ORGANIZED HEALTH CARE EDUCATION/TRAINING PROGRAM

## 2024-11-03 RX ORDER — LEVOFLOXACIN 500 MG/1
500 TABLET, FILM COATED ORAL DAILY
Status: DISCONTINUED | OUTPATIENT
Start: 2024-11-03 | End: 2024-11-04 | Stop reason: HOSPADM

## 2024-11-03 RX ADMIN — AMIODARONE HYDROCHLORIDE 200 MG: 200 TABLET ORAL at 08:11

## 2024-11-03 RX ADMIN — APIXABAN 5 MG: 5 TABLET, FILM COATED ORAL at 08:11

## 2024-11-03 RX ADMIN — AMIODARONE HYDROCHLORIDE 200 MG: 200 TABLET ORAL at 09:11

## 2024-11-03 RX ADMIN — ATORVASTATIN CALCIUM 20 MG: 10 TABLET, FILM COATED ORAL at 08:11

## 2024-11-03 RX ADMIN — APIXABAN 5 MG: 5 TABLET, FILM COATED ORAL at 09:11

## 2024-11-03 RX ADMIN — SODIUM CHLORIDE, POTASSIUM CHLORIDE, SODIUM LACTATE AND CALCIUM CHLORIDE 500 ML: 600; 310; 30; 20 INJECTION, SOLUTION INTRAVENOUS at 09:11

## 2024-11-03 RX ADMIN — FOLIC ACID 1000 MCG: 1 TABLET ORAL at 09:11

## 2024-11-03 RX ADMIN — LEVOFLOXACIN 500 MG: 500 TABLET, FILM COATED ORAL at 12:11

## 2024-11-03 RX ADMIN — TAMSULOSIN HYDROCHLORIDE 0.4 MG: 0.4 CAPSULE ORAL at 08:11

## 2024-11-03 RX ADMIN — PROPRANOLOL HYDROCHLORIDE 40 MG: 20 TABLET ORAL at 09:11

## 2024-11-03 RX ADMIN — ASPIRIN 81 MG: 81 TABLET, COATED ORAL at 09:11

## 2024-11-03 NOTE — CONSULTS
[facility] findings on CT scan of the head [facility] 4th Floor Medical Telemetry  Otorhinolaryngology-Head & Neck Surgery  Consult Note    Patient Name: Maadn Elder  MRN: [id number]  Code Status: DNR  Admission Date: 10/30/2024  Hospital Length of Stay: 4 days  Attending Physician: Tyler Zurita MD  Primary Care Provider: Noemí Peña    Inpatient consult to ENT  Consult performed by: Fan Saucedo MD  Consult ordered by: Salvatore Urbina MD        Subjective:     Chief Complaint/Reason for Admission: Abnormal ear findings on CT scan of the head  History of Present Illness:  The patient was a 78-year-old male with multiple medical problems most recently admitted to our [facility] for urinary retention.  He also has a known  shunt.  He was recently returned to the [facility] ER for symptoms of near-syncope.      In that workup a CT scan of the head was performed which showed right-sided mastoid fluid and some fluid into the middle ear.  There was no signs of any bony erosion.  I reviewed these images personally.  The middle ear fluid was minimal in the mastoid fluid was probably 60% or so.  The patient does describe a long history of middle ear infections and lancing of the eardrum as a child.  He has poor hearing and wears a hearing aid on this right ear.    The patient was completely asymptomatic with regard to any pain or discomfort in the right ear or postauricular.  He was hearing loss and wears a hearing aid  Medications:  Continuous Infusions:  Scheduled Meds:   [START ON 11/6/2024] amiodarone  200 mg Oral Daily    amiodarone  200 mg Oral BID    apixaban  5 mg Oral BID    aspirin  81 mg Oral Daily    atorvastatin  20 mg Oral QHS    folic acid  1,000 mcg Oral Daily    lactated ringers  500 mL Intravenous Once    lactulose 10 gram/15 ml  15 g Oral TID    [START ON 11/4/2024] mupirocin   Topical (Top) BID    polyethylene glycol  17 g Oral Daily    propranoloL  40 mg  Oral BID    tamsulosin  0.4 mg Oral Nightly     PRN Meds:  Current Facility-Administered Medications:     acetaminophen, 1,000 mg, Oral, Q6H PRN    aluminum-magnesium hydroxide-simethicone, 30 mL, Oral, QID PRN    bisacodyL, 10 mg, Rectal, Daily PRN    dextrose 10%, 12.5 g, Intravenous, PRN    dextrose 10%, 25 g, Intravenous, PRN    glucagon (human recombinant), 1 mg, Intramuscular, PRN    glucose, 16 g, Oral, PRN    glucose, 24 g, Oral, PRN    melatonin, 6 mg, Oral, Nightly PRN    naloxone, 0.02 mg, Intravenous, PRN    ondansetron, 4 mg, Intravenous, Q4H PRN    prochlorperazine, 5 mg, Intravenous, Q6H PRN    senna-docusate 8.6-50 mg, 1 tablet, Oral, BID PRN    sodium chloride 0.9%, 10 mL, Intravenous, PRN     No current facility-administered medications on file prior to encounter.     Current Outpatient Medications on File Prior to Encounter   Medication Sig    apixaban (ELIQUIS) 5 mg Tab Take 5 mg by mouth 2 (two) times daily.    aspirin (ECOTRIN) 81 MG EC tablet Take 81 mg by mouth once daily.    folic acid (FOLVITE) 1 MG tablet Take 1,000 mcg by mouth once daily.    polyethylene glycol (GLYCOLAX) 17 gram PwPk Take 17 g by mouth once daily.    propranoloL (INDERAL) 40 MG tablet Take 40 mg by mouth 2 (two) times daily.    tamsulosin (FLOMAX) 0.4 mg Cap Take 0.4 mg by mouth nightly.    VITAMIN B-1 100 MG tablet Take 100 mg by mouth once daily.    vitamin D (VITAMIN D3) 1000 units Tab Take 2,000 Units by mouth once daily.    atorvastatin (LIPITOR) 20 MG tablet Take 1 tablet (20 mg total) by mouth once daily. (Patient taking differently: Take 20 mg by mouth every evening.)    carvediloL (COREG) 3.125 MG tablet Take 1 tablet (3.125 mg total) by mouth 2 (two) times daily.    sodium bicarbonate 650 MG tablet Take 1 tablet (650 mg total) by mouth once daily. for 5 days       Review of patient's allergies indicates:  No Known Allergies    Past Medical History:   Diagnosis Date    Hypertension     Stroke      Past Surgical  History:   Procedure Laterality Date    INSERTION OF PACEMAKER       Family History    None       Tobacco Use    Smoking status: Never    Smokeless tobacco: Never   Substance and Sexual Activity    Alcohol use: Yes    Drug use: Never    Sexual activity: Not on file     Review of Systems  Objective:     Vital Signs (Most Recent):  Temp: 97.4 °F (36.3 °C) (11/03/24 0717)  Pulse: 65 (11/03/24 0717)  Resp: 18 (11/03/24 0717)  BP: 97/61 (11/03/24 0717)  SpO2: 96 % (11/03/24 0717) Vital Signs (24h Range):  Temp:  [97.3 °F (36.3 °C)-98 °F (36.7 °C)] 97.4 °F (36.3 °C)  Pulse:  [62-79] 65  Resp:  [18-20] 18  SpO2:  [94 %-97 %] 96 %  BP: ()/(53-69) 97/61     Weight: 82.5 kg (181 lb 14.1 oz)  Body mass index is 27.65 kg/m².        Physical Exam  Vitals and nursing note reviewed.   Constitutional:       Appearance: Normal appearance.   HENT:      Head: Normocephalic and atraumatic.      Right Ear: External ear normal.      Left Ear: External ear normal.      Ears:      Comments: The external auditory canals and tympanic membrane do not show any signs of infection.  He does have some partial cerumen occlusion on the right due to the hearing aid.  There was no signs of any type of otitis externa or malignant otitis externa.  There was no drainage.  There was no post auricular signs of erythema tenderness or protrusion of the ear.     Nose: Nose normal.   Eyes:      Extraocular Movements: Extraocular movements intact.      Conjunctiva/sclera: Conjunctivae normal.   Pulmonary:      Effort: Pulmonary effort is normal.   Abdominal:      Palpations: Abdomen is soft.   Musculoskeletal:         General: Normal range of motion.      Cervical back: Normal range of motion.   Skin:     General: Skin is warm.   Neurological:      General: No focal deficit present.      Mental Status: He is alert.   Psychiatric:         Mood and Affect: Mood normal.         Significant Labs:  CBC:   Recent Labs   Lab 11/02/24  0538   WBC 5.92   RBC  3.90*   HGB 11.3*   HCT 34.8*      MCV 89.2   MCH 29.0   MCHC 32.5*     CMP:   Recent Labs   Lab 11/01/24  0522 11/02/24  0538   CALCIUM 8.9 8.6*   ALBUMIN 2.7*  --     139   K 4.2 4.4   CO2 25 25    107   BUN 22.9 22.9   CREATININE 0.90 0.86   ALKPHOS 82  --    ALT 21  --    AST 21  --    BILITOT 1.4  --        Significant Diagnostics:  CT: I have reviewed all pertinent results/findings within the past 24 hours and my personal findings are:  Please see above HPI for my review of the CT scan findings and my impression    Assessment/Plan:   At this time my assessment is incidental finding of right otomastoid fluid with no signs of acute mastoiditis and no surgical indications at this time.  I do think due to the patient's past history of middle ear issues he should have an oral antibiotic for 10 days to cover any potential infection as well as an ENT follow-up to determine whether a tube would need to be placed for chronic effusion.    The recommendation for antibiotic would be Levaquin 500 mg q.day as this would cover his recent urinary retention and E coli infection and any type of upper respiratory infection involving the ear.  There are no hospital problems to display for this patient.    VTE Risk Mitigation (From admission, onward)           Ordered     apixaban tablet 5 mg  2 times daily         11/02/24 1524     Reason for No Pharmacological VTE Prophylaxis  Once        Question:  Reasons:  Answer:  Already adequately anticoagulated on oral Anticoagulants    10/30/24 2219     IP VTE HIGH RISK PATIENT  Once         10/30/24 2219     Place sequential compression device  Until discontinued         10/30/24 2219                    Thank you for your consult. I will sign off. Please contact us if you have any additional questions.    Fan Saucedo MD  Otorhinolaryngology-Head & Neck Surgery  [facility] - 4th Floor Medical Telemetry

## 2024-11-03 NOTE — PROGRESS NOTES
[facility] Hospital Medicine Progress Note        Chief Complaint: Inpatient Follow-up for near-syncope    HPI:   78-year-old male whose history includes PAF, systolic heart failure, hydrocephalus with  shunt, and bladder outlet obstruction.  Presented to the ED with complaints of low blood pressure and diaphoresis. Patient had similar symptoms recently which were evaluated during an admission to [facility].      Discharged from [facility] on 10/25/24 following an 8 day admission during which time he had urinary retention. Has history of recurrent UTIs and prior to this he had a recent pan-sensitive E. Coli on 10/6 (treatment initiated at [facility] but patient left AMA). Alfonso placed and was discharged home with it with plans to leave it in until he was more ambulatory. Cardiology changed Sotalol to propranolol. Neurology evaluated for diaphoresis and suspected to be secondary to autonomic dysfunction. Seizures were ruled out.  MRI brain without contrast revealed severe generalized cortical and brainstem atrophy, chronic white matter changes and collapsed ventricles.  It was recommended he follow-up with Neurosurgery regarding  shunt settings and ventricular collapse.  Discharged to [facility].     At the time of my exam patient is awake and alert. Speech clear. Able to state his name and knows he's in the hospital but otherwise unable to provide any information. I spoke with his daughter, Charmaine Vargas, on the phone. She reports patient had a urology follow up today but shortly after arriving he became hypotensive and diaphoretic and EMS summoned. VS on arrival: T 97.7, P 56, R 18, B/P 119/56, Sats 100% on room air. Initial labs unremarkable. UA collected from chronic indwelling alfonso showed 1+ protein, 3+ blood, 25 leuk est, RBC > 100, WBC 6-10 and trace bacteria.  Chest x-ray negative for acute findings. Had several episodes of witnessed hypotension in ED. MRI brain completed showed  severe generalized cortical and brain atrophy with chronic white matter changes and collapsed ventricles. Neurosurgery has been consulted, stated patient has right parietal  shunt is functional with symptoms of hypotension/diaphoresis not thought to be related to NPH; no surgical intervention recommended by Neurosurgery.  Will consult ENT for otomastoiditis per Neurosurgery recommendations.  PT/OT on board; recommending low/moderate intensity therapy    Interval Hx:   Today, patient stated he was doing well and had no new complaints.  Patient noted to have symptomatic orthostatic hypotension.  Will give IV LR bolus and start midodrine if needed. Will recheck orthostatic VS. ENT recommended PO Abx for R otomastoiditis.    Case was discussed with patient's nurse on the floor.    Objective/physical exam:  General: alert male lying comfortably in bed, in no acute distress  HENT: oral and oropharyngeal mucosa moist, pink, with no erythema or exudates, no ear pain or discharge  Neck: normal neck movement, no lymph nodes or swellings, no JVD or Carotid bruit  Respiratory: clear breathing sounds bilaterally, no crackles, rales, ronchi or wheezes  Cardiovascular: clear S1 and S2, no murmurs, rubs or gallops  Peripheral Vascular: no lesions, ulcers or erosions, normal peripheral pulses and no pedal edema  Gastrointestinal: soft, non-tender, non-distended abdomen, no guarding, rigidity or rebound tenderness, normal bowel sounds  Integumentary: normal skin color, no rashes or lesions  Neuro: AAO x 3; motor strength 5/5 in B/L UEs & LEs; sensation intact to gross and fine touch B/L; CN II-XII grossly intact    VITAL SIGNS: 24 HRS MIN & MAX LAST   Temp  Min: 97.3 °F (36.3 °C)  Max: 98 °F (36.7 °C) 97.5 °F (36.4 °C)   BP  Min: 97/61  Max: 110/64 106/65   Pulse  Min: 62  Max: 79  63   Resp  Min: 18  Max: 20 18   SpO2  Min: 94 %  Max: 97 % 97 %     I have reviewed the following labs:  Recent Labs   Lab 10/30/24  9507  11/01/24 0522 11/02/24  0538   WBC 9.11 5.14 5.92   RBC 4.35* 4.07* 3.90*   HGB 12.6* 11.8* 11.3*   HCT 40.2* 36.4* 34.8*   MCV 92.4 89.4 89.2   MCH 29.0 29.0 29.0   MCHC 31.3* 32.4* 32.5*   RDW 15.8 15.5 15.4    213 204   MPV 9.6 9.6 9.4     Recent Labs   Lab 10/28/24  0700 10/30/24  1555 11/01/24 0522 11/02/24  0538    136 140 139   K 4.1 4.0 4.2 4.4    106 107 107   CO2 26 22* 25 25   BUN 26.0* 23.8 22.9 22.9   CREATININE 0.91 0.93 0.90 0.86   CALCIUM 8.9 9.5 8.9 8.6*   MG  --   --  2.20  --    ALBUMIN 2.7* 3.0* 2.7*  --    ALKPHOS 77 96 82  --    ALT 24 24 21  --    AST 17 20 21  --    BILITOT 1.0 1.1 1.4  --      Assessment/Plan:  Symptomatic Orthostatic Hypotension  R otomastoiditis  Recurrent Near syncope secondary to recurrent hypotension - possible autonomic dysfunction vs arrhythmia   Bladder outlet obstruction with urinary retention   PAF   HFrEF   Hx hydrocephalus, S/P  shunt, evidence ventricular collapse    Continues to be admitted   Reporting no new complaints   Will give IV LR bolus and start midodrine if needed; will recheck orthostatic VS  Started on p.o. Levaquin per ENT recommendations  Cardiology signed off with recommendations for amiodarone taper as well as propranolol 40 mg b.i.d.   Neurosurgery signed off with no further recommendations   PT/OT on board; recommending low/moderate intensity therapy   Continued on amiodarone 400 mg b.i.d. followed by amiodarone 200 mg b.i.d. followed by amiodarone 200 mg daily   Continues to be on aspirin, atorvastatin, folic acid, MiraLax, propranolol b.i.d., tamsulosin  Resuming home Eliquis  Will plan for DC back to SNF tomorrow if patient is doing better    VTE prophylaxis:  Eliquis    Patient condition:  Fair    Anticipated discharge and Disposition:   SNF versus       All diagnosis and differential diagnosis have been reviewed; assessment and plan has been documented; I have personally reviewed the labs and test results that are  presently available; I have reviewed the patients medication list; I have reviewed the consulting providers response and recommendations. I have reviewed or attempted to review medical records based upon their availability    All of the patient's questions have been  addressed and answered. Patient's is agreeable to the above stated plan. I will continue to monitor closely and make adjustments to medical management as needed.    Portions of this note dictated using EMR integrated voice recognition software, and may be subject to voice recognition errors not corrected at proofreading. Please contact writer for clarification if needed.   _____________________________________________________________________    Radiology:  I have personally reviewed the following imaging and agree with the radiologist.     X-Ray Shunt Series  EXAMINATION  XR SHUNT SERIES    CLINICAL HISTORY  intermittent confusion, s/p  shunt for NPH, eval for hydrocephalus; (Idiopathic) normal pressure hydrocephalus    TECHNIQUE  A total of 4 images of the head, chest, and abdomen submitted for  shunt evaluation.    COMPARISON  None available at the time of initial interpretation.    FINDINGS   Shunt: The shunt tubing traverses the right posterior parietal scalp and neck soft tissues, then extends inferiorly along the right thoracic wall and abdomen.  There is no evidence of focal contour irregularity, kinking, or gross disruption.    Head/Neck: No convincing acute focal osseous displacement is identified.  The visualized soft tissues are of unremarkable radiographic appearance.  Multilevel degenerative changes with flowing anterior osteophytic projections through the visualized cervical spine.    Chest: The cardiomediastinal silhouette is unremarkable for AP projection.  Left chest wall 2-lead pacemaker/ICD is present.  Multiple ECG leads overlie the chest.  The trachea is midline. There is no lobar consolidation, pleural effusion, or  pneumothorax.    Abdomen: There is notable burden of retained stool throughout the course of the colon with dense fecal material versus residual contrast distending the rectum.  A nonobstructed bowel gas pattern is present.  No mass-effect is appreciated. No acute osseous abnormality is identified.    IMPRESSION  1. No evidence of focal  shunt tubing disruption or acute abnormality.  2. Incidental findings suggestive of constipation with possibility of rectal fecal impaction not excluded.  3. Additional chronic secondary findings discussed above.    Electronically signed by: Prateek Kingsley  Date:    11/02/2024  Time:    17:05  CT Head Without Contrast  Narrative: EXAMINATION:  CT HEAD WITHOUT CONTRAST    CLINICAL HISTORY:  intermittent confusion, s/p  shunt for NPH, eval for hydrocephalus; (Idiopathic) normal pressure hydrocephalus    TECHNIQUE:  Low dose axial images were obtained through the head.  Coronal and sagittal reformations were also performed. Contrast was not administered.    Automatic exposure control was utilized to reduce the patient's radiation dose.    DLP= 868    COMPARISON:  10/13/2024    FINDINGS:  Hemorrhage: No acute intracranial hemorrhage is seen.CSF spaces: The ventricles, sulci and basal cisterns all appear moderately prominent consistent with global cerebral atrophy, unchanged from prior. The previously noted ventriculostomy tube is stable in place coursing through a right parietal craniotomy with its tip within the frontal horn of the left lateral ventricle.Brain parenchyma: No acute infarct is identified. Moderate stable appearing microvascular change is seen in portions of the periventricular and deep white matter tracts.Cerebellum: Unremarkable.Vascular: Unremarkable venous sinuses. Mild to moderate stable appearing atheromatous calcification of the intracranial arteries is seen.Sella and skull base: The sella appears to be within normal limits for age.Cerebellopontine angles: Within  normal limits.Herniation: None.Intracranial calcifications: Incidental note is made of bilateral choroid plexus calcification. Incidental note is made of some pineal region calcification. Incidental note is made of some calcification of the falx.Calvarium: No acute linear or depressed skull fracture is seen.Maxillofacial Structures:Paranasal sinuses: The visualized paranasal sinuses appear clear with no mucoperiosteal thickening or air fluid levels identified.Orbits: The orbits appear unremarkable.Zygomatic arches: The zygomatic arches are intact and unremarkable.Temporal bones and mastoids: The left mastoid air cells and middle ear are stable in this patient status post left mastoidectomy. There is opacity in the right mastoid air cells and right middle ear similar to the prior study.TMJ: The mandibular condyles appear normally placed with respect to the mandibular fossa.  Impression: 1. There is opacity in the right mastoid air cells and right middle ear similar to the prior study. This suggests right-sided otomastoiditis. Correlate clinically.2. No acute intracranial process identified. Details and other findings as noted above. Findings appear grossly unchanged from prior    Electronically signed by: Fernando Sousa  Date:    11/02/2024  Time:    10:19      Salvatore Urbina MD  Department of Hospital Medicine   [facility]   11/03/2024

## 2024-11-04 VITALS
HEART RATE: 73 BPM | TEMPERATURE: 98 F | OXYGEN SATURATION: 97 % | SYSTOLIC BLOOD PRESSURE: 114 MMHG | RESPIRATION RATE: 19 BRPM | BODY MASS INDEX: 27.57 KG/M2 | HEIGHT: 68 IN | DIASTOLIC BLOOD PRESSURE: 67 MMHG | WEIGHT: 181.88 LBS

## 2024-11-04 PROBLEM — R42 ORTHOSTATIC DIZZINESS: Status: ACTIVE | Noted: 2024-11-04

## 2024-11-04 PROCEDURE — 25000003 PHARM REV CODE 250: Performed by: NURSE PRACTITIONER

## 2024-11-04 PROCEDURE — 25000003 PHARM REV CODE 250: Performed by: STUDENT IN AN ORGANIZED HEALTH CARE EDUCATION/TRAINING PROGRAM

## 2024-11-04 RX ORDER — AMOXICILLIN 250 MG
1 CAPSULE ORAL 2 TIMES DAILY PRN
COMMUNITY
Start: 2024-11-04

## 2024-11-04 RX ORDER — AMIODARONE HYDROCHLORIDE 200 MG/1
200 TABLET ORAL 2 TIMES DAILY
Qty: 4 TABLET | Refills: 0 | Status: SHIPPED | OUTPATIENT
Start: 2024-11-04 | End: 2024-11-06

## 2024-11-04 RX ORDER — LEVOFLOXACIN 500 MG/1
500 TABLET, FILM COATED ORAL DAILY
Qty: 3 TABLET | Refills: 0 | Status: SHIPPED | OUTPATIENT
Start: 2024-11-05 | End: 2024-11-08

## 2024-11-04 RX ORDER — AMIODARONE HYDROCHLORIDE 200 MG/1
200 TABLET ORAL DAILY
Qty: 30 TABLET | Refills: 11 | Status: SHIPPED | OUTPATIENT
Start: 2024-11-06 | End: 2025-11-06

## 2024-11-04 RX ADMIN — AMIODARONE HYDROCHLORIDE 200 MG: 200 TABLET ORAL at 08:11

## 2024-11-04 RX ADMIN — APIXABAN 5 MG: 5 TABLET, FILM COATED ORAL at 08:11

## 2024-11-04 RX ADMIN — FOLIC ACID 1000 MCG: 1 TABLET ORAL at 08:11

## 2024-11-04 RX ADMIN — LEVOFLOXACIN 500 MG: 500 TABLET, FILM COATED ORAL at 08:11

## 2024-11-04 RX ADMIN — ASPIRIN 81 MG: 81 TABLET, COATED ORAL at 08:11

## 2024-11-04 NOTE — PLAN OF CARE
11/04/24 0813   Medicare Message   Important Message from Medicare regarding Discharge Appeal Rights Given to patient/caregiver;Explained to patient/caregiver

## 2024-11-04 NOTE — NURSING
Delores LATIF from [facility] called for report. Confirmed pt had alfonso cath at their facility. Denies questions. Arranging transportation.    Pt IV removed without incident, no bleeding, paper tape used.      Tele box removed, cleaned and given to unit secretary. Tele monitors notified pt is discharging.      [address] here to transport pt.  Personal belongings packed and taken, left via wheelchair.

## 2024-11-04 NOTE — PLAN OF CARE
11/04/24 1319   Final Note   Assessment Type Final Discharge Note   Anticipated Discharge Disposition SNF   Post-Acute Status   Post-Acute Authorization Placement   Post-Acute Placement Status Set-up Complete/Auth obtained   Discharge Delays None known at this time     Pt returning to [facility].

## 2024-11-04 NOTE — DISCHARGE SUMMARY
[facility] Hospital Medicine Discharge Summary    Admit Date: 10/30/2024  Discharge Date and Time: 11/4/20249:09 AM  Admitting Physician: PRE[facility] Team  Discharging Physician: Gregorio Varner MD.  Primary Care Physician: Casey, Provider  Consults: ENT    Discharge Diagnoses:  Symptomatic Orthostatic Hypotension: stable   R otomastoiditis  Recurrent Near syncope secondary to recurrent hypotension - possible autonomic dysfunction vs arrhythmia   Bladder outlet obstruction with urinary retention   PAF   HFrEF   Hx hydrocephalus, S/P  shunt, evidence ventricular collapse    Hospital Course:   78-year-old male whose history includes PAF, systolic heart failure, hydrocephalus with  shunt, and bladder outlet obstruction.  Presented to the ED with complaints of low blood pressure and diaphoresis. Patient had similar symptoms recently which were evaluated during an admission to [facility].      Discharged from [facility] on 10/25/24 following an 8 day admission during which time he had urinary retention. Has history of recurrent UTIs and prior to this he had a recent pan-sensitive E. Coli on 10/6 (treatment initiated at [facility] but patient left AMA). Lindsey placed and was discharged home with it with plans to leave it in until he was more ambulatory. Cardiology changed Sotalol to propranolol. Neurology evaluated for diaphoresis and suspected to be secondary to autonomic dysfunction. Seizures were ruled out.  MRI brain without contrast revealed severe generalized cortical and brainstem atrophy, chronic white matter changes and collapsed ventricles.  It was recommended he follow-up with Neurosurgery regarding  shunt settings and ventricular collapse.  Discharged to [facility].     At the time of my exam patient is awake and alert. Speech clear. Able to state his name and knows he's in the hospital but otherwise unable to provide any information. I spoke with his daughter, Charmaine Vargas, on  the phone. She reports patient had a urology follow up today but shortly after arriving he became hypotensive and diaphoretic and EMS summoned. VS on arrival: T 97.7, P 56, R 18, B/P 119/56, Sats 100% on room air. Initial labs unremarkable. UA collected from chronic indwelling alfonso showed 1+ protein, 3+ blood, 25 leuk est, RBC > 100, WBC 6-10 and trace bacteria.  Chest x-ray negative for acute findings. Had several episodes of witnessed hypotension in ED. MRI brain completed showed severe generalized cortical and brain atrophy with chronic white matter changes and collapsed ventricles. Neurosurgery has been consulted, stated patient has right parietal  shunt is functional with symptoms of hypotension/diaphoresis not thought to be related to NPH; no surgical intervention recommended by Neurosurgery.  Will consult ENT for otomastoiditis per Neurosurgery recommendations.  PT/OT on board; recommending low/moderate intensity therapy. He was seen by [facility] team and advised to be started on po amiodarone. HR was stable.      Patient was seen by ENT team and recommended po levaquin. He was stable and had no new issues. He was discharged back to the SNF unit.       Pt was seen and examined on the day of discharge  Vitals:  VITAL SIGNS: 24 HRS MIN & MAX LAST   Temp  Min: 97.4 °F (36.3 °C)  Max: 98 °F (36.7 °C) 97.6 °F (36.4 °C)   BP  Min: 83/53  Max: 145/84 (!) 145/84   Pulse  Min: 59  Max: 73  65   Resp  Min: 18  Max: 20 19   SpO2  Min: 97 %  Max: 99 % 99 %       Physical Exam:  Heart RRR  Lungs clear   Abdomen soft and non tender   Neuro: No FND      Procedures Performed: No admission procedures for hospital encounter.     Significant Diagnostic Studies: See Full reports for all details    Recent Labs   Lab 10/30/24  1555 11/01/24  0522 11/02/24  0538   WBC 9.11 5.14 5.92   RBC 4.35* 4.07* 3.90*   HGB 12.6* 11.8* 11.3*   HCT 40.2* 36.4* 34.8*   MCV 92.4 89.4 89.2   MCH 29.0 29.0 29.0   MCHC 31.3* 32.4* 32.5*   RDW 15.8 15.5  15.4    213 204   MPV 9.6 9.6 9.4       Recent Labs   Lab 10/30/24  1555 11/01/24  0522 11/02/24  0538    140 139   K 4.0 4.2 4.4    107 107   CO2 22* 25 25   BUN 23.8 22.9 22.9   CREATININE 0.93 0.90 0.86   CALCIUM 9.5 8.9 8.6*   MG  --  2.20  --    ALBUMIN 3.0* 2.7*  --    ALKPHOS 96 82  --    ALT 24 21  --    AST 20 21  --    BILITOT 1.1 1.4  --         Microbiology Results (last 7 days)       ** No results found for the last 168 hours. **             X-Ray Shunt Series  EXAMINATION  XR SHUNT SERIES    CLINICAL HISTORY  intermittent confusion, s/p  shunt for NPH, eval for hydrocephalus; (Idiopathic) normal pressure hydrocephalus    TECHNIQUE  A total of 4 images of the head, chest, and abdomen submitted for  shunt evaluation.    COMPARISON  None available at the time of initial interpretation.    FINDINGS   Shunt: The shunt tubing traverses the right posterior parietal scalp and neck soft tissues, then extends inferiorly along the right thoracic wall and abdomen.  There is no evidence of focal contour irregularity, kinking, or gross disruption.    Head/Neck: No convincing acute focal osseous displacement is identified.  The visualized soft tissues are of unremarkable radiographic appearance.  Multilevel degenerative changes with flowing anterior osteophytic projections through the visualized cervical spine.    Chest: The cardiomediastinal silhouette is unremarkable for AP projection.  Left chest wall 2-lead pacemaker/ICD is present.  Multiple ECG leads overlie the chest.  The trachea is midline. There is no lobar consolidation, pleural effusion, or pneumothorax.    Abdomen: There is notable burden of retained stool throughout the course of the colon with dense fecal material versus residual contrast distending the rectum.  A nonobstructed bowel gas pattern is present.  No mass-effect is appreciated. No acute osseous abnormality is identified.    IMPRESSION  1. No evidence of focal   shunt tubing disruption or acute abnormality.  2. Incidental findings suggestive of constipation with possibility of rectal fecal impaction not excluded.  3. Additional chronic secondary findings discussed above.    Electronically signed by: Prateek Kingsley  Date:    11/02/2024  Time:    17:05  CT Head Without Contrast  Narrative: EXAMINATION:  CT HEAD WITHOUT CONTRAST    CLINICAL HISTORY:  intermittent confusion, s/p  shunt for NPH, eval for hydrocephalus; (Idiopathic) normal pressure hydrocephalus    TECHNIQUE:  Low dose axial images were obtained through the head.  Coronal and sagittal reformations were also performed. Contrast was not administered.    Automatic exposure control was utilized to reduce the patient's radiation dose.    DLP= 868    COMPARISON:  10/13/2024    FINDINGS:  Hemorrhage: No acute intracranial hemorrhage is seen.CSF spaces: The ventricles, sulci and basal cisterns all appear moderately prominent consistent with global cerebral atrophy, unchanged from prior. The previously noted ventriculostomy tube is stable in place coursing through a right parietal craniotomy with its tip within the frontal horn of the left lateral ventricle.Brain parenchyma: No acute infarct is identified. Moderate stable appearing microvascular change is seen in portions of the periventricular and deep white matter tracts.Cerebellum: Unremarkable.Vascular: Unremarkable venous sinuses. Mild to moderate stable appearing atheromatous calcification of the intracranial arteries is seen.Sella and skull base: The sella appears to be within normal limits for age.Cerebellopontine angles: Within normal limits.Herniation: None.Intracranial calcifications: Incidental note is made of bilateral choroid plexus calcification. Incidental note is made of some pineal region calcification. Incidental note is made of some calcification of the falx.Calvarium: No acute linear or depressed skull fracture is seen.Maxillofacial  Structures:Paranasal sinuses: The visualized paranasal sinuses appear clear with no mucoperiosteal thickening or air fluid levels identified.Orbits: The orbits appear unremarkable.Zygomatic arches: The zygomatic arches are intact and unremarkable.Temporal bones and mastoids: The left mastoid air cells and middle ear are stable in this patient status post left mastoidectomy. There is opacity in the right mastoid air cells and right middle ear similar to the prior study.TMJ: The mandibular condyles appear normally placed with respect to the mandibular fossa.  Impression: 1. There is opacity in the right mastoid air cells and right middle ear similar to the prior study. This suggests right-sided otomastoiditis. Correlate clinically.2. No acute intracranial process identified. Details and other findings as noted above. Findings appear grossly unchanged from prior    Electronically signed by: Fernando Sousa  Date:    11/02/2024  Time:    10:19         Medication List        START taking these medications      * amiodarone 200 MG Tab  Commonly known as: PACERONE  Take 1 tablet (200 mg total) by mouth 2 (two) times daily. for 2 days     * amiodarone 200 MG Tab  Commonly known as: PACERONE  Take 1 tablet (200 mg total) by mouth once daily.  Start taking on: November 6, 2024     levoFLOXacin 500 MG tablet  Commonly known as: LEVAQUIN  Take 1 tablet (500 mg total) by mouth once daily. for 3 days  Start taking on: November 5, 2024     senna-docusate 8.6-50 mg 8.6-50 mg per tablet  Commonly known as: PERICOLACE  Take 1 tablet by mouth 2 (two) times daily as needed for Constipation (1st choice if can take PO).           * This list has 2 medication(s) that are the same as other medications prescribed for you. Read the directions carefully, and ask your doctor or other care provider to review them with you.                CHANGE how you take these medications      atorvastatin 20 MG tablet  Commonly known as: LIPITOR  Take 1  tablet (20 mg total) by mouth once daily.  What changed: when to take this            CONTINUE taking these medications      aspirin 81 MG EC tablet  Commonly known as: ECOTRIN     ELIQUIS 5 mg Tab  Generic drug: apixaban     folic acid 1 MG tablet  Commonly known as: FOLVITE     polyethylene glycol 17 gram Pwpk  Commonly known as: GLYCOLAX     tamsulosin 0.4 mg Cap  Commonly known as: FLOMAX            STOP taking these medications      carvediloL 3.125 MG tablet  Commonly known as: COREG     propranoloL 40 MG tablet  Commonly known as: INDERAL     sodium bicarbonate 650 MG tablet     VITAMIN B-1 100 MG tablet  Generic drug: thiamine     vitamin D 1000 units Tab  Commonly known as: VITAMIN D3               Where to Get Your Medications        These medications were sent to [facility]. [address] [address]      Phone: [phone]   amiodarone 200 MG Tab  amiodarone 200 MG Tab  levoFLOXacin 500 MG tablet       You can get these medications from any pharmacy    You don't need a prescription for these medications  senna-docusate 8.6-50 mg 8.6-50 mg per tablet          Explained in detail to the patient about the discharge plan, medications, and follow-up visits. Pt understands and agrees with the treatment plan  Discharge Disposition: SNF  Discharged Condition: stable  Diet-   Dietary Orders (From admission, onward)       Start     Ordered    11/01/24 1751  Dietary nutrition supplements TID; Boost Original Nutritional Drink - Chocolate  Continuous        Question Answer Comment   Frequency: TID    Select PO Supplement: Boost Original Nutritional Drink - Chocolate        11/01/24 1751    10/30/24 2218  Diet Easy to Chew (IDDSI Level 7)  Diet effective now        Comments: Mechanical soft texture, chopped meats    10/30/24 2217                   Medications Per DC med rec  Activities as tolerated    For further questions contact hospitalist office    Discharge  time 33 minutes    For worsening symptoms, chest pain, shortness of breath, increased abdominal pain, high grade fever, stroke or stroke like symptoms, immediately go to the nearest Emergency Room or call 911 as soon as possible.      Gregorio Ibarra M.D, on 11/4/2024. at 9:09 AM.

## 2024-11-04 NOTE — PLAN OF CARE
Problem: Adult Inpatient Plan of Care  Goal: Readiness for Transition of Care  11/3/2024 1953 by Rhiannon Garber, RN  Outcome: Progressing  11/3/2024 1953 by Rhiannon Garber, RN  Outcome: Progressing     Problem: Skin Injury Risk Increased  Goal: Skin Health and Integrity  11/3/2024 1953 by Rhiannon Garber, RN  Outcome: Progressing  11/3/2024 1953 by Rhiannon Garber, RN  Outcome: Progressing

## 2024-11-07 ENCOUNTER — HOSPITAL ENCOUNTER (EMERGENCY)
Facility: HOSPITAL | Age: 79
Discharge: HOME | End: 2024-11-07
Attending: STUDENT IN AN ORGANIZED HEALTH CARE EDUCATION/TRAINING PROGRAM
Payer: MEDICARE

## 2024-11-07 VITALS
SYSTOLIC BLOOD PRESSURE: 94 MMHG | RESPIRATION RATE: 21 BRPM | WEIGHT: 175 LBS | HEIGHT: 70 IN | BODY MASS INDEX: 25.05 KG/M2 | DIASTOLIC BLOOD PRESSURE: 54 MMHG | OXYGEN SATURATION: 97 % | TEMPERATURE: 98 F | HEART RATE: 68 BPM

## 2024-11-07 DIAGNOSIS — R55 PRE-SYNCOPE: ICD-10-CM

## 2024-11-07 DIAGNOSIS — R61 DIAPHORESIS: Primary | ICD-10-CM

## 2024-11-07 LAB
ALBUMIN SERPL-MCNC: 3.1 G/DL (ref 3.4–4.8)
ALBUMIN/GLOB SERPL: 0.7 RATIO (ref 1.1–2)
ALP SERPL-CCNC: 89 UNIT/L (ref 40–150)
ALT SERPL-CCNC: 23 UNIT/L (ref 0–55)
ANION GAP SERPL CALC-SCNC: 9 MEQ/L
AST SERPL-CCNC: 21 UNIT/L (ref 5–34)
BACTERIA #/AREA URNS AUTO: ABNORMAL /HPF
BASOPHILS # BLD AUTO: 0.02 X10(3)/MCL
BASOPHILS NFR BLD AUTO: 0.3 %
BILIRUB SERPL-MCNC: 1.2 MG/DL
BILIRUB UR QL STRIP.AUTO: NEGATIVE
BUN SERPL-MCNC: 30.5 MG/DL (ref 8.4–25.7)
CALCIUM SERPL-MCNC: 9.4 MG/DL (ref 8.8–10)
CHLORIDE SERPL-SCNC: 107 MMOL/L (ref 98–107)
CLARITY UR: ABNORMAL
CO2 SERPL-SCNC: 24 MMOL/L (ref 23–31)
COLOR UR AUTO: ABNORMAL
CREAT SERPL-MCNC: 1.25 MG/DL (ref 0.72–1.25)
CREAT/UREA NIT SERPL: 24
EOSINOPHIL # BLD AUTO: 0.22 X10(3)/MCL (ref 0–0.9)
EOSINOPHIL NFR BLD AUTO: 3.3 %
ERYTHROCYTE [DISTWIDTH] IN BLOOD BY AUTOMATED COUNT: 16.1 % (ref 11.5–17)
GFR SERPLBLD CREATININE-BSD FMLA CKD-EPI: 59 ML/MIN/1.73/M2
GLOBULIN SER-MCNC: 4.7 GM/DL (ref 2.4–3.5)
GLUCOSE SERPL-MCNC: 100 MG/DL (ref 82–115)
GLUCOSE UR QL STRIP: NORMAL
HCT VFR BLD AUTO: 36.9 % (ref 42–52)
HGB BLD-MCNC: 11.9 G/DL (ref 14–18)
HGB UR QL STRIP: ABNORMAL
IMM GRANULOCYTES # BLD AUTO: 0.02 X10(3)/MCL (ref 0–0.04)
IMM GRANULOCYTES NFR BLD AUTO: 0.3 %
KETONES UR QL STRIP: NEGATIVE
LACTATE SERPL-SCNC: 1.4 MMOL/L (ref 0.5–2.2)
LEUKOCYTE ESTERASE UR QL STRIP: 500
LYMPHOCYTES # BLD AUTO: 1.27 X10(3)/MCL (ref 0.6–4.6)
LYMPHOCYTES NFR BLD AUTO: 18.9 %
MAGNESIUM SERPL-MCNC: 2.3 MG/DL (ref 1.6–2.6)
MCH RBC QN AUTO: 29.2 PG (ref 27–31)
MCHC RBC AUTO-ENTMCNC: 32.2 G/DL (ref 33–36)
MCV RBC AUTO: 90.4 FL (ref 80–94)
MONOCYTES # BLD AUTO: 0.54 X10(3)/MCL (ref 0.1–1.3)
MONOCYTES NFR BLD AUTO: 8 %
NEUTROPHILS # BLD AUTO: 4.64 X10(3)/MCL (ref 2.1–9.2)
NEUTROPHILS NFR BLD AUTO: 69.2 %
NITRITE UR QL STRIP: NEGATIVE
NRBC BLD AUTO-RTO: 0 %
PH UR STRIP: 5.5 [PH]
PLATELET # BLD AUTO: 231 X10(3)/MCL (ref 130–400)
PMV BLD AUTO: 9.4 FL (ref 7.4–10.4)
POCT GLUCOSE: 105 MG/DL (ref 70–110)
POTASSIUM SERPL-SCNC: 3.8 MMOL/L (ref 3.5–5.1)
PROT SERPL-MCNC: 7.8 GM/DL (ref 5.8–7.6)
PROT UR QL STRIP: ABNORMAL
RBC # BLD AUTO: 4.08 X10(6)/MCL (ref 4.7–6.1)
RBC #/AREA URNS AUTO: >100 /HPF
SODIUM SERPL-SCNC: 140 MMOL/L (ref 136–145)
SP GR UR STRIP.AUTO: 1.02 (ref 1–1.03)
SQUAMOUS #/AREA URNS LPF: ABNORMAL /HPF
TROPONIN I SERPL-MCNC: 0.03 NG/ML (ref 0–0.04)
UROBILINOGEN UR STRIP-ACNC: NORMAL
WBC # BLD AUTO: 6.71 X10(3)/MCL (ref 4.5–11.5)
WBC #/AREA URNS AUTO: ABNORMAL /HPF

## 2024-11-07 PROCEDURE — 82962 GLUCOSE BLOOD TEST: CPT

## 2024-11-07 PROCEDURE — 84484 ASSAY OF TROPONIN QUANT: CPT | Performed by: STUDENT IN AN ORGANIZED HEALTH CARE EDUCATION/TRAINING PROGRAM

## 2024-11-07 PROCEDURE — 93005 ELECTROCARDIOGRAM TRACING: CPT

## 2024-11-07 PROCEDURE — 81001 URINALYSIS AUTO W/SCOPE: CPT | Performed by: STUDENT IN AN ORGANIZED HEALTH CARE EDUCATION/TRAINING PROGRAM

## 2024-11-07 PROCEDURE — 80053 COMPREHEN METABOLIC PANEL: CPT | Performed by: STUDENT IN AN ORGANIZED HEALTH CARE EDUCATION/TRAINING PROGRAM

## 2024-11-07 PROCEDURE — 83735 ASSAY OF MAGNESIUM: CPT | Performed by: STUDENT IN AN ORGANIZED HEALTH CARE EDUCATION/TRAINING PROGRAM

## 2024-11-07 PROCEDURE — 87040 BLOOD CULTURE FOR BACTERIA: CPT | Performed by: STUDENT IN AN ORGANIZED HEALTH CARE EDUCATION/TRAINING PROGRAM

## 2024-11-07 PROCEDURE — 99285 EMERGENCY DEPT VISIT HI MDM: CPT | Mod: 25

## 2024-11-07 PROCEDURE — 83605 ASSAY OF LACTIC ACID: CPT | Performed by: STUDENT IN AN ORGANIZED HEALTH CARE EDUCATION/TRAINING PROGRAM

## 2024-11-07 PROCEDURE — 87077 CULTURE AEROBIC IDENTIFY: CPT | Performed by: STUDENT IN AN ORGANIZED HEALTH CARE EDUCATION/TRAINING PROGRAM

## 2024-11-07 PROCEDURE — 85025 COMPLETE CBC W/AUTO DIFF WBC: CPT | Performed by: STUDENT IN AN ORGANIZED HEALTH CARE EDUCATION/TRAINING PROGRAM

## 2024-11-07 PROCEDURE — 93010 ELECTROCARDIOGRAM REPORT: CPT | Mod: ,,, | Performed by: INTERNAL MEDICINE

## 2024-11-07 NOTE — ED PROVIDER NOTES
Encounter Date: 11/7/2024    SCRIBE #1 NOTE: I, Arcelia Jolly, am scribing for, and in the presence of,  Nicholas Escobar IV, MD. I have scribed the following portions of the note - Other sections scribed: HPI, ROS, PE.       History     Chief Complaint   Patient presents with    Diaphoretic     C/o weakness & dizziness x2 days. Pt coming from NH where pt was diaphoretic. Pt is diaphoretic in triage.  in triage. GCS 15.      The patient is a 78 year old male with hx of HTN and CVA presenting to the ED from nursing home due to intermittent diaphoresis episodes. Has known history of this - just admitted for workup and discharged to facility. On my evaluation patient has no complaints     The history is provided by the patient and medical records. The history is limited by the condition of the patient. No  was used.     Review of patient's allergies indicates:  No Known Allergies  Past Medical History:   Diagnosis Date    Hypertension     Stroke      Past Surgical History:   Procedure Laterality Date    INSERTION OF PACEMAKER       No family history on file.  Social History     Tobacco Use    Smoking status: Never    Smokeless tobacco: Never   Substance Use Topics    Alcohol use: Yes    Drug use: Never     Review of Systems   Unable to perform ROS: Mental status change   Gastrointestinal:  Positive for nausea.       Physical Exam     Initial Vitals [11/07/24 1506]   BP Pulse Resp Temp SpO2   97/60 83 20 98 °F (36.7 °C) 100 %      MAP       --         Physical Exam    Nursing note and vitals reviewed.  Constitutional: He is diaphoretic. No distress.   HENT:   Head: Normocephalic and atraumatic.   Neck: Neck supple.   Normal range of motion.  Cardiovascular:  Normal rate and regular rhythm.           Pulmonary/Chest: Breath sounds normal. No respiratory distress.   Abdominal: Abdomen is soft. He exhibits no distension. There is no abdominal tenderness.   Musculoskeletal:         General: No edema.       Cervical back: Normal range of motion and neck supple.     Neurological: He is alert. He has normal strength. He is disoriented. No cranial nerve deficit or sensory deficit.   Pt is awake.   Pt is at neurological baseline.    Skin: Skin is warm. Capillary refill takes less than 2 seconds.   Psychiatric: He has a normal mood and affect.         ED Course   Procedures  Labs Reviewed   COMPREHENSIVE METABOLIC PANEL - Abnormal       Result Value    Sodium 140      Potassium 3.8      Chloride 107      CO2 24      Glucose 100      Blood Urea Nitrogen 30.5 (*)     Creatinine 1.25      Calcium 9.4      Protein Total 7.8 (*)     Albumin 3.1 (*)     Globulin 4.7 (*)     Albumin/Globulin Ratio 0.7 (*)     Bilirubin Total 1.2      ALP 89      ALT 23      AST 21      eGFR 59      Anion Gap 9.0      BUN/Creatinine Ratio 24     URINALYSIS, REFLEX TO URINE CULTURE - Abnormal    Color, UA Dark-Brown (*)     Appearance, UA Turbid (*)     Specific Gravity, UA 1.021      pH, UA 5.5      Protein, UA 2+ (*)     Glucose, UA Normal      Ketones, UA Negative      Blood, UA 3+ (*)     Bilirubin, UA Negative      Urobilinogen, UA Normal      Nitrites, UA Negative      Leukocyte Esterase,  (*)     RBC, UA >100 (*)     WBC, UA 11-20 (*)     Bacteria, UA None Seen      Squamous Epithelial Cells, UA None Seen     CBC WITH DIFFERENTIAL - Abnormal    WBC 6.71      RBC 4.08 (*)     Hgb 11.9 (*)     Hct 36.9 (*)     MCV 90.4      MCH 29.2      MCHC 32.2 (*)     RDW 16.1      Platelet 231      MPV 9.4      Neut % 69.2      Lymph % 18.9      Mono % 8.0      Eos % 3.3      Basophil % 0.3      Lymph # 1.27      Neut # 4.64      Mono # 0.54      Eos # 0.22      Baso # 0.02      IG# 0.02      IG% 0.3      NRBC% 0.0     MAGNESIUM - Normal    Magnesium Level 2.30     TROPONIN I - Normal    Troponin-I 0.028     LACTIC ACID, PLASMA - Normal    Lactic Acid Level 1.4     BLOOD CULTURE [facility]   BLOOD CULTURE [facility]   CULTURE, URINE   CBC W/ AUTO DIFFERENTIAL     Narrative:     The following orders were created for panel order CBC auto differential.  Procedure                               Abnormality         Status                     ---------                               -----------         ------                     CBC with Differential[[id number]]       Abnormal            Final result                 Please view results for these tests on the individual orders.   POCT GLUCOSE    POCT Glucose 105            Imaging Results              X-Ray Chest AP Portable (Final result)  Result time 11/07/24 17:06:19      Final result by Prateek Kingsley MD (11/07/24 17:06:19)                   Narrative:    EXAMINATION  XR CHEST AP PORTABLE    CLINICAL HISTORY  Generalized hyperhidrosis    TECHNIQUE  A total of 2 frontal image(s) submitted of the chest.    COMPARISON  30 October 2024    FINDINGS  Lines/tubes/devices: Visualized lines/devices are in similar position.    The cardiac silhouette and central vascular structures are unchanged.  The trachea is midline. No new or worsening consolidation is developed in the interval.  There is no large pleural effusion or convincing pneumothorax.    Regional osseous structures and extrathoracic soft tissues are similar.    IMPRESSION  No acute process or other adverse interval change.      Electronically signed by: Prateek Kingsley  Date:    11/07/2024  Time:    17:06                                     Medications - No data to display  Medical Decision Making  78-year-old male with a known history of presyncopal/diaphoretic episodes that were thought to be autonomic dysfunction on recent admission and workup was discharged to nursing home at that time presents from nursing home for exhibiting signs of these episodes over there  I did not witness any these episodes here in the ER today he has been stable without complaints his workup has been negative  Will discharge back to facility at this time    Differential diagnosis includes but is  not limited to:  Acs, electrolyte derangement, arrythmia, autonomic dysfunction, infection     Problems Addressed:  Diaphoresis: chronic illness or injury with exacerbation, progression, or side effects of treatment    Amount and/or Complexity of Data Reviewed  Labs: ordered.  Radiology: ordered and independent interpretation performed.     Details: CXR - no obvious infiltrates, consolidations, pleural effusions, or pneumothorax.      ECG/medicine tests: ordered and independent interpretation performed.     Details: EKG normal sinus rhythm 88 no obvious ischemic changes motion artifact so difficult to interpret 1508            Scribe Attestation:   Scribe #1: I performed the above scribed service and the documentation accurately describes the services I performed. I attest to the accuracy of the note.    Attending Attestation:           Physician Attestation for Scribe:  Physician Attestation Statement for Scribe #1: I, Nicholas Escobar IV, MD, reviewed documentation, as scribed by Arcelia Jolly in my presence, and it is both accurate and complete.             ED Course as of 11/08/24 0051   Thu Nov 07, 2024 2143 Lindsey catheter was replaced here in the ER and initially bloody now clearing and no issues with draining patient just had an extensive admission and workup for these diaphoretic episodes.  Will discharge back to facility at this time as his workup here is negative he has been well-appearing the entire time he has been in the ER with normal vitals [AC]      ED Course User Index  [AC] Nicholas Escobar IV, MD                           Clinical Impression:  Final diagnoses:  [R61] Diaphoresis (Primary)  [R55] Pre-syncope          ED Disposition Condition    Discharge Stable          ED Prescriptions    None       Follow-up Information       Follow up With Specialties Details Why Contact Info    [facility] - Emergency Dept Emergency Medicine Go to  If symptoms worsen [address]  [place] [address]  [phone]    Primary care physician  Schedule an appointment as soon as possible for a visit   Follow up with you primary care physician.   If you do not have a primary care physician call [phone] to schedule an appointment.             Nicholas Escobar IV, MD  11/08/24 0051

## 2024-11-08 ENCOUNTER — DOCUMENT SCAN (OUTPATIENT)
Dept: HOME HEALTH SERVICES | Facility: HOSPITAL | Age: 79
End: 2024-11-08
Payer: MEDICARE

## 2024-11-08 ENCOUNTER — HOSPITAL ENCOUNTER (EMERGENCY)
Facility: HOSPITAL | Age: 79
Discharge: HOME | End: 2024-11-08
Attending: STUDENT IN AN ORGANIZED HEALTH CARE EDUCATION/TRAINING PROGRAM
Payer: MEDICARE

## 2024-11-08 VITALS
BODY MASS INDEX: 24.34 KG/M2 | HEIGHT: 70 IN | RESPIRATION RATE: 19 BRPM | HEART RATE: 78 BPM | DIASTOLIC BLOOD PRESSURE: 81 MMHG | WEIGHT: 170 LBS | OXYGEN SATURATION: 98 % | SYSTOLIC BLOOD PRESSURE: 117 MMHG | TEMPERATURE: 97 F

## 2024-11-08 DIAGNOSIS — R61 DIAPHORESIS: ICD-10-CM

## 2024-11-08 LAB
ALBUMIN SERPL-MCNC: 3.2 G/DL (ref 3.4–4.8)
ALBUMIN/GLOB SERPL: 0.7 RATIO (ref 1.1–2)
ALP SERPL-CCNC: 93 UNIT/L (ref 40–150)
ALT SERPL-CCNC: 23 UNIT/L (ref 0–55)
ANION GAP SERPL CALC-SCNC: 11 MEQ/L
AST SERPL-CCNC: 20 UNIT/L (ref 5–34)
BACTERIA #/AREA URNS AUTO: ABNORMAL /HPF
BASOPHILS # BLD AUTO: 0.05 X10(3)/MCL
BASOPHILS NFR BLD AUTO: 0.7 %
BILIRUB SERPL-MCNC: 1.2 MG/DL
BILIRUB UR QL STRIP.AUTO: NEGATIVE
BUN SERPL-MCNC: 26 MG/DL (ref 8.4–25.7)
CALCIUM SERPL-MCNC: 9.5 MG/DL (ref 8.8–10)
CAOX CRY UR QL COMP ASSIST: ABNORMAL
CHLORIDE SERPL-SCNC: 107 MMOL/L (ref 98–107)
CLARITY UR: ABNORMAL
CO2 SERPL-SCNC: 22 MMOL/L (ref 23–31)
COLOR UR AUTO: YELLOW
CREAT SERPL-MCNC: 1.14 MG/DL (ref 0.72–1.25)
CREAT/UREA NIT SERPL: 23
EOSINOPHIL # BLD AUTO: 0.24 X10(3)/MCL (ref 0–0.9)
EOSINOPHIL NFR BLD AUTO: 3.3 %
ERYTHROCYTE [DISTWIDTH] IN BLOOD BY AUTOMATED COUNT: 16.2 % (ref 11.5–17)
GFR SERPLBLD CREATININE-BSD FMLA CKD-EPI: >60 ML/MIN/1.73/M2
GLOBULIN SER-MCNC: 4.8 GM/DL (ref 2.4–3.5)
GLUCOSE SERPL-MCNC: 88 MG/DL (ref 82–115)
GLUCOSE UR QL STRIP: NORMAL
HCT VFR BLD AUTO: 37.5 % (ref 42–52)
HGB BLD-MCNC: 11.9 G/DL (ref 14–18)
HGB UR QL STRIP: ABNORMAL
HYALINE CASTS #/AREA URNS LPF: ABNORMAL /LPF
IMM GRANULOCYTES # BLD AUTO: 0.03 X10(3)/MCL (ref 0–0.04)
IMM GRANULOCYTES NFR BLD AUTO: 0.4 %
KETONES UR QL STRIP: NEGATIVE
LEUKOCYTE ESTERASE UR QL STRIP: 500
LYMPHOCYTES # BLD AUTO: 1.47 X10(3)/MCL (ref 0.6–4.6)
LYMPHOCYTES NFR BLD AUTO: 20.5 %
MCH RBC QN AUTO: 28.8 PG (ref 27–31)
MCHC RBC AUTO-ENTMCNC: 31.7 G/DL (ref 33–36)
MCV RBC AUTO: 90.8 FL (ref 80–94)
MONOCYTES # BLD AUTO: 0.58 X10(3)/MCL (ref 0.1–1.3)
MONOCYTES NFR BLD AUTO: 8.1 %
MUCOUS THREADS URNS QL MICRO: ABNORMAL /LPF
NEUTROPHILS # BLD AUTO: 4.8 X10(3)/MCL (ref 2.1–9.2)
NEUTROPHILS NFR BLD AUTO: 67 %
NITRITE UR QL STRIP: NEGATIVE
NRBC BLD AUTO-RTO: 0 %
OHS QRS DURATION: 124 MS
OHS QTC CALCULATION: 476 MS
PH UR STRIP: 5.5 [PH]
PLATELET # BLD AUTO: 242 X10(3)/MCL (ref 130–400)
PMV BLD AUTO: 9.2 FL (ref 7.4–10.4)
POCT GLUCOSE: 100 MG/DL (ref 70–110)
POTASSIUM SERPL-SCNC: 3.9 MMOL/L (ref 3.5–5.1)
PROT SERPL-MCNC: 8 GM/DL (ref 5.8–7.6)
PROT UR QL STRIP: ABNORMAL
RBC # BLD AUTO: 4.13 X10(6)/MCL (ref 4.7–6.1)
RBC #/AREA URNS AUTO: >100 /HPF
SODIUM SERPL-SCNC: 140 MMOL/L (ref 136–145)
SP GR UR STRIP.AUTO: 1.02 (ref 1–1.03)
SQUAMOUS #/AREA URNS LPF: ABNORMAL /HPF
TROPONIN I SERPL-MCNC: 0.04 NG/ML (ref 0–0.04)
TSH SERPL-ACNC: 2.54 UIU/ML (ref 0.35–4.94)
UROBILINOGEN UR STRIP-ACNC: NORMAL
WBC # BLD AUTO: 7.17 X10(3)/MCL (ref 4.5–11.5)
WBC #/AREA URNS AUTO: >100 /HPF

## 2024-11-08 PROCEDURE — 93010 ELECTROCARDIOGRAM REPORT: CPT | Mod: ,,, | Performed by: INTERNAL MEDICINE

## 2024-11-08 PROCEDURE — 93005 ELECTROCARDIOGRAM TRACING: CPT

## 2024-11-08 PROCEDURE — 81015 MICROSCOPIC EXAM OF URINE: CPT

## 2024-11-08 PROCEDURE — 80053 COMPREHEN METABOLIC PANEL: CPT

## 2024-11-08 PROCEDURE — 84484 ASSAY OF TROPONIN QUANT: CPT

## 2024-11-08 PROCEDURE — 99284 EMERGENCY DEPT VISIT MOD MDM: CPT | Mod: 25

## 2024-11-08 PROCEDURE — 84443 ASSAY THYROID STIM HORMONE: CPT

## 2024-11-08 PROCEDURE — 85025 COMPLETE CBC W/AUTO DIFF WBC: CPT

## 2024-11-08 NOTE — DISCHARGE INSTRUCTIONS
Thanks for letting use take care of you today! It is our goal to give you courteous care and to keep you comfortable and informed. If you have any questions before you leave I will be happy to try and answer them.     Advice after your visit:  Your visit in the emergency department is NOT definitive care - please follow-up with your primary care doctor and/or specialist within 1-2 days. If you do not have a primary care physician call [phone] to schedule an appointment. Please return if you have any worsening in your condition or if you have any other concerns.    Return to the emergency department if any worsening symptoms including fever, chest pain, difficulty breathing, weakness, numbness, tingling, nausea, vomiting, inability to eat, drink or take your medication, or any other new symptoms or concerns arise.      Please signup for MyChart as noted below in your paperwork to review all labwork, imaging results, and any other incidental findings from today's visit.     If you had radiology exams like an XRAY or CT in the emergency Department the interpreation on them may be preliminary - there may be less time sensitive findings on the reports please obtain these reports within 24 hours from the hospital or by using your out on your mobile phone to access records.  Bring these to your primary care doctor and/or specialist for further review of incidental findings.    Please review any LAB WORK from your visit today with your primary care physician.    If you were prescribed OPIATE PAIN MEDICATION - please understand of these medications can be addictive, you may fill less of the prescription was written for, you do not have to take the full prescription.  You may discard what you do not use.  Please seek help if you feel you are having problems with addiction.  Do not drive or operate heavy machinery if you are taking sedating medications.  Do not mix these medications with alcohol.      If you had a SPLINT  placed in the emergency department if you have severe pain numbness tingling or discoloration of year digits please remove the splint and return to the emergency department for further evaluation as this may represent a sign of compromise to the nerves or blood vessels due to swelling.    If you had SUTURES in the emergency department please have them removed in the prescribed time frame typically within 7-14 days.  You may shower but please do not bathe or swim.  Keep the wounds clean and dry and covered with a clean dressing.  Please return if he have any signs of infection like redness or drainage or pain at the suture site.    Please take the full course of  any ANTIBIOTICS you were prescribed - incomplete courses of antibiotics can cause resistance to antibiotics in the future which will make it difficult to treat any infections you may have.

## 2024-11-08 NOTE — ED PROVIDER NOTES
Encounter Date: 11/8/2024       History     Chief Complaint   Patient presents with    Medical Evaluation     Pt arrived [facility], picked up from [facility] d/t pt being diaphoretic & pale. Pt was seen here yesterday for the same complaint. Pt denies pain at this time. .      The patient is a 78 year old male with hx of HTN and CVA presenting to the ED  due to intermittent diaphoresis episodes. Has known history of this, several ER visits for the  same complaint- just admitted for workup and discharged to facility. On my evaluation patient has no complaints, He is not understanding why he is here. As per EMS patient was  from [facility] to be evaluated for diaphoretic and looking pale.         Review of patient's allergies indicates:  No Known Allergies  Past Medical History:   Diagnosis Date    Hypertension     Stroke      Past Surgical History:   Procedure Laterality Date    INSERTION OF PACEMAKER       No family history on file.  Social History     Tobacco Use    Smoking status: Never    Smokeless tobacco: Never   Substance Use Topics    Alcohol use: Yes    Drug use: Never     Review of Systems   Constitutional:  Negative for fever.   HENT:  Negative for sore throat.    Respiratory:  Negative for shortness of breath.    Cardiovascular:  Negative for chest pain.   Gastrointestinal:  Negative for nausea.   Genitourinary:  Negative for dysuria.   Musculoskeletal:  Negative for back pain.   Skin:  Negative for rash.   Neurological:  Negative for weakness.   Hematological:  Does not bruise/bleed easily.       Physical Exam     Initial Vitals [11/08/24 1537]   BP Pulse Resp Temp SpO2   (!) 152/89 80 16 97.2 °F (36.2 °C) 97 %      MAP       --         Physical Exam    Constitutional: He is diaphoretic.   Neck: Neck supple.   Cardiovascular:  Normal rate.           Pulmonary/Chest: Breath sounds normal.   Abdominal: Abdomen is soft. He exhibits no distension. There is no abdominal tenderness.   Musculoskeletal:       Cervical back: Neck supple.      Comments: Unable to perform ROS: Mental status change      Skin: Skin is warm.         ED Course   Procedures  Labs Reviewed   COMPREHENSIVE METABOLIC PANEL - Abnormal       Result Value    Sodium 140      Potassium 3.9      Chloride 107      CO2 22 (*)     Glucose 88      Blood Urea Nitrogen 26.0 (*)     Creatinine 1.14      Calcium 9.5      Protein Total 8.0 (*)     Albumin 3.2 (*)     Globulin 4.8 (*)     Albumin/Globulin Ratio 0.7 (*)     Bilirubin Total 1.2      ALP 93      ALT 23      AST 20      eGFR >60      Anion Gap 11.0      BUN/Creatinine Ratio 23     URINALYSIS, REFLEX TO URINE CULTURE - Abnormal    Color, UA Yellow      Appearance, UA Turbid (*)     Specific Gravity, UA 1.025      pH, UA 5.5      Protein, UA 2+ (*)     Glucose, UA Normal      Ketones, UA Negative      Blood, UA 3+ (*)     Bilirubin, UA Negative      Urobilinogen, UA Normal      Nitrites, UA Negative      Leukocyte Esterase,  (*)     RBC, UA >100 (*)     WBC, UA >100 (*)     Bacteria, UA Moderate (*)     Squamous Epithelial Cells, UA None Seen      Mucous, UA Few (*)     Hyaline Casts, UA 0-2 (*)     Calcium Oxalate Crystals, UA Occasional (*)    CBC WITH DIFFERENTIAL - Abnormal    WBC 7.17      RBC 4.13 (*)     Hgb 11.9 (*)     Hct 37.5 (*)     MCV 90.8      MCH 28.8      MCHC 31.7 (*)     RDW 16.2      Platelet 242      MPV 9.2      Neut % 67.0      Lymph % 20.5      Mono % 8.1      Eos % 3.3      Basophil % 0.7      Lymph # 1.47      Neut # 4.80      Mono # 0.58      Eos # 0.24      Baso # 0.05      IG# 0.03      IG% 0.4      NRBC% 0.0     TROPONIN I - Normal    Troponin-I 0.042     TSH - Normal    TSH 2.545     CBC W/ AUTO DIFFERENTIAL    Narrative:     The following orders were created for panel order CBC auto differential.  Procedure                               Abnormality         Status                     ---------                               -----------         ------                      CBC with Differential[[id number]]       Abnormal            Final result                 Please view results for these tests on the individual orders.     EKG Readings: (Independently Interpreted)   Rhythm: Normal Sinus Rhythm. Heart Rate: 72.   ECG/medicine tests: ordered and independent interpretation performed.     Details: EKG normal sinus rhythm 77  no obvious ischemic changes motion artifact so difficult to interpret        Imaging Results    None          Medications - No data to display  Medical Decision Making  78-year-old male with a known history of presyncopal/diaphoretic episodes that were thought to be autonomic dysfunction on recent admission and workup was discharged to nursing home at that time presents from nursing home for exhibiting signs of these episodes over there    Amount and/or Complexity of Data Reviewed  Labs:  Decision-making details documented in ED Course.  Radiology: ordered.     Details: Reviewed hemoglobin.  Troponin is normal 0.042 CO2 is 22 creatinine 1.14.  Chest x-ray no acute findings no changes.  Discussion of management or test interpretation with external provider(s): Patient is walking around the ED, he is tolerating fluids and solids well.  I did not witness any these episodes here in the ER today he has been stable without complaints his workup has been negative  Will discharge back to facility at this time  I discussed case with Rhiannon with [facility].  Rhiannon advised for patient to follow up with [facility] on Monday.  Specific return instructions provided to patient and nursing home.      Risk  OTC drugs.               ED Course as of 11/08/24 1850   Fri Nov 08, 2024   1634 WBC: 7.17 [YG]   1635 Hemoglobin(!): 11.9 [YG]   1708 TSH: 2.545 [YG]   1711 Troponin I: 0.042 [YG]   1711 Potassium: 3.9 [YG]   1711 CO2(!): 22 [YG]   1711 Creatinine: 1.14 [YG]   1711 Hemoglobin(!): 11.9 [YG]      ED Course User Index  [YG] Alyssia Schaeffer PA          Judging by the patient's chief complaint  and pertinent history, the patient has the following possible differential diagnoses, including but not limited to the following.  Some of these are deemed to be lower likelihood and some more likely based on my physical exam and history combined with possible lab work and/or imaging studies.   Please see the pertinent studies, and refer to the HPI.  Some of these diagnoses will take further evaluation to fully rule out, perhaps as an outpatient and the patient was encouraged to follow up when discharged for more comprehensive evaluation.  Acs, electrolyte derangement, arrythmia, autonomic dysfunction, infection                    Clinical Impression:  Final diagnoses:  [R61] Diaphoresis          ED Disposition Condition    Discharge Stable          ED Prescriptions    None       Follow-up Information       Follow up With Specialties Details Why Contact Info    [facility] Emergency Medicine  If symptoms worsen [id number] [facility] [address]  [phone]             Alyssia Schaeffer PA  11/08/24 9612

## 2024-11-09 LAB
OHS QRS DURATION: 112 MS
OHS QTC CALCULATION: 466 MS

## 2024-11-10 LAB — BACTERIA UR CULT: ABNORMAL

## 2024-11-12 LAB
BACTERIA BLD CULT: NORMAL
BACTERIA BLD CULT: NORMAL

## 2025-03-09 NOTE — Clinical Note
Diagnosis: Diaphoresis [924075]   Future Attending Provider: SEEMA RICE [[id number]]   Admit to which facility:: [facility] [[id number]]   Reason for IP Medical Treatment  (Clinical interventions that can only be accomplished in the IP setting? ) :: observation   Plans for Post-Acute care--if anticipated (pick the single best option):: A. No post acute care anticipated at this time   Special Needs:: No Special Needs [1]  
Yes

## 2025-06-12 NOTE — NURSING
"Pt had another hypotensive episode with BP 90/54 MAP 66 and diaphoretic, applied another cold compress to head and neck and placed ice pack under pt arm. At first pt stated, "I feel funny". After applying the cool compress and monitoring for a while pt BP went back up to 114/73 and pt stated that he feels better. EKG is being done at this time. Prashant MANZANARES notified  " Is This A New Presentation, Or A Follow-Up?: Growth